# Patient Record
Sex: FEMALE | Race: WHITE | NOT HISPANIC OR LATINO | Employment: UNEMPLOYED | URBAN - METROPOLITAN AREA
[De-identification: names, ages, dates, MRNs, and addresses within clinical notes are randomized per-mention and may not be internally consistent; named-entity substitution may affect disease eponyms.]

---

## 2017-04-14 DIAGNOSIS — G40.219 LOCALIZATION-RELATED EPILEPSY WITH COMPLEX PARTIAL SEIZURES WITH INTRACTABLE EPILEPSY (H): Primary | ICD-10-CM

## 2017-04-14 RX ORDER — CARBAMAZEPINE 300 MG/1
CAPSULE, EXTENDED RELEASE ORAL
Qty: 120 CAPSULE | Refills: 0 | Status: SHIPPED | OUTPATIENT
Start: 2017-04-14 | End: 2017-05-16

## 2017-04-26 ENCOUNTER — OFFICE VISIT (OUTPATIENT)
Dept: NEUROLOGY | Facility: CLINIC | Age: 37
End: 2017-04-26

## 2017-04-26 VITALS
SYSTOLIC BLOOD PRESSURE: 124 MMHG | DIASTOLIC BLOOD PRESSURE: 43 MMHG | BODY MASS INDEX: 20.47 KG/M2 | HEART RATE: 96 BPM | WEIGHT: 123 LBS

## 2017-04-26 DIAGNOSIS — G40.219 PARTIAL EPILEPSY WITH IMPAIRMENT OF CONSCIOUSNESS, INTRACTABLE (H): Primary | ICD-10-CM

## 2017-04-26 LAB
ALBUMIN SERPL-MCNC: 3.8 G/DL (ref 3.4–5)
ALP SERPL-CCNC: 157 U/L (ref 40–150)
ALT SERPL W P-5'-P-CCNC: 25 U/L (ref 0–50)
ANION GAP SERPL CALCULATED.3IONS-SCNC: 11 MMOL/L (ref 3–14)
AST SERPL W P-5'-P-CCNC: 29 U/L (ref 0–45)
BASOPHILS # BLD AUTO: 0.1 10E9/L (ref 0–0.2)
BASOPHILS NFR BLD AUTO: 0.8 %
BILIRUB SERPL-MCNC: 0.3 MG/DL (ref 0.2–1.3)
BUN SERPL-MCNC: 12 MG/DL (ref 7–30)
CALCIUM SERPL-MCNC: 8.6 MG/DL (ref 8.5–10.1)
CHLORIDE SERPL-SCNC: 107 MMOL/L (ref 94–109)
CO2 SERPL-SCNC: 28 MMOL/L (ref 20–32)
CREAT SERPL-MCNC: 0.68 MG/DL (ref 0.52–1.04)
DIFFERENTIAL METHOD BLD: ABNORMAL
EOSINOPHIL # BLD AUTO: 0.1 10E9/L (ref 0–0.7)
EOSINOPHIL NFR BLD AUTO: 1.3 %
ERYTHROCYTE [DISTWIDTH] IN BLOOD BY AUTOMATED COUNT: 19.2 % (ref 10–15)
GFR SERPL CREATININE-BSD FRML MDRD: ABNORMAL ML/MIN/1.7M2
GLUCOSE SERPL-MCNC: 77 MG/DL (ref 70–99)
HCT VFR BLD AUTO: 29.2 % (ref 35–47)
HGB BLD-MCNC: 7.7 G/DL (ref 11.7–15.7)
IMM GRANULOCYTES # BLD: 0 10E9/L (ref 0–0.4)
IMM GRANULOCYTES NFR BLD: 0.3 %
LYMPHOCYTES # BLD AUTO: 1.2 10E9/L (ref 0.8–5.3)
LYMPHOCYTES NFR BLD AUTO: 20.1 %
MCH RBC QN AUTO: 17 PG (ref 26.5–33)
MCHC RBC AUTO-ENTMCNC: 26.4 G/DL (ref 31.5–36.5)
MCV RBC AUTO: 64 FL (ref 78–100)
MONOCYTES # BLD AUTO: 0.5 10E9/L (ref 0–1.3)
MONOCYTES NFR BLD AUTO: 7.5 %
NEUTROPHILS # BLD AUTO: 4.2 10E9/L (ref 1.6–8.3)
NEUTROPHILS NFR BLD AUTO: 70 %
NRBC # BLD AUTO: 0 10*3/UL
NRBC BLD AUTO-RTO: 0 /100
PLATELET # BLD AUTO: 431 10E9/L (ref 150–450)
POTASSIUM SERPL-SCNC: 4.6 MMOL/L (ref 3.4–5.3)
PROT SERPL-MCNC: 8.3 G/DL (ref 6.8–8.8)
RBC # BLD AUTO: 4.54 10E12/L (ref 3.8–5.2)
SODIUM SERPL-SCNC: 145 MMOL/L (ref 133–144)
WBC # BLD AUTO: 6 10E9/L (ref 4–11)

## 2017-04-26 NOTE — MR AVS SNAPSHOT
After Visit Summary   4/26/2017    Vernell Logan    MRN: [id number]           Patient Information     Date Of Birth          1980        Visit Information        Provider Department      4/26/2017 1:30 PM Ashli Wilkinson MD [facility]        Today's Diagnoses     Partial epilepsy with impairment of consciousness, intractable (H)    -  1       Follow-ups after your visit        Your next 10 appointments already scheduled     Oct 27, 2017 11:30 AM CDT   Return Visit with MD [facility] ([facility])    [address], [address]  [facility] [id number] [phone]              Who to contact     Please call your clinic at [phone] to:    Ask questions about your health    Make or cancel appointments    Discuss your medicines    Learn about your test results    Speak to your doctor   If you have compliments or concerns about an experience at your clinic, or if you wish to file a complaint, please contact [facility] Patient Relations at [phone] or email us at [email or web address]         Additional Information About Your Visit        MyChart Information     [employer] gives you secure access to your electronic health record. If you see a primary care provider, you can also send messages to your care team and make appointments. If you have questions, please call your primary care clinic.  If you do not have a primary care provider, please call [phone] and they will assist you.      [employer] is an electronic gateway that provides easy, online access to your medical records. With [employer], you can request a clinic appointment, read your test results, renew a prescription or communicate with your care team.     To access your existing account, please contact your [facility] or call [phone] for assistance.        Care EveryWhere ID     This is your Care EveryWhere ID.  This could be used by other organizations to access your [facility] medical records  [id number]        Your Vitals Were     Pulse Breastfeeding? BMI (Body Mass Index)             96 No 20.47 kg/m2          Blood Pressure from Last 3 Encounters:   04/26/17 124/43   08/17/16 121/60   04/13/16 125/67    Weight from Last 3 Encounters:   04/26/17 123 lb (55.8 kg)   08/17/16 121 lb 3.2 oz (55 kg)   04/13/16 111 lb 9.6 oz (50.6 kg)              We Performed the Following     Carbamazepine and epoxide free and total     CBC with platelets differential     Comprehensive metabolic panel     Lamotrigine level     Levetiracetam level        Primary Care Provider Office Phone # Fax #    Juliana Harris [phone]       [facility] [address] [facility] [address]        Thank you!     Thank you for choosing [facility]  for your care. Our goal is always to provide you with excellent care. Hearing back from our patients is one way we can continue to improve our services. Please take a few minutes to complete the written survey that you may receive in the mail after your visit with us. Thank you!             Your Updated Medication List - Protect others around you: Learn how to safely use, store and throw away your medicines at www.disposemymeds.org.          This list is accurate as of: 4/26/17  2:49 PM.  Always use your most recent med list.                   Brand Name Dispense Instructions for use    ascorbic acid 1000 MG Tabs    vitamin C     1 TABLET DAILY AT DINNER       carBAMazepine 300 MG 12 hr capsule    CARBATROL    120 capsule    TAKE 2 CAPSULES (600 MG) BY MOUTH 2 TIMES DAILY       IBUPROFEN      as needed       lamoTRIgine 100 MG tablet    LaMICtal    210 tablet    Take 2 tablets 8 am and 3 tablet at 1pm and 2 tablet 8 pm       levETIRAcetam 500 MG tablet    KEPPRA    150 tablet    TAKE 2.5 TABLETS BY MOUTH TWICE DAILY       TYLENOL PO      Take by mouth as needed for mild pain  or fever

## 2017-04-26 NOTE — PROGRESS NOTES
"[facility] Progress Note    Patient:  Vernell Logan  :  1980   Age:  36 year old   Today's Office Visit:  2017    Epilepsy Data:  Patient History  Primary Epileptologist/Provider: Ashli Wilkinson M.D.  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 Last 5 minutes.    # of Type 1 Seizure since last visit: 1  Freq. Type 1 / Month: 0.12  Seizure Type 2: Partial seizures with secondary generalization  -  with complex partial seizures evolving to generalized seizures  Description of Sz Type 2: Stares off -> GTC  # of Type 2 Seizure since last visit: 0  Freq. Type 2 / Month: 0      EPILEPSY HISTORY: Onset of seizures was 14 years of age. Based on electrographic data 2012, the patient had frequent nonconvulsive seizures arising from the right and left temporal lobe, bitemporal lobe interictal discharges. Her MRI is normal. Her PET scan was remarkable for decreased metabolic activity in the left temporal lobe. Prior  she rarely had seizure. From 5046-3366 she had several seizure per week. She had antiepileptic drug   INTERVAL HISTORY:  Came alone.  She had one partial seizure on 2017 (difficulty getting words out, feels confused, but, she is aware if someone is talking to her, she has difficulty with comprehension, she states \"it sounds like they are repeating a word over " "and over\"). Prior to this her last seizure was 7/2016. No generalized tonic-clonic convulsion, last generalized tonic-clonic convulsion 2014. She had double vision on lamotrigine 700 mg per day and decrease lamotrigine 200 mg three times a day on her own. After lowering lamotrigine she has less side effects and feels better. She has moved to [place] into a new apartment. Currently, on antiepileptic drug there is no double vision, no mood changes, no nausea, no vomiting, no abdominal pain, no rashes. No recent ER visits and no hospitalizations since last visit.        Prior to Admission medications    Medication Sig Start Date End Date Taking? Authorizing Provider   levETIRAcetam (KEPPRA) 500 MG tablet TAKE TWO AND ONE-HALF TABLETS BY MOUTH TWICE DAILY  Patient taking differently: 500 mg TAKE TWO AND ONE-HALF TABLETS BY MOUTH TWICE DAILY 4/13/16  Yes Ashli Wilkinson MD   lamoTRIgine (LAMICTAL) 100 MG tablet Take 2 tab po at 8 am and 3 tab po at 1 pm and 2 tab po 8 pm  Patient taking differently: 100 mg Take 2 tab po at 8 am and 3 tab po at 1 pm and 2 tab po 8 pm 4/13/16  Yes Ashli Wilkinson MD   carBAMazepine (CARBATROL) 300 MG 12 hr capsule TAKE 2 CAPSULES (600 MG) BY MOUTH 2 TIMES DAILY  Patient taking differently: 300 mg TAKE 2 CAPSULES (600 MG) BY MOUTH 2 TIMES DAILY 4/13/16  Yes Ashli Wilkinson MD   Acetaminophen (TYLENOL PO) Take by mouth as needed for mild pain or fever   Yes Reported, Patient   VITAMIN C 1000 MG OR TABS 1 TABLET DAILY AT DINNER   Yes Reported, Patient   IBUPROFEN as needed   Yes Reported, Patient       MEDICATIONS:   1.  Tegretol- mg AM and 600 mg PM    2.  Lamotrigine 200 mg in the morning, 200 mg 1pm, 200 mg at night.   3.  Keppra 1250 mg twice a day (she is not able to tolerate 1500 mg tablets).     Premenopause Females Questions:     Age menstraution started 9     Are menstrual cycles regular every month? yes    Do seizures increase during menses? No    Are you pregnant? No    Are " you breast feeding? No    Are you planning to get pregnant No    Are you taking any homone medications or contraception. No    Have you had any Ob/GYN surgeries? Yes - .    She had one pregnancy at age 16 and had a . . She had one generalized tonic-clonic convulsion during that pregnancy, she was on monotherapy carbamazepine 400 mg twice a day, baby had cerebral palsy. She passed away at age 17. Vernell was not involved with her care and child was adopted.    She is sexually active and not using contraception     MEDICATION NOTE: She cannot tolerate 1500 mg Keppra at a time as she gets hallucinations and hears voices, so we are not able to optimize Keppra any further. Carbamazepine increased to 900mg /day 2014    REVIEW OF SYSTEMS:  Intermittent dizziness.  diplopia improved.  B/l hand tremor. + Anxiety. Menses are irregular, she may have a menses every 40-50 days. She would like to get pregnant.      SOCIAL HISTORY:  Unfortunately, her  Ricardo passed away 2015. She is not working. Moved to [place]. She is trying to find a job. She smokes 1 pack of cigarettes per day. She is dating and is sexually active and not using contraception, we reviewed the importance of safe sex today.      PHYSICAL EXAMINATION:  /43  Pulse 96  Wt 123 lb (55.8 kg)  Breastfeeding? No  BMI 20.47 kg/m2  She  is alert and oriented to person, place and time.  Face is symmetric.  Extraocular movements intact.  Strength is 5/5.  She does have a tremor in her hands bilaterally.  Gait is stable.       ASSESSMENT:   1.  Localization-related epilepsy, uncontrolled (rare seizures), etiology unclear (MRI is nonlesional).  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  She is currently stable with her antiepileptic drug and had one complex partial seizure 2017  and last generalized tonic-clonic convulsion was . We will continue current antiepileptic drug with no  "changes. She is not able to tolerate higher doses of lamotrigine, levetiracetam, or carbamazepine. Antiepileptic drug that may be considered in future: banzel, onfi, fycompa, felbamate. I would avoid additional Na+ blocking agents.      2.  Women care issues. We have discussed  increased teratogenicity risks on multiple AEDs and on high doses.  She is sexually active. Despite education on contraception she does not want to use them and states \"I will not get pregnant\".       3.  Anxiety and depression.  Stable. She does not want to see a mental health provider at this time. Eating disorder is stable, she is eating three meals per day.      4.  Driving.  The patient does drive. Last seizure was 1/2017     PLAN:   1. Continue carbamazepine, lamotrigine and levetiracetam as above. Script for one year sent.   2. Follow up 6 month  3. Enrolled in UMPgenotyping study      PEMA THORNE MD       I spent 25 minutes with the patient and family. During this time counseling and coordination of care exceeded 50% of the visit time. I addressed all questions and concerns the family raised in regards to the patients care.             "

## 2017-04-26 NOTE — LETTER
2017       RE: Vernell Logan  : 1980   MRN: [id number]      Dear Colleague,    Thank you for referring your patient, Vernell Logan, to the [facility] at [facility]. Please see a copy of my visit note below.    [facility]    Patient:  Vernell Logan  :  1980   Age:  36 year old   Today's Office Visit:  2017    Epilepsy Data:  Patient History  Primary Epileptologist/Provider: Ashli Wilkinson M.D.  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 Last 5 minutes.    # of Type 1 Seizure since last visit: 1  Freq. Type 1 / Month: 0.12  Seizure Type 2: Partial seizures with secondary generalization  -  with complex partial seizures evolving to generalized seizures  Description of Sz Type 2: Stares off -> GTC  # of Type 2 Seizure since last visit: 0  Freq. Type 2 / Month: 0      EPILEPSY HISTORY: Onset of seizures was 14 years of age. Based on electrographic data 2012, the patient had frequent nonconvulsive seizures arising from the right and left temporal lobe, bitemporal lobe interictal discharges. Her MRI is normal. Her PET scan was remarkable for decreased metabolic activity in the left temporal lobe. Prior  she rarely had seizure. From 1661-0022 she had several seizure per week. She had antiepileptic  "drug   INTERVAL HISTORY:  Came alone.  She had one partial seizure on 1/2017 (difficulty getting words out, feels confused, but, she is aware if someone is talking to her, she has difficulty with comprehension, she states \"it sounds like they are repeating a word over and over\"). Prior to this her last seizure was 7/2016. No generalized tonic-clonic convulsion, last generalized tonic-clonic convulsion 2014. She had double vision on lamotrigine 700 mg per day and decrease lamotrigine 200 mg three times a day on her own. After lowering lamotrigine she has less side effects and feels better. She has moved to [place] into a new apartment. Currently, on antiepileptic drug there is no double vision, no mood changes, no nausea, no vomiting, no abdominal pain, no rashes. No recent ER visits and no hospitalizations since last visit.        Prior to Admission medications    Medication Sig Start Date End Date Taking? Authorizing Provider   levETIRAcetam (KEPPRA) 500 MG tablet TAKE TWO AND ONE-HALF TABLETS BY MOUTH TWICE DAILY  Patient taking differently: 500 mg TAKE TWO AND ONE-HALF TABLETS BY MOUTH TWICE DAILY 4/13/16  Yes Ashli Wilkinson MD   lamoTRIgine (LAMICTAL) 100 MG tablet Take 2 tab po at 8 am and 3 tab po at 1 pm and 2 tab po 8 pm  Patient taking differently: 100 mg Take 2 tab po at 8 am and 3 tab po at 1 pm and 2 tab po 8 pm 4/13/16  Yes Ashli Wilkinson MD   carBAMazepine (CARBATROL) 300 MG 12 hr capsule TAKE 2 CAPSULES (600 MG) BY MOUTH 2 TIMES DAILY  Patient taking differently: 300 mg TAKE 2 CAPSULES (600 MG) BY MOUTH 2 TIMES DAILY 4/13/16  Yes Ashli Wilkinson MD   Acetaminophen (TYLENOL PO) Take by mouth as needed for mild pain or fever   Yes Reported, Patient   VITAMIN C 1000 MG OR TABS 1 TABLET DAILY AT DINNER   Yes Reported, Patient   IBUPROFEN as needed   Yes Reported, Patient       MEDICATIONS:   1.  Tegretol- mg AM and 600 mg PM    2.  Lamotrigine 200 mg in the morning, 200 mg 1pm, 200 mg at night. "   3.  Keppra 1250 mg twice a day (she is not able to tolerate 1500 mg tablets).     Premenopause Females Questions:     Age menstraution started 9     Are menstrual cycles regular every month? yes    Do seizures increase during menses? No    Are you pregnant? No    Are you breast feeding? No    Are you planning to get pregnant No    Are you taking any homone medications or contraception. No    Have you had any Ob/GYN surgeries? Yes - .    She had one pregnancy at age 16 and had a . . She had one generalized tonic-clonic convulsion during that pregnancy, she was on monotherapy carbamazepine 400 mg twice a day, baby had cerebral palsy. She passed away at age 17. Vernell was not involved with her care and child was adopted.    She is sexually active and not using contraception     MEDICATION NOTE: She cannot tolerate 1500 mg Keppra at a time as she gets hallucinations and hears voices, so we are not able to optimize Keppra any further. Carbamazepine increased to 900mg /day 2014    REVIEW OF SYSTEMS:  Intermittent dizziness.  diplopia improved.  B/l hand tremor. + Anxiety. Menses are irregular, she may have a menses every 40-50 days. She would like to get pregnant.      SOCIAL HISTORY:  Unfortunately, her  Ricardo passed away 2015. She is not working. Moved to [place]. She is trying to find a job. She smokes 1 pack of cigarettes per day. She is dating and is sexually active and not using contraception, we reviewed the importance of safe sex today.      PHYSICAL EXAMINATION:  /43  Pulse 96  Wt 123 lb (55.8 kg)  Breastfeeding? No  BMI 20.47 kg/m2  She  is alert and oriented to person, place and time.  Face is symmetric.  Extraocular movements intact.  Strength is 5/5.  She does have a tremor in her hands bilaterally.  Gait is stable.       ASSESSMENT:   1.  Localization-related epilepsy, uncontrolled (rare seizures), etiology unclear (MRI is nonlesional).  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  She is currently stable with her antiepileptic drug and had one complex partial seizure 1/2017  and last generalized tonic-clonic convulsion was 2014. We will continue current antiepileptic drug with no changes. She is not able to tolerate higher doses of lamotrigine, levetiracetam, or carbamazepine. Antiepileptic drug that may be considered in future: banzel, onfi, fycompa, felbamate. I would avoid additional Na+ blocking agents.      2.  Women care issues. We have discussed  increased teratogenicity risks on multiple AEDs and on high doses.  She is sexually active. Despite education on contraception she does not want to use them and states \"I will not get pregnant\".       3.  Anxiety and depression.  Stable. She does not want to see a mental health provider at this time. Eating disorder is stable, she is eating three meals per day.      4.  Driving.  The patient does drive. Last seizure was 1/2017     PLAN:   1. Continue carbamazepine, lamotrigine and levetiracetam as above. Script for one year sent.   2. Follow up 6 month  3. Enrolled in UMenotyping study      ASHLI THORNE MD       I spent 25 minutes with the patient and family. During this time counseling and coordination of care exceeded 50% of the visit time. I addressed all questions and concerns the family raised in regards to the patients care.        Again, thank you for allowing me to participate in the care of your patient.      Sincerely,    Ashli Thorne MD      "

## 2017-04-28 LAB
CARBAMAZEPINE EP FREE SERPL-MCNC: 1 UG/ML
CARBAMAZEPINE EP SERPL-MCNC: 2.2 UG/ML
CARBAMAZEPINE FREE SERPL-MCNC: 2 UG/ML
CARBAMAZEPINE SERPL-MCNC: 9.1 UG/ML
LAMOTRIGINE SERPL-MCNC: 5.3 UG/ML
LEVETIRACETAM SERPL-MCNC: 22 UG/ML

## 2017-05-16 DIAGNOSIS — G40.219 LOCALIZATION-RELATED EPILEPSY WITH COMPLEX PARTIAL SEIZURES WITH INTRACTABLE EPILEPSY (H): ICD-10-CM

## 2017-05-16 RX ORDER — CARBAMAZEPINE 300 MG/1
CAPSULE, EXTENDED RELEASE ORAL
Qty: 120 CAPSULE | Refills: 11 | Status: SHIPPED | OUTPATIENT
Start: 2017-05-16 | End: 2017-10-12

## 2017-05-26 ENCOUNTER — TELEPHONE (OUTPATIENT)
Dept: NEUROLOGY | Facility: CLINIC | Age: 37
End: 2017-05-26

## 2017-05-26 NOTE — TELEPHONE ENCOUNTER
Writer received message:  Nurse please call patient her H/H is low and Alk Phos is high. She should see her primary care provider for this. Otherwise her labs look good.    Writer called patient and gave her this information.  She asked about Hematocrit and writer explained that it goes along with Hgb, patient expressed understanding and thanked writer for call.

## 2017-06-24 ENCOUNTER — HEALTH MAINTENANCE LETTER (OUTPATIENT)
Age: 37
End: 2017-06-24

## 2017-07-15 DIAGNOSIS — G40.219 LOCALIZATION-RELATED EPILEPSY WITH COMPLEX PARTIAL SEIZURES WITH INTRACTABLE EPILEPSY (H): ICD-10-CM

## 2017-07-17 RX ORDER — LEVETIRACETAM 500 MG/1
TABLET ORAL
Qty: 150 TABLET | Refills: 3 | Status: SHIPPED | OUTPATIENT
Start: 2017-07-17 | End: 2017-10-09

## 2017-07-17 RX ORDER — LAMOTRIGINE 100 MG/1
TABLET ORAL
Qty: 210 TABLET | Refills: 3 | Status: SHIPPED | OUTPATIENT
Start: 2017-07-17 | End: 2017-10-12

## 2017-10-09 DIAGNOSIS — G40.219 LOCALIZATION-RELATED EPILEPSY WITH COMPLEX PARTIAL SEIZURES WITH INTRACTABLE EPILEPSY (H): ICD-10-CM

## 2017-10-09 RX ORDER — LEVETIRACETAM 500 MG/1
TABLET ORAL
Qty: 150 TABLET | Refills: 0 | Status: SHIPPED | OUTPATIENT
Start: 2017-10-09 | End: 2017-10-12

## 2017-10-12 ENCOUNTER — OFFICE VISIT (OUTPATIENT)
Dept: NEUROLOGY | Facility: CLINIC | Age: 37
End: 2017-10-12

## 2017-10-12 VITALS
SYSTOLIC BLOOD PRESSURE: 127 MMHG | BODY MASS INDEX: 18.29 KG/M2 | WEIGHT: 109.8 LBS | HEART RATE: 96 BPM | DIASTOLIC BLOOD PRESSURE: 62 MMHG | HEIGHT: 65 IN

## 2017-10-12 DIAGNOSIS — G40.219 LOCALIZATION-RELATED EPILEPSY WITH COMPLEX PARTIAL SEIZURES WITH INTRACTABLE EPILEPSY (H): ICD-10-CM

## 2017-10-12 RX ORDER — LAMOTRIGINE 100 MG/1
TABLET ORAL
Qty: 540 TABLET | Refills: 3 | Status: SHIPPED | OUTPATIENT
Start: 2017-10-12 | End: 2017-11-02

## 2017-10-12 RX ORDER — CARBAMAZEPINE 300 MG/1
CAPSULE, EXTENDED RELEASE ORAL
Qty: 360 CAPSULE | Refills: 3 | Status: SHIPPED | OUTPATIENT
Start: 2017-10-12 | End: 2018-02-23

## 2017-10-12 RX ORDER — LEVETIRACETAM 500 MG/1
TABLET ORAL
Qty: 450 TABLET | Refills: 3 | Status: SHIPPED | OUTPATIENT
Start: 2017-10-12 | End: 2017-11-02

## 2017-10-12 NOTE — PATIENT INSTRUCTIONS
Medication Name   Tablet Size        8 AM  (morning)  2pm  8PM (Night)   Notes   Generic  Carbamazepine XR (Carbatrol) 300 mg   2 tablet   (600mg)   2 tablet   (600mg)     Generic  levetiracetam 500 mg   2.5 tablet   (1250 mg)   2.5 tablet   (1250 mg)  not able to tolerate 1500 mg per day   Generic  lamotrigine 100 mg   2 tablet   (200 mg) 2 tablet   (200 mg)  2  tablet   (200 mg)       CONTINUE TAKING YOUR OTHER MEDICATIONS AS PREVIOUSLY DIRECTED.  IF YOU  HAVE ANY SIDE EFFECTS OR CONCERNS ABOUT YOUR ANTIEPILEPTIC DRUG CALL [facility] OFFICE [phone]. PLEASE FOLLOW MEDICATION CHANGES AS ADVISED.     PEMA THORNE MD

## 2017-10-12 NOTE — LETTER
10/12/2017       RE: Vernell Logan  : 1980   MRN: [id number]      Dear Colleague,    Thank you for referring your patient, Vernell Logan, to the [facility] EPILEPSY CARE at [facility]. Please see a copy of my visit note below.    [facility] [place] [facility]    Patient:  Vernell Logan  :  1980   Age:  36 year old   Today's Office Visit:  10/12/2017    Epilepsy Data:  Patient History  Primary Epileptologist/Provider: Ashli Wilkinson M.D.  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 Last 5 minutes.    # of Type 1 Seizure since last visit: 1  Freq. Type 1 / Month: 0.07  Seizure Type 2: Partial seizures with secondary generalization  -  with complex partial seizures evolving to generalized seizures  Description of Sz Type 2: Stares off -> GTC  # of Type 2 Seizure since last visit: 0  Freq. Type 2 / Month: 0       EPILEPSY HISTORY: Onset of seizures was 14 years of age. Based on electrographic data 2012, the patient had frequent nonconvulsive seizures arising from the right and left temporal lobe, bitemporal lobe interictal discharges. Her MRI is normal. Her PET scan was remarkable for decreased metabolic activity in the left temporal lobe. Prior  she rarely had seizure. From 4819-0842 she had several seizure per week. She had  "antiepileptic drug.     INTERVAL HISTORY:  Came alone.  She had a spell in which she was not able to read 10/5/2017, no shaking, no obvious seizure activity. She was under a lot of stress. She had broken up with a boyfriend who was being mean. Patient states antiepileptic drug were not missed, no active infection, no sleep deprivation, no excessive alcohol use or recreational drug use.     Prior to this she had one partial seizure on 1/2017 (difficulty getting words out, feels confused, but, she is aware if someone is talking to her, she has difficulty with comprehension, she states \"it sounds like they are repeating a word over and over\"). Prior to this her last seizure was 7/2016. No generalized tonic-clonic convulsion, last generalized tonic-clonic convulsion 2014.     She had double vision on lamotrigine 700 mg per day and decrease lamotrigine 200 mg three times a day on her own and this decreased her double vision. After lowering lamotrigine she has less side effects and feels better. Currently, on antiepileptic drug there is no double vision, no mood changes, no nausea, no vomiting, no abdominal pain, no rashes. No recent ER visits and no hospitalizations since last visit.        Prior to Admission medications    Medication Sig Start Date End Date Taking? Authorizing Provider   levETIRAcetam (KEPPRA) 500 MG tablet TAKE TWO AND ONE-HALF TABLETS BY MOUTH TWICE DAILY  Patient taking differently: 500 mg TAKE TWO AND ONE-HALF TABLETS BY MOUTH TWICE DAILY 4/13/16  Yes Ashli Wilkinson MD   lamoTRIgine (LAMICTAL) 100 MG tablet Take 2 tab po at 8 am and 3 tab po at 1 pm and 2 tab po 8 pm  Patient taking differently: 100 mg Take 2 tab po at 8 am and 3 tab po at 1 pm and 2 tab po 8 pm 4/13/16  Yes Ashli Wilkinson MD   carBAMazepine (CARBATROL) 300 MG 12 hr capsule TAKE 2 CAPSULES (600 MG) BY MOUTH 2 TIMES DAILY  Patient taking differently: 300 mg TAKE 2 CAPSULES (600 MG) BY MOUTH 2 TIMES DAILY 4/13/16  Yes Ashli Wilkinson, " MD   Acetaminophen (TYLENOL PO) Take by mouth as needed for mild pain or fever   Yes Reported, Patient   VITAMIN C 1000 MG OR TABS 1 TABLET DAILY AT DINNER   Yes Reported, Patient   IBUPROFEN as needed   Yes Reported, Patient       MEDICATIONS:                Medication Name   Tablet Size        8 AM  (morning)  2pm  8PM (Night)   Notes   Generic  Carbamazepine XR (Carbatrol) 300 mg   2 tablet    2 tablet      Generic  levetiracetam 500 mg   2.5 tablet    2.5 tablet      Generic  lamotrigine 100 mg   2 tablet  2 tablet   2  tablet          Premenopause Females Questions:     Age menstraution started 9     Are menstrual cycles regular every month? yes    Do seizures increase during menses? No    Are you pregnant? No    Are you breast feeding? No    Are you planning to get pregnant No    Are you taking any homone medications or contraception. No    Have you had any Ob/GYN surgeries? Yes - .    She had one pregnancy at age 16 and had a . . She had one generalized tonic-clonic convulsion during that pregnancy, she was on monotherapy carbamazepine 400 mg twice a day, baby had cerebral palsy. She passed away at age 17. Vernell was not involved with her care and child was adopted.    She is sexually active and not using contraception     MEDICATION NOTE: She cannot tolerate 1500 mg Keppra at a time as she gets hallucinations and hears voices, so we are not able to optimize Keppra any further. Carbamazepine increased to 900mg /day 2014    REVIEW OF SYSTEMS:  Intermittent dizziness.  diplopia improved.  B/l hand tremor. + Anxiety. Menses are irregular, she may have a menses every 40-50 days. She would like to get pregnant.      SOCIAL HISTORY:  Unfortunately, her  Ricardo passed away 2015. She is not working. Moved to [place]. She is trying to find a job. She smokes 1 pack of cigarettes per day. She is dating and is sexually active and not using contraception, we reviewed the importance  "of safe sex today.      PHYSICAL EXAMINATION:  /62  Pulse 96  Ht 5' 5\" (165.1 cm)  Wt 109 lb 12.8 oz (49.8 kg)  LMP 10/04/2017 (Approximate)  Breastfeeding? No  BMI 18.27 kg/m2  She  is alert and oriented to person, place and time.  Face is symmetric.  Extraocular movements intact.  Strength is 5/5.  She does have a tremor in her hands bilaterally.  Gait is stable.       ASSESSMENT:   1.  Localization-related epilepsy, uncontrolled (rare seizures), etiology unclear (MRI is nonlesional).  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  She is currently stable with her antiepileptic drug and had one complex partial seizure 10/2017  and last generalized tonic-clonic convulsion was 2014. We will continue current antiepileptic drug with no changes. She is not able to tolerate higher doses of lamotrigine, levetiracetam, or carbamazepine. Antiepileptic drug that may be considered in future: banzel, onfi, fycompa, felbamate. I would avoid additional Na+ blocking agents.      2.  Women care issues. We have discussed  increased teratogenicity risks on multiple AEDs and on high doses.  She is sexually active. Despite education on contraception she does not want to use them and states \"I will not get pregnant\".       3.  Anxiety and depression.  Stable. She does not want to see a mental health provider at this time. Eating disorder is stable, she is eating three meals per day.      4.  Driving.  The patient does drive. Last seizure was 10/2017, but, she has maintained awareness. She tries not to drive.      PLAN:   1. Continue carbamazepine, lamotrigine and levetiracetam as above. Script for one year sent.   2. Follow up 6 month  3. Enrolled in UMPgenotyping study      PEMA THORNE MD       I spent 25 minutes with the patient and family. During this time counseling and coordination of care exceeded 50% of the visit time. I addressed all questions and concerns the family raised in " regards to the patients care.          Again, thank you for allowing me to participate in the care of your patient.      Sincerely,    Ashli Wilkinson MD

## 2017-10-12 NOTE — PROGRESS NOTES
[facility] Epilepsy Care Progress Note    Patient:  Vernell Logan  :  1980   Age:  36 year old   Today's Office Visit:  10/12/2017    Epilepsy Data:  Patient History  Primary Epileptologist/Provider: Ashli Wilkinson M.D.  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 Last 5 minutes.    # of Type 1 Seizure since last visit: 1  Freq. Type 1 / Month: 0.07  Seizure Type 2: Partial seizures with secondary generalization  -  with complex partial seizures evolving to generalized seizures  Description of Sz Type 2: Stares off -> GTC  # of Type 2 Seizure since last visit: 0  Freq. Type 2 / Month: 0       EPILEPSY HISTORY: Onset of seizures was 14 years of age. Based on electrographic data 2012, the patient had frequent nonconvulsive seizures arising from the right and left temporal lobe, bitemporal lobe interictal discharges. Her MRI is normal. Her PET scan was remarkable for decreased metabolic activity in the left temporal lobe. Prior  she rarely had seizure. From 5858-6543 she had several seizure per week. She had antiepileptic drug.     INTERVAL HISTORY:  Came alone.  She had a spell in which she was not able to read 10/5/2017, no shaking, no obvious seizure activity. She was under a lot of stress. She had broken up with a boyfriend who was being mean. Patient states antiepileptic drug  "were not missed, no active infection, no sleep deprivation, no excessive alcohol use or recreational drug use.     Prior to this she had one partial seizure on 1/2017 (difficulty getting words out, feels confused, but, she is aware if someone is talking to her, she has difficulty with comprehension, she states \"it sounds like they are repeating a word over and over\"). Prior to this her last seizure was 7/2016. No generalized tonic-clonic convulsion, last generalized tonic-clonic convulsion 2014.     She had double vision on lamotrigine 700 mg per day and decrease lamotrigine 200 mg three times a day on her own and this decreased her double vision. After lowering lamotrigine she has less side effects and feels better. Currently, on antiepileptic drug there is no double vision, no mood changes, no nausea, no vomiting, no abdominal pain, no rashes. No recent ER visits and no hospitalizations since last visit.        Prior to Admission medications    Medication Sig Start Date End Date Taking? Authorizing Provider   levETIRAcetam (KEPPRA) 500 MG tablet TAKE TWO AND ONE-HALF TABLETS BY MOUTH TWICE DAILY  Patient taking differently: 500 mg TAKE TWO AND ONE-HALF TABLETS BY MOUTH TWICE DAILY 4/13/16  Yes Ashli Wilkinson MD   lamoTRIgine (LAMICTAL) 100 MG tablet Take 2 tab po at 8 am and 3 tab po at 1 pm and 2 tab po 8 pm  Patient taking differently: 100 mg Take 2 tab po at 8 am and 3 tab po at 1 pm and 2 tab po 8 pm 4/13/16  Yes Ashli Wilkinson MD   carBAMazepine (CARBATROL) 300 MG 12 hr capsule TAKE 2 CAPSULES (600 MG) BY MOUTH 2 TIMES DAILY  Patient taking differently: 300 mg TAKE 2 CAPSULES (600 MG) BY MOUTH 2 TIMES DAILY 4/13/16  Yes Ashli Wilkinson MD   Acetaminophen (TYLENOL PO) Take by mouth as needed for mild pain or fever   Yes Reported, Patient   VITAMIN C 1000 MG OR TABS 1 TABLET DAILY AT DINNER   Yes Reported, Patient   IBUPROFEN as needed   Yes Reported, Patient       MEDICATIONS:                Medication Name " "  Tablet Size        8 AM  (morning)  2pm  8PM (Night)   Notes   Generic  Carbamazepine XR (Carbatrol) 300 mg   2 tablet    2 tablet      Generic  levetiracetam 500 mg   2.5 tablet    2.5 tablet      Generic  lamotrigine 100 mg   2 tablet  2 tablet   2  tablet          Premenopause Females Questions:     Age menstraution started 9     Are menstrual cycles regular every month? yes    Do seizures increase during menses? No    Are you pregnant? No    Are you breast feeding? No    Are you planning to get pregnant No    Are you taking any homone medications or contraception. No    Have you had any Ob/GYN surgeries? Yes - .    She had one pregnancy at age 16 and had a . . She had one generalized tonic-clonic convulsion during that pregnancy, she was on monotherapy carbamazepine 400 mg twice a day, baby had cerebral palsy. She passed away at age 17. Vernell was not involved with her care and child was adopted.    She is sexually active and not using contraception     MEDICATION NOTE: She cannot tolerate 1500 mg Keppra at a time as she gets hallucinations and hears voices, so we are not able to optimize Keppra any further. Carbamazepine increased to 900mg /day 2014    REVIEW OF SYSTEMS:  Intermittent dizziness.  diplopia improved.  B/l hand tremor. + Anxiety. Menses are irregular, she may have a menses every 40-50 days. She would like to get pregnant.      SOCIAL HISTORY:  Unfortunately, her  Ricardo passed away 2015. She is not working. Moved to [place]. She is trying to find a job. She smokes 1 pack of cigarettes per day. She is dating and is sexually active and not using contraception, we reviewed the importance of safe sex today.      PHYSICAL EXAMINATION:  /62  Pulse 96  Ht 5' 5\" (165.1 cm)  Wt 109 lb 12.8 oz (49.8 kg)  LMP 10/04/2017 (Approximate)  Breastfeeding? No  BMI 18.27 kg/m2  She  is alert and oriented to person, place and time.  Face is symmetric.  Extraocular " "movements intact.  Strength is 5/5.  She does have a tremor in her hands bilaterally.  Gait is stable.       ASSESSMENT:   1.  Localization-related epilepsy, uncontrolled (rare seizures), etiology unclear (MRI is nonlesional).  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  She is currently stable with her antiepileptic drug and had one complex partial seizure 10/2017  and last generalized tonic-clonic convulsion was 2014. We will continue current antiepileptic drug with no changes. She is not able to tolerate higher doses of lamotrigine, levetiracetam, or carbamazepine. Antiepileptic drug that may be considered in future: banzel, onfi, fycompa, felbamate. I would avoid additional Na+ blocking agents.      2.  Women care issues. We have discussed  increased teratogenicity risks on multiple AEDs and on high doses.  She is sexually active. Despite education on contraception she does not want to use them and states \"I will not get pregnant\".       3.  Anxiety and depression.  Stable. She does not want to see a mental health provider at this time. Eating disorder is stable, she is eating three meals per day.      4.  Driving.  The patient does drive. Last seizure was 10/2017, but, she has maintained awareness. She tries not to drive.      PLAN:   1. Continue carbamazepine, lamotrigine and levetiracetam as above. Script for one year sent.   2. Follow up 6 month  3. Enrolled in UMPgenotyping study      PEMA THORNE MD       I spent 25 minutes with the patient and family. During this time counseling and coordination of care exceeded 50% of the visit time. I addressed all questions and concerns the family raised in regards to the patients care.             "
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
## 2017-10-26 NOTE — PATIENT INSTRUCTIONS

## 2017-10-27 ASSESSMENT — ANXIETY QUESTIONNAIRES
6. BECOMING EASILY ANNOYED OR IRRITABLE: NOT AT ALL
1. FEELING NERVOUS, ANXIOUS, OR ON EDGE: MORE THAN HALF THE DAYS
3. WORRYING TOO MUCH ABOUT DIFFERENT THINGS: MORE THAN HALF THE DAYS
4. TROUBLE RELAXING: SEVERAL DAYS
GAD7 TOTAL SCORE: 8
7. FEELING AFRAID AS IF SOMETHING AWFUL MIGHT HAPPEN: SEVERAL DAYS
GAD7 TOTAL SCORE: 8
GAD7 TOTAL SCORE: 8
5. BEING SO RESTLESS THAT IT IS HARD TO SIT STILL: NOT AT ALL
7. FEELING AFRAID AS IF SOMETHING AWFUL MIGHT HAPPEN: SEVERAL DAYS
2. NOT BEING ABLE TO STOP OR CONTROL WORRYING: MORE THAN HALF THE DAYS

## 2017-10-27 ASSESSMENT — PATIENT HEALTH QUESTIONNAIRE - PHQ9
SUM OF ALL RESPONSES TO PHQ QUESTIONS 1-9: 4
SUM OF ALL RESPONSES TO PHQ QUESTIONS 1-9: 4
10. IF YOU CHECKED OFF ANY PROBLEMS, HOW DIFFICULT HAVE THESE PROBLEMS MADE IT FOR YOU TO DO YOUR WORK, TAKE CARE OF THINGS AT HOME, OR GET ALONG WITH OTHER PEOPLE: SOMEWHAT DIFFICULT

## 2017-10-28 ASSESSMENT — PATIENT HEALTH QUESTIONNAIRE - PHQ9: SUM OF ALL RESPONSES TO PHQ QUESTIONS 1-9: 4

## 2017-10-28 ASSESSMENT — ANXIETY QUESTIONNAIRES: GAD7 TOTAL SCORE: 8

## 2017-10-30 ENCOUNTER — OFFICE VISIT (OUTPATIENT)
Dept: FAMILY MEDICINE | Facility: CLINIC | Age: 37
End: 2017-10-30
Payer: MEDICARE

## 2017-10-30 VITALS
BODY MASS INDEX: 18.23 KG/M2 | DIASTOLIC BLOOD PRESSURE: 68 MMHG | TEMPERATURE: 99.7 F | RESPIRATION RATE: 18 BRPM | SYSTOLIC BLOOD PRESSURE: 106 MMHG | HEART RATE: 83 BPM | OXYGEN SATURATION: 100 % | HEIGHT: 64 IN | WEIGHT: 106.8 LBS

## 2017-10-30 DIAGNOSIS — N64.4 BREAST PAIN, LEFT: ICD-10-CM

## 2017-10-30 DIAGNOSIS — F33.0 MAJOR DEPRESSIVE DISORDER, RECURRENT EPISODE, MILD (H): ICD-10-CM

## 2017-10-30 DIAGNOSIS — Z32.01 PREGNANCY TEST POSITIVE: ICD-10-CM

## 2017-10-30 DIAGNOSIS — D64.9 ANEMIA, UNSPECIFIED TYPE: ICD-10-CM

## 2017-10-30 DIAGNOSIS — Z34.00 SUPERVISION OF NORMAL FIRST PREGNANCY, ANTEPARTUM: ICD-10-CM

## 2017-10-30 DIAGNOSIS — R63.4 WEIGHT LOSS: ICD-10-CM

## 2017-10-30 DIAGNOSIS — N63.20 LEFT BREAST LUMP: ICD-10-CM

## 2017-10-30 DIAGNOSIS — Z00.00 ROUTINE GENERAL MEDICAL EXAMINATION AT A HEALTH CARE FACILITY: Primary | ICD-10-CM

## 2017-10-30 DIAGNOSIS — G40.219 PARTIAL EPILEPSY WITH IMPAIRMENT OF CONSCIOUSNESS, INTRACTABLE (H): ICD-10-CM

## 2017-10-30 DIAGNOSIS — Z87.891 PERSONAL HISTORY OF TOBACCO USE, PRESENTING HAZARDS TO HEALTH: ICD-10-CM

## 2017-10-30 LAB
ANION GAP SERPL CALCULATED.3IONS-SCNC: 5 MMOL/L (ref 3–14)
BASOPHILS # BLD AUTO: 0 10E9/L (ref 0–0.2)
BASOPHILS NFR BLD AUTO: 0.1 %
BUN SERPL-MCNC: 8 MG/DL (ref 7–30)
CALCIUM SERPL-MCNC: 8.8 MG/DL (ref 8.5–10.1)
CHLORIDE SERPL-SCNC: 98 MMOL/L (ref 94–109)
CHOLEST SERPL-MCNC: 221 MG/DL
CO2 SERPL-SCNC: 30 MMOL/L (ref 20–32)
CREAT SERPL-MCNC: 0.54 MG/DL (ref 0.52–1.04)
DIFFERENTIAL METHOD BLD: ABNORMAL
EOSINOPHIL # BLD AUTO: 0 10E9/L (ref 0–0.7)
EOSINOPHIL NFR BLD AUTO: 0.3 %
ERYTHROCYTE [DISTWIDTH] IN BLOOD BY AUTOMATED COUNT: 12.7 % (ref 10–15)
FERRITIN SERPL-MCNC: 23 NG/ML (ref 12–150)
GFR SERPL CREATININE-BSD FRML MDRD: >90 ML/MIN/1.7M2
GLUCOSE SERPL-MCNC: 80 MG/DL (ref 70–99)
HCG SERPL QL: POSITIVE
HCT VFR BLD AUTO: 41.5 % (ref 35–47)
HDLC SERPL-MCNC: 97 MG/DL
HGB BLD-MCNC: 14.1 G/DL (ref 11.7–15.7)
IRON SATN MFR SERPL: 41 % (ref 15–46)
IRON SERPL-MCNC: 114 UG/DL (ref 35–180)
LDLC SERPL CALC-MCNC: 105 MG/DL
LYMPHOCYTES # BLD AUTO: 1.3 10E9/L (ref 0.8–5.3)
LYMPHOCYTES NFR BLD AUTO: 10.9 %
MCH RBC QN AUTO: 29.3 PG (ref 26.5–33)
MCHC RBC AUTO-ENTMCNC: 34 G/DL (ref 31.5–36.5)
MCV RBC AUTO: 86 FL (ref 78–100)
MONOCYTES # BLD AUTO: 0.6 10E9/L (ref 0–1.3)
MONOCYTES NFR BLD AUTO: 4.8 %
NEUTROPHILS # BLD AUTO: 10.3 10E9/L (ref 1.6–8.3)
NEUTROPHILS NFR BLD AUTO: 83.9 %
NONHDLC SERPL-MCNC: 124 MG/DL
PLATELET # BLD AUTO: 258 10E9/L (ref 150–450)
POTASSIUM SERPL-SCNC: 3.4 MMOL/L (ref 3.4–5.3)
RBC # BLD AUTO: 4.81 10E12/L (ref 3.8–5.2)
SODIUM SERPL-SCNC: 133 MMOL/L (ref 133–144)
T4 FREE SERPL-MCNC: 1.05 NG/DL (ref 0.76–1.46)
TIBC SERPL-MCNC: 276 UG/DL (ref 240–430)
TRIGL SERPL-MCNC: 93 MG/DL
TSH SERPL DL<=0.005 MIU/L-ACNC: 0.2 MU/L (ref 0.4–4)
VIT B12 SERPL-MCNC: 606 PG/ML (ref 193–986)
WBC # BLD AUTO: 12.2 10E9/L (ref 4–11)

## 2017-10-30 PROCEDURE — 80061 LIPID PANEL: CPT | Performed by: PHYSICIAN ASSISTANT

## 2017-10-30 PROCEDURE — 80048 BASIC METABOLIC PNL TOTAL CA: CPT | Performed by: PHYSICIAN ASSISTANT

## 2017-10-30 PROCEDURE — 84703 CHORIONIC GONADOTROPIN ASSAY: CPT | Performed by: PHYSICIAN ASSISTANT

## 2017-10-30 PROCEDURE — 99385 PREV VISIT NEW AGE 18-39: CPT | Performed by: PHYSICIAN ASSISTANT

## 2017-10-30 PROCEDURE — 36415 COLL VENOUS BLD VENIPUNCTURE: CPT | Performed by: PHYSICIAN ASSISTANT

## 2017-10-30 PROCEDURE — 85025 COMPLETE CBC W/AUTO DIFF WBC: CPT | Performed by: PHYSICIAN ASSISTANT

## 2017-10-30 PROCEDURE — 99214 OFFICE O/P EST MOD 30 MIN: CPT | Mod: 25 | Performed by: PHYSICIAN ASSISTANT

## 2017-10-30 PROCEDURE — 82607 VITAMIN B-12: CPT | Performed by: PHYSICIAN ASSISTANT

## 2017-10-30 PROCEDURE — 83540 ASSAY OF IRON: CPT | Performed by: PHYSICIAN ASSISTANT

## 2017-10-30 PROCEDURE — 84443 ASSAY THYROID STIM HORMONE: CPT | Performed by: PHYSICIAN ASSISTANT

## 2017-10-30 PROCEDURE — 83550 IRON BINDING TEST: CPT | Performed by: PHYSICIAN ASSISTANT

## 2017-10-30 PROCEDURE — 84702 CHORIONIC GONADOTROPIN TEST: CPT | Performed by: PHYSICIAN ASSISTANT

## 2017-10-30 PROCEDURE — 82728 ASSAY OF FERRITIN: CPT | Performed by: PHYSICIAN ASSISTANT

## 2017-10-30 PROCEDURE — 84439 ASSAY OF FREE THYROXINE: CPT | Performed by: PHYSICIAN ASSISTANT

## 2017-10-30 ASSESSMENT — PAIN SCALES - GENERAL: PAINLEVEL: EXTREME PAIN (8)

## 2017-10-30 NOTE — LETTER
My Depression Action Plan  Name: Vernell Logan   Date of Birth 1980  Date: 10/26/2017    My doctor: Juliana Harris   [facility]  [id number] [place] [address], [address]  [place] [id number] [phone]          GREEN    ZONE   Good Control    What it looks like:     Things are going generally well. You have normal up s and down s. You may even feel depressed from time to time, but bad moods usually last less than a day.   What you need to do:  1. Continue to care for yourself (see self care plan)  2. Check your depression survival kit and update it as needed  3. Follow your physician s recommendations including any medication.  4. Do not stop taking medication unless you consult with your physician first.           YELLOW         ZONE Getting Worse    What it looks like:     Depression is starting to interfere with your life.     It may be hard to get out of bed; you may be starting to isolate yourself from others.    Symptoms of depression are starting to last most all day and this has happened for several days.     You may have suicidal thoughts but they are not constant.   What you need to do:     1. Call your care team, your response to treatment will improve if you keep your care team informed of your progress. Yellow periods are signs an adjustment may need to be made.     2. Continue your self-care, even if you have to fake it!    3. Talk to someone in your support network    4. Open up your depression survival kit           RED    ZONE Medical Alert - Get Help    What it looks like:     Depression is seriously interfering with your life.     You may experience these or other symptoms: You can t get out of bed most days, can t work or engage in other necessary activities, you have trouble taking care of basic hygiene, or basic responsibilities, thoughts of suicide or death that will not go away, self-injurious behavior.     What you need to do:  1. Call your care team and  request a same-day appointment. If they are not available (weekends or after hours) call your local crisis line, emergency room or 911.      Electronically signed by: Bianca Elias, October 26, 2017    Depression Self Care Plan / Survival Kit    Self-Care for Depression  Here s the deal. Your body and mind are really not as separate as most people think.  What you do and think affects how you feel and how you feel influences what you do and think. This means if you do things that people who feel good do, it will help you feel better.  Sometimes this is all it takes.  There is also a place for medication and therapy depending on how severe your depression is, so be sure to consult with your medical provider and/ or Behavioral Health Consultant if your symptoms are worsening or not improving.     In order to better manage my stress, I will:    Exercise  Get some form of exercise, every day. This will help reduce pain and release endorphins, the  feel good  chemicals in your brain. This is almost as good as taking antidepressants!  This is not the same as joining a gym and then never going! (they count on that by the way ) It can be as simple as just going for a walk or doing some gardening, anything that will get you moving.      Hygiene   Maintain good hygiene (Get out of bed in the morning, Make your bed, Brush your teeth, Take a shower, and Get dressed like you were going to work, even if you are unemployed).  If your clothes don't fit try to get ones that do.    Diet  I will strive to eat foods that are good for me, drink plenty of water, and avoid excessive sugar, caffeine, alcohol, and other mood-altering substances.  Some foods that are helpful in depression are: complex carbohydrates, B vitamins, flaxseed, fish or fish oil, fresh fruits and vegetables.    Psychotherapy  I agree to participate in Individual Therapy (if recommended).    Medication  If prescribed medications, I agree to take them.  Missing  doses can result in serious side effects.  I understand that drinking alcohol, or other illicit drug use, may cause potential side effects.  I will not stop my medication abruptly without first discussing it with my provider.    Staying Connected With Others  I will stay in touch with my friends, family members, and my primary care provider/team.    Use your imagination  Be creative.  We all have a creative side; it doesn t matter if it s oil painting, sand castles, or mud pies! This will also kick up the endorphins.    Witness Beauty  (AKA stop and smell the roses) Take a look outside, even in mid-winter. Notice colors, textures. Watch the squirrels and birds.     Service to others  Be of service to others.  There is always someone else in need.  By helping others we can  get out of ourselves  and remember the really important things.  This also provides opportunities for practicing all the other parts of the program.    Humor  Laugh and be silly!  Adjust your TV habits for less news and crime-drama and more comedy.    Control your stress  Try breathing deep, massage therapy, biofeedback, and meditation. Find time to relax each day.     My support system    Clinic Contact:  Phone number:    Contact 1:  Phone number:    Contact 2:  Phone number:    [employer]/:  Phone number:    Therapist:  Phone number:    Local crisis center:    Phone number:    Other community support:  Phone number:

## 2017-10-30 NOTE — MR AVS SNAPSHOT
After Visit Summary   10/30/2017    Vernell Logan    MRN: [id number]           Patient Information     Date Of Birth          1980        Visit Information        Provider Department      10/30/2017 11:00 AM Subha Saenz PA-C [facility]        Today's Diagnoses     Routine general medical examination at a health care facility    -  1    Major depressive disorder, recurrent episode, mild (H)        Personal history of tobacco use, presenting hazards to health        Left breast lump        Breast pain, left        Anemia, unspecified type          Care Instructions      Preventive Health Recommendations  Female Ages 26 - 39  Yearly exam:   See your health care provider every year in order to    Review health changes.     Discuss preventive care.      Review your medicines if you your doctor has prescribed any.    Until age 30: Get a Pap test every three years (more often if you have had an abnormal result).    After age 30: Talk to your doctor about whether you should have a Pap test every 3 years or have a Pap test with HPV screening every 5 years.   You do not need a Pap test if your uterus was removed (hysterectomy) and you have not had cancer.  You should be tested each year for STDs (sexually transmitted diseases), if you're at risk.   Talk to your provider about how often to have your cholesterol checked.  If you are at risk for diabetes, you should have a diabetes test (fasting glucose).  Shots: Get a flu shot each year. Get a tetanus shot every 10 years.   Nutrition:     Eat at least 5 servings of fruits and vegetables each day.    Eat whole-grain bread, whole-wheat pasta and brown rice instead of white grains and rice.    Talk to your provider about Calcium and Vitamin D.     Lifestyle    Exercise at least 150 minutes a week (30 minutes a day, 5 days of the week). This will help you control your weight and prevent disease.    Limit alcohol to one drink per day.    No  "smoking.     Wear sunscreen to prevent skin cancer.    See your dentist every six months for an exam and cleaning.    Plan for breast imaging    Hemomglobin was normal at 13.   You can stop the iron  Will recheck 6 months      Other labs pending                    Follow-ups after your visit        Your next 10 appointments already scheduled     Nov 01, 2017  3:50 PM CDT   MA DIAGNOSTIC DIGITAL BILATERAL with MGMA2, MG MA [facility] ([facility])    [id number] [place] [id number] [phone]           Do not use any powder, lotion or deodorant under your arms or on your breast. If you do, we will ask you to remove it before your exam.  Wear comfortable, two-piece clothing.  If you have any allergies, tell your care team.  Bring any previous mammograms from other facilities or have them mailed to the breast center.  Three-dimensional (3D) mammograms are available at [facility] locations in [facility], [facility], [facility], and [place]. [facility] locations include [place] [facility] in [place]. Benefits of 3D mammograms include: - Improved rate of cancer detection - Decreases your chance of having to go back for more tests, which means fewer: - \"False-positive\" results (This means that there is an abnormal area but it isn't cancer.) - Invasive testing procedures, such as a biopsy or surgery - Can provide clearer images of the breast if you have dense breast tissue. 3D mammography is an optional exam that anyone can have with a 2D mammogram. It doesn't replace or take the place of a 2D mammogram. 2D mammograms remain an effective screening test for all women.  Not all insurance companies cover the cost of a 3D mammogram. Check with your insurance.            Nov 01, 2017  4:10 PM CDT   US BREAST LEFT CMPL 4 QUAD with MGMUS1 MG [facility] ([facility]) "    [address] [address] [place] [id number] [phone]           Please bring a list of your medicines (including vitamins, minerals and over-the-counter drugs). Also, tell your doctor about any allergies you may have. Wear comfortable clothes and leave your valuables at home.  You do not need to do anything special to prepare for your exam.  Please call the Imaging Department at your exam site with any questions.              Future tests that were ordered for you today     Open Future Orders        Priority Expected Expires Ordered    US Breast Left Complete 4 Quadrants Routine  10/30/2018 10/30/2017    MA Diagnostic Digital Bilateral Routine  10/30/2018 10/30/2017            Who to contact     If you have questions or need follow up information about today's clinic visit or your schedule please contact [facility] directly at [phone].  Normal or non-critical lab and imaging results will be communicated to you by MyChart, letter or phone within 4 business days after the clinic has received the results. If you do not hear from us within 7 days, please contact the clinic through [employer] or phone. If you have a critical or abnormal lab result, we will notify you by phone as soon as possible.  Submit refill requests through [employer] or call your pharmacy and they will forward the refill request to us. Please allow 3 business days for your refill to be completed.          Additional Information About Your Visit        [employer] Information     [employer] gives you secure access to your electronic health record. If you see a primary care provider, you can also send messages to your care team and make appointments. If you have questions, please call your primary care clinic.  If you do not have a primary care provider, please call [phone] and they will assist you.        Care EveryWhere ID     This is your Care EveryWhere ID. This could be used by other organizations to access your [facility] [place]  "medical records  [id number]        Your Vitals Were     Pulse Temperature Respirations Height Last Period Pulse Oximetry    83 99.7  F (37.6  C) (Temporal) 18 5' 3.78\" (1.62 m) 10/30/2017 (Approximate) 100%    BMI (Body Mass Index)                   18.46 kg/m2            Blood Pressure from Last 3 Encounters:   10/30/17 106/68   10/12/17 127/62   04/26/17 124/43    Weight from Last 3 Encounters:   10/30/17 106 lb 12.8 oz (48.4 kg)   10/12/17 109 lb 12.8 oz (49.8 kg)   04/26/17 123 lb (55.8 kg)              We Performed the Following     Basic metabolic panel  (Ca, Cl, CO2, Creat, Gluc, K, Na, BUN)     CBC with platelets and differential     DEPRESSION ACTION PLAN (DAP)     Ferritin     HCG qualitative Blood     Iron and iron binding capacity     Lipid panel reflex to direct LDL Fasting     TOBACCO CESSATION - FOR HEALTH MAINTENANCE     TSH with free T4 reflex     Vitamin B12        Primary Care Provider Office Phone # Fax #    Juliana Harris [phone]       [address]  [facility] [address]        Equal Access to Services     [facility]: Hadii aad osman hadasho Soyana, waaxda luqadaha, qaybta kaalmada adeegyada, bubba candelario . So [facility] [phone].    ATENCIÓN: Si habla español, tiene a larkin disposición servicios gratuitos de asistencia lingüística. Llame al [phone].    We comply with applicable federal civil rights laws and [place] laws. We do not discriminate on the basis of race, color, national origin, age, disability, sex, sexual orientation, or gender identity.            Thank you!     Thank you for choosing [facility]  for your care. Our goal is always to provide you with excellent care. Hearing back from our patients is one way we can continue to improve our services. Please take a few minutes to complete the written survey that you may receive in the mail after your visit with us. Thank you!             Your Updated Medication List " - Protect others around you: Learn how to safely use, store and throw away your medicines at www.disposemymeds.org.          This list is accurate as of: 10/30/17 12:23 PM.  Always use your most recent med list.                   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)       IBUPROFEN      as needed        IRON (FERROUS SULFATE) PO      Take 1 tablet by mouth every morning        lamoTRIgine 100 MG tablet    LaMICtal    540 tablet    200 mg three times a day    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)       levETIRAcetam 500 MG tablet    KEPPRA    450 tablet    Take two and one half tablets by mouth twice daily    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

## 2017-10-30 NOTE — Clinical Note
Elvin Cedillo, This patient was new to me for a physical today. Incidentally found to be pregnant. Uncertain dates. Obtaining quant hcg and early OB US. I have advised her to be in touch with you about her antiseizure medication regimen. I do not do prenatal care and will refer her on to OB. TITUSI. Thank you, Subha Saenz PA-C

## 2017-10-31 ENCOUNTER — TELEPHONE (OUTPATIENT)
Dept: NEUROLOGY | Facility: CLINIC | Age: 37
End: 2017-10-31

## 2017-10-31 LAB — B-HCG SERPL-ACNC: ABNORMAL IU/L (ref 0–5)

## 2017-10-31 NOTE — TELEPHONE ENCOUNTER
From Hilario Vu:    Patient called in saying she just found out she is pregnant.   She is on Carbatrol 300 mg 12hr   600 / 600   Keprra  1250 / 1250   LAMOTRIGINE 200 / 200 / 200   She has had Vision SE from LTG   She had one child with cerebral palsy   No immediate concerns - just staying healthy and keeping baby safe.   Also sent message to Iraida

## 2017-11-01 ENCOUNTER — TELEPHONE (OUTPATIENT)
Dept: FAMILY MEDICINE | Facility: CLINIC | Age: 37
End: 2017-11-01

## 2017-11-01 ENCOUNTER — RADIANT APPOINTMENT (OUTPATIENT)
Dept: ULTRASOUND IMAGING | Facility: CLINIC | Age: 37
End: 2017-11-01
Attending: PHYSICIAN ASSISTANT
Payer: MEDICARE

## 2017-11-01 ENCOUNTER — RADIANT APPOINTMENT (OUTPATIENT)
Dept: MAMMOGRAPHY | Facility: CLINIC | Age: 37
End: 2017-11-01
Attending: PHYSICIAN ASSISTANT
Payer: MEDICARE

## 2017-11-01 DIAGNOSIS — N63.20 LEFT BREAST LUMP: ICD-10-CM

## 2017-11-01 DIAGNOSIS — Z32.01 PREGNANCY TEST POSITIVE: ICD-10-CM

## 2017-11-01 DIAGNOSIS — N64.4 BREAST PAIN, LEFT: ICD-10-CM

## 2017-11-01 PROCEDURE — 76801 OB US < 14 WKS SINGLE FETUS: CPT

## 2017-11-01 PROCEDURE — G0204 DX MAMMO INCL CAD BI: HCPCS

## 2017-11-01 PROCEDURE — 76802 OB US < 14 WKS ADDL FETUS: CPT

## 2017-11-01 PROCEDURE — 76642 ULTRASOUND BREAST LIMITED: CPT | Mod: LT

## 2017-11-02 ENCOUNTER — TELEPHONE (OUTPATIENT)
Dept: NEUROLOGY | Facility: CLINIC | Age: 37
End: 2017-11-02

## 2017-11-02 DIAGNOSIS — G40.219 LOCALIZATION-RELATED EPILEPSY WITH COMPLEX PARTIAL SEIZURES WITH INTRACTABLE EPILEPSY (H): Primary | ICD-10-CM

## 2017-11-02 DIAGNOSIS — O30.009 TWIN PREGNANCY: ICD-10-CM

## 2017-11-02 RX ORDER — LEVETIRACETAM 500 MG/1
TABLET ORAL
Qty: 135 TABLET | Refills: 11 | Status: SHIPPED | OUTPATIENT
Start: 2017-11-02 | End: 2018-01-17

## 2017-11-02 RX ORDER — LAMOTRIGINE 100 MG/1
TABLET ORAL
Qty: 210 TABLET | Refills: 1 | Status: SHIPPED | OUTPATIENT
Start: 2017-11-02 | End: 2017-11-07

## 2017-11-02 NOTE — TELEPHONE ENCOUNTER
Provider, is there a message/information you want us to pass on to the patient?  Or is this a reminder to yourself to call patient again to discuss results?

## 2017-11-02 NOTE — TELEPHONE ENCOUNTER
PLAN: Discussed with Dr. Wilkinson    1) Increase LTG to 300-200-200 (increased AM dose by 100 mg)  2) In one week, increase LEV by 250 mg. Daily dose will be 4324-1898. Patient reported that when she has higher doses of LEV she heard voices - she will call if concerns.  3) RTC for f/u with Dr. Wilkinson scheduled 11/7/17.  4) Standing lab order entered.    Goal levels:    LTG >8-10  CBZ =>9  LEV =>20

## 2017-11-02 NOTE — TELEPHONE ENCOUNTER
R   Ref. Range 4/26/2017 14:32   Carbamazepine Total Level Unknown 9.1   10, 11 Epoxide Level Unknown 2.2   Free Carbamazepine Level Ug/Ml Unknown 2.0   Free Epoxide Level Unknown 1.0   Lamotrigine Level Unknown 5.3   Levetiracetam Level Unknown 22.0

## 2017-11-02 NOTE — TELEPHONE ENCOUNTER
Attempted call to pt with results of early OB US- 12 weeks with twins. Needs referral to OB or MFM.

## 2017-11-02 NOTE — TELEPHONE ENCOUNTER
Spoke with pt about breast imaging and OB US results. Pt scheduled with  11:15am 11/3/2017 in [place]. Subha Saenz PA-C

## 2017-11-02 NOTE — TELEPHONE ENCOUNTER
Margo Kat RN Harper, Heather, J CARLOS        Phone Number: [phone]                       Previous Messages       ----- Message -----      From: Geraldine Charles MA      Sent: 11/2/2017  10:43 AM        To: Aidee Lee   Caller: Vernell   Relationship to Patient: Self   Call Back Number: [phone]   Reason for Call: Pt found out that she is pregnant with twins, and would like to speak to Iraida or Hilario to let them know and to go over things.   Please advise         SALIMA: May 16, 2017  Vernell is feeling fine. She can start to show so it's evident she is carrying twins.  She had been taking LTG     Current/confirmed anticonvulsant medications:    LEV [phone]) [phone]  CBZ (300 12H) 600-600  LTG (100) 200-200-200    Vernell feels like her AED serum levels are low and would like to increase LTG to 200-300-200.    Standing lab order entered. WIll have labs drawn at [facility].  OB/GYN Dr. Rain: Phone number [phone]

## 2017-11-02 NOTE — TELEPHONE ENCOUNTER
I LM on pt cell. I will attempt to call pt to discuss results. No message for staff to pass on. Subha Saenz PA-C

## 2017-11-03 ENCOUNTER — RESULT FOLLOW UP (OUTPATIENT)
Dept: OBGYN | Facility: CLINIC | Age: 37
End: 2017-11-03

## 2017-11-03 ENCOUNTER — OFFICE VISIT (OUTPATIENT)
Dept: OBGYN | Facility: CLINIC | Age: 37
End: 2017-11-03
Payer: MEDICARE

## 2017-11-03 VITALS
DIASTOLIC BLOOD PRESSURE: 74 MMHG | HEART RATE: 74 BPM | BODY MASS INDEX: 19.1 KG/M2 | RESPIRATION RATE: 18 BRPM | SYSTOLIC BLOOD PRESSURE: 122 MMHG | WEIGHT: 110.5 LBS

## 2017-11-03 DIAGNOSIS — F41.1 GENERALIZED ANXIETY DISORDER: ICD-10-CM

## 2017-11-03 DIAGNOSIS — R87.810 CERVICAL HIGH RISK HPV (HUMAN PAPILLOMAVIRUS) TEST POSITIVE: ICD-10-CM

## 2017-11-03 DIAGNOSIS — G40.219 PARTIAL EPILEPSY WITH IMPAIRMENT OF CONSCIOUSNESS, INTRACTABLE (H): ICD-10-CM

## 2017-11-03 DIAGNOSIS — F32.1 MAJOR DEPRESSIVE DISORDER, SINGLE EPISODE, MODERATE (H): ICD-10-CM

## 2017-11-03 DIAGNOSIS — F17.200 TOBACCO USE DISORDER: ICD-10-CM

## 2017-11-03 DIAGNOSIS — O09.529 SUPERVISION OF HIGH-RISK PREGNANCY OF ELDERLY MULTIGRAVIDA: Primary | ICD-10-CM

## 2017-11-03 PROBLEM — O09.522 ELDERLY MULTIGRAVIDA IN SECOND TRIMESTER: Status: ACTIVE | Noted: 2017-11-03

## 2017-11-03 PROBLEM — Z23 NEED FOR TDAP VACCINATION: Status: ACTIVE | Noted: 2017-11-03

## 2017-11-03 PROBLEM — O09.92 HIGH-RISK PREGNANCY IN SECOND TRIMESTER: Status: ACTIVE | Noted: 2017-11-03

## 2017-11-03 PROBLEM — Z98.891 H/O: C-SECTION: Status: ACTIVE | Noted: 2017-11-03

## 2017-11-03 LAB
ALBUMIN UR-MCNC: NEGATIVE MG/DL
APPEARANCE UR: CLEAR
BACTERIA #/AREA URNS HPF: ABNORMAL /HPF
BASOPHILS # BLD AUTO: 0 10E9/L (ref 0–0.2)
BASOPHILS NFR BLD AUTO: 0.1 %
BILIRUB UR QL STRIP: NEGATIVE
COLOR UR AUTO: YELLOW
DIFFERENTIAL METHOD BLD: NORMAL
EOSINOPHIL # BLD AUTO: 0.1 10E9/L (ref 0–0.7)
EOSINOPHIL NFR BLD AUTO: 0.8 %
ERYTHROCYTE [DISTWIDTH] IN BLOOD BY AUTOMATED COUNT: 13.3 % (ref 10–15)
GLUCOSE UR STRIP-MCNC: NEGATIVE MG/DL
HCT VFR BLD AUTO: 41.2 % (ref 35–47)
HGB BLD-MCNC: 13.7 G/DL (ref 11.7–15.7)
HGB UR QL STRIP: NEGATIVE
KETONES UR STRIP-MCNC: NEGATIVE MG/DL
LEUKOCYTE ESTERASE UR QL STRIP: NEGATIVE
LYMPHOCYTES # BLD AUTO: 1 10E9/L (ref 0.8–5.3)
LYMPHOCYTES NFR BLD AUTO: 11 %
MCH RBC QN AUTO: 29.6 PG (ref 26.5–33)
MCHC RBC AUTO-ENTMCNC: 33.3 G/DL (ref 31.5–36.5)
MCV RBC AUTO: 89 FL (ref 78–100)
MONOCYTES # BLD AUTO: 0.4 10E9/L (ref 0–1.3)
MONOCYTES NFR BLD AUTO: 4.9 %
NEUTROPHILS # BLD AUTO: 7.3 10E9/L (ref 1.6–8.3)
NEUTROPHILS NFR BLD AUTO: 83.2 %
NITRATE UR QL: NEGATIVE
NON-SQ EPI CELLS #/AREA URNS LPF: ABNORMAL /LPF
PH UR STRIP: 7 PH (ref 5–7)
PLATELET # BLD AUTO: 254 10E9/L (ref 150–450)
RBC # BLD AUTO: 4.63 10E12/L (ref 3.8–5.2)
RBC #/AREA URNS AUTO: ABNORMAL /HPF
SOURCE: ABNORMAL
SP GR UR STRIP: 1.02 (ref 1–1.03)
UROBILINOGEN UR STRIP-ACNC: 0.2 EU/DL (ref 0.2–1)
WBC # BLD AUTO: 8.8 10E9/L (ref 4–11)
WBC #/AREA URNS AUTO: ABNORMAL /HPF

## 2017-11-03 PROCEDURE — 86762 RUBELLA ANTIBODY: CPT | Performed by: OBSTETRICS & GYNECOLOGY

## 2017-11-03 PROCEDURE — 36415 COLL VENOUS BLD VENIPUNCTURE: CPT | Performed by: OBSTETRICS & GYNECOLOGY

## 2017-11-03 PROCEDURE — G0499 HEPB SCREEN HIGH RISK INDIV: HCPCS | Performed by: OBSTETRICS & GYNECOLOGY

## 2017-11-03 PROCEDURE — 86901 BLOOD TYPING SEROLOGIC RH(D): CPT | Performed by: OBSTETRICS & GYNECOLOGY

## 2017-11-03 PROCEDURE — 85025 COMPLETE CBC W/AUTO DIFF WBC: CPT | Performed by: OBSTETRICS & GYNECOLOGY

## 2017-11-03 PROCEDURE — 87591 N.GONORRHOEAE DNA AMP PROB: CPT | Performed by: OBSTETRICS & GYNECOLOGY

## 2017-11-03 PROCEDURE — 86900 BLOOD TYPING SEROLOGIC ABO: CPT | Performed by: OBSTETRICS & GYNECOLOGY

## 2017-11-03 PROCEDURE — 99207 ZZC FIRST OB VISIT: CPT | Performed by: OBSTETRICS & GYNECOLOGY

## 2017-11-03 PROCEDURE — G0145 SCR C/V CYTO,THINLAYER,RESCR: HCPCS | Performed by: OBSTETRICS & GYNECOLOGY

## 2017-11-03 PROCEDURE — 86780 TREPONEMA PALLIDUM: CPT | Performed by: OBSTETRICS & GYNECOLOGY

## 2017-11-03 PROCEDURE — 87389 HIV-1 AG W/HIV-1&-2 AB AG IA: CPT | Performed by: OBSTETRICS & GYNECOLOGY

## 2017-11-03 PROCEDURE — 86850 RBC ANTIBODY SCREEN: CPT | Performed by: OBSTETRICS & GYNECOLOGY

## 2017-11-03 PROCEDURE — G0476 HPV COMBO ASSAY CA SCREEN: HCPCS | Performed by: OBSTETRICS & GYNECOLOGY

## 2017-11-03 PROCEDURE — 81001 URINALYSIS AUTO W/SCOPE: CPT | Performed by: OBSTETRICS & GYNECOLOGY

## 2017-11-03 PROCEDURE — 87491 CHLMYD TRACH DNA AMP PROBE: CPT | Performed by: OBSTETRICS & GYNECOLOGY

## 2017-11-03 NOTE — LETTER
May 23, 2019      Vernell Logan  [address] [address]    Dear ,      At [facility], your health and wellness is our primary concern. That is why we are following up on a colposcopy from 6/8/18. Your provider had recommended that you have a Pap smear and HPV test completed by 6/8/19. Our records do not show that this has been scheduled.    It is important to complete the follow up that your provider has suggested for you to ensure that there are no worsening changes which may, over time, develop into cancer.      Please contact our office at  [phone] to schedule an appointment for a Pap smear and HPV test at your earliest convenience. If you have questions or concerns, please call the clinic and we will be happy to assist you.    If you have completed the tests outside of [facility], please have the results forwarded to our office. We will update the chart for your primary Physician to review before your next annual physical.     Thank you for choosing [facility]!    Sincerely,      Your [facility] Care Team/cheli

## 2017-11-03 NOTE — MR AVS SNAPSHOT
After Visit Summary   11/3/2017    Vernell Logan    MRN: [id number]           Patient Information     Date Of Birth          1980        Visit Information        Provider Department      11/3/2017 11:15 AM Alicia Rain DO [facility] (formerly [facility])        Today's Diagnoses     Supervision of high-risk pregnancy of elderly multigravida    -  1    Partial epilepsy with impairment of consciousness, intractable (H)        Generalized anxiety disorder        Major depressive disorder, single episode, moderate (H)        Tobacco use disorder          Care Instructions    Prenatal Care  What is prenatal care?   Prenatal care is the care you receive when you are pregnant. It includes care given by your healthcare provider, support from your family, and an extra focus on giving yourself the care you need during this special time. Prenatal care improves your chances for a healthy pregnancy and healthy baby.   When should I see my healthcare provider?   Good care during pregnancy includes regularly scheduled prenatal exams.   If you are not yet pregnant but planning to get pregnant in the next few months, see your provider. Your provider may do some tests and talk about things you can do to have a healthy pregnancy and healthy baby.   You should schedule your first prenatal visit with your provider as soon as you think or know you are pregnant. Depending on your health and health history, your provider will probably schedule visits at least once a month for the first 6 months. During the 7th and 8th months you will see your provider every 2 weeks. During the last month you will probably see your provider once a week until you deliver your baby. If your pregnancy is high risk, your provider will probably want to see you more often. In some cases your provider may refer you to a medical specialist for more help with special needs, such as diabetes.   At each visit your healthcare provider will check to  make sure that you and the baby are healthy. Regular visits can help you and your provider prevent possible problems. They can also help your provider find and treat any problems early. In addition to meeting your medical needs, your provider advise you about caring for yourself. You will talk about how to have a healthy diet and get plenty of exercise and rest. Your provider can also help you deal with the emotional changes that can happen during pregnancy.   What will happen at the first prenatal visit?   At your first visit, your provider will ask about your personal medical history. He or she will also ask about the baby's father and your family health history. This information can help give your provider an idea of any problems you might have during your pregnancy. You will have a physical exam, including checks of your height, weight, and blood pressure and a pelvic exam. You will have a Pap test, urine tests, blood tests, and cultures of the cervix and vagina to check for infection.   Your provider will calculate your due date and the age of your baby. If your periods were regular before you got pregnant, and you are sure of the day when your last period started, your due date will be estimated to be 40 weeks from that day.   Your provider will talk to you about how to stay healthy during your pregnancy.   What will happen at other prenatal visits?   Your provider will check how you are doing and how the baby is developing. He or she will discuss how you are feeling, ask if you have any problems, and answer your questions.   During each prenatal visit your provider will:   weigh you   take your blood pressure   check your urine for sugar, protein, or bacteria   check your face, hands, ankles, and feet for swelling   listen to the baby's heartbeat   measure the size of the uterus to check the baby's growth.   At different times during the pregnancy, other exams and tests may be done. Some are routine and  others are done only when a problem is suspected or you have a risk factor for a problem. Examples of other tests you might have are:   tests to check for genetic problems and some birth defects, such as:   chorionic villus sampling of cells from the placenta   amniocentesis to test the fluid around the baby   blood tests called triple or quad screens   ultrasound scans to check the baby's growth, development, and health and to look at your uterus, the amniotic sac, and the placenta   blood tests to check for diabetes   electronic monitoring to check the health of the baby.   How can I take care of myself during my pregnancy?   Here are some things you can do to take good care of yourself during your pregnancy and prepare for the birth of your child:   Keep all appointments with your healthcare provider. Use these visits to discuss your pregnancy concerns or problems. Write down questions before each visit so that you will not forget about things you want to talk about.   Eat healthy meals that include whole grains, fresh fruits and vegetables, and calcium-rich foods, such as milk, cheese, and yogurt. Choose foods low in saturated fat. Do not eat uncooked or undercooked meats or fish.   Avoid certain fish with high levels of mercury. These fish include shark, vesta mackerel, swordfish, and tilefish. Do not eat more than 12 ounces of fish per week, or no more than 6 ounces of tuna fish per week. Because albacore tuna fish is also high in mercury, choose light tuna.   Drink plenty of water each day.   Take vitamins, other supplements, and medicines as recommended by your provider.   Unless your healthcare provider tells you not to, try to be physically active for at least 30 minutes a day, most days of the week. If you are pressed for time, you might find it easier to exercise 10 minutes at a time, 3 times a day. Consider taking a prenatal exercise class.   Do not smoke, do not drink alcohol, and do not take illegal  drugs.   Talk to your healthcare provider before you take any medicine, including nonprescription and herbal medicines. Some medicines are not safe during pregnancy.   Avoid hot tubs or saunas.   If you have cats in your home, do not empty the cat litter while you are pregnant. It may contain a parasite that causes an infection called toxoplasmosis, which can cause birth defects. Also, use gloves when you work in garden areas used by cats.   Stay away from toxic chemicals like insecticides, solvents (such as some  or paint thinners), lead, and mercury. Check labels on household products. Most dangerous products have pregnancy warnings on their labels. Ask your healthcare provider about products if you are unsure.   Relax by taking breaks from work or chores.   Help reduce stress by sharing your feelings with others.   Report any violence or other types of abuse in your home.   Learn more about pregnancy, labor, and delivery. Read books, watch videos, go to a childbirth class, and talk with experienced moms.   Plan for the lifestyle changes a new baby will bring. Prepare for possible changes in your budget, work situation, daily schedule, and relationships with family and friends.   Talk to your provider about the pros and cons of breast-feeding.   Before and during your pregnancy, try to do everything you can to keep yourself and your baby healthy during your pregnancy.     Published by [employer].  This content is reviewed periodically and is subject to change as new health information becomes available. The information is intended to inform and educate and is not a replacement for medical evaluation, advice, diagnosis or treatment by a healthcare professional.   Developed by [employer].   ? 2010 [facility] and/or its affiliates. All Rights Reserved.   Copyright   Clinical Reference Systems 2011              Follow-ups after your visit        Follow-up notes from your care team     Return in about 4  weeks (around 12/1/2017).      Your next 10 appointments already scheduled     Nov 07, 2017 11:00 AM CST   Return Visit with Ashli Wilkinson MD   [facility] ([facility])    [address], [address]  [facility] [id number] [phone]            Dec 11, 2017 10:30 AM CST   ESTABLISHED PRENATAL with Alicia Rain DO   [facility]    [address] [facility] [address] [phone]              Future tests that were ordered for you today     Open Standing Orders        Priority Remaining Interval Expires Ordered    Keppra (Levetiracetam) Level Routine 7/7 monthly 5/30/2018 11/2/2017    Lamotrigine Level Routine 7/7 monthly 5/30/2018 11/2/2017    Carbamazepine and epoxide free and total Routine 7/7 monthly 5/30/2018 11/2/2017            Who to contact     If you have questions or need follow up information about today's clinic visit or your schedule please contact [facility] directly at [phone].  Normal or non-critical lab and imaging results will be communicated to you by MyChart, letter or phone within 4 business days after the clinic has received the results. If you do not hear from us within 7 days, please contact the clinic through [employer] or phone. If you have a critical or abnormal lab result, we will notify you by phone as soon as possible.  Submit refill requests through [employer] [facility] or call your pharmacy and they will forward the refill request to us. Please allow 3 business days for your refill to be completed.          Additional Information About Your Visit        MyChart Information     [employer] [facility] gives you secure access to your electronic health record. If you see a primary care provider, you can also send messages to your care team and make appointments. If you have questions, please call your primary care clinic.  If you do not have a primary care provider, please call [phone] and they will assist  you.        Care EveryWhere ID     This is your Care EveryWhere ID. This could be used by other organizations to access your [place] medical records  [id number]        Your Vitals Were     Pulse Respirations Last Period BMI (Body Mass Index)          74 18 (LMP Unknown) 19.1 kg/m2         Blood Pressure from Last 3 Encounters:   11/03/17 122/74   10/30/17 106/68   10/12/17 127/62    Weight from Last 3 Encounters:   11/03/17 110 lb 8 oz (50.1 kg)   10/30/17 106 lb 12.8 oz (48.4 kg)   10/12/17 109 lb 12.8 oz (49.8 kg)              We Performed the Following     ABO/Rh type and screen     Anti Treponema     CBC with platelets differential     Chlamydia trachomatis PCR     Hepatitis B surface antigen     HIV Antigen Antibody Combo     Neisseria gonorrhoeae PCR     Pap imaged thin layer screen with HPV - recommended age 30 - 65 years (select HPV order below)     Rubella Antibody IgG Quantitative     UA with Microscopic        Primary Care Provider Office Phone # Fax #    Juliana Harris [phone] [address] [facility] [address]        Equal Access to Services     [facility] : Hadii aad ku hadashaliyah Soyana, waaxda ludanna, qaybta kaalj carlos cervantes, bubba candelario . So [facility] [phone].    ATENCIÓN: Si habla español, tiene a larkin disposición servicios gratuitos de asistencia lingüística. [facility] [phone].    We comply with applicable federal civil rights laws and [place] laws. We do not discriminate on the basis of race, color, national origin, age, disability, sex, sexual orientation, or gender identity.            Thank you!     Thank you for choosing [facility]  for your care. Our goal is always to provide you with excellent care. Hearing back from our patients is one way we can continue to improve our services. Please take a few minutes to complete the written survey that you may receive in the mail after your visit with us. Thank you!              Your Updated Medication List - Protect others around you: Learn how to safely use, store and throw away your medicines at www.disposemymeds.org.          This list is accurate as of: 11/3/17  1:32 PM.  Always use your most recent med list.                   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)       IBUPROFEN      as needed        IRON (FERROUS SULFATE) PO      Take 1 tablet by mouth every morning        lamoTRIgine 100 MG tablet    LaMICtal    210 tablet    Increase to 300 AM, 200 afternoon and 200 HS    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), Twin pregnancy       levETIRAcetam 500 MG tablet    KEPPRA    135 tablet    Beginning 11/9/17, increase LEV to 1500 mg AM and 1250 HS    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

## 2017-11-03 NOTE — LETTER
February 21, 2018      Vernell Logan  [address] [address]    Dear ,      At [facility], your health and wellness is our primary concern. That is why we are following up on a colposcopy from 12/11/17. Your provider had recommended that you have a repeat Colposcopy completed by 02/05/18. Our records do not show that this has been done.    It is important to complete the follow up that your provider has suggested for you to ensure that there are no worsening changes which may, over time, develop into cancer.      Please discuss the need for a repeat Colposcopy at your next prenatal appointment. If you have questions or concerns, please call the clinic and we will be happy to assist you.    If you have completed the tests outside of [facility], please have the results forwarded to our office. We will update the chart for your primary Physician to review before your next annual physical.     Thank you for choosing [facility]!    Sincerely,      Alicia Rain DO/tonya

## 2017-11-03 NOTE — PATIENT INSTRUCTIONS
Prenatal Care  What is prenatal care?   Prenatal care is the care you receive when you are pregnant. It includes care given by your healthcare provider, support from your family, and an extra focus on giving yourself the care you need during this special time. Prenatal care improves your chances for a healthy pregnancy and healthy baby.   When should I see my healthcare provider?   Good care during pregnancy includes regularly scheduled prenatal exams.   If you are not yet pregnant but planning to get pregnant in the next few months, see your provider. Your provider may do some tests and talk about things you can do to have a healthy pregnancy and healthy baby.   You should schedule your first prenatal visit with your provider as soon as you think or know you are pregnant. Depending on your health and health history, your provider will probably schedule visits at least once a month for the first 6 months. During the 7th and 8th months you will see your provider every 2 weeks. During the last month you will probably see your provider once a week until you deliver your baby. If your pregnancy is high risk, your provider will probably want to see you more often. In some cases your provider may refer you to a medical specialist for more help with special needs, such as diabetes.   At each visit your healthcare provider will check to make sure that you and the baby are healthy. Regular visits can help you and your provider prevent possible problems. They can also help your provider find and treat any problems early. In addition to meeting your medical needs, your provider advise you about caring for yourself. You will talk about how to have a healthy diet and get plenty of exercise and rest. Your provider can also help you deal with the emotional changes that can happen during pregnancy.   What will happen at the first prenatal visit?   At your first visit, your provider will ask about your personal medical history. He  or she will also ask about the baby's father and your family health history. This information can help give your provider an idea of any problems you might have during your pregnancy. You will have a physical exam, including checks of your height, weight, and blood pressure and a pelvic exam. You will have a Pap test, urine tests, blood tests, and cultures of the cervix and vagina to check for infection.   Your provider will calculate your due date and the age of your baby. If your periods were regular before you got pregnant, and you are sure of the day when your last period started, your due date will be estimated to be 40 weeks from that day.   Your provider will talk to you about how to stay healthy during your pregnancy.   What will happen at other prenatal visits?   Your provider will check how you are doing and how the baby is developing. He or she will discuss how you are feeling, ask if you have any problems, and answer your questions.   During each prenatal visit your provider will:   weigh you   take your blood pressure   check your urine for sugar, protein, or bacteria   check your face, hands, ankles, and feet for swelling   listen to the baby's heartbeat   measure the size of the uterus to check the baby's growth.   At different times during the pregnancy, other exams and tests may be done. Some are routine and others are done only when a problem is suspected or you have a risk factor for a problem. Examples of other tests you might have are:   tests to check for genetic problems and some birth defects, such as:   chorionic villus sampling of cells from the placenta   amniocentesis to test the fluid around the baby   blood tests called triple or quad screens   ultrasound scans to check the baby's growth, development, and health and to look at your uterus, the amniotic sac, and the placenta   blood tests to check for diabetes   electronic monitoring to check the health of the baby.   How can I take care  of myself during my pregnancy?   Here are some things you can do to take good care of yourself during your pregnancy and prepare for the birth of your child:   Keep all appointments with your healthcare provider. Use these visits to discuss your pregnancy concerns or problems. Write down questions before each visit so that you will not forget about things you want to talk about.   Eat healthy meals that include whole grains, fresh fruits and vegetables, and calcium-rich foods, such as milk, cheese, and yogurt. Choose foods low in saturated fat. Do not eat uncooked or undercooked meats or fish.   Avoid certain fish with high levels of mercury. These fish include shark, vesta mackerel, swordfish, and tilefish. Do not eat more than 12 ounces of fish per week, or no more than 6 ounces of tuna fish per week. Because albacore tuna fish is also high in mercury, choose light tuna.   Drink plenty of water each day.   Take vitamins, other supplements, and medicines as recommended by your provider.   Unless your healthcare provider tells you not to, try to be physically active for at least 30 minutes a day, most days of the week. If you are pressed for time, you might find it easier to exercise 10 minutes at a time, 3 times a day. Consider taking a prenatal exercise class.   Do not smoke, do not drink alcohol, and do not take illegal drugs.   Talk to your healthcare provider before you take any medicine, including nonprescription and herbal medicines. Some medicines are not safe during pregnancy.   Avoid hot tubs or saunas.   If you have cats in your home, do not empty the cat litter while you are pregnant. It may contain a parasite that causes an infection called toxoplasmosis, which can cause birth defects. Also, use gloves when you work in garden areas used by cats.   Stay away from toxic chemicals like insecticides, solvents (such as some  or paint thinners), lead, and mercury. Check labels on household products.  Most dangerous products have pregnancy warnings on their labels. Ask your healthcare provider about products if you are unsure.   Relax by taking breaks from work or chores.   Help reduce stress by sharing your feelings with others.   Report any violence or other types of abuse in your home.   Learn more about pregnancy, labor, and delivery. Read books, watch videos, go to a childbirth class, and talk with experienced moms.   Plan for the lifestyle changes a new baby will bring. Prepare for possible changes in your budget, work situation, daily schedule, and relationships with family and friends.   Talk to your provider about the pros and cons of breast-feeding.   Before and during your pregnancy, try to do everything you can to keep yourself and your baby healthy during your pregnancy.     Published by [employer].  This content is reviewed periodically and is subject to change as new health information becomes available. The information is intended to inform and educate and is not a replacement for medical evaluation, advice, diagnosis or treatment by a healthcare professional.   Developed by [employer].   ? 2010 [employer] and/or its affiliates. All Rights Reserved.   Copyright   [employer] 2011

## 2017-11-03 NOTE — PROGRESS NOTES
HPI:    Vernell is a 36 year old yo  at 12 2/7 weeks by 12 1/7 week ultrasound with di/di twins who presents today for her initial OB visit.  Patient has a seizure disorder and has been dealing with this since she was 14 years old. She has not had a grand mal seizure since .  She spaces out/not aware of her surroundings during a seizure.  This happens once a month.  We discussed the concern if this were to happen and she falls or hurts herself, etc.  She feels she gets a good warning before the seizure happens.  We discussed the concern with her current medications.  She is seeing her Epileptologist, Dr. Wilkinson in [facility] next Tuesday.  This physician is aware of the pregnancy now.  Unknown last pap smear.     Issues:  None    Past OB Hx: Emergency c section at [facility]     Father of baby: No health issues       Past Medical History:   Diagnosis Date     Anorexia 3/5/2013     Anxiety      Depressive disorder 1999     Heart murmur     told benign     Localization-related epilepsy (H)      Major depression      Migraine      Seizure disorder (H)     [place] [facility] [place]      Status post left breast biopsy,sclerosing adenosis not concordant with imaging,12             Past Surgical History:   Procedure Laterality Date     C/SECTION, LOW TRANSVERSE      , Low Transverse        Family History   Problem Relation Age of Onset     HEART DISEASE Father      62 yo when he had arrhythmia,  of CHF age 65     DIABETES Father      Neurologic Disorder Father      epilepsy     Schizophrenia Father      Anxiety Disorder Father      Hypertension Father      Obesity Father      CANCER Maternal Grandfather      CEREBROVASCULAR DISEASE Maternal Grandfather      CEREBROVASCULAR DISEASE Paternal Grandmother      HEART DISEASE Paternal Grandfather      Psychotic Disorder Daughter      Neurologic Disorder Daughter      CP, she was adopted out in       Schizophrenia Mother      Anxiety Disorder Mother      Depression Mother         Social History     Social History     Marital status:      Spouse name: N/A     Number of children: 0     Years of education: N/A     Occupational History      None      Social History Main Topics     Smoking status: Current Every Day Smoker     Packs/day: 1.00     Years: 10.00     Types: Cigarettes     Start date: 6/1/1993     Smokeless tobacco: Never Used      Comment: 1 pack      Alcohol use Yes      Comment: beer or wine a few times per year     Drug use: No     Sexual activity: Yes     Partners: Male     Birth control/ protection: None     Other Topics Concern     Parent/Sibling W/ Cabg, Mi Or Angioplasty Before 65f 55m? Yes     Social History Narrative         Current Outpatient Prescriptions:      lamoTRIgine (LAMICTAL) 100 MG tablet, Increase to 300 AM, 200 afternoon and 200 HS, Disp: 210 tablet, Rfl: 1     levETIRAcetam (KEPPRA) 500 MG tablet, Beginning 11/9/17, increase LEV to 1500 mg AM and 1250 HS, Disp: 135 tablet, Rfl: 11     carBAMazepine (CARBATROL) 300 MG 12 hr capsule, TAKE 2 CAPSULES (600 MG) BY MOUTH 2 TIMES DAILY, Disp: 360 capsule, Rfl: 3     Acetaminophen (TYLENOL PO), Take by mouth as needed for mild pain or fever, Disp: , Rfl:      VITAMIN C 1000 MG OR TABS, 1 TABLET DAILY AT DINNER, Disp: , Rfl:      vitamin B complex with vitamin C (VITAMIN  B COMPLEX) TABS tablet, Take 1 tablet by mouth daily, Disp: , Rfl:      IRON, FERROUS SULFATE, PO, Take 1 tablet by mouth every morning, Disp: , Rfl:      IBUPROFEN, as needed, Disp: , Rfl:       Objective:  Physical Exam:   Breast:  Benign exam, no masses palpated.  No skin changes, no axillary lymphadenopathy, no nipple discharge.  Axilla feel completely normal, no lymph node enlargement and non-tender.  Heart=RRR, no murmurs  Thyroid=normal, no masses, no TTP  Lungs=Clear to ascultation bilateral, non-labored breathing  Abd=soft, Nontender/nondistended, +bowel  sounds x4  PELVIC:    External genitalia: normal without lesion  Vagina: normal mucosa and rugae, no discharge.  Cervix: normal without lesion, healthy, pap smear, GC and Chlamydia obtained  Uterus: small, mobile, nontender.  Adnexa: non tender, without masses  Rectal: deffered  Ext=no clubbing or cyanosis  WTVp=P=754, B=159    Assessment:   1.  IUP at 12 2/7 weeks here for her initial OB visit    Plan:    1.  PNV  2.  NOB Labs   3.  Discussed routine prenatal care, quad screen, GCT, anatomy scan at ~19 weeks, frequency of visits.  4.  Discussed first trimester screen and she wants this done  5.  Delivery hospital: [facility]  6.  RTC 4 weeks.  7.  History of [facility] at [facility]=will get those records  8.  Seizure disorder on multiple medication including Carbamazepine=MFM referral placed  9.  Di/Di twin=MFM referral  10. Tobacco abuse=1/2 pack per day  11.  Anxiety/depression=stable  12.  History of child born with CP  13.  AMA      Discussed physician coverage, tertiary support, diet, exercise, weight gain, schedule of visits, routine and indicated ultrasounds, and childbirth education.    Options for  testing for chromosomal and birth defects were discussed with the patient. Diagnostic tests include CVS and Amniocentesis. We discussed that these tests are definitive but invasive and do carry a risk of fetal loss.   Screening tests include nuchal translucency/blood marker testing in the first trimester and quad screening in the second trimester. We discussed that these are screening tests and not diagnostic tests and that false positives and negatives are a distinct possibility.     25 minutes was spent face to face with the patient today discussing her history, diagnosis, and follow-up plan as noted above.  Over 50% of the visit was spent in counseling and coordination of care.    Total Visit Time: 30 minutes      Alicia Rain

## 2017-11-03 NOTE — NURSING NOTE
"No chief complaint on file.      Initial /74 (BP Location: Left arm, Patient Position: Chair, Cuff Size: Adult Regular)  Pulse 74  Resp 18  Wt 110 lb 8 oz (50.1 kg)  LMP  (LMP Unknown)  BMI 19.1 kg/m2 Estimated body mass index is 19.1 kg/(m^2) as calculated from the following:    Height as of 10/30/17: 5' 3.78\" (1.62 m).    Weight as of this encounter: 110 lb 8 oz (50.1 kg).  Medication Reconciliation: complete    "

## 2017-11-04 LAB — T PALLIDUM IGG+IGM SER QL: NEGATIVE

## 2017-11-05 LAB
C TRACH DNA SPEC QL NAA+PROBE: NEGATIVE
N GONORRHOEA DNA SPEC QL NAA+PROBE: NEGATIVE
SPECIMEN SOURCE: NORMAL
SPECIMEN SOURCE: NORMAL

## 2017-11-06 LAB
HBV SURFACE AG SERPL QL IA: NONREACTIVE
HIV 1+2 AB+HIV1 P24 AG SERPL QL IA: NONREACTIVE
RUBV IGG SERPL IA-ACNC: 179 IU/ML

## 2017-11-07 ENCOUNTER — OFFICE VISIT (OUTPATIENT)
Dept: NEUROLOGY | Facility: CLINIC | Age: 37
End: 2017-11-07

## 2017-11-07 VITALS
WEIGHT: 111.4 LBS | HEART RATE: 85 BPM | DIASTOLIC BLOOD PRESSURE: 60 MMHG | BODY MASS INDEX: 19.02 KG/M2 | SYSTOLIC BLOOD PRESSURE: 124 MMHG | HEIGHT: 64 IN

## 2017-11-07 DIAGNOSIS — G40.219 LOCALIZATION-RELATED EPILEPSY WITH COMPLEX PARTIAL SEIZURES WITH INTRACTABLE EPILEPSY (H): ICD-10-CM

## 2017-11-07 DIAGNOSIS — O09.92 HIGH-RISK PREGNANCY IN SECOND TRIMESTER: Primary | ICD-10-CM

## 2017-11-07 DIAGNOSIS — O30.001 TWIN GESTATION IN FIRST TRIMESTER, UNSPECIFIED MULTIPLE GESTATION TYPE: ICD-10-CM

## 2017-11-07 DIAGNOSIS — G40.219 PARTIAL EPILEPSY WITH IMPAIRMENT OF CONSCIOUSNESS, INTRACTABLE (H): ICD-10-CM

## 2017-11-07 LAB
ABO + RH BLD: NORMAL
ABO + RH BLD: NORMAL
BLD GP AB SCN SERPL QL: NORMAL
BLOOD BANK CMNT PATIENT-IMP: NORMAL
COPATH REPORT: NORMAL
PAP: NORMAL
SPECIMEN EXP DATE BLD: NORMAL

## 2017-11-07 RX ORDER — PRENATAL VIT/IRON FUM/FOLIC AC 27MG-0.8MG
1 TABLET ORAL DAILY
COMMUNITY
End: 2018-02-23

## 2017-11-07 RX ORDER — LAMOTRIGINE 100 MG/1
TABLET ORAL
Qty: 240 TABLET | Refills: 3 | Status: SHIPPED | OUTPATIENT
Start: 2017-11-07 | End: 2017-12-08

## 2017-11-07 RX ORDER — FOLIC ACID 1 MG/1
2 TABLET ORAL DAILY
Qty: 180 TABLET | Refills: 3 | Status: SHIPPED | OUTPATIENT
Start: 2017-11-07 | End: 2018-02-23

## 2017-11-07 NOTE — MR AVS SNAPSHOT
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)        Twin gestation in first trimester, unspecified multiple gestation type          Care Instructions    Women with Epilepsy and Pregnancy Information:     Target pregnancy levels:   Carbamazepine target level above 9  Lamotrigine target level 8-10  Levetiracetam target level above 20    Check antiepileptic drug levels every month    Please establish care with psychiatrist and psychologist near home               Medication Name   Tablet Size      Week 1  8 AM  (morning)  2pm  8PM (Night)   Notes   Generic  Carbamazepine XR (Carbatrol) 300 mg   2 tablet    2 tablet      Generic  levetiracetam 500 mg   3 tablet    2.5 tablet      Generic  lamotrigine 100 mg   3 tablet  2.5 tablet   2  tablet   increased since to get level near 10                   Medication Name   Tablet Size      Week 2  8 AM  (morning)  2pm  8PM (Night)   Notes   Generic  Carbamazepine XR (Carbatrol) 300 mg   2 tablet    2 tablet      Generic  levetiracetam 500 mg   3 tablet    2.5 tablet      Generic  lamotrigine 100 mg   3 tablet  3 tablet   2  tablet   increased since to get level near 10        A. Pregnancy Planning:   a. More than 90% of babies born to women with epilepsy are normal and the chance of having a healthy pregnancy are higher when the pregnancy is well planned and AEDs are reviewed and optimized before conception.   b. All women with epilepsy are encouraged to take 1 mg folic acid and prenatal vitamins daily prior to conception.   In the general population, brandon-conceptual use of folic acid has been shown to reduce risk of neural tube defects.   c. Pregnant women who are taking antiepileptic drugs (AED) are strongly urged to contact the registry (425-175-6128, or www.aedpregnancyregistry.org ) and provide information that may help define the safety of their future pregnancies, as well as the pregnancies of other women with epilepsy.  B. Delivery:   a. Women with epilepsy may have a natural vaginal delivery, this should be reviewed with your OB/GYN.   b. A rescue plan should be created in the event you have a seizure (convulsive or partial seizures) during labor.   c. Avoid water birth primarily to avoid trauma if a seizure occurs in the tub and difficulty in transitioning patient out of the tub.   d. Women are highly encouraged to deliver in a center with adequate facilities for maternal and  resuscitation and maternal complications.    e. Intravenous access should be secured during labor in case of a seizure.  f. Women should continue to take their usual seizure medications. Inform your OB/GYN if you were unable to take your seizure medications due to nausea or pain.   g. Women may be at an increased risk for seizures due to failure or inability to take antiepileptic medications, sleep deprivation, hyperventilation, stress, physical pain.   h. Spinal anesthesia is safe for women with epilepsy. If general anesthesia is required, it also can be given safely. The anesthesiologist should be informed about the woman s history of epilepsy and the antiepileptic drugs she is taking (as well as about other medical disorders and medications).    i. If patient has to be NPO (nothing by mouth), oral AEDS should be continued, review with your OB/GYN during hospital admission.       C. Postpartum Seizure Precautions:   a. Women with epilepsy should be given advice on minimizing the risk of seizures at home. Emphasize the importance of adequate  post-partum social supports.  b. Encourage patients to take all seizure medications as prescribed.  c. Encourage patients to get adequate sleep. Perhaps partners can feed baby at night/ If patient is breast feeding, she may consider pumping breast milk earlier in the day for night time.   d. To minimize the risk of harm if a seizure occurs, changing or feeding the baby on the floor.  e. Avoid baby slings.   f. Bath baby with sponge bath, instead of tub bath, if alone.   g. To minimize the risk of harm if a seizure occurs, be cautious when carrying baby up the stairs.  h. Please visit Epilepsy Foundation website for more details on epilepsy and pregnancy. http://www.epilepsyfoundation.org     D. Breast Feeding:   a. Breast feeding is recommended for most women with epilepsy, because breast milk provides a variety of benefits to the baby, including protection against infection.    b. The clinical consequence of  ingesting AEDs via breast milk has not been extensively studied.    c. Although anticonvulsants do appear in breast milk, they are found at low levels that are not likely to have a significant effect upon the . If concerns arise about anticonvulsant toxicity in the breast fed ,  drug levels can be measured  d. A breastfeeding mother should never stop her medication abruptly, as this can provoke a seizure and may affect her baby.       Ashli Wilkinson MD             Follow-ups after your visit        Follow-up notes from your care team     Return in about 2 months (around 2018).      Your next 10 appointments already scheduled     Dec 11, 2017 10:30 AM CST   ESTABLISHED PRENATAL with Alicia Rain DO   [facility]    [address] [facility] [address] [phone]              Future tests that were ordered for you today     Open Standing Orders        Priority Remaining Interval Expires Ordered    Carbamazepine and  "epoxide free and total Routine 12/12 7/7/2018 11/7/2017    Lamotrigine Level Routine 12/12 7/7/2018 11/7/2017    Keppra (Levetiracetam) Level Routine 12/12 7/7/2018 11/7/2017            Who to contact     Please call your clinic at [phone] to:    Ask questions about your health    Make or cancel appointments    Discuss your medicines    Learn about your test results    Speak to your doctor   If you have compliments or concerns about an experience at your clinic, or if you wish to file a complaint, please contact [facility] Patient Relations at [phone] or email us at [email or web address]         Additional Information About Your Visit        [employer] Information     [employer] gives you secure access to your electronic health record. If you see a primary care provider, you can also send messages to your care team and make appointments. If you have questions, please call your primary care clinic.  If you do not have a primary care provider, please call [phone] and they will assist you.      [employer] is an electronic gateway that provides easy, online access to your medical records. With [employer], you can request a clinic appointment, read your test results, renew a prescription or communicate with your care team.     To access your existing account, please contact your [facility] or call [phone] for assistance.        Care EveryWhere ID     This is your Care EveryWhere ID. This could be used by other organizations to access your [place] medical records  [id number]        Your Vitals Were     Pulse Height Last Period BMI (Body Mass Index)          85 5' 4\" (162.6 cm) (LMP Unknown) 19.12 kg/m2         Blood Pressure from Last 3 Encounters:   11/07/17 124/60   11/03/17 122/74   10/30/17 106/68    Weight from Last 3 Encounters:   11/07/17 111 lb 6.4 oz (50.5 kg)   11/03/17 110 lb 8 oz (50.1 kg)   10/30/17 106 lb 12.8 oz (48.4 kg)    "              Today's Medication Changes          These changes are accurate as of: 11/7/17 12:02 PM.  If you have any questions, ask your nurse or doctor.               Start taking these medicines.        Dose/Directions    folic acid 1 MG tablet   Commonly known as:  FOLVITE   Used for:  High-risk pregnancy in second trimester, Partial epilepsy with impairment of consciousness, intractable (H)   Started by:  Ashli Wilkinson MD        Dose:  2 mg   Take 2 tablets (2 mg) by mouth daily   Quantity:  180 tablet   Refills:  3         These medicines have changed or have updated prescriptions.        Dose/Directions    lamoTRIgine 100 MG tablet   Commonly known as:  LaMICtal   This may have changed:  additional instructions   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), Twin gestation in first trimester, unspecified multiple gestation type   Changed by:  Ashli Wilkinson MD        Increase to 300 AM, 300 afternoon and 200 HS   Quantity:  240 tablet   Refills:  3            Where to get your medicines      These medications were sent to [facility] [address] [place] [address] [address]   [address] [facility] [address]     Phone:  [phone]     folic acid 1 MG tablet    lamoTRIgine 100 MG tablet                Primary Care Provider Office Phone # Fax #    Juliana Harris [phone] [address]  [facility] [address]        Equal Access to Services     MICHELL MENDOZA AH: Hadii lyric brewer hadasho Soyana, waaxda luqadaha, qaybta kaalmada adeegyada, bubba coates. So [facility] [phone].    ATENCIÓN: Si habla español, tiene a larkin disposición servicios gratuitos de asistencia lingüística. Llame al [phone].    We comply with applicable federal civil rights laws and [place] laws. We do not discriminate on the basis of race, color, national origin, age, disability, sex, sexual orientation, or gender  identity.            Thank you!     Thank you for choosing [facility]  for your care. Our goal is always to provide you with excellent care. Hearing back from our patients is one way we can continue to improve our services. Please take a few minutes to complete the written survey that you may receive in the mail after your visit with us. Thank you!             Your Updated Medication List - Protect others around you: Learn how to safely use, store and throw away your medicines at www.disposemymeds.org.          This list is accurate as of: 11/7/17 12:02 PM.  Always use your most recent med list.                   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)       folic acid 1 MG tablet    FOLVITE    180 tablet    Take 2 tablets (2 mg) by mouth daily    High-risk pregnancy in second trimester, Partial epilepsy with impairment of consciousness, intractable (H)       IBUPROFEN      as needed        IRON (FERROUS SULFATE) PO      Take 1 tablet by mouth every morning        lamoTRIgine 100 MG tablet    LaMICtal    240 tablet    Increase to 300 AM, 300 afternoon and 200 HS    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), Twin gestation in first trimester, unspecified multiple gestation type       levETIRAcetam 500 MG tablet    KEPPRA    135 tablet    Beginning 11/9/17, increase LEV to 1500 mg AM and 1250 HS    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)       prenatal multivitamin plus iron 27-0.8 MG Tabs per tablet      Take 1 tablet by mouth daily    High-risk pregnancy in second trimester, Partial epilepsy with impairment of  consciousness, intractable (H)       TYLENOL PO      Take by mouth as needed for mild pain or fever    Localization-related (focal) (partial) epilepsy and epileptic syndromes with complex partial seizures, with intractable epilepsy       vitamin B complex with vitamin C Tabs tablet      Take 1 tablet by mouth daily

## 2017-11-07 NOTE — PATIENT INSTRUCTIONS
Women with Epilepsy and Pregnancy Information:     Target pregnancy levels:   Carbamazepine target level above 9  Lamotrigine target level 8-10  Levetiracetam target level above 20    Check antiepileptic drug levels every month    Please establish care with psychiatrist and psychologist near home               Medication Name   Tablet Size      Week 1  8 AM  (morning)  2pm  8PM (Night)   Notes   Generic  Carbamazepine XR (Carbatrol) 300 mg   2 tablet    2 tablet      Generic  levetiracetam 500 mg   3 tablet    2.5 tablet      Generic  lamotrigine 100 mg   3 tablet  2.5 tablet   2  tablet   increased since to get level near 10                   Medication Name   Tablet Size      Week 2  8 AM  (morning)  2pm  8PM (Night)   Notes   Generic  Carbamazepine XR (Carbatrol) 300 mg   2 tablet    2 tablet      Generic  levetiracetam 500 mg   3 tablet    2.5 tablet      Generic  lamotrigine 100 mg   3 tablet  3 tablet   2  tablet   increased since to get level near 10        A. Pregnancy Planning:   a. More than 90% of babies born to women with epilepsy are normal and the chance of having a healthy pregnancy are higher when the pregnancy is well planned and AEDs are reviewed and optimized before conception.   b. All women with epilepsy are encouraged to take 1 mg folic acid and prenatal vitamins daily prior to conception.  In the general population, brandon-conceptual use of folic acid has been shown to reduce risk of neural tube defects.   c. Pregnant women who are taking antiepileptic drugs (AED) are strongly urged to contact the registry ([phone], or www.aedpregnancyregistry.org ) and provide information that may help define the safety of their future pregnancies, as well as the pregnancies of other women with epilepsy.  B. Delivery:   a. Women with epilepsy may have a natural vaginal delivery, this should be reviewed with your OB/GYN.   b. A rescue plan should be created in the event you have a seizure (convulsive or  partial seizures) during labor.   c. Avoid water birth primarily to avoid trauma if a seizure occurs in the tub and difficulty in transitioning patient out of the tub.   d. Women are highly encouraged to deliver in a center with adequate facilities for maternal and  resuscitation and maternal complications.    e. Intravenous access should be secured during labor in case of a seizure.  f. Women should continue to take their usual seizure medications. Inform your OB/GYN if you were unable to take your seizure medications due to nausea or pain.   g. Women may be at an increased risk for seizures due to failure or inability to take antiepileptic medications, sleep deprivation, hyperventilation, stress, physical pain.   h. Spinal anesthesia is safe for women with epilepsy. If general anesthesia is required, it also can be given safely. The anesthesiologist should be informed about the woman s history of epilepsy and the antiepileptic drugs she is taking (as well as about other medical disorders and medications).    i. If patient has to be NPO (nothing by mouth), oral AEDS should be continued, review with your OB/GYN during hospital admission.       C. Postpartum Seizure Precautions:   a. Women with epilepsy should be given advice on minimizing the risk of seizures at home. Emphasize the importance of adequate post-partum social supports.  b. Encourage patients to take all seizure medications as prescribed.  c. Encourage patients to get adequate sleep. Perhaps partners can feed baby at night/ If patient is breast feeding, she may consider pumping breast milk earlier in the day for night time.   d. To minimize the risk of harm if a seizure occurs, changing or feeding the baby on the floor.  e. Avoid baby slings.   f. Bath baby with sponge bath, instead of tub bath, if alone.   g. To minimize the risk of harm if a seizure occurs, be cautious when carrying baby up the stairs.  h. Please visit Epilepsy Foundation  website for more details on epilepsy and pregnancy. http://www.epilepsyfoundation.org     D. Breast Feeding:   a. Breast feeding is recommended for most women with epilepsy, because breast milk provides a variety of benefits to the baby, including protection against infection.    b. The clinical consequence of  ingesting AEDs via breast milk has not been extensively studied.    c. Although anticonvulsants do appear in breast milk, they are found at low levels that are not likely to have a significant effect upon the . If concerns arise about anticonvulsant toxicity in the breast fed ,  drug levels can be measured  d. A breastfeeding mother should never stop her medication abruptly, as this can provoke a seizure and may affect her baby.       Ashli Wilkinson MD

## 2017-11-07 NOTE — PROGRESS NOTES
[facility]    Patient:  Vernell Logan  :  1980   Age:  36 year old   Today's Office Visit:  2017    Epilepsy Data:  Patient History  Primary Epileptologist/Provider: Ashli Wilkinson M.D.  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 Last 5 minutes.    # of Type 1 Seizure since last visit: 0  Freq. Type 1 / Month: 0  Seizure Type 2: Partial seizures with secondary generalization  -  with complex partial seizures evolving to generalized seizures  Description of Sz Type 2: Stares off -> GTC  # of Type 2 Seizure since last visit: 0  Freq. Type 2 / Month: 0         EPILEPSY HISTORY: Onset of seizures was 14 years of age. Based on electrographic data 2012, the patient had frequent nonconvulsive seizures arising from the right and left temporal lobe, bitemporal lobe interictal discharges. Her MRI is normal. Her PET scan was remarkable for decreased metabolic activity in the left temporal lobe. Prior  she rarely had seizure. From 4970-6017 she had several seizure per week. She had antiepileptic drug.     INTERVAL HISTORY:  Came alone.  She is 12 weeks (twin pregnancy) pregnant. No seizure since last visit. Prior to this she had one partial seizure on 2017 (difficulty getting words out, feels confused, but, she is aware if someone is talking to her, she has  "difficulty with comprehension, she states \"it sounds like they are repeating a word over and over\"). Prior to this her last seizure was 7/2016. No generalized tonic-clonic convulsion, last generalized tonic-clonic convulsion 2014. We have started to increase antiepileptic drug to maintain levels. We spent whole visit talking about pregnancy and antiepileptic drug. I reviewed risk of teratogenicity associated with antiepileptic drug with patient,  importance of therapeutic drug monitoring during pregnancy, potential harm a generalized tonic-clonic convulsion may have on a fetus (miscarriage, hypoxia, etc). Patient expressed understanding of associated risks.   Currently, on antiepileptic drug there is no double vision, no mood changes, no nausea, no vomiting, no abdominal pain, no rashes. No recent ER visits and no hospitalizations since last visit.      Prior to Admission medications    Medication Sig Start Date End Date Taking? Authorizing Provider   levETIRAcetam (KEPPRA) 500 MG tablet TAKE TWO AND ONE-HALF TABLETS BY MOUTH TWICE DAILY  Patient taking differently: 500 mg TAKE TWO AND ONE-HALF TABLETS BY MOUTH TWICE DAILY 4/13/16  Yes Ashli Wilkinson MD   lamoTRIgine (LAMICTAL) 100 MG tablet Take 2 tab po at 8 am and 3 tab po at 1 pm and 2 tab po 8 pm  Patient taking differently: 100 mg Take 2 tab po at 8 am and 3 tab po at 1 pm and 2 tab po 8 pm 4/13/16  Yes Ashli Wilkinson MD   carBAMazepine (CARBATROL) 300 MG 12 hr capsule TAKE 2 CAPSULES (600 MG) BY MOUTH 2 TIMES DAILY  Patient taking differently: 300 mg TAKE 2 CAPSULES (600 MG) BY MOUTH 2 TIMES DAILY 4/13/16  Yes Ashli Wilkinson MD   Acetaminophen (TYLENOL PO) Take by mouth as needed for mild pain or fever   Yes Reported, Patient   VITAMIN C 1000 MG OR TABS 1 TABLET DAILY AT DINNER   Yes Reported, Patient   IBUPROFEN as needed   Yes Reported, Patient       MEDICATIONS:                Medication Name   Tablet Size        8 AM  (morning)  2pm  8PM (Night)   Notes "   Generic  Carbamazepine XR (Carbatrol) 300 mg   2 tablet    2 tablet      Generic  levetiracetam 500 mg   3 tablet    2.5 tablet      Generic  lamotrigine 100 mg   3 tablet  2 tablet   2  tablet   increased since pregnant     Component      Latest Ref Rng & Units 7/26/2013 11/24/2014 6/26/2015 4/13/2016   Carbamazepine Total Level         8.9 5.9   10, 11 Epoxide Level         3.0 1.6   Free Carbamazepine Level Ug/Ml         1.8 1.1   Free Epoxide Level         1.2 0.6   Levetiracetam Level       39.5 31.1  22.9   Lamotrigine Level         8.7 10.0     Component      Latest Ref Rng & Units 4/26/2017   Carbamazepine Total Level       9.1   10, 11 Epoxide Level       2.2   Free Carbamazepine Level Ug/Ml       2.0   Free Epoxide Level       1.0   Levetiracetam Level       22.0   Lamotrigine Level       5.3     Target pregnancy levels:   Carbamazepine target level above 9  Lamotrigine target level 8-10  Levetiracetam target level above 20    Pregnancy Notes/Plan:      1. Expected date of delivery: 5/16/2017  2. OB/GYN contact information: Dr. Koffi Molnia   3. Medications  a. Target AED level during pregnancy: Carbamazepine target level above 9 Lamotrigine target level 8-10, Levetiracetam target level above 20  b. Use of folic acid daily: yes  c. Use of prenatal vitamins:  yes  d. Postpartum AED reduction plan: to be determined in third trimester  4. EPIC:   a. Epilepsy provider entered standing antiepileptic levels every 2-4 weeks in EPIC: yes   b. Epilepsy provider notify Iradia Mccartney to track patient: yes    5. Seizure rescue plan during pregnancy:   a. Diazepam (Valium): 5 mg intensol for any generalized tonic clonic seizure or for more than 2 complex partial seizures within a 4 hour period, repeat once after 10 minutes.     6. Seizure rescue plan during delivery.   a. Lorazepam (Ativan): 2 mg lorazepam intravenously for any generalized tonic clonic seizure or for more than 2 complex partial seizures  "within a 4 hour period once, and repeat as needed per your OB/GYN assessment.  OR   Diazepam (Valium): 5 mg intravenously for any generalized tonic clonic seizure or for more than 2 complex partial seizures within a 4 hour period once, and repeat as needed per your OB/GYN assessment  b. Midazolam (if no IV access): 5 mg intramuscularly for any generalized tonic clonic seizure or for 2 complex partial seizures within a 4 hour period once, and repeat as needed per your OB/GYN assessment.   c. If patient has seizure due to preeclampsia or eclampsia magnesium sulfate is indicated per OB/GYN assessment.      9. Does patient have untreated mood disorder? Yes, but, depression is stable at this time. Encouraged patient to establish care with mental health care provider.       REVIEW OF SYSTEMS:  Intermittent dizziness.  diplopia improved.  B/l hand tremor. improved Anxiety.     SOCIAL HISTORY:  Unfortunately, her  Ricardo passed away 11/2015. She is not working. Moved to [place]. She is trying to find a job. She smokes 1 pack of cigarettes per day. She is dating and is sexually active and not using contraception, we reviewed the importance of safe sex today. She is living alone. Pregnant.      PHYSICAL EXAMINATION:  /60 (BP Location: Right arm, Patient Position: Chair, Cuff Size: Adult Regular)  Pulse 85  Ht 5' 4\" (162.6 cm)  Wt 111 lb 6.4 oz (50.5 kg)  LMP  (LMP Unknown)  BMI 19.12 kg/m2  She  is alert and oriented to person, place and time.  Face is symmetric.  Extraocular movements intact.  Strength is 5/5.  She does have a tremor in her hands bilaterally.  Gait is stable.       ASSESSMENT:   1.  Localization-related epilepsy, uncontrolled (rare seizures), etiology unclear (MRI is nonlesional).  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  Last complex partial seizure 10/2017  and last generalized tonic-clonic convulsion was 2014.  She is pregnant with twins. We " will have to increase antiepileptic drug to maintain target levels. After pregnancy, if needed, antiepileptic drug that may be considered in future: banzel, onfi, fycompa, felbamate. I would avoid additional Na+ blocking agents.      2.  Women care issues. She is pregnant with twins. No seizure thus far in pregnancy. Note epilepsy pregnancy plan of care above. We have discussed  increased teratogenicity risks on multiple AEDs and on high doses.       3.  Anxiety and depression.  Stable. She does not want to see a mental health provider at this time. Eating disorder is stable, she is eating three meals per day. I did ask her to get established with mental health care provider, she is a higher risk for post partum depression, lacks family support.         PLAN:   1. Increase lamotrigine   2. Target pregnancy levels:   Carbamazepine target level above 9  Lamotrigine target level 8-10  Levetiracetam target level above 20                 Medication Name   Tablet Size      Week 1  8 AM  (morning)  2pm  8PM (Night)   Notes   Generic  Carbamazepine XR (Carbatrol) 300 mg   2 tablet    2 tablet      Generic  levetiracetam 500 mg   3 tablet    2.5 tablet      Generic  lamotrigine 100 mg   3 tablet  2.5 tablet   2  tablet   increased since to get level near 10                   Medication Name   Tablet Size      Week 2  8 AM  (morning)  2pm  8PM (Night)   Notes   Generic  Carbamazepine XR (Carbatrol) 300 mg   2 tablet    2 tablet      Generic  levetiracetam 500 mg   3 tablet    2.5 tablet      Generic  lamotrigine 100 mg   3 tablet  3 tablet   2  tablet   increased since to get level near 10          3[facility]    4. Follow up  2 months      PEMA THORNE MD       I spent 35 minutes with the patient and family. During this time counseling and coordination of care exceeded 50% of the visit time. I addressed all questions and concerns the family raised in regards to the patients care.

## 2017-11-08 LAB
FINAL DIAGNOSIS: ABNORMAL
HPV HR 12 DNA CVX QL NAA+PROBE: POSITIVE
HPV16 DNA SPEC QL NAA+PROBE: NEGATIVE
HPV18 DNA SPEC QL NAA+PROBE: POSITIVE
SPECIMEN DESCRIPTION: ABNORMAL

## 2017-11-09 NOTE — PROGRESS NOTES
"8/9/12 NIL pap  11/3/17 NIL pap, + HR HPV 18 and other (no 16). 13w1d pregnant.  Plan: colp about 20 weeks gestation, around Dec 28th.  11/9/17 Pt. Advised.  12/11/17 [place] without bx, due to pregnancy. \"Mild acetowhite epithelial changes noted from the 10-12:00 position\". Plan: Repeat colpo in 4-8 weeks.  If stable, recommend biopsy post partum  02/21/18 [place] reminder letter sent. ([facility])   02/22/18: Plan [place] at next prenatal visit on 03/02/18 per provider. Msg left to call back. (sk)  3/2/18 [place] without bx during pregnancy. \"acetowhitening noted 10-12:00\" o'clock position. Plan: colp with bx due 6 wk pp, (approx 6/27/18)  4/27/18 Baby delivered  6/8/18 [place] bx and ECC: negative. Plan: cotest in 1 yr.  5/23/19 Cotest reminder letter sent (rl)  6/14/19 Pap Follow up reminder call placed, voicemail left (rlm)  7/17/19 Patient is lost to follow-up. Routed to provider as SWAPNA. (rlm)    "

## 2017-11-10 ENCOUNTER — TRANSFERRED RECORDS (OUTPATIENT)
Dept: HEALTH INFORMATION MANAGEMENT | Facility: CLINIC | Age: 37
End: 2017-11-10

## 2017-11-16 ENCOUNTER — TELEPHONE (OUTPATIENT)
Dept: NEUROLOGY | Facility: CLINIC | Age: 37
End: 2017-11-16

## 2017-11-16 DIAGNOSIS — G40.219 LOCALIZATION-RELATED EPILEPSY WITH COMPLEX PARTIAL SEIZURES WITH INTRACTABLE EPILEPSY (H): ICD-10-CM

## 2017-11-16 RX ORDER — DIAZEPAM ORAL SOLUTION (CONCENTRATE) 5 MG/ML
SOLUTION ORAL
Qty: 30 ML | Refills: 1 | Status: SHIPPED | OUTPATIENT
Start: 2017-11-16 | End: 2019-11-04

## 2017-11-16 NOTE — TELEPHONE ENCOUNTER
----- Message from Rashad Granda LPN sent at 11/16/2017  8:32 AM CST -----  Regarding: FW: Refill      ----- Message -----     From: Ashli Wilkinson MD     Sent: 11/15/2017   4:37 PM       To: Rashad Man LPN  Subject: RE: Refill                                       Kindly, ask nurses to refill. Thank you. Ashli Wilkinson MD   ----- Message -----     From: Rashad Granda LPN     Sent: 11/15/2017  12:56 PM       To: Ashli Wilkinson MD  Subject: FW: Refill                                       Would you be able to put in the initial Diazepam order for this patient? She went to the pharmacy as she thought it had been ordered and it was not there.   Thank you  Rashad Granda LPN    ----- Message -----     From: Rachel Zhang [facility]     Sent: 11/15/2017  12:54 PM       To: Rashad Granda LPN  Subject: RE: Refill                                       Talk to Dr. Wilkinson to put in the first script please, I assume it will be diazepam intensol 5 mg/ml and let me know.  Rachel  ----- Message -----     From: Rashad Granda LPN     Sent: 11/15/2017  11:09 AM       To: Rachel Zhang [facility]  Subject: RE: Refill                                       According to the note, this is for use during pregnancy and delivery. If this is not correct then I will set up a phone call with the provider and patient to discuss as the patient thought this was a new and ongoing medication while pregnant. Thank you    Note:    5. Seizure rescue plan during pregnancy:   a. Diazepam (Valium): 5 mg intensol for any generalized tonic clonic seizure or for more than 2 complex partial seizures within a 4 hour period, repeat once after 10 minutes.      6. Seizure rescue plan during delivery.   a. Lorazepam (Ativan): 2 mg lorazepam intravenously for any generalized tonic clonic seizure or for more than 2 complex partial seizures within a 4 hour period once, and repeat as needed per your OB/GYN assessment.  OR   Diazepam (Valium): 5 mg  intravenously for any generalized tonic clonic seizure or for more than 2 complex partial seizures within a 4 hour period once, and repeat as needed per your OB/GYN assessment  ----- Message -----     From: Rachel Zhang [facility]     Sent: 11/15/2017   9:59 AM       To: Rashad Granda LPN  Subject: RE: Refill                                       This is for the emergency protocol during delivery in the hospital, if she has a seizure ......     Rachel  ----- Message -----     From: Rashad Granda LPN     Sent: 11/15/2017   9:11 AM       To: Me [facility]ince[facility] Refill Pool  Subject: Refill                                           Patient needs a refill on Diazepam, taking Diazepam (Valium): 5 mg intravenously for any generalized tonic clonic seizure or for more than 2 complex partial seizures within a 4 hour period once, and repeat as needed per your OB/GYN assessment. Needs 30 days supply with refills.    Pharmacy:    [facility] [id number] [place] [address]        [facility] date: Was just seen on 11/07. Provider wrote this in note but did not order

## 2017-12-08 ENCOUNTER — TELEPHONE (OUTPATIENT)
Dept: NEUROLOGY | Facility: CLINIC | Age: 37
End: 2017-12-08

## 2017-12-08 DIAGNOSIS — O30.001 TWIN GESTATION IN FIRST TRIMESTER, UNSPECIFIED MULTIPLE GESTATION TYPE: ICD-10-CM

## 2017-12-08 DIAGNOSIS — G40.219 LOCALIZATION-RELATED EPILEPSY WITH COMPLEX PARTIAL SEIZURES WITH INTRACTABLE EPILEPSY (H): ICD-10-CM

## 2017-12-08 RX ORDER — LAMOTRIGINE 100 MG/1
TABLET ORAL
Qty: 270 TABLET | Refills: 1 | Status: SHIPPED | OUTPATIENT
Start: 2017-12-08 | End: 2017-12-29

## 2017-12-08 NOTE — TELEPHONE ENCOUNTER
"  From: Froy Ojeda CMA      Sent: 12/8/2017   9:37 AM        To: Aidee Palm Rn Pool   Subject: nful                                         Caller: vernell   Relationship to Patient: self   Call Back Number: [phone]   Reason for Call: pt would like to increase one of her AED. Please call her back    Partial epilepsy with impairment of consciousness  No illness, denies stress, appetite has been great.  Per her report, she recently had a few minor seizures described as \"I can't speak, have a hard time understanding what people are saying, denies loss of awareness\". Lasts 1-2 minutes\".  Vernell calls to see if she could take an extra Lamotrigine or extra LEV at HS.  Although a GTC did not occur, last night (12/14/17) she felt like she may have a GTC.  Vernell is 17 weeks gravid. SALIMA May 16, 2018.  Additional labs are scheduled to be drawn on Monday, 12/14/17.    Current height: 5'4  Current weight: 111 lbs    Last GTC 2014.    -300-200  LEV [phone]   CBZ (300 mg 12 h) 600-600     Ref. Range 4/26/2017 14:32   Carbamazepine Total Level Unknown 9.1   10, 11 Epoxide Level Unknown 2.2   Free Carbamazepine Level Ug/Ml Unknown 2.0   Free Epoxide Level Unknown 1.0   Lamotrigine Level Unknown 5.3   Levetiracetam Level Unknown 22.0     PLAN: Discussed with Dr. Cornell    1) Increase LTG to 300-300-300, RX sent to pharmacy  2) Continue other medications without change.  3) Will have serum levels drawn 12/11/17  4) Follow up appointment with Dr. Wilkinson on 1/17/17  "

## 2017-12-11 ENCOUNTER — OFFICE VISIT (OUTPATIENT)
Dept: OBGYN | Facility: OTHER | Age: 37
End: 2017-12-11
Payer: MEDICARE

## 2017-12-11 VITALS
DIASTOLIC BLOOD PRESSURE: 70 MMHG | HEART RATE: 98 BPM | BODY MASS INDEX: 21.11 KG/M2 | SYSTOLIC BLOOD PRESSURE: 120 MMHG | WEIGHT: 123 LBS

## 2017-12-11 DIAGNOSIS — O09.92 HIGH-RISK PREGNANCY IN SECOND TRIMESTER: ICD-10-CM

## 2017-12-11 DIAGNOSIS — R87.810 CERVICAL HIGH RISK HPV (HUMAN PAPILLOMAVIRUS) TEST POSITIVE: ICD-10-CM

## 2017-12-11 DIAGNOSIS — O30.042 DICHORIONIC DIAMNIOTIC TWIN PREGNANCY IN SECOND TRIMESTER: Primary | ICD-10-CM

## 2017-12-11 DIAGNOSIS — G40.219 PARTIAL EPILEPSY WITH IMPAIRMENT OF CONSCIOUSNESS, INTRACTABLE (H): ICD-10-CM

## 2017-12-11 DIAGNOSIS — F17.200 TOBACCO USE DISORDER: ICD-10-CM

## 2017-12-11 DIAGNOSIS — O09.522 ELDERLY MULTIGRAVIDA IN SECOND TRIMESTER: ICD-10-CM

## 2017-12-11 DIAGNOSIS — Z98.891 H/O: C-SECTION: ICD-10-CM

## 2017-12-11 PROBLEM — Z30.2 ENCOUNTER FOR STERILIZATION: Status: ACTIVE | Noted: 2017-12-11

## 2017-12-11 PROCEDURE — 57452 EXAM OF CERVIX W/SCOPE: CPT | Performed by: OBSTETRICS & GYNECOLOGY

## 2017-12-11 PROCEDURE — 99207 ZZC PRENATAL VISIT: CPT | Performed by: OBSTETRICS & GYNECOLOGY

## 2017-12-11 ASSESSMENT — PAIN SCALES - GENERAL: PAINLEVEL: MILD PAIN (2)

## 2017-12-11 NOTE — NURSING NOTE
"Chief Complaint   Patient presents with     Prenatal Care     Colpo----consent       Initial /70  Pulse 98  Wt 55.8 kg (123 lb)  LMP  (LMP Unknown)  BMI 21.11 kg/m2 Estimated body mass index is 21.11 kg/(m^2) as calculated from the following:    Height as of 11/7/17: 1.626 m (5' 4\").    Weight as of this encounter: 55.8 kg (123 lb).  Medication Reconciliation: complete     17w5d    "

## 2017-12-11 NOTE — MR AVS SNAPSHOT
After Visit Summary   2017    Vernell Logan    MRN: [id number]           Patient Information     Date Of Birth          1980        Visit Information        Provider Department      2017 10:30 AM Alicia Rain DO; NL PROC ROOM, [facility]        Today's Diagnoses     Dichorionic diamniotic twin pregnancy in second trimester    -  1    High-risk pregnancy in second trimester        H/O:         Elderly multigravida in second trimester        Tobacco use disorder        Partial epilepsy with impairment of consciousness, intractable (H)        Cervical high risk HPV (human papillomavirus) test positive          Care Instructions    What to watch out for are: regular contractions every 5 min, vaginal bleeding, decreased fetal movement, or leakage of fluid.  Please call the office or go to L&D if you develop any of these signs and symptoms.      I will see you for your follow up appointment.  Please feel free to call if you have any questions or concerns.      Thanks,  Alicia Rain DO            Follow-ups after your visit        Follow-up notes from your care team     Return in about 4 weeks (around 2018).      Your next 10 appointments already scheduled     2018 10:00 AM CST   [facility]ABL[facility]ia Rain DO   [facility]    [address]  [address]  [place] [id number] [phone]            2018  2:30 PM CST   Return Visit with Ashli Wilkinson MD   [facility]    [address], [address]  [place] [address] [phone]              Who to contact     If you have questions or need follow up information about today's clinic visit or your schedule please contact [facility] directly at [phone].  Normal or non-critical lab and imaging results will be communicated to you by MyChart, letter or  phone within 4 business days after the clinic has received the results. If you do not hear from us within 7 days, please contact the clinic through "[employer] or phone. If you have a critical or abnormal lab result, we will notify you by phone as soon as possible.  Submit refill requests through "[employer] or call your pharmacy and they will forward the refill request to us. Please allow 3 business days for your refill to be completed.          Additional Information About Your Visit        [employer] Information     "[employer] gives you secure access to your electronic health record. If you see a primary care provider, you can also send messages to your care team and make appointments. If you have questions, please call your primary care clinic.  If you do not have a primary care provider, please call [phone] and they will assist you.        Care EveryWhere ID     This is your Care EveryWhere ID. This could be used by other organizations to access your [place] medical records  [id number]        Your Vitals Were     Pulse Last Period BMI (Body Mass Index)             98 (LMP Unknown) 21.11 kg/m2          Blood Pressure from Last 3 Encounters:   12/11/17 120/70   11/07/17 124/60   11/03/17 122/74    Weight from Last 3 Encounters:   12/11/17 123 lb (55.8 kg)   11/07/17 111 lb 6.4 oz (50.5 kg)   11/03/17 110 lb 8 oz (50.1 kg)              We Performed the Following     COLP CERVIX/UPPER VAGINA W BX CERVIX/ENDOCERV CURETT        Primary Care Provider Office Phone # Fax #    Juliana Harris [phone] [address] [facility] [address]        Equal Access to Services     [facility]: Hadii aad ku hadasho Soyana, waaxda luqadaha, qaybta kaalmada bubba cervantes. So [facility] [phone].    ATENCIÓN: Si habla español, tiene a larkin disposición servicios gratuitos de asistencia lingüística. Llame al [phone].    We comply with applicable federal civil rights laws  and [place] laws. We do not discriminate on the basis of race, color, national origin, age, disability, sex, sexual orientation, or gender identity.            Thank you!     Thank you for choosing [facility]  for your care. Our goal is always to provide you with excellent care. Hearing back from our patients is one way we can continue to improve our services. Please take a few minutes to complete the written survey that you may receive in the mail after your visit with us. Thank you!             Your Updated Medication List - Protect others around you: Learn how to safely use, store and throw away your medicines at www.disposemymeds.org.          This list is accurate as of: 12/11/17 11:18 AM.  Always use your most recent med list.                   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 This is during pregnancy. Bottle needs to last 90 days!    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)       folic acid 1 MG tablet    FOLVITE    180 tablet    Take 2 tablets (2 mg) by mouth daily    High-risk pregnancy in second trimester, Partial epilepsy with impairment of consciousness, intractable (H)       IBUPROFEN      as needed        IRON (FERROUS SULFATE) PO      Take 1 tablet by mouth every morning        lamoTRIgine 100 MG tablet    LaMICtal    270 tablet    Increase to 300 AM, 300  afternoon and 300 HS    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), Twin gestation in first trimester, unspecified multiple gestation type       levETIRAcetam 500 MG tablet    KEPPRA    135 tablet    Beginning 11/9/17, increase LEV to 1500 mg AM and 1250 HS    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       vitamin B complex with vitamin C Tabs tablet      Take 1 tablet by mouth daily

## 2017-12-11 NOTE — PROGRESS NOTES
37 year old  at 17w5d weeks with di/di twins presents to the clinic for a routine prenatal visit with colposcopy.  Feeling well.  No vaginal bleeding, leakage of fluid, or contractions   Seizure disorder=recent change in medication  Fundal height=s=d  AIKj=O=984 and B=147  Anatomy ultrasound scheduled with [facility]  Will need to get c section records from [facility] filled out  Tobacco abuse  RTC 4 weeks.      The patient's pap smear on 11/3/17 showed Normal with HPV 18 and HR HPV.   I attempted to ensure that the patient was educated regarding the nature of her findings and implications to date.  We reviewed the role of HPV, incidence in the population and the natural history of the infection, and its transmission.  We also reviewed ways to minimize her future risk, the effect of HPV on the cervix and treatment options available, should they be indicated.    The pathophysiology of the cervix, including a discussion of the squamous and columnar cells, metaplasia and dysplasia have been reviewed, drawings, sketches and the pamphlets were reviewed with her.      No LMP recorded (lmp unknown). Patient is pregnant.  Current Birth Control Method: pregnancy  History of veneral diseases: : No  History of genital warts:  No  Visible warts now?:  No  Family History of  Cervical, Uterine or Vaginal Cancer?: No    Past Medical History:   Diagnosis Date     Anorexia 3/5/2013     Anxiety      Depressive disorder 1999     Heart murmur     told benign     Localization-related epilepsy (H)      Major depression      Migraine      Seizure disorder (H)     working with [facility] [place], really bad episodes      Status post left breast biopsy,sclerosing adenosis not concordant with imaging,12       Past Surgical History:   Procedure Laterality Date     C/SECTION, LOW TRANSVERSE      , Low Transverse        Outpatient Encounter Prescriptions as of 2017   Medication Sig Dispense Refill      lamoTRIgine (LAMICTAL) 100 MG tablet Increase to 300 AM, 300 afternoon and 300  tablet 1     Prenatal Vit-Fe Fumarate-FA (PRENATAL MULTIVITAMIN PLUS IRON) 27-0.8 MG TABS per tablet Take 1 tablet by mouth daily       folic acid (FOLVITE) 1 MG tablet Take 2 tablets (2 mg) by mouth daily 180 tablet 3     levETIRAcetam (KEPPRA) 500 MG tablet Beginning 11/9/17, increase LEV to 1500 mg AM and 1250  tablet 11     carBAMazepine (CARBATROL) 300 MG 12 hr capsule TAKE 2 CAPSULES (600 MG) BY MOUTH 2 TIMES DAILY 360 capsule 3     Acetaminophen (TYLENOL PO) Take by mouth as needed for mild pain or fever       diazepam (DIAZEPAM INTENSOL) 5 MG/ML (HIGH CONC) solution Diazepam intensol 5mg/ml: Administer between gum and cheek  1 ml(5 mg)  for any generalized tonic clonic seizure or for more than 2 complex partial seizures within a 4 hour period, repeat once after 10 minutes. This is during pregnancy. Bottle needs to last 90 days! 30 mL 1     vitamin B complex with vitamin C (VITAMIN  B COMPLEX) TABS tablet Take 1 tablet by mouth daily       IRON, FERROUS SULFATE, PO Take 1 tablet by mouth every morning       VITAMIN C 1000 MG OR TABS 1 TABLET DAILY AT DINNER       IBUPROFEN as needed       No facility-administered encounter medications on file as of 12/11/2017.         Allergies as of 12/11/2017     (No Known Allergies)       Social History     Social History     Marital status:      Spouse name: N/A     Number of children: 0     Years of education: N/A     Occupational History      None      Social History Main Topics     Smoking status: Current Every Day Smoker     Packs/day: 1.00     Years: 10.00     Types: Cigarettes     Start date: 6/1/1993     Smokeless tobacco: Never Used      Comment: 1 pack      Alcohol use Yes      Comment: beer or wine a few times per year     Drug use: No     Sexual activity: Yes     Partners: Male     Birth control/ protection: None     Other Topics Concern     Parent/Sibling W/  Cabg, Mi Or Angioplasty Before 65f 55m? Yes     Social History Narrative        Family History   Problem Relation Age of Onset     HEART DISEASE Father      62 yo when he had arrhythmia,  of CHF age 65     DIABETES Father      Neurologic Disorder Father      epilepsy     Schizophrenia Father      Anxiety Disorder Father      Hypertension Father      Obesity Father      CANCER Maternal Grandfather      CEREBROVASCULAR DISEASE Maternal Grandfather      CEREBROVASCULAR DISEASE Paternal Grandmother      HEART DISEASE Paternal Grandfather      Psychotic Disorder Daughter      Neurologic Disorder Daughter      CP, she was adopted out in      Schizophrenia Mother      Anxiety Disorder Mother      Depression Mother          Review Of Systems  Skin: negative  Eyes: negative  Ears/Nose/Throat: negative  Respiratory: negative  Cardiovascular: negative  Gastrointestinal: negative  Genitourinary: negative  Musculoskeletal: negative  Neurologic: negative  Psychiatric: negative  Hematologic/Lymphatic/Immunologic: negative  Endocrine: negative     Exam:   /70  Pulse 98  Wt 123 lb (55.8 kg)  LMP  (LMP Unknown)  BMI 21.11 kg/m2  GENERAL:  WNWD female NAD  HEENT: NC/AT, EOMI  SKIN: normal skin turgor  GAIT: Normal  NECK: Symmetrical, no masses noted   VULVA: Normal Genitalia  BUS: Normal  URETHRA:  No hypermobility noted  URETHRAL MEATUS:  No masses noted  VAGINA: Normal mucosa, no discharge  CERVIX: Closed, mobile, no discharge  PERIANAL:  No masses or lesions seen  EXTREMITIES: no clubbing, cyanosis, or edema    Assessment:  NIL with HPV 18 and other HR HPV    Plan:  Recommend to Proceed with Colpo  The details of the colposcopic procedure were reviewed, the risks of missed diagnoses, pain, infection, and bleeding.    TT 20 min, in addition to the time for the procedure  CT greater than 50%, as noted above in the HPI and in the Plan.         Procedure:  Procedure for colposcopy and biopsy has been explained to the  patient and consent obtained.    Before the procedure, it was ensured that the patient was educated regarding the nature of her findings and implications to date.  We reviewed the role of HPV and the natural history of the infection.  We also reviewed ways to minimize her future risk, the effect of HPV on the cervix and treatment options available, should they be indicated.    The pathophysiology of the cervix, including a discussion of the squamous and columnar cells, metaplasia and dysplasia have been reviewed, drawings, sketches and the pamphlets were reviewed with her.  The details of the colposcopic procedure were reviewed, the risks of missed diagnoses, pain, infection, and bleeding.  Questions seemed to be answered before proceeding and the patient then consented to the procedure.     Procedure:    Speculum placed and cervix visualized. Vagina normal with no lesions noted. Acetic acid applied to the cervix. The colposcopy is satisfactory as the entire transformation zone is visualized. Mild acetowhite epithelial changes noted from the 10-12:00 position.  I had Dr. Mccoy look as well.  Lugal's solution applied to patients cervix. Speculum removed    She tolerated the procedure well. There were no apparent complications.      Findings:    No images are attached to the encounter.     Cervix: acetowhitening noted 10-12:00  Vaginal inspection: vaginal colposcopy not performed.  Vulvar colposcopy: vulvar colposcopy not performed. N/A  Procedure Summary: Patient tolerated procedure well.      Assessment:   NIL with HPV 18 and other HR HPV    Plan:  Repeat colpo in 4-8 weeks.  If stable, recommend biopsy post partum    Alicia Rain

## 2017-12-11 NOTE — PATIENT INSTRUCTIONS
What to watch out for are: regular contractions every 5 min, vaginal bleeding, decreased fetal movement, or leakage of fluid.  Please call the office or go to L&D if you develop any of these signs and symptoms.      I will see you for your follow up appointment.  Please feel free to call if you have any questions or concerns.      Thanks,  Alicia Rain, DO

## 2017-12-18 DIAGNOSIS — G40.219 PARTIAL EPILEPSY WITH IMPAIRMENT OF CONSCIOUSNESS, INTRACTABLE (H): ICD-10-CM

## 2017-12-18 DIAGNOSIS — O09.92 HIGH-RISK PREGNANCY IN SECOND TRIMESTER: ICD-10-CM

## 2017-12-18 PROCEDURE — 80175 DRUG SCREEN QUAN LAMOTRIGINE: CPT | Mod: 90 | Performed by: PSYCHIATRY & NEUROLOGY

## 2017-12-18 PROCEDURE — 99000 SPECIMEN HANDLING OFFICE-LAB: CPT | Performed by: PSYCHIATRY & NEUROLOGY

## 2017-12-18 PROCEDURE — 80157 ASSAY CARBAMAZEPINE FREE: CPT | Mod: 90 | Performed by: PSYCHIATRY & NEUROLOGY

## 2017-12-18 PROCEDURE — 80339 ANTIEPILEPTICS NOS 1-3: CPT | Mod: 90 | Performed by: PSYCHIATRY & NEUROLOGY

## 2017-12-18 PROCEDURE — 36415 COLL VENOUS BLD VENIPUNCTURE: CPT | Performed by: PSYCHIATRY & NEUROLOGY

## 2017-12-18 PROCEDURE — 80177 DRUG SCRN QUAN LEVETIRACETAM: CPT | Mod: 90 | Performed by: PSYCHIATRY & NEUROLOGY

## 2017-12-18 PROCEDURE — 80156 ASSAY CARBAMAZEPINE TOTAL: CPT | Mod: 90 | Performed by: PSYCHIATRY & NEUROLOGY

## 2017-12-19 ENCOUNTER — TRANSFERRED RECORDS (OUTPATIENT)
Dept: HEALTH INFORMATION MANAGEMENT | Facility: CLINIC | Age: 37
End: 2017-12-19

## 2017-12-19 LAB
CARBAMAZEPINE EP FREE SERPL-MCNC: 1.1 UG/ML
CARBAMAZEPINE EP SERPL-MCNC: 2.2 UG/ML
CARBAMAZEPINE FREE SERPL-MCNC: 2 UG/ML
CARBAMAZEPINE SERPL-MCNC: 7.9 UG/ML

## 2017-12-20 LAB
LAMOTRIGINE SERPL-MCNC: 5.5 UG/ML (ref 2.5–15)
LEVETIRACETAM SERPL-MCNC: 27 UG/ML (ref 12–46)

## 2017-12-29 ENCOUNTER — TELEPHONE (OUTPATIENT)
Dept: NEUROLOGY | Facility: CLINIC | Age: 37
End: 2017-12-29

## 2017-12-29 DIAGNOSIS — G40.219 LOCALIZATION-RELATED EPILEPSY WITH COMPLEX PARTIAL SEIZURES WITH INTRACTABLE EPILEPSY (H): Primary | ICD-10-CM

## 2017-12-29 DIAGNOSIS — O30.001 TWIN GESTATION IN FIRST TRIMESTER, UNSPECIFIED MULTIPLE GESTATION TYPE: ICD-10-CM

## 2017-12-29 RX ORDER — LAMOTRIGINE 100 MG/1
TABLET ORAL
Qty: 330 TABLET | Refills: 3 | Status: SHIPPED | OUTPATIENT
Start: 2017-12-29 | End: 2018-01-17

## 2017-12-29 NOTE — TELEPHONE ENCOUNTER
Caller: Vernell     Relationship to Patient: Self     Call Back Number: [phone]     Reason for Call: Patient would like to talk about adjusting her medications. Please give her a call. Thanks.     Marguerite Sousa CMA    20 weeks gravid: SALIMA ~ 5/16/18.  Vernell calls to report she felt that she may have a GTC last evening. Per Vernell she became spacy and felt her head pulling to the left. She also experienced tunnel vision. She calls requesting to increase LTG.  Per Dr. Wilkinson, goal LTG level is 10.   When labs were last drawn her LTG level returned at 5.5.  No missed medications, no alcohol, sleeping okay, denies n/v/d.  Follow up appointment with Dr. Wilkinson on 1/17/18.    Currently prescribed:    -300-300  -600  LEV [id number]     Ref. Range 12/18/2017 10:47   Carbamazepine Total Level Latest Ref Range: 4.0 - 12.0 ug/mL 7.9   10, 11 Epoxide Level Latest Ref Range: 0.4 - 4.0 ug/mL 2.2   Free Carbamazepine Level Ug/Ml Latest Ref Range: 0.6 - 4.2 ug/mL 2.0   Free Epoxide Level Latest Ref Range: 0.2 - 2.0 ug/mL 1.1   Keppra (Levetiracetam) Level Latest Ref Range: 12 - 46 ug/mL 27   Lamotrigine Level Latest Ref Range: 2.5 - 15.0 ug/mL 5.5     PLAN:  Discussed with Dr. Cornell    1) Increase LTG to 400-400-300  2) Updated RX sent to pharmacy  3) Follow up with Dr. Wilkinson as scheduled 1/17/18

## 2018-01-09 ENCOUNTER — TRANSFERRED RECORDS (OUTPATIENT)
Dept: HEALTH INFORMATION MANAGEMENT | Facility: CLINIC | Age: 38
End: 2018-01-09

## 2018-01-09 PROBLEM — O30.042 DICHORIONIC DIAMNIOTIC TWIN PREGNANCY IN SECOND TRIMESTER: Status: ACTIVE | Noted: 2017-11-03

## 2018-01-12 ENCOUNTER — PRENATAL OFFICE VISIT (OUTPATIENT)
Dept: OBGYN | Facility: CLINIC | Age: 38
End: 2018-01-12
Payer: MEDICARE

## 2018-01-12 VITALS
HEART RATE: 81 BPM | BODY MASS INDEX: 23.34 KG/M2 | SYSTOLIC BLOOD PRESSURE: 124 MMHG | DIASTOLIC BLOOD PRESSURE: 71 MMHG | WEIGHT: 136 LBS

## 2018-01-12 DIAGNOSIS — F33.0 MAJOR DEPRESSIVE DISORDER, RECURRENT EPISODE, MILD (H): ICD-10-CM

## 2018-01-12 DIAGNOSIS — F41.1 GENERALIZED ANXIETY DISORDER: ICD-10-CM

## 2018-01-12 DIAGNOSIS — O09.522 ELDERLY MULTIGRAVIDA IN SECOND TRIMESTER: ICD-10-CM

## 2018-01-12 DIAGNOSIS — O09.92 HIGH-RISK PREGNANCY IN SECOND TRIMESTER: ICD-10-CM

## 2018-01-12 DIAGNOSIS — Z98.891 H/O: C-SECTION: ICD-10-CM

## 2018-01-12 DIAGNOSIS — F17.200 TOBACCO USE DISORDER: ICD-10-CM

## 2018-01-12 DIAGNOSIS — O30.042 DICHORIONIC DIAMNIOTIC TWIN PREGNANCY IN SECOND TRIMESTER: Primary | ICD-10-CM

## 2018-01-12 PROCEDURE — 99207 ZZC PRENATAL VISIT: CPT | Performed by: OBSTETRICS & GYNECOLOGY

## 2018-01-12 ASSESSMENT — PAIN SCALES - GENERAL: PAINLEVEL: NO PAIN (0)

## 2018-01-12 NOTE — NURSING NOTE
"Chief Complaint   Patient presents with     Prenatal Care       Initial /71  Pulse 81  Wt 61.7 kg (136 lb)  LMP  (LMP Unknown)  BMI 23.34 kg/m2 Estimated body mass index is 23.34 kg/(m^2) as calculated from the following:    Height as of 11/7/17: 1.626 m (5' 4\").    Weight as of this encounter: 61.7 kg (136 lb).  Medication Reconciliation: complete     22w2d    "

## 2018-01-12 NOTE — MR AVS SNAPSHOT
After Visit Summary   2018    Vernell Logan    MRN: [id number]           Patient Information     Date Of Birth          1980        Visit Information        Provider Department      2018 10:00 AM Alicia Rain DO [facility]        Today's Diagnoses     Dichorionic diamniotic twin pregnancy in second trimester    -  1    High-risk pregnancy in second trimester        H/O:         Elderly multigravida in second trimester        Generalized anxiety disorder        Major depressive disorder, recurrent episode, mild (H)        Tobacco use disorder          Care Instructions    What to watch out for are: regular contractions every 5 min, vaginal bleeding, decreased fetal movement, or leakage of fluid.  Please call the office or go to L&D if you develop any of these signs and symptoms.      I will see you for your follow up appointment.  Please feel free to call if you have any questions or concerns.      Thanks,  Alicia Rain, DO            Follow-ups after your visit        Follow-up notes from your care team     Return in about 4 weeks (around 2018).      Your next 10 appointments already scheduled     2018  2:30 PM CST   Return Visit with Ashli Wilkinson MD   [facility] ([facility])    [address], [address]  [facility] [address]   [phone]            2018  9:30 AM CST   ESTABLISHED PRENATAL with Alicia Rain DO   [facility]    [address]  [address]  [facility] [address]   [phone]              Future tests that were ordered for you today     Open Future Orders        Priority Expected Expires Ordered    Glucose tolerance gest screen 1 hour Routine  2018    OB hemoglobin Routine  2018            Who to contact     If you have questions or need follow up information about today's clinic visit or your schedule  please contact [facility] directly at [phone].  Normal or non-critical lab and imaging results will be communicated to you by MyChart, letter or phone within 4 business days after the clinic has received the results. If you do not hear from us within 7 days, please contact the clinic through MyChart or phone. If you have a critical or abnormal lab result, we will notify you by phone as soon as possible.  Submit refill requests through [employer] or call your pharmacy and they will forward the refill request to us. Please allow 3 business days for your refill to be completed.          Additional Information About Your Visit        [employer] Information     [employer] gives you secure access to your electronic health record. If you see a primary care provider, you can also send messages to your care team and make appointments. If you have questions, please call your primary care clinic.  If you do not have a primary care provider, please call [phone] and they will assist you.        Care EveryWhere ID     This is your Care EveryWhere ID. This could be used by other organizations to access your [place] medical records  [id number]        Your Vitals Were     Pulse Last Period BMI (Body Mass Index)             81 (LMP Unknown) 23.34 kg/m2          Blood Pressure from Last 3 Encounters:   01/12/18 124/71   12/11/17 120/70   11/07/17 124/60    Weight from Last 3 Encounters:   01/12/18 61.7 kg (136 lb)   12/11/17 55.8 kg (123 lb)   11/07/17 50.5 kg (111 lb 6.4 oz)               Primary Care Provider Office Phone # Fax #    Juliana Harris [phone] [address]  [facility] [address]        Equal Access to Services     [facility]: Hadii lyric chan Soyana, waaxda luqadaha, qaybta kaalmada billy, bubba candelario . So [facility] [phone].    ATENCIÓN: Si habla español, tiene a larkin disposición servicios gratuitos de asistencia lingüística. Llame al  [phone].    We comply with applicable federal civil rights laws and [place] laws. We do not discriminate on the basis of race, color, national origin, age, disability, sex, sexual orientation, or gender identity.            Thank you!     Thank you for choosing [facility]  for your care. Our goal is always to provide you with excellent care. Hearing back from our patients is one way we can continue to improve our services. Please take a few minutes to complete the written survey that you may receive in the mail after your visit with us. Thank you!             Your Updated Medication List - Protect others around you: Learn how to safely use, store and throw away your medicines at www.disposemymeds.org.          This list is accurate as of: 1/12/18 10:31 AM.  Always use your most recent med list.                   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 This is during pregnancy. Bottle needs to last 90 days!    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), Twin gestation in first trimester, unspecified multiple gestation type       levETIRAcetam 500 MG tablet    KEPPRA    135 tablet    Beginning 11/9/17, increase LEV to 1500 mg AM and 1250 HS    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

## 2018-01-12 NOTE — PROGRESS NOTES
37 year old  at 22w2d weeks with di/di twins presents to the clinic for a routine prenatal visit.  Seizure disorder=stable  No vaginal bleeding, leakage of fluid, or contractions   Good fetal movement.  FHTs= A=148  B=135  Fundal height= 26cm  Normal anatomy ultrasound with MFM.  Repeat already scheduled.  RTC 4 weeks  GCT at that visit  Anxiety/depression=stable  Tobacco abuse=1/2 pack per day.  Patient smells of significant smoke.  We discussed cutting back and quitting.   Blood type:O+    Alicia Rain

## 2018-01-15 ENCOUNTER — TRANSFERRED RECORDS (OUTPATIENT)
Dept: HEALTH INFORMATION MANAGEMENT | Facility: CLINIC | Age: 38
End: 2018-01-15

## 2018-01-17 ENCOUNTER — OFFICE VISIT (OUTPATIENT)
Dept: NEUROLOGY | Facility: CLINIC | Age: 38
End: 2018-01-17
Payer: MEDICARE

## 2018-01-17 VITALS
WEIGHT: 137.6 LBS | SYSTOLIC BLOOD PRESSURE: 130 MMHG | BODY MASS INDEX: 23.49 KG/M2 | HEART RATE: 90 BPM | HEIGHT: 64 IN | RESPIRATION RATE: 24 BRPM | DIASTOLIC BLOOD PRESSURE: 56 MMHG

## 2018-01-17 DIAGNOSIS — F33.0 MAJOR DEPRESSIVE DISORDER, RECURRENT EPISODE, MILD (H): Primary | ICD-10-CM

## 2018-01-17 DIAGNOSIS — G40.219 LOCALIZATION-RELATED EPILEPSY WITH COMPLEX PARTIAL SEIZURES WITH INTRACTABLE EPILEPSY (H): ICD-10-CM

## 2018-01-17 DIAGNOSIS — O30.001 TWIN GESTATION IN FIRST TRIMESTER, UNSPECIFIED MULTIPLE GESTATION TYPE: ICD-10-CM

## 2018-01-17 RX ORDER — LEVETIRACETAM 500 MG/1
TABLET ORAL
Qty: 540 TABLET | Refills: 3 | Status: SHIPPED | OUTPATIENT
Start: 2018-01-17 | End: 2018-02-23

## 2018-01-17 RX ORDER — LAMOTRIGINE 100 MG/1
TABLET ORAL
Qty: 345 TABLET | Refills: 3 | Status: SHIPPED | OUTPATIENT
Start: 2018-01-17 | End: 2018-02-23

## 2018-01-17 ASSESSMENT — PAIN SCALES - GENERAL: PAINLEVEL: NO PAIN (0)

## 2018-01-17 NOTE — LETTER
2018       RE: Vernell Logan  : 1980   MRN: [id number]      Dear Colleague,    Thank you for referring your patient, Vernell Logan, to the [facility] EPILEPSY CARE at [facility]. Please see a copy of my visit note below.    [facility]    Patient:  Vernell Logan  :  1980   Age:  37 year old   Today's Office Visit:  2018    Epilepsy Data:  Patient History  Primary Epileptologist/Provider: Ashli Wilkinson M.D.  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 Last 5 minutes.    # of Type 1 Seizure since last visit: 2  Freq. Type 1 / Month: 0.86  Seizure Type 2: Partial seizures with secondary generalization  -  with complex partial seizures evolving to generalized seizures  Description of Sz Type 2: Stares off -> GTC  # of Type 2 Seizure since last visit: 0  Freq. Type 2 / Month: 0         EPILEPSY HISTORY: Onset of seizures was 14 years of age. Based on electrographic data 2012, the patient had frequent nonconvulsive seizures arising from the right and left temporal lobe, bitemporal lobe interictal discharges. Her MRI is normal. Her PET scan was remarkable for decreased metabolic activity in the left temporal lobe. Prior  she rarely had seizure. From 6056-0046 she had several seizure per week. She had  "antiepileptic drug.     INTERVAL HISTORY:  Came alone.  She is 23 weeks (twin pregnancy) pregnant. She has two complex partial seizure since last visit, no generalized tonic-clonic convulsion. Prior to this she had one partial seizure on 1/2017 (difficulty getting words out, feels confused, but, she is aware if someone is talking to her, she has difficulty with comprehension, she states \"it sounds like they are repeating a word over and over\"). Prior to this her last seizure was 7/2016. No generalized tonic-clonic convulsion, last generalized tonic-clonic convulsion 2014.     We have increase antiepileptic drug to maintain levels during pregnancy. We spent whole visit talking about pregnancy and antiepileptic drug. Currently, on antiepileptic drug there is no double vision, no mood changes, no nausea, no vomiting, no abdominal pain, no rashes. No recent ER visits and no hospitalizations since last visit. We spent much of time talking about antiepileptic drug changes, antiepileptic drug levels, precautions post partum, and breast feeding.       Prior to Admission medications    Medication Sig Start Date End Date Taking? Authorizing Provider   levETIRAcetam (KEPPRA) 500 MG tablet TAKE TWO AND ONE-HALF TABLETS BY MOUTH TWICE DAILY  Patient taking differently: 500 mg TAKE TWO AND ONE-HALF TABLETS BY MOUTH TWICE DAILY 4/13/16  Yes Ashli Wilkinson MD   lamoTRIgine (LAMICTAL) 100 MG tablet Take 2 tab po at 8 am and 3 tab po at 1 pm and 2 tab po 8 pm  Patient taking differently: 100 mg Take 2 tab po at 8 am and 3 tab po at 1 pm and 2 tab po 8 pm 4/13/16  Yes Ashli Wilkinson MD   carBAMazepine (CARBATROL) 300 MG 12 hr capsule TAKE 2 CAPSULES (600 MG) BY MOUTH 2 TIMES DAILY  Patient taking differently: 300 mg TAKE 2 CAPSULES (600 MG) BY MOUTH 2 TIMES DAILY 4/13/16  Yes Ashli Wilkinson MD   Acetaminophen (TYLENOL PO) Take by mouth as needed for mild pain or fever   Yes Reported, Patient   VITAMIN C 1000 MG OR TABS 1 TABLET DAILY " AT DINNER   Yes Reported, Patient   IBUPROFEN as needed   Yes Reported, Patient       MEDICATIONS:                Medication Name   Tablet Size        8 AM  (morning)  2pm  8PM (Night)   Notes   Generic  Carbamazepine XR (Carbatrol) 300 mg   2 tablet    2 tablet      Generic  levetiracetam 500 mg   3 tablet    2.5 tablet      Generic  lamotrigine 100 mg   4 tablet  4 tablet   3  tablet   increased since pregnant     Prepregnancy antiepileptic drug doses:   Carbamazepine 600-600  Levetiracetam 7423-3957  Lamotrigine 200-200-200        Component      Latest Ref Rng & Units 7/26/2013 11/24/2014 1/21/2015 4/22/2015   Carbamazepine Total      4.0 - 12.0 mg/L       Carbamazepine Total Level       5.2  7.0 8.4   10, 11 Epoxide Level       1.4  1.9 2.3   Free Carbamazepine Level Ug/Ml       1.4  1.5 1.4   Free Epoxide Level       0.8  0.9 0.9   Levetiracetam Level       39.5 31.1     Lamotrigine Level      2.5 - 15.0 ug/mL       Keppra (Levetiracetam) Level      12 - 46 ug/mL         Component      Latest Ref Rng & Units 6/26/2015 4/13/2016 4/26/2017 12/18/2017   Carbamazepine Total      4.0 - 12.0 mg/L       Carbamazepine Total Level       8.9      10, 11 Epoxide Level       3.0      Free Carbamazepine Level Ug/Ml       1.8      Free Epoxide Level       1.2      Levetiracetam Level        22.9 22.0    Lamotrigine Level      2.5 - 15.0 ug/mL 8.7 10.0 5.3 5.5   Keppra (Levetiracetam) Level      12 - 46 ug/mL    27       Target pregnancy levels:   Carbamazepine target level above 9  Lamotrigine target level 8-10  Levetiracetam target level above 20    Pregnancy Notes/Plan:      1. Expected date of delivery: 5/16/2017  2. OB/GYN contact information: Dr. Koffi [place]   3. Medications  a. Target AED level during pregnancy: Carbamazepine target level above 9 Lamotrigine target level 8-10, Levetiracetam target level above 20  b. Use of folic acid daily: yes  c. Use of prenatal vitamins:  yes  d. Postpartum AED reduction  "plan: to be determined in third trimester  4. EPIC:   a. Epilepsy provider entered standing antiepileptic levels every 2-4 weeks in EPIC: yes   b. Epilepsy provider notify Iraida Mccartney to track patient: yes    5. Seizure rescue plan during pregnancy:   a. Diazepam (Valium): 5 mg intensol for any generalized tonic clonic seizure or for more than 2 complex partial seizures within a 4 hour period, repeat once after 10 minutes.     6. Seizure rescue plan during delivery.   a. Lorazepam (Ativan): 2 mg lorazepam intravenously for any generalized tonic clonic seizure or for more than 2 complex partial seizures within a 4 hour period once, and repeat as needed per your OB/GYN assessment.  OR Diazepam (Valium): 5 mg intravenously for any generalized tonic clonic seizure or for more than 2 complex partial seizures within a 4 hour period once, and repeat as needed per your OB/GYN assessment  b. Midazolam (if no IV access): 5 mg intramuscularly for any generalized tonic clonic seizure or for 2 complex partial seizures within a 4 hour period once, and repeat as needed per your OB/GYN assessment.   c. If patient has seizure due to preeclampsia or eclampsia magnesium sulfate is indicated per OB/GYN assessment.   7. . Does patient have untreated mood disorder? Yes, but, depression is stable at this time. Encouraged patient to establish care with mental health care provider.       REVIEW OF SYSTEMS:  Intermittent dizziness.  diplopia improved.  B/l hand tremor improved.  Anxiety.     SOCIAL HISTORY: She is not working. She is engaged.  She smokes 1/2 pack of cigarettes per day. She is living alone. Pregnant. Stressors in last five years:  Ricardo passed away 11/2015. Unplanned pregnancy.      PHYSICAL EXAMINATION:  /56 (BP Location: Right arm, Patient Position: Chair, Cuff Size: Adult Regular)  Pulse 90  Resp 24  Ht 5' 4\" (162.6 cm)  Wt 137 lb 9.6 oz (62.4 kg)  LMP  (LMP Unknown)  BMI 23.62 kg/m2  Pregnant. " Alert, orientated, speech is fluent, face is symmetric, extra-ocular movement in tact, no focal deficits noted.Gait is stable.    ASSESSMENT:   1.  Localization-related epilepsy, uncontrolled (rare seizures), etiology unclear (MRI is nonlesional).  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  Last complex partial seizure 11/2017 or 12/2017. Her last generalized tonic-clonic convulsion was 2014.  She is pregnant with twins. We have increased antiepileptic drug to maintain target levels. After pregnancy, if needed, antiepileptic drug that may be considered in future: banzel, onfi, fycompa, felbamate. I would avoid additional Na+ blocking agents.      2.  Women care issues. She is pregnant with twins. Rare complex partial seizure in pregnancy, no generalized tonic-clonic convulsion. Note epilepsy pregnancy plan of care above. We have discussed  increased teratogenicity risks on multiple AEDs and on high doses.       3.  Anxiety and depression.  Stable. She does not want to see a mental health provider at this time. Eating disorder is stable, she is eating three meals per day. I did ask her to get established with mental health care provider, she is a higher risk for post partum depression, lacks family support.         PLAN:   1. Increase antiepileptic drugs.     2. Target pregnancy levels:   Carbamazepine target level above 9  Lamotrigine target level 8-10  Levetiracetam target level above 20               Medication Name   Tablet Size      Week 1   8 AM  (morning)  2pm  8PM (Night)   Notes   Generic  Carbamazepine XR (Carbatrol) 300 mg   2 tablet    2 tablet      Generic  levetiracetam 500 mg   3 tablet    3 tablet   increased since pregnant   Generic  lamotrigine 100 mg   4 tablet  4 tablet   3  tablet   increased since pregnant                Medication Name   Tablet Size      Week 2  8 AM  (morning)  2pm  8PM (Night)   Notes   Generic  Carbamazepine XR (Carbatrol) 300 mg   2  tablet    2 tablet      Generic  levetiracetam 500 mg   3 tablet    3 tablet   increased since pregnant   Generic  lamotrigine 100 mg   4.5 tablet  4 tablet   3  tablet   increased since pregnant         [facility]    4. Follow up  1-2 months    5. Placed orders for levetiracetam, lamotrigine, carbamazepine monthly draw        I spent 35 minutes with the patient and family. During this time counseling and coordination of care exceeded 50% of the visit time. I addressed all questions and concerns the family raised in regards to the patients care.       Again, thank you for allowing me to participate in the care of your patient.      Sincerely,    Ashli Wilkinson MD

## 2018-01-17 NOTE — MR AVS SNAPSHOT
After Visit Summary   1/17/2018    Vernell Logan    MRN: [id number]           Patient Information     Date Of Birth          1980        Visit Information        Provider Department      1/17/2018 2:30 PM Ashli Wilkinson MD [facility]        Today's Diagnoses     Major depressive disorder, recurrent episode, mild (H)    -  1    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)        Twin gestation in first trimester, unspecified multiple gestation type          Care Instructions               Medication Name   Tablet Size      Week 1   8 AM  (morning)  2pm  8PM (Night)   Notes   Generic  Carbamazepine XR (Carbatrol) 300 mg   2 tablet    2 tablet      Generic  levetiracetam 500 mg   3 tablet    3 tablet   increased since pregnant   Generic  lamotrigine 100 mg   4 tablet  4 tablet   3  tablet   increased since pregnant                Medication Name   Tablet Size      Week 2  8 AM  (morning)  2pm  8PM (Night)   Notes   Generic  Carbamazepine XR (Carbatrol) 300 mg   2 tablet    2 tablet      Generic  levetiracetam 500 mg   3 tablet    3 tablet   increased since pregnant   Generic  lamotrigine 100 mg   4.5 tablet  4 tablet   3  tablet   increased since pregnant       After we check your labs, we may need to increase carbamazepine    Ashli PEÑALOZA. MD Minh           Follow-ups after your visit        Your next 10 appointments already scheduled     Feb 16, 2018  9:30 AM CST   ESTABLISHED PRENATAL with Alicia Rain DO   [facility] ([facility]    [address]  [address]  [place] [address]   [phone]            Feb 23, 2018 11:00 AM CST   Return Visit with MD [facility] ([facility])    [address], [address]  [place] [address]   [phone]            Apr 11, 2018  2:00 PM CDT   Return Visit with MD [facility] ([facility]  "Clinics)    [address], [address]  [facility] [id number] [phone]              Future tests that were ordered for you today     Open Standing Orders        Priority Remaining Interval Expires Ordered    Lamotrigine Level Routine 10/10  6/17/2018 1/17/2018    Carbamazepine and epoxide free and total Routine 10/10  6/17/2018 1/17/2018    Keppra (Levetiracetam) Level Routine 10/10  6/17/2018 1/17/2018            Who to contact     Please call your clinic at [phone] to:    Ask questions about your health    Make or cancel appointments    Discuss your medicines    Learn about your test results    Speak to your doctor   If you have compliments or concerns about an experience at your clinic, or if you wish to file a complaint, please contact [facility] Patient Relations at [phone] or email us at [email or web address]         Additional Information About Your Visit        IPextremeharTelesocial Information     AllClear ID gives you secure access to your electronic health record. If you see a primary care provider, you can also send messages to your care team and make appointments. If you have questions, please call your primary care clinic.  If you do not have a primary care provider, please call [phone] and they will assist you.      AllClear ID is an electronic gateway that provides easy, online access to your medical records. With AllClear ID, you can request a clinic appointment, read your test results, renew a prescription or communicate with your care team.     To access your existing account, please contact your [facility] or call [phone] for assistance.        Care EveryWhere ID     This is your Care EveryWhere ID. This could be used by other organizations to access your [place] medical records  [id number]        Your Vitals Were     Pulse Respirations Height Last Period BMI (Body Mass Index)       90 24 5' 4\" (162.6 cm) (LMP " Unknown) 23.62 kg/m2        Blood Pressure from Last 3 Encounters:   01/17/18 130/56   01/12/18 124/71   12/11/17 120/70    Weight from Last 3 Encounters:   01/17/18 137 lb 9.6 oz (62.4 kg)   01/12/18 136 lb (61.7 kg)   12/11/17 123 lb (55.8 kg)              We Performed the Following     Carbamazepine and epoxide free and total     Keppra (Levetiracetam) Level     Lamotrigine Level          Today's Medication Changes          These changes are accurate as of: 1/17/18  3:13 PM.  If you have any questions, ask your nurse or doctor.               These medicines have changed or have updated prescriptions.        Dose/Directions    lamoTRIgine 100 MG tablet   Commonly known as:  LaMICtal   This may have changed:  additional instructions   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), Twin gestation in first trimester, unspecified multiple gestation type, Major depressive disorder, recurrent episode, mild (H)   Changed by:  Ashli Wilkinson MD        Increase to 450 mg AM, 400 mg afternoon and 300 HS   Quantity:  345 tablet   Refills:  3       levETIRAcetam 500 MG tablet   Commonly known as:  KEPPRA   This may have changed:  additional instructions   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), Major depressive disorder, recurrent episode, mild (H), Twin gestation in first trimester, unspecified multiple gestation type   Changed by:  Ashli Wilkinson MD        Beginning 1/18/2018, increase LEV to 1500 mg AM and 1500 HS   Quantity:  540 tablet   Refills:  3            Where to get your medicines      These medications were sent to [place] [address] [place] [address]   [address] [facility] [address]     Phone:  [phone]     lamoTRIgine 100 MG tablet    levETIRAcetam 500 MG tablet                Primary Care Provider Office Phone # Fax #    Juliana Harris [phone]        [address]  [facility] [place] [facility] [address]        Equal Access to Services     RASHMILÓPEZ KELLY : Hadii lyric brewer deisyaliyah Ottonielali, susanfab diamondelsaha, kaylaadina mujicaharleyfab espinozafaustinofab, bubba salomon marthacher trinhjunocorona coates. So [facility] [phone].    ATENCIÓN: Si habla español, tiene a larkin disposición servicios gratuitos de asistencia lingüística. Llame al [phone].    We comply with applicable federal civil rights laws and [place] laws. We do not discriminate on the basis of race, color, national origin, age, disability, sex, sexual orientation, or gender identity.            Thank you!     Thank you for choosing [facility] E[facility]  for your care. Our goal is always to provide you with excellent care. Hearing back from our patients is one way we can continue to improve our services. Please take a few minutes to complete the written survey that you may receive in the mail after your visit with us. Thank you!             Your Updated Medication List - Protect others around you: Learn how to safely use, store and throw away your medicines at www.disposemymeds.org.          This list is accurate as of: 1/17/18  3:13 PM.  Always use your most recent med list.                   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 This is during pregnancy. Bottle needs to last 90 days!    Localization-related 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  epilepsy (H)       folic acid 1 MG tablet    FOLVITE    180 tablet    Take 2 tablets (2 mg) by mouth daily    High-risk pregnancy in second trimester, Partial epilepsy with impairment of consciousness, intractable (H)       lamoTRIgine 100 MG tablet    LaMICtal    345 tablet    Increase to 450 mg AM, 400 mg afternoon and 300 HS    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), Twin gestation in first trimester, unspecified multiple gestation type, Major depressive disorder, recurrent episode, mild (H)       levETIRAcetam 500 MG tablet    KEPPRA    540 tablet    Beginning 1/18/2018, increase LEV to 1500 mg AM and 1500 HS    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

## 2018-01-17 NOTE — NURSING NOTE
Lamotrigine taken at 2:00 pm all other AEDs taken at 8:00 am  Blood drawn at 3:10 pm  Marguerite Tijerianrecht CITLALLI

## 2018-01-17 NOTE — Clinical Note
Hi. I saw Vernell and she had two complex partial seizure. I have increase antiepileptic drug, but, I suspect she will need more.   Please keep a look out for her labs.   Thanks. Ashli Wilkinson MD

## 2018-01-17 NOTE — PATIENT INSTRUCTIONS
Medication Name   Tablet Size      Week 1   8 AM  (morning)  2pm  8PM (Night)   Notes   Generic  Carbamazepine XR (Carbatrol) 300 mg   2 tablet    2 tablet      Generic  levetiracetam 500 mg   3 tablet    3 tablet   increased since pregnant   Generic  lamotrigine 100 mg   4 tablet  4 tablet   3  tablet   increased since pregnant                Medication Name   Tablet Size      Week 2  8 AM  (morning)  2pm  8PM (Night)   Notes   Generic  Carbamazepine XR (Carbatrol) 300 mg   2 tablet    2 tablet      Generic  levetiracetam 500 mg   3 tablet    3 tablet   increased since pregnant   Generic  lamotrigine 100 mg   4.5 tablet  4 tablet   3  tablet   increased since pregnant       After we check your labs, we may need to increase carbamazepine    Ashli Wilkinson MD

## 2018-01-18 LAB
CARBAMAZEPINE EP FREE SERPL-MCNC: 1.2 UG/ML
CARBAMAZEPINE EP SERPL-MCNC: 2 UG/ML
CARBAMAZEPINE FREE SERPL-MCNC: 1.8 UG/ML
CARBAMAZEPINE SERPL-MCNC: 6.6 UG/ML

## 2018-01-19 LAB
LAMOTRIGINE SERPL-MCNC: 7.8 UG/ML (ref 2.5–15)
LEVETIRACETAM SERPL-MCNC: 16 UG/ML (ref 12–46)

## 2018-02-06 ENCOUNTER — TRANSFERRED RECORDS (OUTPATIENT)
Dept: HEALTH INFORMATION MANAGEMENT | Facility: CLINIC | Age: 38
End: 2018-02-06

## 2018-02-16 ENCOUNTER — PRENATAL OFFICE VISIT (OUTPATIENT)
Dept: OBGYN | Facility: CLINIC | Age: 38
End: 2018-02-16
Payer: MEDICARE

## 2018-02-16 ENCOUNTER — TELEPHONE (OUTPATIENT)
Dept: OBGYN | Facility: OTHER | Age: 38
End: 2018-02-16

## 2018-02-16 VITALS
BODY MASS INDEX: 23.86 KG/M2 | WEIGHT: 139 LBS | SYSTOLIC BLOOD PRESSURE: 118 MMHG | DIASTOLIC BLOOD PRESSURE: 62 MMHG | HEART RATE: 88 BPM

## 2018-02-16 DIAGNOSIS — F41.1 GENERALIZED ANXIETY DISORDER: ICD-10-CM

## 2018-02-16 DIAGNOSIS — F17.200 TOBACCO USE DISORDER: ICD-10-CM

## 2018-02-16 DIAGNOSIS — Z13.1 ENCOUNTER FOR SCREENING FOR DIABETES MELLITUS: ICD-10-CM

## 2018-02-16 DIAGNOSIS — O30.042 DICHORIONIC DIAMNIOTIC TWIN PREGNANCY IN SECOND TRIMESTER: Primary | ICD-10-CM

## 2018-02-16 DIAGNOSIS — Z13.1 SCREENING FOR DIABETES MELLITUS: ICD-10-CM

## 2018-02-16 DIAGNOSIS — Z33.1 PREGNANCY, INCIDENTAL: ICD-10-CM

## 2018-02-16 DIAGNOSIS — Z30.2 ENCOUNTER FOR STERILIZATION: ICD-10-CM

## 2018-02-16 DIAGNOSIS — Z98.891 H/O: C-SECTION: ICD-10-CM

## 2018-02-16 LAB
GLUCOSE 1H P 50 G GLC PO SERPL-MCNC: 149 MG/DL (ref 60–129)
HGB BLD-MCNC: 12.4 G/DL (ref 11.7–15.7)

## 2018-02-16 PROCEDURE — 80157 ASSAY CARBAMAZEPINE FREE: CPT | Mod: 90 | Performed by: OBSTETRICS & GYNECOLOGY

## 2018-02-16 PROCEDURE — 00000218 ZZHCL STATISTIC OBHBG - HEMOGLOBIN: Performed by: OBSTETRICS & GYNECOLOGY

## 2018-02-16 PROCEDURE — 80156 ASSAY CARBAMAZEPINE TOTAL: CPT | Mod: 90 | Performed by: OBSTETRICS & GYNECOLOGY

## 2018-02-16 PROCEDURE — 99207 ZZC PRENATAL VISIT: CPT | Performed by: OBSTETRICS & GYNECOLOGY

## 2018-02-16 PROCEDURE — 36415 COLL VENOUS BLD VENIPUNCTURE: CPT | Performed by: OBSTETRICS & GYNECOLOGY

## 2018-02-16 PROCEDURE — 80339 ANTIEPILEPTICS NOS 1-3: CPT | Mod: 90 | Performed by: OBSTETRICS & GYNECOLOGY

## 2018-02-16 PROCEDURE — 80177 DRUG SCRN QUAN LEVETIRACETAM: CPT | Mod: 90 | Performed by: OBSTETRICS & GYNECOLOGY

## 2018-02-16 PROCEDURE — 86780 TREPONEMA PALLIDUM: CPT | Performed by: OBSTETRICS & GYNECOLOGY

## 2018-02-16 PROCEDURE — 99000 SPECIMEN HANDLING OFFICE-LAB: CPT | Performed by: OBSTETRICS & GYNECOLOGY

## 2018-02-16 PROCEDURE — 82950 GLUCOSE TEST: CPT | Performed by: OBSTETRICS & GYNECOLOGY

## 2018-02-16 PROCEDURE — 80175 DRUG SCREEN QUAN LAMOTRIGINE: CPT | Mod: 90 | Performed by: OBSTETRICS & GYNECOLOGY

## 2018-02-16 ASSESSMENT — PAIN SCALES - GENERAL: PAINLEVEL: MILD PAIN (2)

## 2018-02-16 NOTE — NURSING NOTE
"Chief Complaint   Patient presents with     Prenatal Care     PHQ9/HOOD       Initial /62  Pulse 88  Wt 139 lb (63 kg)  LMP  (LMP Unknown)  BMI 23.86 kg/m2 Estimated body mass index is 23.86 kg/(m^2) as calculated from the following:    Height as of 1/17/18: 5' 4\" (1.626 m).    Weight as of this encounter: 139 lb (63 kg).  Medication Reconciliation: complete     27w2d    Twins  "

## 2018-02-16 NOTE — TELEPHONE ENCOUNTER
Surgery Scheduled    Date of Surgery 18 Time of Surgery 7:30am  Procedure: Repeat  Section & Tubal Ligation  Hospital/Surgical Facility: [facility]  Surgeon: Dr Rain  Type of Anesthesia Anticipated: Spinal  Pre-Op: 18 with Dr Rain   Post-Op: 06/15/18 with Dr Rain  Pre-Certification -to be completed  Consent Signed -to be completed  Hospital Stay - yes inpatient procedure    Surgery Packet (and/or) Colonscopy Prep (was given/or mailed) to patient. Patient was also instructed to arrive 1 1/2 hour(s) prior to surgery.  Patient understood and agrees to the plan.      Marcelina Sal  Specialty    ___________________________________  Surgery Pre-Certification    Medical Record Number: [id number]  Vernell Logan  YOB: 1980   Phone: [phone] (home)   Primary Provider: Juliana Harris    Reason for Admit:  OB Procedure    Surgeon: Dr Rain  Surgical Procedure: Repeat  Section & Tubal Ligation  ICD-9 Coded: O30.042 & Z30.2  Date of Surgery: 18  Consent signed? Yes    Date signed: 18  Hospital: [facility]  Inpatient- Length of stay:  3 days.    Requestor:  Janeth Sal     Location:  [facility]

## 2018-02-16 NOTE — MR AVS SNAPSHOT
After Visit Summary   2018    Vernell Logan    MRN: [id number]           Patient Information     Date Of Birth          1980        Visit Information        Provider Department      2018 9:30 AM Alicia Rain DO [facility]        Today's Diagnoses     Dichorionic diamniotic twin pregnancy in second trimester    -  1    Encounter for screening for diabetes mellitus         H/O:         Tobacco use disorder        Generalized anxiety disorder        Encounter for sterilization          Care Instructions    What to watch out for are: regular contractions every 5 min, vaginal bleeding, decreased fetal movement, or leakage of fluid.  Please call the office or go to L&D if you develop any of these signs and symptoms.      I will see you for your follow up appointment.  Please feel free to call if you have any questions or concerns.      Thanks,  Alicia Rain, DO            Follow-ups after your visit        Follow-up notes from your care team     Return in about 2 weeks (around 3/2/2018).      Your next 10 appointments already scheduled     2018 11:00 AM CST   Return Visit with Ashli Wilkinson MD   [facility])    [address]  [facility] [id number] [phone]            Mar 02, 2018  8:45 AM CST   ESTABLISHED PRENATAL with Alicia Rain DO   [facility] ([facility])    [address]  [address]  [facility] [id number] [phone]            2018  2:00 PM CDT   Return Visit with Ashli Wilkinson MD   [facility])    [address]  [facility] [facility] [address] [phone]              Who to contact     If you have questions or need follow up information about today's clinic visit or your schedule please contact [facility] directly at [phone].  Normal or non-critical lab and imaging  results will be communicated to you by MyChart, letter or phone within 4 business days after the clinic has received the results. If you do not hear from us within 7 days, please contact the clinic through [facility] or phone. If you have a critical or abnormal lab result, we will notify you by phone as soon as possible.  Submit refill requests through [facility] or call your pharmacy and they will forward the refill request to us. Please allow 3 business days for your refill to be completed.          Additional Information About Your Visit        [facility] Information     [facility] gives you secure access to your electronic health record. If you see a primary care provider, you can also send messages to your care team and make appointments. If you have questions, please call your primary care clinic.  If you do not have a primary care provider, please call [phone] and they will assist you.        Care EveryWhere ID     This is your Care EveryWhere ID. This could be used by other organizations to access your [facility] medical records  [id number]        Your Vitals Were     Pulse Last Period BMI (Body Mass Index)             88 (LMP Unknown) 23.86 kg/m2          Blood Pressure from Last 3 Encounters:   02/16/18 118/62   01/17/18 130/56   01/12/18 124/71    Weight from Last 3 Encounters:   02/16/18 139 lb (63 kg)   01/17/18 137 lb 9.6 oz (62.4 kg)   01/12/18 136 lb (61.7 kg)              We Performed the Following     Anti Treponema     Carbamazepine and epoxide free and total     Glucose tolerance gest screen 1 hour     Keppra (Levetiracetam) Level     Lamotrigine Level     OB hemoglobin        Primary Care Provider Office Phone # Fax #    Juliana Harris [phone] [address]  [facility] [place] [address]        Equal Access to Services     MICHELL MENDOZA : miriam Bahena, bubba anguiano. So [facility]  [phone].    ATENCIÓN: Si angelito luther, tiene a larkin disposición servicios gratuitos de asistencia lingüística. Kai finch [phone].    We comply with applicable federal civil rights laws and [place] laws. We do not discriminate on the basis of race, color, national origin, age, disability, sex, sexual orientation, or gender identity.            Thank you!     Thank you for choosing [facility]  for your care. Our goal is always to provide you with excellent care. Hearing back from our patients is one way we can continue to improve our services. Please take a few minutes to complete the written survey that you may receive in the mail after your visit with us. Thank you!             Your Updated Medication List - Protect others around you: Learn how to safely use, store and throw away your medicines at www.disposemymeds.org.          This list is accurate as of 2/16/18 10:04 AM.  Always use your most recent med list.                   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 This is during pregnancy. Bottle needs to last 90 days!    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), Twin gestation in first trimester, unspecified multiple gestation type, Major depressive disorder, recurrent episode, mild (H)       levETIRAcetam 500 MG tablet    KEPPRA    540 tablet    Beginning 1/18/2018, increase LEV to 1500 mg AM and 1500 HS    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

## 2018-02-16 NOTE — PROGRESS NOTES
37 year old  with Di/Di twins at 27w2d weeks presents to the clinic for a routine prenatal visit.  Di/Di twins=MFM ultrasound in 1 week  Seizure disorder=stable. Blood work done today.  No vaginal bleeding, leakage of fluid, or contractions   Good fetal movement.  Fundal height=30cm  KVNb=A=262    B=135  GCT and hgb today.  Rh O+  RTC 2 weeks.  History of c section=desires RCS.  Will schedule at 38 weeks-patient desires 5/4  Patient desires tubal ligation.  We discussed 3-1000 failure rate.  Increased risk of ectopic pregnancy if patient were to get pregnant again.  Risk of damage to nearby organs, bleeding, infection, and anesthesia risks. TDAP next visit  Tobacco abuse=1/2 ppd      Alicia Rain

## 2018-02-17 LAB
CARBAMAZEPINE EP FREE SERPL-MCNC: 1 UG/ML
CARBAMAZEPINE EP SERPL-MCNC: 1.9 UG/ML
CARBAMAZEPINE FREE SERPL-MCNC: 1.5 UG/ML
CARBAMAZEPINE SERPL-MCNC: 6.3 UG/ML
LAMOTRIGINE SERPL-MCNC: 7.2 UG/ML (ref 2.5–15)
LEVETIRACETAM SERPL-MCNC: 28 UG/ML (ref 12–46)
T PALLIDUM IGG+IGM SER QL: NEGATIVE

## 2018-02-19 ENCOUNTER — TELEPHONE (OUTPATIENT)
Dept: OBGYN | Facility: OTHER | Age: 38
End: 2018-02-19

## 2018-02-19 NOTE — TELEPHONE ENCOUNTER
Patient returned call and received message below and I went through th 3 hour process with her and she understands. She will do the lab before seeing Dr. Barker for her OB appt on march 2nd     Paulette Vasquez  Reception/ Scheduling

## 2018-02-19 NOTE — TELEPHONE ENCOUNTER
Please call patient with her abnormal glucose results.  Let her know she will need to do the three hour testing and give her the instructions.  Orders placed.  Thanks!      When Barbara calls back - please give her the above message,  And or,  Have her discuss with OB ( Tata ) if she has questions.

## 2018-02-22 ENCOUNTER — TELEPHONE (OUTPATIENT)
Dept: NEUROLOGY | Facility: CLINIC | Age: 38
End: 2018-02-22

## 2018-02-22 NOTE — TELEPHONE ENCOUNTER
"Vernell reports she is doing \"pretty well\".  She has had a few episodes of involving difficulty communicating with her boyfriend who has moved in with her. Vernell reports she occasionally gets confused with what her boyfriend is saying. Vernell reports that she has had episodes of difficult speech periodically over the years.    Current/confirmed anticonvulsant medications:    CBZ (300) 600-600  LTG (100) 450-400-300  LEV [phone]) [phone]      Results for VERNELL WU as of 2/22/2018 10:23   Ref. Range 2/16/2018 09:40   Carbamazepine Total Level Latest Ref Range: 4.0 - 12.0 ug/mL 6.3   10, 11 Epoxide Level Latest Ref Range: 0.4 - 4.0 ug/mL 1.9   Free Carbamazepine Level Ug/Ml Latest Ref Range: 0.6 - 4.2 ug/mL 1.5   Free Epoxide Level Latest Ref Range: 0.2 - 2.0 ug/mL 1.0   Keppra (Levetiracetam) Level Latest Ref Range: 12 - 46 ug/mL 28   Lamotrigine Level Latest Ref Range: 2.5 - 15.0 ug/mL 7.2     PLAN:  Follow up appointment scheduled with Dr. Wilkinson on 2/23/18.      "

## 2018-02-23 ENCOUNTER — OFFICE VISIT (OUTPATIENT)
Dept: NEUROLOGY | Facility: CLINIC | Age: 38
End: 2018-02-23
Payer: MEDICARE

## 2018-02-23 VITALS
BODY MASS INDEX: 24.19 KG/M2 | RESPIRATION RATE: 20 BRPM | DIASTOLIC BLOOD PRESSURE: 52 MMHG | WEIGHT: 145.2 LBS | TEMPERATURE: 97.6 F | HEIGHT: 65 IN | SYSTOLIC BLOOD PRESSURE: 122 MMHG | HEART RATE: 87 BPM

## 2018-02-23 DIAGNOSIS — G40.219 LOCALIZATION-RELATED EPILEPSY WITH COMPLEX PARTIAL SEIZURES WITH INTRACTABLE EPILEPSY (H): ICD-10-CM

## 2018-02-23 DIAGNOSIS — F33.0 MAJOR DEPRESSIVE DISORDER, RECURRENT EPISODE, MILD (H): ICD-10-CM

## 2018-02-23 DIAGNOSIS — G40.219 PARTIAL EPILEPSY WITH IMPAIRMENT OF CONSCIOUSNESS, INTRACTABLE (H): ICD-10-CM

## 2018-02-23 DIAGNOSIS — O30.001 TWIN GESTATION IN FIRST TRIMESTER, UNSPECIFIED MULTIPLE GESTATION TYPE: ICD-10-CM

## 2018-02-23 DIAGNOSIS — O09.92 HIGH-RISK PREGNANCY IN SECOND TRIMESTER: ICD-10-CM

## 2018-02-23 RX ORDER — CARBAMAZEPINE 300 MG/1
CAPSULE, EXTENDED RELEASE ORAL
Qty: 360 CAPSULE | Refills: 3 | Status: SHIPPED | OUTPATIENT
Start: 2018-02-23 | End: 2018-09-25

## 2018-02-23 RX ORDER — FOLIC ACID 1 MG/1
2 TABLET ORAL DAILY
Qty: 180 TABLET | Refills: 3 | Status: SHIPPED | OUTPATIENT
Start: 2018-02-23 | End: 2019-01-31 | Stop reason: DRUGHIGH

## 2018-02-23 RX ORDER — CARBAMAZEPINE 200 MG/1
CAPSULE, EXTENDED RELEASE ORAL
Qty: 90 CAPSULE | Refills: 3 | Status: SHIPPED | OUTPATIENT
Start: 2018-02-23 | End: 2018-04-05

## 2018-02-23 RX ORDER — LAMOTRIGINE 100 MG/1
TABLET ORAL
Qty: 345 TABLET | Refills: 3 | Status: SHIPPED | OUTPATIENT
Start: 2018-02-23 | End: 2018-04-16

## 2018-02-23 RX ORDER — PRENATAL VIT/IRON FUM/FOLIC AC 27MG-0.8MG
1 TABLET ORAL DAILY
Qty: 100 TABLET | Refills: 3 | Status: ON HOLD | OUTPATIENT
Start: 2018-02-23 | End: 2022-04-28

## 2018-02-23 RX ORDER — LEVETIRACETAM 500 MG/1
TABLET ORAL
Qty: 540 TABLET | Refills: 3 | Status: SHIPPED | OUTPATIENT
Start: 2018-02-23 | End: 2018-09-14

## 2018-02-23 ASSESSMENT — PAIN SCALES - GENERAL: PAINLEVEL: NO PAIN (0)

## 2018-02-23 NOTE — PATIENT INSTRUCTIONS
Medication Name   Tablet Size      Week 1  8 AM  (morning)  2pm  8PM (Night)   Notes   Generic  Carbamazepine XR (Carbatrol) 300 mg   2 tablet    2 tablet      Generic  levetiracetam 500 mg   3 tablet    3 tablet   increased since pregnant   Generic  lamotrigine 100 mg   4.5 tablet  4.5 tablet   3  tablet   increased since pregnant                Medication Name   Tablet Size      Week 2  8 AM  (morning)  2pm  8PM (Night)   Notes   Generic  Carbamazepine XR (Carbatrol) 300 mg   2 tablet    2 tablet      Generic  Carbamazepine XR (Carbatrol) 200 mg    1 tablet     Generic  levetiracetam 500 mg   3 tablet    3 tablet   increased since pregnant   Generic  lamotrigine 100 mg   4.5 tablet  4.5 tablet   3  tablet   increased since pregnant       Women with Epilepsy and Pregnancy Information:     A. Pregnancy Planning:   a. More than 90% of babies born to women with epilepsy are normal and the chance of having a healthy pregnancy are higher when the pregnancy is well planned and AEDs are reviewed and optimized before conception.   b. All women with epilepsy are encouraged to take 1 mg folic acid and prenatal vitamins daily prior to conception.  In the general population, brandon-conceptual use of folic acid has been shown to reduce risk of neural tube defects.   c. Pregnant women who are taking antiepileptic drugs (AED) are strongly urged to contact the registry (339-311-2286, or www.aedpregnancyregistry.org ) and provide information that may help define the safety of their future pregnancies, as well as the pregnancies of other women with epilepsy.  B. Delivery:   a. A rescue plan should be created in the event you have a seizure (convulsive or partial seizures) during .    b. Women should continue to take their usual seizure medications. Inform your OB/GYN if you were unable to take your seizure medications due to nausea or pain.   c. Women may be at an increased risk for seizures due to failure or  inability to take antiepileptic medications, sleep deprivation, hyperventilation, stress, physical pain.   d. Spinal anesthesia is safe for women with epilepsy. If general anesthesia is required, it also can be given safely. The anesthesiologist should be informed about the woman s history of epilepsy and the antiepileptic drugs she is taking (as well as about other medical disorders and medications).    e. If patient has to be NPO (nothing by mouth), oral AEDS should be continued, review with your OB/GYN during hospital admission.       C. Postpartum Seizure Precautions:   a. Women with epilepsy should be given advice on minimizing the risk of seizures at home. Emphasize the importance of adequate post-partum social supports.  b. Encourage patients to take all seizure medications as prescribed. Ask Stiven to remind you to take antiepileptic drug.   c. Encourage patients to get adequate sleep. Perhaps partners can feed baby at night/ If patient is breast feeding, she may consider pumping breast milk earlier in the day for night time. Ask Stiven to feed at night.   d. To minimize the risk of harm if a seizure occurs, changing or feeding the baby on the floor.  e. Avoid baby slings.   f. Bath baby with sponge bath, instead of tub bath, if alone.   g. To minimize the risk of harm if a seizure occurs, be cautious when carrying baby up the stairs.  h. Please visit Epilepsy Foundation website for more details on epilepsy and pregnancy. http://www.epilepsyfoundation.org     D. Breast Feeding:   a. Breast feeding is recommended for most women with epilepsy, because breast milk provides a variety of benefits to the baby, including protection against infection.    b. The clinical consequence of  ingesting AEDs via breast milk has not been extensively studied.    c. Although anticonvulsants do appear in breast milk, they are found at low levels that are not likely to have a significant effect upon the . If concerns  arise about anticonvulsant toxicity in the breast fed ,  drug levels can be measured  d. A breastfeeding mother should never stop her medication abruptly, as this can provoke a seizure and may affect her baby.     PREGNANCY & POSTPARTUM SUPPORT [place] [phone]    Find A local talk therapist as support group about providers in your area.     Ashli Wilkinson MD

## 2018-02-23 NOTE — PROGRESS NOTES
[facility] Progress Note    Patient:  Vernell Logan  :  1980   Age:  37 year old   Today's Office Visit:  2018    Epilepsy Data:  Patient History  Primary Epileptologist/Provider: Ashli Wilkinson M.D.  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 Last 5 minutes.    # of Type 1 Seizure since last visit: 4  Freq. Type 1 / Month: 3.28  Seizure Type 2: Partial seizures with secondary generalization  -  with complex partial seizures evolving to generalized seizures  Description of Sz Type 2: Stares off -> GTC  # of Type 2 Seizure since last visit: 0  Freq. Type 2 / Month: 0       EPILEPSY HISTORY: Onset of seizures was 14 years of age. Based on electrographic data 2012, the patient had frequent nonconvulsive seizures arising from the right and left temporal lobe, bitemporal lobe interictal discharges. Her MRI is normal. Her PET scan was remarkable for decreased metabolic activity in the left temporal lobe. Prior  she rarely had seizure. From 1160-5231 she had several seizure per week. She had antiepileptic drug.     INTERVAL HISTORY:  Came alone.  She is 28 weeks (twin pregnancy) pregnant. She has four complex partial seizure since last visit, no generalized tonic-clonic convulsion. Complex partial seizure are described as difficulty getting words out, feels confused,  "but, she is aware if someone is talking to her, she has difficulty with comprehension, she states \"it sounds like they are repeating a word over and over.  No generalized tonic-clonic convulsion, last generalized tonic-clonic convulsion 2014. She is very fatigued, stressed about money, denied disability, She is living with Stiven. Currently, on antiepileptic drug there is fatigue, no double vision, no mood changes, no nausea, no vomiting, no abdominal pain, no rashes. No recent ER visits and no hospitalizations since last visit.  She was feeling overwhelmed, crying, Currently, patient  feeling depressed, denies feeling anhedonia, denies, suicidal  thoughts, and denies having feelings of excessive guilt/worthlessness.  She misses her parents and wish they were here.       Prior to Admission medications    Medication Sig Start Date End Date Taking? Authorizing Provider   levETIRAcetam (KEPPRA) 500 MG tablet TAKE TWO AND ONE-HALF TABLETS BY MOUTH TWICE DAILY  Patient taking differently: 500 mg TAKE TWO AND ONE-HALF TABLETS BY MOUTH TWICE DAILY 4/13/16  Yes Ashli Wilkinson MD   lamoTRIgine (LAMICTAL) 100 MG tablet Take 2 tab po at 8 am and 3 tab po at 1 pm and 2 tab po 8 pm  Patient taking differently: 100 mg Take 2 tab po at 8 am and 3 tab po at 1 pm and 2 tab po 8 pm 4/13/16  Yes Ashli Wilkinson MD   carBAMazepine (CARBATROL) 300 MG 12 hr capsule TAKE 2 CAPSULES (600 MG) BY MOUTH 2 TIMES DAILY  Patient taking differently: 300 mg TAKE 2 CAPSULES (600 MG) BY MOUTH 2 TIMES DAILY 4/13/16  Yes Ashli Wilkinson MD   Acetaminophen (TYLENOL PO) Take by mouth as needed for mild pain or fever   Yes Reported, Patient   VITAMIN C 1000 MG OR TABS 1 TABLET DAILY AT DINNER   Yes Reported, Patient   IBUPROFEN as needed   Yes Reported, Patient       MEDICATIONS:                Medication Name   Tablet Size        8 AM  (morning)  2pm  8PM (Night)   Notes   Generic  Carbamazepine XR (Carbatrol) 300 mg   2 tablet    2 tablet      Generic  " levetiracetam 500 mg   3 tablet    2.5 tablet      Generic  lamotrigine 100 mg   4 tablet  4 tablet   3  tablet   increased since pregnant     Prepregnancy antiepileptic drug doses:   Carbamazepine 600-600  Levetiracetam 6339-4747  Lamotrigine 450-400-300      Component      Latest Ref Rng & Units 12/18/2017 1/17/2018 2/16/2018   Carbamazepine Total Level      4.0 - 12.0 ug/mL 7.9 6.6 6.3   10, 11 Epoxide Level      0.4 - 4.0 ug/mL 2.2 2.0 1.9   Free Carbamazepine Level Ug/Ml      0.6 - 4.2 ug/mL 2.0 1.8 1.5   Free Epoxide Level      0.2 - 2.0 ug/mL 1.1 1.2 1.0   Lamotrigine Level      2.5 - 15.0 ug/mL 5.5 7.8 7.2   Keppra (Levetiracetam) Level      12 - 46 ug/mL 27 16 28       Target pregnancy levels:   Carbamazepine target level above 9  Lamotrigine target level 8-10  Levetiracetam target level above 20    Pregnancy Notes/Plan:      1. Expected date of delivery: 5/16/2017. 5/4/2018 C section  2. OB/GYN contact information: Dr. Koffi Molina   3. Medications  a. Target AED level during pregnancy: Carbamazepine target level above 9 Lamotrigine target level 8-10, Levetiracetam target level above 20  b. Use of folic acid daily: yes  c. Use of prenatal vitamins:  yes  d. Postpartum AED reduction plan: to be determined in third trimester  4. EPIC:   a. Epilepsy provider entered standing antiepileptic levels every 2-4 weeks in EPIC: yes   b. Epilepsy provider notify Iraida Mccartney to track patient: yes    5. Seizure rescue plan during pregnancy:   a. Diazepam (Valium): 5 mg intensol for any generalized tonic clonic seizure or for more than 2 complex partial seizures within a 4 hour period, repeat once after 10 minutes.     7. . Does patient have untreated mood disorder? Yes. Encouraged patient to establish care with mental health care provider.       REVIEW OF SYSTEMS:  Intermittent dizziness.  diplopia improved.  B/l hand tremor improved.  Anxiety. Hip pain, fatigue. Depressed. Stressed. Unstable gait, sleeping  "more, nausea, no headaches, no joint pain, no suicidal ideations.      SOCIAL HISTORY: She is not working. She is engaged.  She smokes 1/2 pack of cigarettes per day. She is living alone. Pregnant. Stressors in last five years:  Ricardo passed away 2015. Unplanned pregnancy. She lost her disability benefits and she is very worried about this.      PHYSICAL EXAMINATION:  /52 (BP Location: Right arm, Patient Position: Chair, Cuff Size: Adult Regular)  Pulse 87  Temp 97.6  F (36.4  C)  Resp 20  Ht 5' 4.57\" (164 cm)  Wt 145 lb 3.2 oz (65.9 kg)  LMP  (LMP Unknown)  BMI 24.49 kg/m2  Pregnant. Alert, orientated, speech is fluent, face is symmetric, extra-ocular movement in tact, no focal deficits noted.Gait is stable.    ASSESSMENT:   1.  Localization-related epilepsy, uncontrolled (rare seizures), etiology unclear (MRI is nonlesional).  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  Continues to have rare complex partial seizure during pregnancy, no generalized tonic-clonic convulsion. Her last generalized tonic-clonic convulsion was .  She is pregnant with twins. We have increased antiepileptic drug to maintain target levels. After pregnancy we will reduce her antiepileptic drug otherwise she will have side effects. If needed, antiepileptic drug that may be considered in future: banzel, onfi, fycompa, felbamate. I would avoid additional Na+ blocking agents.      2.  Women care issues. She is pregnant with twins. Rare complex partial seizure in pregnancy, no generalized tonic-clonic convulsion. Note epilepsy pregnancy plan of care above. We have discussed  increased teratogenicity risks on multiple AEDs and on high doses.    date is 2018.     3.  Anxiety and depression.  Stable. She does not want to see a mental health provider at this time. Eating disorder is stable, she is eating three meals per day. I did ask her to get established with mental " health care provider, she is a higher risk for post partum depression, lacks family support (Isadora Saleem, mother in law, Milagros)         PLAN:   1. Increase lamotrigine and carbamazepine.     2. Target pregnancy levels:   Carbamazepine target level above 9  Lamotrigine target level 8-10  Levetiracetam target level above 20                 Medication Name   Tablet Size      Week 1  8 AM  (morning)  2pm  8PM (Night)   Notes   Generic  Carbamazepine XR (Carbatrol) 300 mg   2 tablet    2 tablet      Generic  levetiracetam 500 mg   3 tablet    3 tablet   increased since pregnant   Generic  lamotrigine 100 mg   4.5 tablet  4.5 tablet   3  tablet   increased since pregnant                Medication Name   Tablet Size      Week 2  8 AM  (morning)  2pm  8PM (Night)   Notes   Generic  Carbamazepine XR (Carbatrol) 300 mg   2 tablet    2 tablet      Generic  Carbamazepine XR (Carbatrol) 200 mg    1 tablet     Generic  levetiracetam 500 mg   3 tablet    3 tablet   increased since pregnant   Generic  lamotrigine 100 mg   4.5 tablet  4.5 tablet   3  tablet   increased since pregnant         [facility]    4. Follow up  On the phone in 4/11/2018 with Minh. Nurse call on 5/7/2018.   5. Placed orders for levetiracetam, lamotrigine, carbamazepine scripts      PEMA THORNE MD       I spent 35 minutes with the patient and family. During this time counseling and coordination of care exceeded 50% of the visit time. I addressed all questions and concerns the family raised in regards to the patients care.

## 2018-02-23 NOTE — MR AVS SNAPSHOT
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)        Major depressive disorder, recurrent episode, mild (H)        Twin gestation in first trimester, unspecified multiple gestation type        High-risk pregnancy in second trimester        Partial epilepsy with impairment of consciousness, intractable (H)          Care Instructions               Medication Name   Tablet Size      Week 1  8 AM  (morning)  2pm  8PM (Night)   Notes   Generic  Carbamazepine XR (Carbatrol) 300 mg   2 tablet    2 tablet      Generic  levetiracetam 500 mg   3 tablet    3 tablet   increased since pregnant   Generic  lamotrigine 100 mg   4.5 tablet  4.5 tablet   3  tablet   increased since pregnant                Medication Name   Tablet Size      Week 2  8 AM  (morning)  2pm  8PM (Night)   Notes   Generic  Carbamazepine XR (Carbatrol) 300 mg   2 tablet    2 tablet      Generic  Carbamazepine XR (Carbatrol) 200 mg    1 tablet     Generic  levetiracetam 500 mg   3 tablet    3 tablet   increased since pregnant   Generic  lamotrigine 100 mg   4.5 tablet  4.5 tablet   3  tablet   increased since pregnant       Women with Epilepsy and Pregnancy Information:     A. Pregnancy Planning:   a. More than 90% of babies born to women with epilepsy are normal and the chance of having a healthy pregnancy are higher when the pregnancy is well planned and AEDs are reviewed and optimized before conception.   b. All women with epilepsy are encouraged to take 1 mg folic acid and prenatal vitamins daily prior to conception.  In the general population, brandon-conceptual use of folic acid has been shown to reduce risk of neural tube  defects.   c. Pregnant women who are taking antiepileptic drugs (AED) are strongly urged to contact the registry (679-217-0790, or www.aedpregnancyregistry.org ) and provide information that may help define the safety of their future pregnancies, as well as the pregnancies of other women with epilepsy.  B. Delivery:   a. A rescue plan should be created in the event you have a seizure (convulsive or partial seizures) during .    b. Women should continue to take their usual seizure medications. Inform your OB/GYN if you were unable to take your seizure medications due to nausea or pain.   c. Women may be at an increased risk for seizures due to failure or inability to take antiepileptic medications, sleep deprivation, hyperventilation, stress, physical pain.   d. Spinal anesthesia is safe for women with epilepsy. If general anesthesia is required, it also can be given safely. The anesthesiologist should be informed about the woman s history of epilepsy and the antiepileptic drugs she is taking (as well as about other medical disorders and medications).    e. If patient has to be NPO (nothing by mouth), oral AEDS should be continued, review with your OB/GYN during hospital admission.       C. Postpartum Seizure Precautions:   a. Women with epilepsy should be given advice on minimizing the risk of seizures at home. Emphasize the importance of adequate post-partum social supports.  b. Encourage patients to take all seizure medications as prescribed. Ask Stiven to remind you to take antiepileptic drug.   c. Encourage patients to get adequate sleep. Perhaps partners can feed baby at night/ If patient is breast feeding, she may consider pumping breast milk earlier in the day for night time. Ask Stiven to feed at night.   d. To minimize the risk of harm if a seizure occurs, changing or feeding the baby on the floor.  e. Avoid baby slings.   f. Bath baby with sponge bath, instead of tub bath, if alone.   g. To minimize the  risk of harm if a seizure occurs, be cautious when carrying baby up the stairs.  h. Please visit Epilepsy Foundation website for more details on epilepsy and pregnancy. http://www.epilepsyfoundation.org     D. Breast Feeding:   a. Breast feeding is recommended for most women with epilepsy, because breast milk provides a variety of benefits to the baby, including protection against infection.    b. The clinical consequence of  ingesting AEDs via breast milk has not been extensively studied.    c. Although anticonvulsants do appear in breast milk, they are found at low levels that are not likely to have a significant effect upon the . If concerns arise about anticonvulsant toxicity in the breast fed ,  drug levels can be measured  d. A breastfeeding mother should never stop her medication abruptly, as this can provoke a seizure and may affect her baby.     PREGNANCY & POSTPARTUM SUPPORT [place] [phone]    Find A local talk therapist as support group about providers in your area.     Ashli Wilkinson MD             Follow-ups after your visit        Your next 10 appointments already scheduled     Mar 02, 2018  8:30 AM CST   [facility]    [address], [address]  [place] [address]   [phone]           Please do not eat 10-12 hours before your appointment if you are coming in fasting for labs on lipids, cholesterol, or glucose (sugar). This does not apply to pregnant women. Water, hot tea and black coffee (with nothing added) are okay. Do not drink other fluids, diet soda or chew gum.            Mar 02, 2018  8:45 AM CST   ESTABLISHED PRENATAL with Alicia [facility]Rin,    [facility]    [address]  [address]  [place] [address]   [phone]            2018  3:00 PM CDT   Telephone Call with Ashli Wilkinson MD   [facility]    [address], [address]  [place] [id number] [phone]           Note: This is not an onsite visit; there is no need to come to the facility.            Apr 27, 2018  9:00 AM CDT   ESTABLISHED PRENATAL with Alicia Rain DO   [facility] [place] ([facility])    [address]  [address]  [place] [address] [phone]            May 07, 2018 10:30 AM CDT   Telephone Call with [facility] Nurse 2   [facility] ([facility])    [address], [address]  [place] [facility] [address] [phone]           Note: this is not an onsite visit; there is no need to come to the facility.            Sadi 15, 2018  9:00 AM CDT   Office Visit with Alicia Rain DO   [facility] ([facility])    [address]  [address]  [place] [address] [phone]           Bring a current list of meds and any records pertaining to this visit. For Physicals, please bring immunization records and any forms needing to be filled out. Please arrive 10 minutes early to complete paperwork.              Who to contact     Please call your clinic at [phone] to:    Ask questions about your health    Make or cancel appointments    Discuss your medicines    Learn about your test results    Speak to your doctor            Additional Information About Your Visit        [employer] Information     [employer] gives you secure access to your electronic health record. If you see a primary care provider, you can also send messages to your care team and make appointments. If you have questions, please call your primary care clinic.  If you do not have a primary care provider, please call [phone] and they will assist you.      [employer] is an electronic gateway that provides easy, online access to your medical records. With [employer], you can request a clinic appointment, read your test results, renew a prescription or communicate  "with your care team.     To access your existing account, please contact your [facility] or call [phone] for assistance.        Care EveryWhere ID     This is your Care EveryWhere ID. This could be used by other organizations to access your [place] medical records  [id number]        Your Vitals Were     Pulse Temperature Respirations Height Last Period BMI (Body Mass Index)    87 97.6  F (36.4  C) 20 5' 4.57\" (164 cm) (LMP Unknown) 24.49 kg/m2       Blood Pressure from Last 3 Encounters:   02/23/18 122/52   02/16/18 118/62   01/17/18 130/56    Weight from Last 3 Encounters:   02/23/18 145 lb 3.2 oz (65.9 kg)   02/16/18 139 lb (63 kg)   01/17/18 137 lb 9.6 oz (62.4 kg)              Today, you had the following     No orders found for display         Today's Medication Changes          These changes are accurate as of 2/23/18 11:44 AM.  If you have any questions, ask your nurse or doctor.               These medicines have changed or have updated prescriptions.        Dose/Directions    * carBAMazepine 300 MG 12 hr capsule   Commonly known as:  CARBATROL   This may have changed:  Another medication with the same name was added. Make sure you understand how and when to take each.   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 Make sure you understand how and when to take each. 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 Read the directions carefully, and ask your doctor or other care provider to review them with you.         Where to get your medicines      These medications were sent to [facility] [address] [place] [address] [address]     Phone:  [phone]     carBAMazepine 200 MG 12 hr capsule    carBAMazepine 300 MG 12 hr capsule    folic acid 1 MG tablet    lamoTRIgine 100 MG tablet    levETIRAcetam 500 MG tablet    prenatal multivitamin plus iron 27-0.8 MG Tabs per tablet                Primary Care Provider Office Phone # Fax #    Subha Saenz PA-C [phone] [address] [address]        Equal Access to Services     MICHELL MENDOZA AH: Hadii lyric brewer hadasho Soke[place]onali, waaxda luqadaha, qaybta kaalmada adeegyada, bubba coates. So [facility] [phone].    ATENCIÓN: Si habla español, tiene a larkin disposición servicios gratuitos de asistencia lingüística. Llame al [phone].    We comply with applicable federal civil rights laws and [place] laws. We do not discriminate on the basis of race, color, national origin, age, disability, sex, sexual orientation, or gender identity.            Thank you!     Thank you for choosing [facility]  for your care. Our goal is always to provide you with excellent care. Hearing back from  our patients is one way we can continue to improve our services. Please take a few minutes to complete the written survey that you may receive in the mail after your visit with us. Thank you!             Your Updated Medication List - Protect others around you: Learn how to safely use, store and throw away your medicines at www.disposemymeds.org.          This list is accurate as of 2/23/18 11:44 AM.  Always use your most recent med list.                   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), Major depressive disorder, recurrent episode, mild (H), Twin gestation in first trimester, unspecified multiple gestation type, High-risk pregnancy in second trimester, Partial epilepsy with impairment of consciousness, intractable (H)       * carBAMazepine 200 MG 12 hr capsule    CARBATROL    90 capsule    1 tablet at night (along with 600 mg at night) for total of 800 mg at night    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 This is during pregnancy. Bottle needs to last 90 days!    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), Major depressive disorder, recurrent episode, mild (H), Twin gestation in first trimester, unspecified multiple gestation type       lamoTRIgine 100 MG tablet    LaMICtal    345 tablet    Increase to 450 mg AM, 400 mg afternoon and 300 HS    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), Twin gestation in first trimester, unspecified multiple gestation type, Major depressive disorder, recurrent episode, mild (H), High-risk pregnancy in second trimester, Partial epilepsy with impairment of consciousness, intractable (H)       levETIRAcetam 500 MG tablet    KEPPRA    540 tablet    Beginning 1/18/2018, increase LEV to 1500 mg AM and 1500 HS    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), Major depressive disorder, recurrent episode, mild (H), Twin gestation in first trimester, unspecified multiple gestation type, High-risk pregnancy in second trimester, Partial epilepsy with impairment of consciousness, intractable (H)       prenatal multivitamin plus iron 27-0.8 MG Tabs per tablet     100 tablet    Take 1 tablet by mouth daily    High-risk pregnancy in second trimester, Partial epilepsy with impairment of consciousness, intractable (H), 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 Read the directions carefully, and ask your doctor or other care provider to review them with you.

## 2018-02-23 NOTE — LETTER
2018       RE: Vernell Logan  : 1980   MRN: [id number]      Dear Colleague,    Thank you for referring your patient, Vernell Logan, to the [facility] EPILEPSY CARE at [facility]. Please see a copy of my visit note below.    [facility] Note    Patient:  Vernell Logan  :  1980   Age:  37 year old   Today's Office Visit:  2018    Epilepsy Data:  Patient History  Primary Epileptologist/Provider: Ashli Wilkinson M.D.  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 Last 5 minutes.    # of Type 1 Seizure since last visit: 4  Freq. Type 1 / Month: 3.28  Seizure Type 2: Partial seizures with secondary generalization  -  with complex partial seizures evolving to generalized seizures  Description of Sz Type 2: Stares off -> GTC  # of Type 2 Seizure since last visit: 0  Freq. Type 2 / Month: 0       EPILEPSY HISTORY: Onset of seizures was 14 years of age. Based on electrographic data 2012, the patient had frequent nonconvulsive seizures arising from the right and left temporal lobe, bitemporal lobe interictal discharges. Her MRI is normal. Her PET scan was remarkable for decreased metabolic activity in the left temporal lobe. Prior  she rarely had seizure. From 2319-7542 she had several seizure per week. She had antiepileptic  "drug.     INTERVAL HISTORY:  Came alone.  She is 28 weeks (twin pregnancy) pregnant. She has four complex partial seizure since last visit, no generalized tonic-clonic convulsion. Complex partial seizure are described as difficulty getting words out, feels confused, but, she is aware if someone is talking to her, she has difficulty with comprehension, she states \"it sounds like they are repeating a word over and over.  No generalized tonic-clonic convulsion, last generalized tonic-clonic convulsion 2014. She is very fatigued, stressed about money, denied disability, She is living with [place]. Currently, on antiepileptic drug there is fatigue, no double vision, no mood changes, no nausea, no vomiting, no abdominal pain, no rashes. No recent ER visits and no hospitalizations since last visit.  She was feeling overwhelmed, crying, Currently, patient  feeling depressed, denies feeling anhedonia, denies, suicidal  thoughts, and denies having feelings of excessive guilt/worthlessness.  She misses her parents and wish they were here.       Prior to Admission medications    Medication Sig Start Date End Date Taking? Authorizing Provider   levETIRAcetam (KEPPRA) 500 MG tablet TAKE TWO AND ONE-HALF TABLETS BY MOUTH TWICE DAILY  Patient taking differently: 500 mg TAKE TWO AND ONE-HALF TABLETS BY MOUTH TWICE DAILY 4/13/16  Yes Ashli Wilkinson MD   lamoTRIgine (LAMICTAL) 100 MG tablet Take 2 tab po at 8 am and 3 tab po at 1 pm and 2 tab po 8 pm  Patient taking differently: 100 mg Take 2 tab po at 8 am and 3 tab po at 1 pm and 2 tab po 8 pm 4/13/16  Yes Ashli Wilkinson MD   carBAMazepine (CARBATROL) 300 MG 12 hr capsule TAKE 2 CAPSULES (600 MG) BY MOUTH 2 TIMES DAILY  Patient taking differently: 300 mg TAKE 2 CAPSULES (600 MG) BY MOUTH 2 TIMES DAILY 4/13/16  Yes Ashli Wilkinson MD   Acetaminophen (TYLENOL PO) Take by mouth as needed for mild pain or fever   Yes Reported, Patient   VITAMIN C 1000 MG OR TABS 1 TABLET DAILY AT DINNER   " Yes Reported, Patient   IBUPROFEN as needed   Yes Reported, Patient       MEDICATIONS:                Medication Name   Tablet Size        8 AM  (morning)  2pm  8PM (Night)   Notes   Generic  Carbamazepine XR (Carbatrol) 300 mg   2 tablet    2 tablet      Generic  levetiracetam 500 mg   3 tablet    2.5 tablet      Generic  lamotrigine 100 mg   4 tablet  4 tablet   3  tablet   increased since pregnant     Prepregnancy antiepileptic drug doses:   Carbamazepine 600-600  Levetiracetam 5403-7805  Lamotrigine 450-400-300      Component      Latest Ref Rng & Units 12/18/2017 1/17/2018 2/16/2018   Carbamazepine Total Level      4.0 - 12.0 ug/mL 7.9 6.6 6.3   10, 11 Epoxide Level      0.4 - 4.0 ug/mL 2.2 2.0 1.9   Free Carbamazepine Level Ug/Ml      0.6 - 4.2 ug/mL 2.0 1.8 1.5   Free Epoxide Level      0.2 - 2.0 ug/mL 1.1 1.2 1.0   Lamotrigine Level      2.5 - 15.0 ug/mL 5.5 7.8 7.2   Keppra (Levetiracetam) Level      12 - 46 ug/mL 27 16 28       Target pregnancy levels:   Carbamazepine target level above 9  Lamotrigine target level 8-10  Levetiracetam target level above 20    Pregnancy Notes/Plan:      1. Expected date of delivery: 5/16/2017. 5/4/2018 C section  2. OB/GYN contact information: Dr. Koffi Molina   3. Medications  a. Target AED level during pregnancy: Carbamazepine target level above 9 Lamotrigine target level 8-10, Levetiracetam target level above 20  b. Use of folic acid daily: yes  c. Use of prenatal vitamins:  yes  d. Postpartum AED reduction plan: to be determined in third trimester  4. EPIC:   a. Epilepsy provider entered standing antiepileptic levels every 2-4 weeks in EPIC: yes   b. Epilepsy provider notify Iraida Mccartney to track patient: yes    5. Seizure rescue plan during pregnancy:   a. Diazepam (Valium): 5 mg intensol for any generalized tonic clonic seizure or for more than 2 complex partial seizures within a 4 hour period, repeat once after 10 minutes.     7. . Does patient have  "untreated mood disorder? Yes. Encouraged patient to establish care with mental health care provider.       REVIEW OF SYSTEMS:  Intermittent dizziness.  diplopia improved.  B/l hand tremor improved.  Anxiety. Hip pain, fatigue. Depressed. Stressed. Unstable gait, sleeping more, nausea, no headaches, no joint pain, no suicidal ideations.      SOCIAL HISTORY: She is not working. She is engaged.  She smokes 1/2 pack of cigarettes per day. She is living alone. Pregnant. Stressors in last five years:  Ricardo passed away 11/2015. Unplanned pregnancy. She lost her disability benefits and she is very worried about this.      PHYSICAL EXAMINATION:  /52 (BP Location: Right arm, Patient Position: Chair, Cuff Size: Adult Regular)  Pulse 87  Temp 97.6  F (36.4  C)  Resp 20  Ht 5' 4.57\" (164 cm)  Wt 145 lb 3.2 oz (65.9 kg)  LMP  (LMP Unknown)  BMI 24.49 kg/m2  Pregnant. Alert, orientated, speech is fluent, face is symmetric, extra-ocular movement in tact, no focal deficits noted.Gait is stable.    ASSESSMENT:   1.  Localization-related epilepsy, uncontrolled (rare seizures), etiology unclear (MRI is nonlesional).  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  Continues to have rare complex partial seizure during pregnancy, no generalized tonic-clonic convulsion. Her last generalized tonic-clonic convulsion was 2014.  She is pregnant with twins. We have increased antiepileptic drug to maintain target levels. After pregnancy we will reduce her antiepileptic drug otherwise she will have side effects. If needed, antiepileptic drug that may be considered in future: banzel, onfi, fycompa, felbamate. I would avoid additional Na+ blocking agents.      2.  Women care issues. She is pregnant with twins. Rare complex partial seizure in pregnancy, no generalized tonic-clonic convulsion. Note epilepsy pregnancy plan of care above. We have discussed  increased teratogenicity risks on " multiple AEDs and on high doses.    date is 2018.     3.  Anxiety and depression.  Stable. She does not want to see a mental health provider at this time. Eating disorder is stable, she is eating three meals per day. I did ask her to get established with mental health care provider, she is a higher risk for post partum depression, lacks family support (Isadora Saleem, mother in law, Milagros)         PLAN:   1. Increase lamotrigine and carbamazepine.     2. Target pregnancy levels:   Carbamazepine target level above 9  Lamotrigine target level 8-10  Levetiracetam target level above 20                 Medication Name   Tablet Size      Week 1  8 AM  (morning)  2pm  8PM (Night)   Notes   Generic  Carbamazepine XR (Carbatrol) 300 mg   2 tablet    2 tablet      Generic  levetiracetam 500 mg   3 tablet    3 tablet   increased since pregnant   Generic  lamotrigine 100 mg   4.5 tablet  4.5 tablet   3  tablet   increased since pregnant                Medication Name   Tablet Size      Week 2  8 AM  (morning)  2pm  8PM (Night)   Notes   Generic  Carbamazepine XR (Carbatrol) 300 mg   2 tablet    2 tablet      Generic  Carbamazepine XR (Carbatrol) 200 mg    1 tablet     Generic  levetiracetam 500 mg   3 tablet    3 tablet   increased since pregnant   Generic  lamotrigine 100 mg   4.5 tablet  4.5 tablet   3  tablet   increased since pregnant         3. [facility]     4. Follow up  On the phone in 2018 with Minh. Nurse call on 2018.   5. Placed orders for levetiracetam, lamotrigine, carbamazepine scripts      PEMA THORNE MD       I spent 35 minutes with the patient and family. During this time counseling and coordination of care exceeded 50% of the visit time. I addressed all questions and concerns the family raised in regards to the patients care.

## 2018-02-28 ENCOUNTER — TRANSFERRED RECORDS (OUTPATIENT)
Dept: HEALTH INFORMATION MANAGEMENT | Facility: CLINIC | Age: 38
End: 2018-02-28

## 2018-03-02 ENCOUNTER — PRENATAL OFFICE VISIT (OUTPATIENT)
Dept: OBGYN | Facility: CLINIC | Age: 38
End: 2018-03-02
Payer: MEDICARE

## 2018-03-02 VITALS
WEIGHT: 145 LBS | SYSTOLIC BLOOD PRESSURE: 115 MMHG | DIASTOLIC BLOOD PRESSURE: 70 MMHG | HEART RATE: 85 BPM | BODY MASS INDEX: 24.45 KG/M2

## 2018-03-02 DIAGNOSIS — G40.219 PARTIAL EPILEPSY WITH IMPAIRMENT OF CONSCIOUSNESS, INTRACTABLE (H): ICD-10-CM

## 2018-03-02 DIAGNOSIS — Z13.1 SCREENING FOR DIABETES MELLITUS: ICD-10-CM

## 2018-03-02 DIAGNOSIS — R87.810 CERVICAL HIGH RISK HPV (HUMAN PAPILLOMAVIRUS) TEST POSITIVE: ICD-10-CM

## 2018-03-02 DIAGNOSIS — F41.1 GENERALIZED ANXIETY DISORDER: ICD-10-CM

## 2018-03-02 DIAGNOSIS — F17.200 TOBACCO USE DISORDER: ICD-10-CM

## 2018-03-02 DIAGNOSIS — O30.043 DICHORIONIC DIAMNIOTIC TWIN PREGNANCY IN THIRD TRIMESTER: Primary | ICD-10-CM

## 2018-03-02 DIAGNOSIS — O30.009 TWIN PREGNANCY: ICD-10-CM

## 2018-03-02 DIAGNOSIS — O30.042 DICHORIONIC DIAMNIOTIC TWIN PREGNANCY IN SECOND TRIMESTER: ICD-10-CM

## 2018-03-02 DIAGNOSIS — Z98.891 H/O: C-SECTION: ICD-10-CM

## 2018-03-02 LAB
GLUCOSE 1H P 100 G GLC PO SERPL-MCNC: 174 MG/DL (ref 60–179)
GLUCOSE 2H P 100 G GLC PO SERPL-MCNC: 146 MG/DL (ref 60–154)
GLUCOSE 3H P 100 G GLC PO SERPL-MCNC: 94 MG/DL (ref 60–139)
GLUCOSE P FAST SERPL-MCNC: 96 MG/DL (ref 60–94)

## 2018-03-02 PROCEDURE — 36415 COLL VENOUS BLD VENIPUNCTURE: CPT | Performed by: OBSTETRICS & GYNECOLOGY

## 2018-03-02 PROCEDURE — 82951 GLUCOSE TOLERANCE TEST (GTT): CPT | Performed by: OBSTETRICS & GYNECOLOGY

## 2018-03-02 PROCEDURE — 57452 EXAM OF CERVIX W/SCOPE: CPT | Performed by: OBSTETRICS & GYNECOLOGY

## 2018-03-02 PROCEDURE — 80175 DRUG SCREEN QUAN LAMOTRIGINE: CPT | Mod: 90 | Performed by: PSYCHIATRY & NEUROLOGY

## 2018-03-02 PROCEDURE — 99207 ZZC PRENATAL VISIT: CPT | Mod: 25 | Performed by: OBSTETRICS & GYNECOLOGY

## 2018-03-02 PROCEDURE — 99000 SPECIMEN HANDLING OFFICE-LAB: CPT | Performed by: PSYCHIATRY & NEUROLOGY

## 2018-03-02 PROCEDURE — 80156 ASSAY CARBAMAZEPINE TOTAL: CPT | Mod: 90 | Performed by: PSYCHIATRY & NEUROLOGY

## 2018-03-02 PROCEDURE — 80177 DRUG SCRN QUAN LEVETIRACETAM: CPT | Mod: 90 | Performed by: PSYCHIATRY & NEUROLOGY

## 2018-03-02 PROCEDURE — 82952 GTT-ADDED SAMPLES: CPT | Performed by: OBSTETRICS & GYNECOLOGY

## 2018-03-02 PROCEDURE — 80157 ASSAY CARBAMAZEPINE FREE: CPT | Mod: 90 | Performed by: PSYCHIATRY & NEUROLOGY

## 2018-03-02 PROCEDURE — 80339 ANTIEPILEPTICS NOS 1-3: CPT | Mod: 90 | Performed by: PSYCHIATRY & NEUROLOGY

## 2018-03-02 ASSESSMENT — PAIN SCALES - GENERAL: PAINLEVEL: NO PAIN (0)

## 2018-03-02 NOTE — MR AVS SNAPSHOT
After Visit Summary   3/2/2018    Vernell Logan    MRN: [id number]           Patient Information     Date Of Birth          1980        Visit Information        Provider Department      3/2/2018 8:45 AM Alicia Rain DO [facility]        Today's Diagnoses     Dichorionic diamniotic twin pregnancy in third trimester    -  1    H/O:         Dichorionic diamniotic twin pregnancy in second trimester        Tobacco use disorder        Generalized anxiety disorder        Cervical high risk HPV (human papillomavirus) test positive        Partial epilepsy with impairment of consciousness, intractable (H)          Care Instructions    Colposcopy  Colposcopy is a procedure that gives your healthcare provider a magnified view of the cervix. It is done using a lighted microscope called a colposcope. In most cases, a sample of cervical cells is taken during a biopsy. The sample can then be studied in a lab. If any problems are found, you and your healthcare provider will discuss treatment options. It usually takes less than 30 minutes, and you can often go back to your normal routine right away.   Reasons for the Procedure  Colposcopy is usually done as a follow-up exam to help find the cause of an abnormal Pap test. Abnormal Pap tests are often due to an HPV (human papilloma virus) infection. HPV is a large family of viruses. HPV can cause genital warts. It can also cause changes in cervical cells. Colposcopy is also used to assess other problems. These include pain or bleeding during sexual intercourse, or a lesion on the vulva or vagina.   What Are the Risks?  Problems after colposcopy are very rare, but can include:    Bleeding (if a biopsy is done)    Infection  Getting Ready for the Procedure  Colposcopy is normally done in your healthcare provider s office. It will be scheduled for a time when you re not having your menstrual period. You may be asked to sign a form giving  your consent to have the procedure. A day or two before the procedure, your healthcare provider may also ask you to:    Avoid sexual intercourse.    Stop using tampons.    Avoid using creams or other vaginal medications.    Avoid douching.    Take over-the-counter pain medications an hour or two before the procedure.  During Colposcopy    You will be asked to lie on an exam table with your knees bent, just as you do for a Pap test.    An instrument called a speculum is inserted into the vagina to hold it open.    A vinegar solution is applied to the cervix to make the cells easier to see. You may feel pressure or a slight burning for a few moments. In some cases, the cervix may be numbed first with an anesthetic.    The cervix is viewed through the colposcope, which is placed outside the vagina.    If your healthcare provider sees abnormal areas on the cervix, a biopsy will be done. The tissue sample is sent to a lab for study.    You may feel slight pinching or cramping during the biopsy. Medication may be applied to the biopsy site to stop bleeding.  After the Procedure    If you feel lightheaded or dizzy, you can rest on the table until you re ready to get dressed.    If a biopsy was done, you may have mild cramping or light bleeding for a few days. You may also have discharge from the medication used to stop bleeding at the biopsy site.    Use pads, not tampons, for at least the first 24 hours.    If you have any discomfort, over-the-counter pain medication can provide relief.    Ask your healthcare provider when you can resume sexual intercourse.  Follow-Up  If a biopsy was done, your healthcare provider will get the lab report in a week or two. You and your healthcare provider can then discuss the results. In some cases, you may be scheduled for further tests or treatment. Be sure to keep follow-up appointments with your healthcare provider.  Call your healthcare provider if you have:    Heavy vaginal bleeding  (more than a pad an hour for 2 hours).    Severe or increasing pelvic pain.    A fever over 101 F.    Foul-smelling or unusual vaginal discharge.               Follow-ups after your visit        Follow-up notes from your care team     Return in about 2 weeks (around 3/16/2018).      Your next 10 appointments already scheduled     Mar 16, 2018 10:15 AM CDT   ESTABLISHED PRENATAL with Alicia Rain DO   [facility])    [id number] [place] [id number]   [phone]            Apr 11, 2018  3:00 PM CDT   Telephone Call with Ashli Wilkinson MD   [facility] ([facility])    [address] [place] [address], [facility] [address]  [facility] [id number] [phone]           Note: This is not an onsite visit; there is no need to come to the facility.            Apr 27, 2018  9:00 AM CDT   ESTABLISHED PRENATAL with Alicia Rain DO   [facility] ([facility])    [address] [place] [id number]   [phone]            May 07, 2018 10:30 AM CDT   Telephone Call with [facility] 2   [facility])    [address], [address]  [facility] [id number] [phone]           Note: this is not an onsite visit; there is no need to come to the facility.            Sadi 15, 2018  9:00 AM CDT   Office Visit with Alicia Rain DO   [facility] ([facility])    [id number] [place] [phone]   [phone]           Bring a current list of meds and any records pertaining to this visit. For Physicals, please bring immunization records and any forms needing to be filled out. Please arrive 10 minutes early to complete paperwork.              Who to contact     If you have questions or need follow up information about today's clinic visit or your schedule please contact [facility] directly at  [phone].  Normal or non-critical lab and imaging results will be communicated to you by MyChart, letter or phone within 4 business days after the clinic has received the results. If you do not hear from us within 7 days, please contact the clinic through [facility] or phone. If you have a critical or abnormal lab result, we will notify you by phone as soon as possible.  Submit refill requests through peerTransfer or call your pharmacy and they will forward the refill request to us. Please allow 3 business days for your refill to be completed.          Additional Information About Your Visit        [facility] Information     peerTransfer gives you secure access to your electronic health record. If you see a primary care provider, you can also send messages to your care team and make appointments. If you have questions, please call your primary care clinic.  If you do not have a primary care provider, please call [phone] and they will assist you.        Care EveryWhere ID     This is your Care EveryWhere ID. This could be used by other organizations to access your [facility] medical records  [id number]        Your Vitals Were     Pulse Last Period BMI (Body Mass Index)             85 (LMP Unknown) 24.45 kg/m2          Blood Pressure from Last 3 Encounters:   03/02/18 115/70   02/23/18 122/52   02/16/18 118/62    Weight from Last 3 Encounters:   03/02/18 145 lb (65.8 kg)   02/23/18 145 lb 3.2 oz (65.9 kg)   02/16/18 139 lb (63 kg)              We Performed the Following     COLP CERVIX/UPPER VAGINA W BX CERVIX/ENDOCERV CURETT        Primary Care Provider Office Phone # Fax #    Subha Saenz PA-C [phone] [address] [facility] [address]        Equal Access to Services     [facility]: Hadii lyric chan Soyana, waaxda luqadaha, qaybta kaalmada billy, bubba coates. So [facility] [phone].    ATENCIÓN: Si habla español, tiene a larkin disposición servicios  mushtaq de asistencia lingüística. Kai finch [phone].    We comply with applicable federal civil rights laws and [place] laws. We do not discriminate on the basis of race, color, national origin, age, disability, sex, sexual orientation, or gender identity.            Thank you!     Thank you for choosing [facility]  for your care. Our goal is always to provide you with excellent care. Hearing back from our patients is one way we can continue to improve our services. Please take a few minutes to complete the written survey that you may receive in the mail after your visit with us. Thank you!             Your Updated Medication List - Protect others around you: Learn how to safely use, store and throw away your medicines at www.disposemymeds.org.          This list is accurate as of 3/2/18  9:20 AM.  Always use your most recent med list.                   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), Major depressive disorder, recurrent episode, mild (H), Twin gestation in first trimester, unspecified multiple gestation type, High-risk pregnancy in second trimester, Partial epilepsy with impairment of consciousness, intractable (H)       * carBAMazepine 200 MG 12 hr capsule    CARBATROL    90 capsule    1 tablet at night (along with 600 mg at night) for total of 800 mg at night    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), Major depressive disorder, recurrent episode, mild (H), Twin gestation in first trimester, unspecified multiple gestation type, High-risk pregnancy in second trimester, Partial epilepsy with impairment of consciousness, intractable (H)        diazepam 5 MG/ML (HIGH CONC) solution    DIAZEPAM INTENSOL    30 mL    Diazepam intensol 5mg/ml: Administer between gum and cheek  1 ml(5 mg)  for any generalized tonic clonic seizure or for more than 2 complex partial seizures within a 4 hour period, repeat once after 10 minutes. This is during pregnancy. Bottle needs to last 90 days!    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), Major depressive disorder, recurrent episode, mild (H), Twin gestation in first trimester, unspecified multiple gestation type       lamoTRIgine 100 MG tablet    LaMICtal    345 tablet    Increase to 450 mg AM, 400 mg afternoon and 300 HS    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), Twin gestation in first trimester, unspecified multiple gestation type, Major depressive disorder, recurrent episode, mild (H), High-risk pregnancy in second trimester, Partial epilepsy with impairment of consciousness, intractable (H)       levETIRAcetam 500 MG tablet    KEPPRA    540 tablet    Beginning 1/18/2018, increase LEV to 1500 mg AM and 1500 HS    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), Major depressive disorder, recurrent episode, mild (H), Twin gestation in first trimester, unspecified multiple gestation type, High-risk pregnancy in second trimester, Partial epilepsy with impairment of consciousness, intractable (H)       prenatal multivitamin plus iron 27-0.8 MG Tabs per tablet     100 tablet    Take 1 tablet by mouth daily    High-risk pregnancy in second trimester, Partial epilepsy with impairment of  consciousness, intractable (H), 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 Read the directions carefully, and ask your doctor or other care provider to review them with you.

## 2018-03-02 NOTE — PROGRESS NOTES
37 year old  at 29w2d weeks with di/di twins presents to the clinic for a routine prenatal visit.  Di/Di twins=mild poly for Baby A.  Weekly BPPs  Seizure disorder=increase in medications this week  No vaginal bleeding, leakage of fluid, or contractions   Good fetal movement.  Fundal height=34cm  FHTs=Baby A=148 and Baby B=154  HPV on pap smear, abnormal colpo.  Repeat today  GCT=failed one hour and doing three hour today  Hgb=12.4  Rh O+  RTC 2 weeks.  TDAP=pt declines  C section with TL=18  Anxiety/depresssion=stable.  We discussed following up with psych and patient states she hasn't made an appointment yet.  We discussed the importance of this.  Patient expresses concerns for post partum depression.  We discussed how important it is to establish care prior to delivery.  Tobacco abuse    The patient's pap smear on 11/3/17 showed Normal with HR HPV and HPV 18.   I attempted to ensure that the patient was educated regarding the nature of her findings and implications to date.  We reviewed the role of HPV, incidence in the population and the natural history of the infection, and its transmission.  We also reviewed ways to minimize her future risk, the effect of HPV on the cervix and treatment options available, should they be indicated.    The pathophysiology of the cervix, including a discussion of the squamous and columnar cells, metaplasia and dysplasia have been reviewed, drawings, sketches and the pamphlets were reviewed with her.      No LMP recorded (lmp unknown). Patient is pregnant.  Current Birth Control Method: Pregnancy  History of veneral diseases: : No  History of genital warts:  No  Visible warts now?:  No  Family History of  Cervical, Uterine or Vaginal Cancer?: No    Past Medical History:   Diagnosis Date     Anorexia 3/5/2013     Anxiety      Depressive disorder 1999     Heart murmur     told benign     Localization-related epilepsy (H)      Major depression      Migraine       Seizure disorder (H)     working with [facility] [employer] [facility] [place], really bad episodes      Seizures (H)      Status post left breast biopsy,sclerosing adenosis not concordant with imaging,12       Past Surgical History:   Procedure Laterality Date     C/SECTION, LOW TRANSVERSE      , Low Transverse        Outpatient Encounter Prescriptions as of 3/2/2018   Medication Sig Dispense Refill     levETIRAcetam (KEPPRA) 500 MG tablet Beginning 2018, increase LEV to 1500 mg AM and 1500  tablet 3     lamoTRIgine (LAMICTAL) 100 MG tablet Increase to 450 mg AM, 400 mg afternoon and 300  tablet 3     Prenatal Vit-Fe Fumarate-FA (PRENATAL MULTIVITAMIN PLUS IRON) 27-0.8 MG TABS per tablet Take 1 tablet by mouth daily 100 tablet 3     carBAMazepine (CARBATROL) 300 MG 12 hr capsule TAKE 2 CAPSULES (600 MG) BY MOUTH 2 TIMES DAILY 360 capsule 3     folic acid (FOLVITE) 1 MG tablet Take 2 tablets (2 mg) by mouth daily 180 tablet 3     carBAMazepine (CARBATROL) 200 MG 12 hr capsule 1 tablet at night (along with 600 mg at night) for total of 800 mg at night 90 capsule 3     Acetaminophen (TYLENOL PO) Take by mouth as needed for mild pain or fever       VITAMIN C 1000 MG OR TABS 1 TABLET DAILY AT DINNER       diazepam (DIAZEPAM INTENSOL) 5 MG/ML (HIGH CONC) solution Diazepam intensol 5mg/ml: Administer between gum and cheek  1 ml(5 mg)  for any generalized tonic clonic seizure or for more than 2 complex partial seizures within a 4 hour period, repeat once after 10 minutes. This is during pregnancy. Bottle needs to last 90 days! 30 mL 1     No facility-administered encounter medications on file as of 3/2/2018.         Allergies as of 2018     (No Known Allergies)       Social History     Social History     Marital status:      Spouse name: N/A     Number of children: 0     Years of education: N/A     Occupational History      None      Social History Main Topics     Smoking status: Current Every  Day Smoker     Packs/day: 1.00     Years: 10.00     Types: Cigarettes     Start date: 1993     Smokeless tobacco: Never Used      Comment: 1 pack      Alcohol use Yes      Comment: beer or wine a few times per year     Drug use: No     Sexual activity: Yes     Partners: Male     Birth control/ protection: None     Other Topics Concern     Parent/Sibling W/ Cabg, Mi Or Angioplasty Before 65f 55m? Yes     Social History Narrative        Family History   Problem Relation Age of Onset     HEART DISEASE Father      62 yo when he had arrhythmia,  of CHF age 65     DIABETES Father      Neurologic Disorder Father      epilepsy     Schizophrenia Father      Anxiety Disorder Father      Hypertension Father      Obesity Father      CANCER Maternal Grandfather      CEREBROVASCULAR DISEASE Maternal Grandfather      CEREBROVASCULAR DISEASE Paternal Grandmother      HEART DISEASE Paternal Grandfather      Psychotic Disorder Daughter      Neurologic Disorder Daughter      CP, she was adopted out in      Schizophrenia Mother      Anxiety Disorder Mother      Depression Mother          Review Of Systems  Skin: negative  Eyes: negative  Ears/Nose/Throat: negative  Respiratory: negative  Cardiovascular: negative  Gastrointestinal: negative  Genitourinary: negative  Musculoskeletal: negative  Neurologic: negative  Psychiatric: negative  Hematologic/Lymphatic/Immunologic: negative  Endocrine: negative     Exam:   /70  Pulse 85  Wt 145 lb (65.8 kg)  LMP  (LMP Unknown)  BMI 24.45 kg/m2  GENERAL:  WNWD female NAD  HEENT: NC/AT, EOMI  SKIN: normal skin turgor  GAIT: Normal  NECK: Symmetrical, no masses noted   VULVA: Normal Genitalia  BUS: Normal  URETHRA:  No hypermobility noted  URETHRAL MEATUS:  No masses noted  VAGINA: Normal mucosa, no discharge  CERVIX: Closed, mobile, no discharge  PERIANAL:  No masses or lesions seen  EXTREMITIES: no clubbing, cyanosis, or edema    Assessment:  NIL with HPV 18 and HR  HPV  Acetowhite changes seen previously    Plan:  Recommend to Proceed with Colpo  The details of the colposcopic procedure were reviewed, the risks of missed diagnoses, pain, infection, and bleeding.    TT 20 min, in addition to the time for the procedure  CT greater than 50%, as noted above in the HPI and in the Plan.         Procedure:  Procedure for colposcopy and biopsy has been explained to the patient and consent obtained.    Before the procedure, it was ensured that the patient was educated regarding the nature of her findings and implications to date.  We reviewed the role of HPV and the natural history of the infection.  We also reviewed ways to minimize her future risk, the effect of HPV on the cervix and treatment options available, should they be indicated.    The pathophysiology of the cervix, including a discussion of the squamous and columnar cells, metaplasia and dysplasia have been reviewed, drawings, sketches and the pamphlets were reviewed with her.  The details of the colposcopic procedure were reviewed, the risks of missed diagnoses, pain, infection, and bleeding.  Questions seemed to be answered before proceeding and the patient then consented to the procedure.     Procedure:    Speculum placed and cervix visualized. Vagina normal with no lesions noted. Acetic acid applied to the cervix. The colposcopy is satisfactory as the entire transformation zone is visualized. Mild acetowhite epithelial changes noted again at the 10-12:00 position.  Unchanged and stable. Lugal's solution applied to patients cervix. Speculum removed    She tolerated the procedure well. There were no apparent complications.    She is instructed not to use tampons or have intercourse for 5 days.  Instructed to call if she has persistent bleeding, foul vaginal discharge or any other concerns.    Findings:    No images are attached to the encounter.     Cervix: acetowhitening noted 10-12:00  Vaginal inspection: vaginal  colposcopy not performed.  Vulvar colposcopy: vulvar colposcopy not performed. N/A  Procedure Summary: Patient tolerated procedure well.      Assessment:   NIL with HPV 18 and other HR HPV    Plan:  Biopsy post partum    Aliica Rain

## 2018-03-02 NOTE — NURSING NOTE
"Chief Complaint   Patient presents with     Prenatal Care     offer T-dap  --  consent for Colposcopy       Initial /70  Pulse 85  Wt 145 lb (65.8 kg)  LMP  (LMP Unknown)  BMI 24.45 kg/m2 Estimated body mass index is 24.45 kg/(m^2) as calculated from the following:    Height as of 2/23/18: 5' 4.57\" (1.64 m).    Weight as of this encounter: 145 lb (65.8 kg).  Medication Reconciliation: complete     29w2d  Twins  "

## 2018-03-03 LAB
CARBAMAZEPINE EP FREE SERPL-MCNC: 1.2 UG/ML
CARBAMAZEPINE EP SERPL-MCNC: 2.3 UG/ML
CARBAMAZEPINE FREE SERPL-MCNC: 1 UG/ML
CARBAMAZEPINE SERPL-MCNC: 4.1 UG/ML
LAMOTRIGINE SERPL-MCNC: 7.8 UG/ML (ref 2.5–15)
LEVETIRACETAM SERPL-MCNC: 32 UG/ML (ref 12–46)

## 2018-03-14 ENCOUNTER — TRANSFERRED RECORDS (OUTPATIENT)
Dept: HEALTH INFORMATION MANAGEMENT | Facility: CLINIC | Age: 38
End: 2018-03-14

## 2018-03-16 ENCOUNTER — PRENATAL OFFICE VISIT (OUTPATIENT)
Dept: OBGYN | Facility: CLINIC | Age: 38
End: 2018-03-16
Payer: MEDICARE

## 2018-03-16 VITALS
SYSTOLIC BLOOD PRESSURE: 120 MMHG | BODY MASS INDEX: 25.3 KG/M2 | DIASTOLIC BLOOD PRESSURE: 75 MMHG | HEART RATE: 73 BPM | WEIGHT: 150 LBS

## 2018-03-16 DIAGNOSIS — Z30.2 ENCOUNTER FOR STERILIZATION: ICD-10-CM

## 2018-03-16 DIAGNOSIS — O30.043 DICHORIONIC DIAMNIOTIC TWIN PREGNANCY IN THIRD TRIMESTER: ICD-10-CM

## 2018-03-16 DIAGNOSIS — O09.93 HIGH-RISK PREGNANCY IN THIRD TRIMESTER: Primary | ICD-10-CM

## 2018-03-16 DIAGNOSIS — Z98.891 H/O: C-SECTION: ICD-10-CM

## 2018-03-16 PROCEDURE — 99207 ZZC PRENATAL VISIT: CPT | Performed by: OBSTETRICS & GYNECOLOGY

## 2018-03-16 ASSESSMENT — PAIN SCALES - GENERAL: PAINLEVEL: EXTREME PAIN (8)

## 2018-03-16 NOTE — MR AVS SNAPSHOT
After Visit Summary   3/16/2018    Vernell Logan    MRN: [id number]           Patient Information     Date Of Birth          1980        Visit Information        Provider Department      3/16/2018 10:15 AM Alicia Rain DO [facility]        Today's Diagnoses     High-risk pregnancy in third trimester    -  1    H/O:         Dichorionic diamniotic twin pregnancy in third trimester        Encounter for sterilization          Care Instructions    SIGNS OF  LABOR    Labor is  if it happens more than three weeks before your due date.    It can be hard to know if you are in labor, since the symptoms can be like the normal feelings of pregnancy.  Often, the only difference is the symptoms increase or they don't go away.     Signs of  labor can include:    Change in your vaginal discharge:  You will have more vaginal discharge when you are pregnant and it should be creamy white.  Call the clinic right away if your discharge has a foul odor, pink or bloody,  or if it becomes watery or is more than is normal for you during your pregnancy.    More than 5-6 contractions or tightenings per hour.  Contractions can feel like period cramps or bowel (gas or diarrhea) pain.  You will feel it in the lower part of your abdomen, in your back or as a pressure feeling in your bottom.  It is often regular, coming for 30 seconds or a minute and then going away, only to come back 5 or 10 minutes later. Some contractions are normal during pregnancy, but if you are feeling more than 5-6 in one hour, empty your bladder, then drink 16-24 ounces of water, eat a snack and lay down on your left side. Put your hand on your abdomen to count the contractions.  If after one hour of resting you have still had 5-6 contractions call your clinic.          Follow-ups after your visit        Follow-up notes from your care team     Return in about 2 weeks (around 3/30/2018).      Your next  10 appointments already scheduled     Mar 26, 2018  9:00 AM CDT   [facility]    [address] [facility] [address]   [phone]            Apr 11, 2018  3:00 PM CDT   Telephone Call with Ashli Wilkinson MD   [facility] ([facility])    [address], [address]  [place] [address] [phone]           Note: This is not an onsite visit; there is no need to come to the facility.            Apr 27, 2018  9:00 AM CDT   ESTABLISHED PRENATAL with Alicia Rain DO   [facility] ([facility])    [address]  [address]  [place] [id number] [phone]            May 07, 2018 10:30 AM CDT   Telephone Call with [facility] Nurse 2   [facility]    [address], [address]  [place] [address] [phone]           Note: this is not an onsite visit; there is no need to come to the facility.            Sadi 15, 2018  9:00 AM CDT   Office Visit with Alicia Rain DO   [facility] ([facility])    [address]  [place] [id number] [phone]           Bring a current list of meds and any records pertaining to this visit. For Physicals, please bring immunization records and any forms needing to be filled out. Please arrive 10 minutes early to complete paperwork.              Who to contact     If you have questions or need follow up information about today's clinic visit or your schedule please contact [facility] directly at [phone].  Normal or non-critical lab and imaging results will be communicated to you by MyChart, letter or phone within 4 business days after the clinic has received the results. If you do not hear from us within 7 days, please contact the clinic through MyChart or phone. If you have a critical or abnormal lab  result, we will notify you by phone as soon as possible.  Submit refill requests through [facility] or call your pharmacy and they will forward the refill request to us. Please allow 3 business days for your refill to be completed.          Additional Information About Your Visit        [facility] Information     [facility] gives you secure access to your electronic health record. If you see a primary care provider, you can also send messages to your care team and make appointments. If you have questions, please call your primary care clinic.  If you do not have a primary care provider, please call [phone] and they will assist you.        Care EveryWhere ID     This is your Care EveryWhere ID. This could be used by other organizations to access your [place] medical records  [id number]        Your Vitals Were     Pulse Last Period BMI (Body Mass Index)             73 (LMP Unknown) 25.3 kg/m2          Blood Pressure from Last 3 Encounters:   03/16/18 120/75   03/02/18 115/70   02/23/18 122/52    Weight from Last 3 Encounters:   03/16/18 150 lb (68 kg)   03/02/18 145 lb (65.8 kg)   02/23/18 145 lb 3.2 oz (65.9 kg)              Today, you had the following     No orders found for display       Primary Care Provider Office Phone # Fax #    Subha Saenz PA-C [phone] [address] [facility] [address]        Equal Access to Services     [facility] : Hadii aad ku hadasho Soomaali, waaxda luqadaha, qaybta kaalmada adeegyada, bubba salomon hayindra candelario . So [facility] [phone].    ATENCIÓN: Si habla español, tiene a larkin disposición servicios gratuitos de asistencia lingüística. Kai al [phone].    We comply with applicable federal civil rights laws and [place] laws. We do not discriminate on the basis of race, color, national origin, age, disability, sex, sexual orientation, or gender identity.            Thank you!     Thank you for choosing [facility]  for  your care. Our goal is always to provide you with excellent care. Hearing back from our patients is one way we can continue to improve our services. Please take a few minutes to complete the written survey that you may receive in the mail after your visit with us. Thank you!             Your Updated Medication List - Protect others around you: Learn how to safely use, store and throw away your medicines at www.disposemymeds.org.          This list is accurate as of 3/16/18 11:28 AM.  Always use your most recent med list.                   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), Major depressive disorder, recurrent episode, mild (H), Twin gestation in first trimester, unspecified multiple gestation type, High-risk pregnancy in second trimester, Partial epilepsy with impairment of consciousness, intractable (H)       * carBAMazepine 200 MG 12 hr capsule    CARBATROL    90 capsule    1 tablet at night (along with 600 mg at night) for total of 800 mg at night    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 This is during pregnancy. Bottle needs to last 90 days!     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), Major depressive disorder, recurrent episode, mild (H), Twin gestation in first trimester, unspecified multiple gestation type       lamoTRIgine 100 MG tablet    LaMICtal    345 tablet    Increase to 450 mg AM, 400 mg afternoon and 300 HS    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), Twin gestation in first trimester, unspecified multiple gestation type, Major depressive disorder, recurrent episode, mild (H), High-risk pregnancy in second trimester, Partial epilepsy with impairment of consciousness, intractable (H)       levETIRAcetam 500 MG tablet    KEPPRA    540 tablet    Beginning 1/18/2018, increase LEV to 1500 mg AM and 1500 HS    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), Major depressive disorder, recurrent episode, mild (H), Twin gestation in first trimester, unspecified multiple gestation type, High-risk pregnancy in second trimester, Partial epilepsy with impairment of consciousness, intractable (H)       prenatal multivitamin plus iron 27-0.8 MG Tabs per tablet     100 tablet    Take 1 tablet by mouth daily    High-risk pregnancy in second trimester, Partial epilepsy with impairment of consciousness, intractable (H), 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 Read the directions carefully, and ask your doctor or other care provider to review them with you.

## 2018-03-16 NOTE — NURSING NOTE
"Chief Complaint   Patient presents with     Prenatal Care     T-dap ??       Initial /75  Pulse 73  Wt 150 lb (68 kg)  LMP  (LMP Unknown)  BMI 25.3 kg/m2 Estimated body mass index is 25.3 kg/(m^2) as calculated from the following:    Height as of 2/23/18: 5' 4.57\" (1.64 m).    Weight as of this encounter: 150 lb (68 kg).  Medication Reconciliation: complete     31w2d    "

## 2018-03-16 NOTE — PROGRESS NOTES
37 year old  at 31w2d weeks with di/di twins presents to the clinic for a routine prenatal visit.  Di/Di twins=repeat c section on .  Weekly ultrasound with MFM on   Seizure disorder=pt recently had an increase in her meds  No concerns. Patient denies any vaginal bleeding, leakage of fluid, or contractions   Good fetal movement.    Fundal height=36cm  WKOc=K=331 B=129  GDS=failed one hour but passed three hour  Hgb=12.4  Tobacco abuse=1/2 pack per day  RTC 2 weeks    Alicia Rain    Carbatrol  300 twice a day  200 qhs      Lamictal  450 am  450 afternoon  300qhs

## 2018-03-21 ENCOUNTER — TRANSFERRED RECORDS (OUTPATIENT)
Dept: HEALTH INFORMATION MANAGEMENT | Facility: CLINIC | Age: 38
End: 2018-03-21

## 2018-03-26 ENCOUNTER — PRENATAL OFFICE VISIT (OUTPATIENT)
Dept: OBGYN | Facility: OTHER | Age: 38
End: 2018-03-26
Payer: MEDICARE

## 2018-03-26 ENCOUNTER — TELEPHONE (OUTPATIENT)
Dept: NEUROLOGY | Facility: CLINIC | Age: 38
End: 2018-03-26

## 2018-03-26 VITALS
BODY MASS INDEX: 25.97 KG/M2 | DIASTOLIC BLOOD PRESSURE: 72 MMHG | SYSTOLIC BLOOD PRESSURE: 120 MMHG | HEART RATE: 82 BPM | WEIGHT: 154 LBS

## 2018-03-26 DIAGNOSIS — G40.219 LOCALIZATION-RELATED EPILEPSY WITH COMPLEX PARTIAL SEIZURES WITH INTRACTABLE EPILEPSY (H): ICD-10-CM

## 2018-03-26 DIAGNOSIS — F17.200 TOBACCO USE DISORDER: ICD-10-CM

## 2018-03-26 DIAGNOSIS — F33.0 MAJOR DEPRESSIVE DISORDER, RECURRENT EPISODE, MILD (H): ICD-10-CM

## 2018-03-26 DIAGNOSIS — F41.1 GENERALIZED ANXIETY DISORDER: ICD-10-CM

## 2018-03-26 DIAGNOSIS — Z30.2 ENCOUNTER FOR STERILIZATION: ICD-10-CM

## 2018-03-26 DIAGNOSIS — O30.001 TWIN GESTATION IN FIRST TRIMESTER, UNSPECIFIED MULTIPLE GESTATION TYPE: ICD-10-CM

## 2018-03-26 DIAGNOSIS — G40.219 PARTIAL EPILEPSY WITH IMPAIRMENT OF CONSCIOUSNESS, INTRACTABLE (H): ICD-10-CM

## 2018-03-26 DIAGNOSIS — O09.529 ELDERLY MULTIGRAVIDA WITH ANTEPARTUM CONDITION OR COMPLICATION: ICD-10-CM

## 2018-03-26 DIAGNOSIS — F32.1 MAJOR DEPRESSIVE DISORDER, SINGLE EPISODE, MODERATE (H): ICD-10-CM

## 2018-03-26 DIAGNOSIS — O30.043 DICHORIONIC DIAMNIOTIC TWIN PREGNANCY IN THIRD TRIMESTER: Primary | ICD-10-CM

## 2018-03-26 DIAGNOSIS — O09.92 HIGH-RISK PREGNANCY IN SECOND TRIMESTER: ICD-10-CM

## 2018-03-26 PROCEDURE — 99207 ZZC PRENATAL VISIT: CPT | Performed by: OBSTETRICS & GYNECOLOGY

## 2018-03-26 ASSESSMENT — PAIN SCALES - GENERAL: PAINLEVEL: MILD PAIN (2)

## 2018-03-26 NOTE — MR AVS SNAPSHOT
After Visit Summary   3/26/2018    Vernell Logan    MRN: [id number]           Patient Information     Date Of Birth          1980        Visit Information        Provider Department      3/26/2018 9:00 AM Alicia Rain DO [facility]        Today's Diagnoses     Dichorionic diamniotic twin pregnancy in third trimester    -  1    Elderly multigravida with antepartum condition or complication        Encounter for sterilization        Partial epilepsy with impairment of consciousness, intractable (H)        Major depressive disorder, single episode, moderate (H)        Generalized anxiety disorder        Tobacco use disorder        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)        Twin gestation in first trimester, unspecified multiple gestation type        Major depressive disorder, recurrent episode, mild (H)        High-risk pregnancy in second trimester          Care Instructions    SIGNS OF  LABOR    Labor is  if it happens more than three weeks before your due date.    It can be hard to know if you are in labor, since the symptoms can be like the normal feelings of pregnancy.  Often, the only difference is the symptoms increase or they don't go away.     Signs of  labor can include:    Change in your vaginal discharge:  You will have more vaginal discharge when you are pregnant and it should be creamy white.  Call the clinic right away if your discharge has a foul odor, pink or bloody,  or if it becomes watery or is more than is normal for you during your pregnancy.    More than 5-6 contractions or tightenings per hour.  Contractions can feel like period cramps or bowel (gas or diarrhea) pain.  You will feel it in the lower part of your abdomen, in your back or as a pressure feeling in your bottom.  It is often regular, coming for 30 seconds or a minute and then going away, only to come back 5 or 10  minutes later. Some contractions are normal during pregnancy, but if you are feeling more than 5-6 in one hour, empty your bladder, then drink 16-24 ounces of water, eat a snack and lay down on your left side. Put your hand on your abdomen to count the contractions.  If after one hour of resting you have still had 5-6 contractions call your clinic.          Follow-ups after your visit        Follow-up notes from your care team     Return in about 2 weeks (around 4/9/2018).      Your next 10 appointments already scheduled     Apr 11, 2018  3:00 PM CDT   Telephone Call with Ashli Wilkinson MD   [facility]    [address], [address]  [place] [id number] [phone]           Note: This is not an onsite visit; there is no need to come to the facility.            Apr 12, 2018  2:15 PM CDT   ESTABLISHED PRENATAL with Padmini Mccoy MD   [facility]    [address] [facility] [address]   [phone]            Apr 27, 2018  9:00 AM CDT   ESTABLISHED PRENATAL with Alicia Rain DO   [facility]    [address]  [place] [facility] [id number] [phone]            May 07, 2018 10:30 AM CDT   Telephone Call with Me [facility] Nurse 2   [facility]    [address], [address]  [place] [id number] [phone]           Note: this is not an onsite visit; there is no need to come to the facility.            Sadi 15, 2018  9:00 AM CDT   Office Visit with Alicia Rain DO   [facility] ([facility])    [address]  [place] [place] [facility] [id number] [phone]           Bring a current list of meds and any records pertaining to this visit. For Physicals, please bring immunization records and any forms needing to be filled out. Please arrive 10 minutes early to complete paperwork.               Who to contact     If you have questions or need follow up information about today's clinic visit or your schedule please contact [facility] directly at [phone].  Normal or non-critical lab and imaging results will be communicated to you by MyChart, letter or phone within 4 business days after the clinic has received the results. If you do not hear from us within 7 days, please contact the clinic through MyChart or phone. If you have a critical or abnormal lab result, we will notify you by phone as soon as possible.  Submit refill requests through earthmine or call your pharmacy and they will forward the refill request to us. Please allow 3 business days for your refill to be completed.          Additional Information About Your Visit        earthmine Information     earthmine gives you secure access to your electronic health record. If you see a primary care provider, you can also send messages to your care team and make appointments. If you have questions, please call your primary care clinic.  If you do not have a primary care provider, please call [phone] and they will assist you.        Care EveryWhere ID     This is your Care EveryWhere ID. This could be used by other organizations to access your [facility] medical records  [id number]        Your Vitals Were     Pulse Last Period BMI (Body Mass Index)             82 (LMP Unknown) 25.97 kg/m2          Blood Pressure from Last 3 Encounters:   03/26/18 120/72   03/16/18 120/75   03/02/18 115/70    Weight from Last 3 Encounters:   03/26/18 154 lb (69.9 kg)   03/16/18 150 lb (68 kg)   03/02/18 145 lb (65.8 kg)              Today, you had the following     No orders found for display       Primary Care Provider Office Phone # Fax #    Subha Saenz PA-C [phone] [address] [place] [address]        Equal Access to Services     MICHELL MENDOZA : Katelynn Wagner, miriam rudolph, fely moses  bubba cervantesjunocorona jacobsen'aan ah. So [facility] [phone].    ATENCIÓN: Si nataliela ashkan, tiene a larkin disposición servicios gratuitos de asistencia lingüística. Kai al [phone].    We comply with applicable federal civil rights laws and [place] laws. We do not discriminate on the basis of race, color, national origin, age, disability, sex, sexual orientation, or gender identity.            Thank you!     Thank you for choosing [facility]  for your care. Our goal is always to provide you with excellent care. Hearing back from our patients is one way we can continue to improve our services. Please take a few minutes to complete the written survey that you may receive in the mail after your visit with us. Thank you!             Your Updated Medication List - Protect others around you: Learn how to safely use, store and throw away your medicines at www.disposemymeds.org.          This list is accurate as of 3/26/18  9:47 AM.  Always use your most recent med list.                   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), Major depressive disorder, recurrent episode, mild (H), Twin gestation in first trimester, unspecified multiple gestation type, High-risk pregnancy in second trimester, Partial epilepsy with impairment of consciousness, intractable (H)       * carBAMazepine 200 MG 12 hr capsule    CARBATROL    90 capsule    1 tablet at night (along with 600 mg at night) for total of 800 mg at night    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), Major depressive disorder, recurrent episode, mild (H), Twin gestation in first trimester,  unspecified multiple gestation type, High-risk pregnancy in second trimester, Partial epilepsy with impairment of consciousness, intractable (H)       diazepam 5 MG/ML (HIGH CONC) solution    DIAZEPAM INTENSOL    30 mL    Diazepam intensol 5mg/ml: Administer between gum and cheek  1 ml(5 mg)  for any generalized tonic clonic seizure or for more than 2 complex partial seizures within a 4 hour period, repeat once after 10 minutes. This is during pregnancy. Bottle needs to last 90 days!    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), Major depressive disorder, recurrent episode, mild (H), Twin gestation in first trimester, unspecified multiple gestation type       lamoTRIgine 100 MG tablet    LaMICtal    345 tablet    Increase to 450 mg AM, 400 mg afternoon and 300 HS    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), Twin gestation in first trimester, unspecified multiple gestation type, Major depressive disorder, recurrent episode, mild (H), High-risk pregnancy in second trimester, Partial epilepsy with impairment of consciousness, intractable (H)       levETIRAcetam 500 MG tablet    KEPPRA    540 tablet    Beginning 1/18/2018, increase LEV to 1500 mg AM and 1500 HS    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), Major depressive disorder, recurrent episode, mild (H), Twin gestation in first trimester, unspecified multiple gestation type, High-risk pregnancy in second trimester, Partial epilepsy with impairment of consciousness, intractable (H)       prenatal multivitamin plus iron 27-0.8 MG  Tabs per tablet     100 tablet    Take 1 tablet by mouth daily    High-risk pregnancy in second trimester, Partial epilepsy with impairment of consciousness, intractable (H), 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 Read the directions carefully, and ask your doctor or other care provider to review them with you.

## 2018-03-26 NOTE — TELEPHONE ENCOUNTER
No seizures.   Side effects: Had double vision temporarily after increasing LTG. Side effects lasted a few days then resolved.   scheduled for May 4, 2018.  Reports most recent labs were drawn one week after appointment with MD on 18.    Goal levels:    LTG >8-10  CBZ =>9  LEV =>20     Ref. Range 2018 14:47 2018 09:40 3/2/2018 09:04   Carbamazepine Total Level Latest Ref Range: 4.0 - 12.0 ug/mL 6.6 6.3 4.1   10, 11 Epoxide Level Latest Ref Range: 0.4 - 4.0 ug/mL 2.0 1.9 2.3   Free Carbamazepine Level Ug/Ml Latest Ref Range: 0.6 - 4.2 ug/mL 1.8 1.5 1.0   Free Epoxide Level Latest Ref Range: 0.2 - 2.0 ug/mL 1.2 1.0 1.2   Keppra (Levetiracetam) Level Latest Ref Range: 12 - 46 ug/mL 16 28 32   Lamotrigine Level Latest Ref Range: 2.5 - 15.0 ug/mL 7.8 7.2 7.8     Current/confirmed anticonvulsant medications drawn 18:    CBZ 4.1 / 2.3  (Daily dose 600-800)  LTG 7.8   (Daily dose 450-450-300)  LEV 32 (1500- 1500)    Vernell confirms she is currently taking the above doses of anticonvulsant medications.  Will continue to monitor.

## 2018-03-26 NOTE — NURSING NOTE
"Chief Complaint   Patient presents with     Prenatal Care       Initial /72  Pulse 82  Wt 154 lb (69.9 kg)  LMP  (LMP Unknown)  BMI 25.97 kg/m2 Estimated body mass index is 25.97 kg/(m^2) as calculated from the following:    Height as of 2/23/18: 5' 4.57\" (1.64 m).    Weight as of this encounter: 154 lb (69.9 kg).  Medication Reconciliation: complete       32w5d    "

## 2018-03-26 NOTE — PROGRESS NOTES
37 year old  at 32w5d weeks with di/di twins presents to the clinic for a routine prenatal visit.    MFM ultrasounds on   Patient denies any vaginal bleeding, leakage of fluid, or contractions   Seizure disorder=stable  Tobacco abuse=1/2 pack per day  Anxiety/depression=stable    Good fetal movement  Fundal height=39cm  FHTs=Baby A=130's and Baby B=120's  Discussed labor precautions.  ERCS with TL=5/4  RTC 2 weeks    Alicia Rain         Spoke w/patient, informed he is due for flu vaccine. States he will call and schedule after he has his procedure.

## 2018-03-28 ENCOUNTER — TRANSFERRED RECORDS (OUTPATIENT)
Dept: HEALTH INFORMATION MANAGEMENT | Facility: CLINIC | Age: 38
End: 2018-03-28

## 2018-03-30 ENCOUNTER — TELEPHONE (OUTPATIENT)
Dept: FAMILY MEDICINE | Facility: CLINIC | Age: 38
End: 2018-03-30

## 2018-03-30 DIAGNOSIS — R30.0 DYSURIA: Primary | ICD-10-CM

## 2018-03-30 RX ORDER — CEPHALEXIN 500 MG/1
500 CAPSULE ORAL 2 TIMES DAILY
Qty: 14 CAPSULE | Refills: 0 | Status: SHIPPED | OUTPATIENT
Start: 2018-03-30 | End: 2018-04-06

## 2018-03-30 NOTE — TELEPHONE ENCOUNTER
Routing to  to review and advise. Having abdominal burning sensation since yesterday. Pain level 7-8/10. Able to find relief sitting with feet elevated. Able to feel fetal movement in both babies. Denies accompanying symptoms. Please review and advise if you would recommend being seen in clinic today or being seen in L&D for triage in person.    Vernell Logan is a 37 year old female who calls with abdominal burning.    NURSING ASSESSMENT:  Description:  Burning across the stomach, pain started yesterday afternoon. Lower stomach pain, radiates up and around the stomach. Tried laying on her left side and drinking water, but doesn't feel like she is having relief. Did take Tylenol last night. Able to find relief with sitting and elevating feet. Able to feel fetal movement of both fetuses. Denies low back pain, dysuria, pelvic pressures, BM changes, vaginal bleeding, vaginal discharge, leakage.   Onset/duration:  yesterday  Precip. factors:  Pregnant with twins  Associated symptoms:  Stomach discomfort - burning sensation   Improves/worsens symptoms:  Worsening   Pain scale (0-10)   7-8/10  LMP/preg/breast feeding:  No LMP recorded (lmp unknown). Patient is pregnant.  GA: 33w2d  SALIMA: Estimated Date of Delivery: May 16, 2018  Last exam/Treatment:  03/26/2018  Allergies: No Known Allergies    NURSING PLAN: Routed to provider Yes    RECOMMENDED DISPOSITION:  TBD by provider   Will comply with recommendation: Yes  If further questions/concerns or if symptoms do not improve, worsen or new symptoms develop, call your PCP or [facility] as soon as possible.    NOTES:  Disposition was determined by the first positive assessment question, therefore all previous assessment questions were negative    Guideline used:  Telephone Triage for Obstetrics and Gynecology, Jackie Miranda and Teresa Mullins  3rd Trimester Abdominal Pain  Nursing Judgment  Routing to     Dora Galvan, RN, BSN

## 2018-03-30 NOTE — TELEPHONE ENCOUNTER
Reason for call:  Patient reporting a symptom    Symptom or request: burning across stomach area    Duration (how long have symptoms been present): since yesterday afternoon    Have you been treated for this before? No    Additional comments: pt is pregnant and feels like shes having contractions , every time she moves around she gets a burning feeling across her stomach area. Pt wondering if she should go into the hospital or not     Phone Number patient can be reached at:  Home number on file [phone] (home)    Best Time:  ANY    Can we leave a detailed message on this number:  YES    Call taken on 3/30/2018 at 8:02 AM by Ruma Fuentes

## 2018-04-05 ENCOUNTER — TELEPHONE (OUTPATIENT)
Dept: NEUROLOGY | Facility: CLINIC | Age: 38
End: 2018-04-05

## 2018-04-05 DIAGNOSIS — G40.219 LOCALIZATION-RELATED EPILEPSY WITH COMPLEX PARTIAL SEIZURES WITH INTRACTABLE EPILEPSY (H): ICD-10-CM

## 2018-04-05 DIAGNOSIS — G40.219 PARTIAL EPILEPSY WITH IMPAIRMENT OF CONSCIOUSNESS, INTRACTABLE (H): ICD-10-CM

## 2018-04-05 DIAGNOSIS — O09.92 HIGH-RISK PREGNANCY IN SECOND TRIMESTER: ICD-10-CM

## 2018-04-05 DIAGNOSIS — F33.0 MAJOR DEPRESSIVE DISORDER, RECURRENT EPISODE, MILD (H): ICD-10-CM

## 2018-04-05 DIAGNOSIS — O30.001 TWIN GESTATION IN FIRST TRIMESTER, UNSPECIFIED MULTIPLE GESTATION TYPE: ICD-10-CM

## 2018-04-05 RX ORDER — CARBAMAZEPINE 200 MG/1
CAPSULE, EXTENDED RELEASE ORAL
Qty: 62 CAPSULE | Refills: 3 | Status: SHIPPED | OUTPATIENT
Start: 2018-04-05 | End: 2018-04-25

## 2018-04-05 NOTE — TELEPHONE ENCOUNTER
Vernell continues to be seizure free.   Side effects: Had double vision temporarily after increasing LTG. Side effects lasted a few days then resolved.   scheduled for May 4, 2018.     Goal levels:     LTG >8-10  CBZ =>9  LEV =>20       Ref. Range 2018 14:47 2018 09:40 3/2/2018 09:04   Carbamazepine Total Level Latest Ref Range: 4.0 - 12.0 ug/mL 6.6 6.3 4.1   10, 11 Epoxide Level Latest Ref Range: 0.4 - 4.0 ug/mL 2.0 1.9 2.3   Free Carbamazepine Level Ug/Ml Latest Ref Range: 0.6 - 4.2 ug/mL 1.8 1.5 1.0   Free Epoxide Level Latest Ref Range: 0.2 - 2.0 ug/mL 1.2 1.0 1.2   Keppra (Levetiracetam) Level Latest Ref Range: 12 - 46 ug/mL 16 28 32   Lamotrigine Level Latest Ref Range: 2.5 - 15.0 ug/mL 7.8 7.2 7.8      CBZ 4.1 / 2.3  (Daily dose 600-800)  LTG 7.8   (Daily dose 450-450-300)  LEV 32 (1500- 1500    PLAN: Discussed with Dr Barcenas    1) Increase CBZ to 800-800. Updated RX sent to pharmacy.  2) Continue other medications as prescribed.  3) Will have serum levels drawn in 2 weeks.   4) Will continue to monitor.

## 2018-04-06 ENCOUNTER — PRENATAL OFFICE VISIT (OUTPATIENT)
Dept: OBGYN | Facility: OTHER | Age: 38
End: 2018-04-06
Payer: MEDICARE

## 2018-04-06 VITALS
DIASTOLIC BLOOD PRESSURE: 70 MMHG | HEART RATE: 92 BPM | BODY MASS INDEX: 26.98 KG/M2 | SYSTOLIC BLOOD PRESSURE: 122 MMHG | WEIGHT: 160 LBS

## 2018-04-06 DIAGNOSIS — F32.1 MAJOR DEPRESSIVE DISORDER, SINGLE EPISODE, MODERATE (H): ICD-10-CM

## 2018-04-06 DIAGNOSIS — O09.529 ELDERLY MULTIGRAVIDA WITH ANTEPARTUM CONDITION OR COMPLICATION: ICD-10-CM

## 2018-04-06 DIAGNOSIS — Z98.891 H/O: C-SECTION: ICD-10-CM

## 2018-04-06 DIAGNOSIS — O30.043 DICHORIONIC DIAMNIOTIC TWIN PREGNANCY IN THIRD TRIMESTER: Primary | ICD-10-CM

## 2018-04-06 DIAGNOSIS — R80.9 PROTEINURIA, UNSPECIFIED TYPE: ICD-10-CM

## 2018-04-06 DIAGNOSIS — G40.219 PARTIAL EPILEPSY WITH IMPAIRMENT OF CONSCIOUSNESS, INTRACTABLE (H): ICD-10-CM

## 2018-04-06 DIAGNOSIS — F41.1 GENERALIZED ANXIETY DISORDER: ICD-10-CM

## 2018-04-06 DIAGNOSIS — F17.200 TOBACCO USE DISORDER: ICD-10-CM

## 2018-04-06 LAB
CREAT UR-MCNC: 186 MG/DL
PROT UR-MCNC: 1.56 G/L
PROT/CREAT 24H UR: 0.84 G/G CR (ref 0–0.2)

## 2018-04-06 PROCEDURE — 99207 ZZC PRENATAL VISIT: CPT | Performed by: OBSTETRICS & GYNECOLOGY

## 2018-04-06 PROCEDURE — 84156 ASSAY OF PROTEIN URINE: CPT | Performed by: OBSTETRICS & GYNECOLOGY

## 2018-04-06 ASSESSMENT — PAIN SCALES - GENERAL: PAINLEVEL: NO PAIN (0)

## 2018-04-06 NOTE — PROGRESS NOTES
37 year old  at 34w2d weeks with Di/Di presents to the clinic for a routine prenatal visit.    Di/Di Twins=MFM ultrasound on Wednesday  Patient denies any vaginal bleeding, leakage of fluid, or contractions     Patient was seen at L&D 2 weeks ago for cramping.  UTI was questionable for UTI so antibiotics given.  Elevated protein then.  Will check a urine Pr/Cr today  Good fetal movement  Fundal height=42cm  FHTs=Baby A=135 Baby B=142  RCS with TL=5/4  Anxiety/depression=stable  Seizure disorder=stable  Tobacco abuse=1/2 pack/day  Discussed labor precautions.  RETURN TO CLINIC 1 week    Alicia Rain

## 2018-04-06 NOTE — NURSING NOTE
"Chief Complaint   Patient presents with     Prenatal Care       Initial /70  Pulse 92  Wt 160 lb (72.6 kg)  LMP  (LMP Unknown)  BMI 26.98 kg/m2 Estimated body mass index is 26.98 kg/(m^2) as calculated from the following:    Height as of 2/23/18: 5' 4.57\" (1.64 m).    Weight as of this encounter: 160 lb (72.6 kg).  Medication Reconciliation: complete       34w2d  Twins   "

## 2018-04-06 NOTE — MR AVS SNAPSHOT
After Visit Summary   2018    Vernell Logan    MRN: [id number]           Patient Information     Date Of Birth          1980        Visit Information        Provider Department      2018 2:45 PM Alicia Rain DO [facility]        Today's Diagnoses     Dichorionic diamniotic twin pregnancy in third trimester    -  1    Elderly multigravida with antepartum condition or complication        Proteinuria, unspecified type        H/O:         Partial epilepsy with impairment of consciousness, intractable (H)        Major depressive disorder, single episode, moderate (H)        Generalized anxiety disorder        Tobacco use disorder          Care Instructions    What to watch out for are: regular contractions every 5 min, vaginal bleeding, decreased fetal movement, or leakage of fluid.  Please call the office or go to L&D if you develop any of these signs and symptoms.      I will see you for your follow up appointment.  Please feel free to call if you have any questions or concerns.      Thanks,  Alicia Rain, DO            Follow-ups after your visit        Follow-up notes from your care team     Return in about 1 week (around 2018).      Your next 10 appointments already scheduled     2018  3:00 PM CDT   Telephone Call with Ashli Wilkinson MD   [facility] ([facility])    [address], [address]  [facility] [address] [phone]           Note: This is not an onsite visit; there is no need to come to the facility.            2018  2:15 PM CDT   ESTABLISHED PRENATAL with Padmini Mccoy MD   [facility] ([facility])    [address] [facility] [id number] [phone]            2018  9:00 AM CDT   ESTABLISHED PRENATAL with Alicia Rain DO   [facility]    [address]  [address]  [facility] [address]    [phone]            May 07, 2018 10:30 AM CDT   Telephone Call with [facility] Nurse 2   [facility] ([facility])    [address], [address]  [facility] [address] [phone]           Note: this is not an onsite visit; there is no need to come to the facility.            Sadi 15, 2018  9:00 AM CDT   Office Visit with Alicia Rain,    [facility]    [address]  [address]  [facility] [address] [phone]           Bring a current list of meds and any records pertaining to this visit. For Physicals, please bring immunization records and any forms needing to be filled out. Please arrive 10 minutes early to complete paperwork.              Who to contact     If you have questions or need follow up information about today's clinic visit or your schedule please contact [facility] [place] directly at [phone].  Normal or non-critical lab and imaging results will be communicated to you by Lukkinhart, letter or phone within 4 business days after the clinic has received the results. If you do not hear from us within 7 days, please contact the clinic through 250okt or phone. If you have a critical or abnormal lab result, we will notify you by phone as soon as possible.  Submit refill requests through [employer] or call your pharmacy and they will forward the refill request to us. Please allow 3 business days for your refill to be completed.          Additional Information About Your Visit        [employer] Information     [employer] gives you secure access to your electronic health record. If you see a primary care provider, you can also send messages to your care team and make appointments. If you have questions, please call your primary care clinic.  If you do not have a primary care provider, please call [phone] and they will assist you.        Care EveryWhere ID     This is your Care EveryWhere ID. This could be used  by other organizations to access your [place] medical records  [id number]        Your Vitals Were     Pulse Last Period BMI (Body Mass Index)             92 (LMP Unknown) 26.98 kg/m2          Blood Pressure from Last 3 Encounters:   04/06/18 122/70   03/26/18 120/72   03/16/18 120/75    Weight from Last 3 Encounters:   04/06/18 160 lb (72.6 kg)   03/26/18 154 lb (69.9 kg)   03/16/18 150 lb (68 kg)              We Performed the Following     Creatinine urine calculation only     Protein  random urine with Creat Ratio        Primary Care Provider Office Phone # Fax #    Subha Sarahy Saenz PA-C [phone] [address] [facility] [address]        Equal Access to Services     MICHELL MENDOZA : Hadii lyric olivaso Soyana, waaxda luqadaha, qaybta kaalmada adeegyada, bubba candelario . So [facility] [phone].    ATENCIÓN: Si habla español, tiene a larkin disposición servicios gratuitos de asistencia lingüística. [facility] [phone].    We comply with applicable federal civil rights laws and [place] laws. We do not discriminate on the basis of race, color, national origin, age, disability, sex, sexual orientation, or gender identity.            Thank you!     Thank you for choosing [facility]  for your care. Our goal is always to provide you with excellent care. Hearing back from our patients is one way we can continue to improve our services. Please take a few minutes to complete the written survey that you may receive in the mail after your visit with us. Thank you!             Your Updated Medication List - Protect others around you: Learn how to safely use, store and throw away your medicines at www.disposemymeds.org.          This list is accurate as of 4/6/18  4:03 PM.  Always use your most recent med list.                   Brand Name Dispense Instructions for use Diagnosis    ascorbic acid 1000 MG Tabs    vitamin C     1 TABLET DAILY AT DINNER         * carBAMazepine 300 MG 12 hr capsule    CARBATROL    360 capsule    TAKE 2 CAPSULES (600 MG) BY MOUTH 2 TIMES DAILY    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), Major depressive disorder, recurrent episode, mild (H), Twin gestation in first trimester, unspecified multiple gestation type, High-risk pregnancy in second trimester, Partial epilepsy with impairment of consciousness, intractable (H)       * carBAMazepine 200 MG 12 hr capsule    CARBATROL    62 capsule    1 tablet each morning and evening (along with 600 mg at night) for total of 800 mg Am and PM    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), Major depressive disorder, recurrent episode, mild (H), Twin gestation in first trimester, unspecified multiple gestation type, High-risk pregnancy in second trimester, Partial epilepsy with impairment of consciousness, intractable (H)       cephALEXin 500 MG capsule    KEFLEX    14 capsule    Take 1 capsule (500 mg) by mouth 2 times daily for 7 days    Dysuria       diazepam 5 MG/ML (HIGH CONC) solution    DIAZEPAM INTENSOL    30 mL    Diazepam intensol 5mg/ml: Administer between gum and cheek  1 ml(5 mg)  for any generalized tonic clonic seizure or for more than 2 complex partial seizures within a 4 hour period, repeat once after 10 minutes. This is during pregnancy. Bottle needs to last 90 days!    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), Major depressive disorder, recurrent episode, mild (H), Twin gestation in first trimester, unspecified multiple gestation type       lamoTRIgine  100 MG tablet    LaMICtal    345 tablet    Increase to 450 mg AM, 400 mg afternoon and 300 HS    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), Twin gestation in first trimester, unspecified multiple gestation type, Major depressive disorder, recurrent episode, mild (H), High-risk pregnancy in second trimester, Partial epilepsy with impairment of consciousness, intractable (H)       levETIRAcetam 500 MG tablet    KEPPRA    540 tablet    Beginning 1/18/2018, increase LEV to 1500 mg AM and 1500 HS    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), Major depressive disorder, recurrent episode, mild (H), Twin gestation in first trimester, unspecified multiple gestation type, High-risk pregnancy in second trimester, Partial epilepsy with impairment of consciousness, intractable (H)       prenatal multivitamin plus iron 27-0.8 MG Tabs per tablet     100 tablet    Take 1 tablet by mouth daily    High-risk pregnancy in second trimester, Partial epilepsy with impairment of consciousness, intractable (H), 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 Read the directions carefully, and ask your doctor or other care provider to review them with you.

## 2018-04-11 ENCOUNTER — TRANSFERRED RECORDS (OUTPATIENT)
Dept: HEALTH INFORMATION MANAGEMENT | Facility: CLINIC | Age: 38
End: 2018-04-11

## 2018-04-12 ENCOUNTER — PRENATAL OFFICE VISIT (OUTPATIENT)
Dept: OBGYN | Facility: OTHER | Age: 38
End: 2018-04-12
Payer: MEDICARE

## 2018-04-12 VITALS
BODY MASS INDEX: 27.95 KG/M2 | HEART RATE: 78 BPM | WEIGHT: 165.75 LBS | SYSTOLIC BLOOD PRESSURE: 120 MMHG | DIASTOLIC BLOOD PRESSURE: 64 MMHG

## 2018-04-12 DIAGNOSIS — Z98.891 H/O: C-SECTION: ICD-10-CM

## 2018-04-12 DIAGNOSIS — F17.200 TOBACCO USE DISORDER: ICD-10-CM

## 2018-04-12 DIAGNOSIS — Z86.69 HISTORY OF SEIZURE DISORDER: ICD-10-CM

## 2018-04-12 DIAGNOSIS — O40.1XX1 POLYHYDRAMNIOS IN FIRST TRIMESTER, FETUS 1: ICD-10-CM

## 2018-04-12 DIAGNOSIS — O30.042 DICHORIONIC DIAMNIOTIC TWIN PREGNANCY IN SECOND TRIMESTER: Primary | ICD-10-CM

## 2018-04-12 PROCEDURE — 99207 ZZC COMPLICATED OB VISIT: CPT | Performed by: OBSTETRICS & GYNECOLOGY

## 2018-04-12 PROCEDURE — 87186 SC STD MICRODIL/AGAR DIL: CPT | Performed by: OBSTETRICS & GYNECOLOGY

## 2018-04-12 PROCEDURE — 87653 STREP B DNA AMP PROBE: CPT | Performed by: OBSTETRICS & GYNECOLOGY

## 2018-04-12 NOTE — PROGRESS NOTES
Presents for routine  appointment.     No complaints.    No LOF/VB/Ctxs.    ROS:   and GI  negative.     Please see Prenatal Vitals and Notes Flowsheet for objective data.    A/P:  37 year old  at 35w1d       ICD-10-CM    1. Dichorionic diamniotic twin pregnancy in third trimester O30.042 Strep, Group B by PCR   2. Polyhydramnios in first trimester, fetus 1 O40.1XX1    3. H/O:  Z98.891    4. Tobacco use disorder F17.200    5. History of seizure disorder Z86.69        Group B Strep collected today  RCS with TL=5/4  Anxiety/depression=stable  Seizure disorder=stable  Tobacco abuse=recommend cessation  Discussed MFM US and polyhydramnios baby A. She will discuss rescheduling the CS when she sees Dr. Rain next week.   Follow up in 1 week.      Padmini Mccoy MD

## 2018-04-12 NOTE — NURSING NOTE
"Chief Complaint   Patient presents with     Prenatal Care       Initial /64 (BP Location: Left arm, Patient Position: Chair, Cuff Size: Adult Regular)  Pulse 78  Wt 165 lb 12 oz (75.2 kg)  LMP  (LMP Unknown)  BMI 27.95 kg/m2 Estimated body mass index is 27.95 kg/(m^2) as calculated from the following:    Height as of 2/23/18: 5' 4.57\" (1.64 m).    Weight as of this encounter: 165 lb 12 oz (75.2 kg).  Medication Reconciliation: complete     Li Madrigal, [facility]  April 12, 2018      "

## 2018-04-12 NOTE — MR AVS SNAPSHOT
After Visit Summary   2018    Vernell Logan    MRN: [id number]           Patient Information     Date Of Birth          1980        Visit Information        Provider Department      2018 2:15 PM Padmini Mccoy MD [facility]        Today's Diagnoses     Dichorionic diamniotic twin pregnancy in third trimester    -  1    Polyhydramnios in first trimester, fetus 1        H/O:         Tobacco use disorder        History of seizure disorder           Follow-ups after your visit        Follow-up notes from your care team     Return in about 1 week (around 2018) for OB Visit.      Your next 10 appointments already scheduled     2018  8:30 AM CDT   ESTABLISHED PRENATAL with Alicia Rain DO   [facility] ([facility])    [address] [place] [address]   [phone]            2018  9:00 AM CDT   ESTABLISHED PRENATAL with Alicia Rain DO   [facility]    [address]  [address]  [facility] [address]   [phone]            May 07, 2018 10:30 AM CDT   Telephone Call with [facility] Nurse 2   [facility] ([facility])    [address], [address]  [facility] [address] [phone]           Note: this is not an onsite visit; there is no need to come to the facility.            Sadi 15, 2018  9:00 AM CDT   Office Visit with Alicia Rain DO   [facility] ([facility])    [address] [place] [address]  [address]  [facility] [address]   [phone]           Bring a current list of meds and any records pertaining to this visit. For Physicals, please bring immunization records and any forms needing to be filled out. Please arrive 10 minutes early to complete paperwork.              Who to contact     If you have questions or need follow up information about today's clinic visit or your  schedule please contact [facility] [place] directly at [phone].  Normal or non-critical lab and imaging results will be communicated to you by MyChart, letter or phone within 4 business days after the clinic has received the results. If you do not hear from us within 7 days, please contact the clinic through MyChart or phone. If you have a critical or abnormal lab result, we will notify you by phone as soon as possible.  Submit refill requests through [employer] or call your pharmacy and they will forward the refill request to us. Please allow 3 business days for your refill to be completed.          Additional Information About Your Visit        [email or web address] Information     [employer] gives you secure access to your electronic health record. If you see a primary care provider, you can also send messages to your care team and make appointments. If you have questions, please call your primary care clinic.  If you do not have a primary care provider, please call [phone] and they will assist you.        Care EveryWhere ID     This is your Care EveryWhere ID. This could be used by other organizations to access your [place] medical records  [id number]        Your Vitals Were     Pulse Last Period BMI (Body Mass Index)             78 (LMP Unknown) 27.95 kg/m2          Blood Pressure from Last 3 Encounters:   04/12/18 120/64   04/06/18 122/70   03/26/18 120/72    Weight from Last 3 Encounters:   04/12/18 165 lb 12 oz (75.2 kg)   04/06/18 160 lb (72.6 kg)   03/26/18 154 lb (69.9 kg)              We Performed the Following     Strep, Group B by PCR        Primary Care Provider Office Phone # Fax #    Subha Saenz PA-C [phone] [address] [facility] [address]        Equal Access to Services     MICHELL MENDOZA : Katelynn Wagner, waluisa rudolph, qaybta karoberto cervantes, bubba coates. So [facility] [phone].    ATENCIÓN: Jabier luther,  tiene a larkin disposición servicios gratuitos de asistencia lingüística. Kai finch [phone].    We comply with applicable federal civil rights laws and [place] laws. We do not discriminate on the basis of race, color, national origin, age, disability, sex, sexual orientation, or gender identity.            Thank you!     Thank you for choosing [facility]  for your care. Our goal is always to provide you with excellent care. Hearing back from our patients is one way we can continue to improve our services. Please take a few minutes to complete the written survey that you may receive in the mail after your visit with us. Thank you!             Your Updated Medication List - Protect others around you: Learn how to safely use, store and throw away your medicines at www.disposemymeds.org.          This list is accurate as of 4/12/18 11:59 PM.  Always use your most recent med list.                   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), Major depressive disorder, recurrent episode, mild (H), Twin gestation in first trimester, unspecified multiple gestation type, High-risk pregnancy in second trimester, Partial epilepsy with impairment of consciousness, intractable (H)       * carBAMazepine 200 MG 12 hr capsule    CARBATROL    62 capsule    1 tablet each morning and evening (along with 600 mg at night) for total of 800 mg Am and PM    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), Major depressive disorder, recurrent episode, mild (H), Twin gestation in first trimester, unspecified multiple gestation type, High-risk pregnancy in second trimester, Partial epilepsy  with impairment of consciousness, intractable (H)       diazepam 5 MG/ML (HIGH CONC) solution    DIAZEPAM INTENSOL    30 mL    Diazepam intensol 5mg/ml: Administer between gum and cheek  1 ml(5 mg)  for any generalized tonic clonic seizure or for more than 2 complex partial seizures within a 4 hour period, repeat once after 10 minutes. This is during pregnancy. Bottle needs to last 90 days!    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), Major depressive disorder, recurrent episode, mild (H), Twin gestation in first trimester, unspecified multiple gestation type       lamoTRIgine 100 MG tablet    LaMICtal    345 tablet    Increase to 450 mg AM, 400 mg afternoon and 300 HS    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), Twin gestation in first trimester, unspecified multiple gestation type, Major depressive disorder, recurrent episode, mild (H), High-risk pregnancy in second trimester, Partial epilepsy with impairment of consciousness, intractable (H)       levETIRAcetam 500 MG tablet    KEPPRA    540 tablet    Beginning 1/18/2018, increase LEV to 1500 mg AM and 1500 HS    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), Major depressive disorder, recurrent episode, mild (H), Twin gestation in first trimester, unspecified multiple gestation type, High-risk pregnancy in second trimester, Partial epilepsy with impairment of consciousness, intractable (H)       prenatal multivitamin plus iron 27-0.8 MG Tabs per tablet     100 tablet    Take 1 tablet by mouth daily    High-risk pregnancy in second  trimester, Partial epilepsy with impairment of consciousness, intractable (H), 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 Read the directions carefully, and ask your doctor or other care provider to review them with you.

## 2018-04-13 LAB
GP B STREP DNA SPEC QL NAA+PROBE: POSITIVE
SPECIMEN SOURCE: ABNORMAL

## 2018-04-16 ENCOUNTER — TELEPHONE (OUTPATIENT)
Dept: NEUROLOGY | Facility: CLINIC | Age: 38
End: 2018-04-16

## 2018-04-16 DIAGNOSIS — O30.001 TWIN GESTATION IN FIRST TRIMESTER, UNSPECIFIED MULTIPLE GESTATION TYPE: ICD-10-CM

## 2018-04-16 DIAGNOSIS — O09.92 HIGH-RISK PREGNANCY IN SECOND TRIMESTER: ICD-10-CM

## 2018-04-16 DIAGNOSIS — G40.219 PARTIAL EPILEPSY WITH IMPAIRMENT OF CONSCIOUSNESS, INTRACTABLE (H): ICD-10-CM

## 2018-04-16 DIAGNOSIS — G40.219 LOCALIZATION-RELATED EPILEPSY WITH COMPLEX PARTIAL SEIZURES WITH INTRACTABLE EPILEPSY (H): Primary | ICD-10-CM

## 2018-04-16 DIAGNOSIS — F33.0 MAJOR DEPRESSIVE DISORDER, RECURRENT EPISODE, MILD (H): ICD-10-CM

## 2018-04-16 LAB
BACTERIA SPEC CULT: ABNORMAL
SPECIMEN SOURCE: ABNORMAL

## 2018-04-16 RX ORDER — LAMOTRIGINE 100 MG/1
TABLET ORAL
Qty: 363 TABLET | Refills: 1 | Status: SHIPPED | OUTPATIENT
Start: 2018-04-16 | End: 2018-06-13

## 2018-04-16 NOTE — TELEPHONE ENCOUNTER
Surgery was changed to 04/27/18 with Dr Robbins due to recommendation from [facility] to deliver a week earlier.  Hospital, patient and Dr's schedules all update to reflect.

## 2018-04-16 NOTE — TELEPHONE ENCOUNTER
Phone call to Vernell regarding lab levels below.  Labs were last drawn 3/2/18. Vernell will have labs re-drawn tomorrow (4/17/18). Vernell reports that she may delivery earlier than expected ~ 4/27/18.    Goal levels:     LTG >8-10  CBZ =>9  LEV =>20     Ref. Range 2/16/2018 09:40 3/2/2018 09:04   Carbamazepine Total Level Latest Ref Range: 4.0 - 12.0 ug/mL 6.3 4.1   10, 11 Epoxide Level Latest Ref Range: 0.4 - 4.0 ug/mL 1.9 2.3   Free Carbamazepine Level Ug/Ml Latest Ref Range: 0.6 - 4.2 ug/mL 1.5 1.0   Free Epoxide Level Latest Ref Range: 0.2 - 2.0 ug/mL 1.0 1.2   Keppra (Levetiracetam) Level Latest Ref Range: 12 - 46 ug/mL 28 32   Lamotrigine Level Latest Ref Range: 2.5 - 15.0 ug/mL 7.2 7.8     Vernell is in need of a refill for Lamotrgine 450-450-300.  RX sent to pharmacy.  Will follow up with Vernell when results are available.

## 2018-04-17 ENCOUNTER — PRENATAL OFFICE VISIT (OUTPATIENT)
Dept: OBGYN | Facility: CLINIC | Age: 38
End: 2018-04-17
Payer: MEDICARE

## 2018-04-17 VITALS
SYSTOLIC BLOOD PRESSURE: 112 MMHG | HEART RATE: 71 BPM | BODY MASS INDEX: 28.5 KG/M2 | WEIGHT: 169 LBS | DIASTOLIC BLOOD PRESSURE: 70 MMHG

## 2018-04-17 DIAGNOSIS — O30.043 DICHORIONIC DIAMNIOTIC TWIN PREGNANCY IN THIRD TRIMESTER: Primary | ICD-10-CM

## 2018-04-17 DIAGNOSIS — F41.1 GENERALIZED ANXIETY DISORDER: ICD-10-CM

## 2018-04-17 DIAGNOSIS — F33.0 MAJOR DEPRESSIVE DISORDER, RECURRENT EPISODE, MILD (H): ICD-10-CM

## 2018-04-17 DIAGNOSIS — O99.820 GBS (GROUP B STREPTOCOCCUS CARRIER), +RV CULTURE, CURRENTLY PREGNANT: ICD-10-CM

## 2018-04-17 DIAGNOSIS — O30.009 TWIN PREGNANCY: ICD-10-CM

## 2018-04-17 DIAGNOSIS — Z30.2 ENCOUNTER FOR STERILIZATION: ICD-10-CM

## 2018-04-17 DIAGNOSIS — Z98.891 H/O: C-SECTION: ICD-10-CM

## 2018-04-17 DIAGNOSIS — F17.200 TOBACCO USE DISORDER: ICD-10-CM

## 2018-04-17 DIAGNOSIS — O09.523 ELDERLY MULTIGRAVIDA IN THIRD TRIMESTER: ICD-10-CM

## 2018-04-17 PROCEDURE — 80339 ANTIEPILEPTICS NOS 1-3: CPT | Mod: 90 | Performed by: PSYCHIATRY & NEUROLOGY

## 2018-04-17 PROCEDURE — 36415 COLL VENOUS BLD VENIPUNCTURE: CPT | Performed by: PSYCHIATRY & NEUROLOGY

## 2018-04-17 PROCEDURE — 99207 ZZC PRENATAL VISIT: CPT | Performed by: OBSTETRICS & GYNECOLOGY

## 2018-04-17 PROCEDURE — 99000 SPECIMEN HANDLING OFFICE-LAB: CPT | Performed by: PSYCHIATRY & NEUROLOGY

## 2018-04-17 PROCEDURE — 80156 ASSAY CARBAMAZEPINE TOTAL: CPT | Mod: 90 | Performed by: PSYCHIATRY & NEUROLOGY

## 2018-04-17 PROCEDURE — 80175 DRUG SCREEN QUAN LAMOTRIGINE: CPT | Mod: 90 | Performed by: PSYCHIATRY & NEUROLOGY

## 2018-04-17 PROCEDURE — 80157 ASSAY CARBAMAZEPINE FREE: CPT | Mod: 90 | Performed by: PSYCHIATRY & NEUROLOGY

## 2018-04-17 PROCEDURE — 80177 DRUG SCRN QUAN LEVETIRACETAM: CPT | Mod: 90 | Performed by: PSYCHIATRY & NEUROLOGY

## 2018-04-17 ASSESSMENT — PAIN SCALES - GENERAL: PAINLEVEL: MILD PAIN (2)

## 2018-04-17 NOTE — NURSING NOTE
"Chief Complaint   Patient presents with     Prenatal Care     Had GBS       Initial /70  Pulse 71  Wt 169 lb (76.7 kg)  LMP  (LMP Unknown)  BMI 28.5 kg/m2 Estimated body mass index is 28.5 kg/(m^2) as calculated from the following:    Height as of 2/23/18: 5' 4.57\" (1.64 m).    Weight as of this encounter: 169 lb (76.7 kg).  Medication Reconciliation: complete     35w6d  Twin gestation  "

## 2018-04-17 NOTE — PROGRESS NOTES
37 year old  at 35w6d weeks with di/di twins presents to the clinic for a routine prenatal visit.  MFM ultrasound/BPP tomorrow   Poly Baby A so delivery was recommended now for 37 weeks.  Scheduled for RCS with TL on  with Dr. Robbins  No vaginal bleeding, leakage of fluid, or contractions  Pr/Cr=0.84  Patient denies any headache, vision changes, N/V, no epigastric or RUQ pain  Fundal height=46cm  FHTs=Baby A=130's and Baby B=140's  CX=deferred  GBS=positive  Anxiety/depression=stable  Tobacco abuse=1/2 pack/day  Seizure disorder=stable  Labor precautions discussed  RTC 1 week    Alicia Rain

## 2018-04-17 NOTE — MR AVS SNAPSHOT
After Visit Summary   2018    Vernell Logan    MRN: [id number]           Patient Information     Date Of Birth          1980        Visit Information        Provider Department      2018 8:30 AM Alicia Rain DO [facility]        Today's Diagnoses     Dichorionic diamniotic twin pregnancy in third trimester    -  1    Elderly multigravida in third trimester        GBS (group B Streptococcus carrier), +RV culture, currently pregnant        H/O:         Encounter for sterilization        Generalized anxiety disorder        Major depressive disorder, recurrent episode, mild (H)        Tobacco use disorder          Care Instructions    What to watch out for are: regular contractions every 5 min, vaginal bleeding, decreased fetal movement, or leakage of fluid.  Please call the office or go to L&D if you develop any of these signs and symptoms.      I will see you for your follow up appointment.  Please feel free to call if you have any questions or concerns.      Thanks,  Alicia Rain, DO            Follow-ups after your visit        Follow-up notes from your care team     Return in about 1 week (around 2018).      Your next 10 appointments already scheduled     2018  1:45 PM CDT   ESTABLISHED PRENATAL with Alicia Rain DO   [facility]    [address] [address] [place] [id number] [phone]            May 07, 2018 10:30 AM CDT   Telephone Call with [facility] Nurse 2   [facility] ([facility])    [address], [address]  [facility] [address] [phone]           Note: this is not an onsite visit; there is no need to come to the facility.            2018  9:15 AM CDT   Office Visit with Alicia Rain DO   [facility]    [address]  [address]  [facility] [id number] [phone]            Bring a current list of meds and any records pertaining to this visit. For Physicals, please bring immunization records and any forms needing to be filled out. Please arrive 10 minutes early to complete paperwork.              Who to contact     If you have questions or need follow up information about today's clinic visit or your schedule please contact [facility] directly at [phone].  Normal or non-critical lab and imaging results will be communicated to you by [employer], letter or phone within 4 business days after the clinic has received the results. If you do not hear from us within 7 days, please contact the clinic through [employer] or phone. If you have a critical or abnormal lab result, we will notify you by phone as soon as possible.  Submit refill requests through [employer] or call your pharmacy and they will forward the refill request to us. Please allow 3 business days for your refill to be completed.          Additional Information About Your Visit        [employer] Information     [employer] gives you secure access to your electronic health record. If you see a primary care provider, you can also send messages to your care team and make appointments. If you have questions, please call your primary care clinic.  If you do not have a primary care provider, please call [phone] and they will assist you.        Care EveryWhere ID     This is your Care EveryWhere ID. This could be used by other organizations to access your [place] medical records  [id number]        Your Vitals Were     Pulse Last Period BMI (Body Mass Index)             71 (LMP Unknown) 28.5 kg/m2          Blood Pressure from Last 3 Encounters:   04/17/18 112/70   04/12/18 120/64   04/06/18 122/70    Weight from Last 3 Encounters:   04/17/18 169 lb (76.7 kg)   04/12/18 165 lb 12 oz (75.2 kg)   04/06/18 160 lb (72.6 kg)              Today, you had the following     No orders found for display       Primary Care  Provider Office Phone # Fax #    Subha Saenz PA-C [phone] [address] [address]        Equal Access to Services     MICHELL MENDOZA : Hadii lyric ku deisyo Sokeatonali, waaxda luqadaha, qaybta kaalmada adezakda, bubba thorne laShannonindra coates. So [facility] [phone].    ATENCIÓN: Si habla español, tiene a larkin disposición servicios gratuitos de asistencia lingüística. Llame al [phone].    We comply with applicable federal civil rights laws and [place] laws. We do not discriminate on the basis of race, color, national origin, age, disability, sex, sexual orientation, or gender identity.            Thank you!     Thank you for choosing [facility]  for your care. Our goal is always to provide you with excellent care. Hearing back from our patients is one way we can continue to improve our services. Please take a few minutes to complete the written survey that you may receive in the mail after your visit with us. Thank you!             Your Updated Medication List - Protect others around you: Learn how to safely use, store and throw away your medicines at www.disposemymeds.org.          This list is accurate as of 4/17/18 10:24 AM.  Always use your most recent med list.                   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), Major depressive disorder, recurrent episode, mild (H), Twin gestation in first trimester, unspecified multiple gestation type, High-risk pregnancy in second trimester, Partial epilepsy with impairment of consciousness, intractable (H)       * carBAMazepine 200 MG 12 hr capsule    CARBATROL    62 capsule    1 tablet each morning  and evening (along with 600 mg at night) for total of 800 mg Am and PM    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 This is during pregnancy. Bottle needs to last 90 days!    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), Major depressive disorder, recurrent episode, mild (H), Twin gestation in first trimester, unspecified multiple gestation type       lamoTRIgine 100 MG tablet    LaMICtal    363 tablet    Increase to 450 mg AM, 450 mg afternoon and 300 HS    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), Twin gestation in first trimester, unspecified multiple gestation type, Major depressive disorder, recurrent episode, mild (H), High-risk pregnancy in second trimester, Partial epilepsy with impairment of consciousness, intractable (H)       levETIRAcetam 500 MG tablet    KEPPRA    540 tablet    Beginning 1/18/2018, increase LEV to 1500 mg AM and 1500 HS    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), Major depressive disorder,  recurrent episode, mild (H), Twin gestation in first trimester, unspecified multiple gestation type, High-risk pregnancy in second trimester, Partial epilepsy with impairment of consciousness, intractable (H)       prenatal multivitamin plus iron 27-0.8 MG Tabs per tablet     100 tablet    Take 1 tablet by mouth daily    High-risk pregnancy in second trimester, Partial epilepsy with impairment of consciousness, intractable (H), 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 Read the directions carefully, and ask your doctor or other care provider to review them with you.

## 2018-04-18 ENCOUNTER — TRANSFERRED RECORDS (OUTPATIENT)
Dept: HEALTH INFORMATION MANAGEMENT | Facility: CLINIC | Age: 38
End: 2018-04-18

## 2018-04-18 LAB
CARBAMAZEPINE EP FREE SERPL-MCNC: 0.9 UG/ML
CARBAMAZEPINE EP SERPL-MCNC: 1.6 UG/ML
CARBAMAZEPINE FREE SERPL-MCNC: 1.1 UG/ML
CARBAMAZEPINE SERPL-MCNC: 3.7 UG/ML
LAMOTRIGINE SERPL-MCNC: 7.9 UG/ML (ref 2.5–15)
LEVETIRACETAM SERPL-MCNC: 40 UG/ML (ref 12–46)

## 2018-04-23 ENCOUNTER — TELEPHONE (OUTPATIENT)
Dept: NEUROLOGY | Facility: CLINIC | Age: 38
End: 2018-04-23

## 2018-04-23 DIAGNOSIS — G40.209 LOCALIZATION-RELATED FOCAL EPILEPSY WITH COMPLEX PARTIAL SEIZURES (H): Primary | ICD-10-CM

## 2018-04-23 RX ORDER — CARBAMAZEPINE 100 MG/1
100 CAPSULE, EXTENDED RELEASE ORAL 2 TIMES DAILY
Qty: 92 CAPSULE | Refills: 1 | Status: SHIPPED | OUTPATIENT
Start: 2018-04-23 | End: 2018-04-25 | Stop reason: DRUGHIGH

## 2018-04-23 NOTE — TELEPHONE ENCOUNTER
Goal levels:    LTG >8-10  CBZ =>9  LEV =>20     Ref. Range 4/17/2018 08:54   Carbamazepine Total Level Latest Ref Range: 4.0 - 12.0 ug/mL 3.7 (L)   10, 11 Epoxide Level Latest Ref Range: 0.4 - 4.0 ug/mL 1.6   Free Carbamazepine Level Ug/Ml Latest Ref Range: 0.6 - 4.2 ug/mL 1.1   Free Epoxide Level Latest Ref Range: 0.2 - 2.0 ug/mL 0.9   Keppra (Levetiracetam) Level Latest Ref Range: 12 - 46 ug/mL 40   Lamotrigine Level Latest Ref Range: 2.5 - 15.0 ug/mL 7.9     Current/confirmed anticonvulsant medication dosages:    LTG (100) 450-450-300  CBZ (200 12 h/300 12 h) 800-300-800  LEV [phone]    PLAN: Discussed with Dr. Padilla    1) Increase Carbamazepine to 800-400-800.  2) RX for Carbamazepine 100 mg 12h tab sent to pharmacy  3) Check AED serum levels on Wednesday, 4/23/18.    Vernell verbalized agreement to POC

## 2018-04-24 ENCOUNTER — PRENATAL OFFICE VISIT (OUTPATIENT)
Dept: OBGYN | Facility: CLINIC | Age: 38
End: 2018-04-24
Payer: MEDICARE

## 2018-04-24 DIAGNOSIS — O99.820 GBS (GROUP B STREPTOCOCCUS CARRIER), +RV CULTURE, CURRENTLY PREGNANT: ICD-10-CM

## 2018-04-24 DIAGNOSIS — Z98.891 H/O: C-SECTION: ICD-10-CM

## 2018-04-24 DIAGNOSIS — G40.219 PARTIAL EPILEPSY WITH IMPAIRMENT OF CONSCIOUSNESS, INTRACTABLE (H): ICD-10-CM

## 2018-04-24 DIAGNOSIS — F41.1 GENERALIZED ANXIETY DISORDER: ICD-10-CM

## 2018-04-24 DIAGNOSIS — O30.043 DICHORIONIC DIAMNIOTIC TWIN PREGNANCY IN THIRD TRIMESTER: Primary | ICD-10-CM

## 2018-04-24 DIAGNOSIS — F17.200 TOBACCO USE DISORDER: ICD-10-CM

## 2018-04-24 DIAGNOSIS — F32.1 MAJOR DEPRESSIVE DISORDER, SINGLE EPISODE, MODERATE (H): ICD-10-CM

## 2018-04-24 DIAGNOSIS — Z30.2 ENCOUNTER FOR STERILIZATION: ICD-10-CM

## 2018-04-24 DIAGNOSIS — O09.529 HIGH-RISK PREGNANCY, ELDERLY MULTIGRAVIDA, UNSPECIFIED TRIMESTER: ICD-10-CM

## 2018-04-24 PROCEDURE — 99207 ZZC PRENATAL VISIT: CPT | Performed by: OBSTETRICS & GYNECOLOGY

## 2018-04-24 ASSESSMENT — PAIN SCALES - GENERAL: PAINLEVEL: MILD PAIN (2)

## 2018-04-24 NOTE — PROGRESS NOTES
37 year old  at 36w6d weeks with di/di twins presents to the clinic for a routine prenatal visit.  MFM ultrasound/BPP tomorrow   Poly Baby A so delivery was recommended at 37 weeks. Scheduled for RCS with TL on  with Dr. Robbins-Friday works better for patient even though Wed was recommended  No vaginal bleeding, leakage of fluid, or contractions  Patient denies any headache, vision changes, N/V, no epigastric or RUQ pain  Fundal height=46cm  FHTs=Baby A=132 and Baby B=125  CX=deferred  GBS=positive  Anxiety/depression=stable  Tobacco abuse=1/2 pack/day  Seizure disorder=medicine increase yesterday  Labor precautions discussed     Alicia Rain        Pre-op  Date of Surgery:   Type of Anticipated Surgery: RCS with TL  Surgeon: Dr. Robbins  Type of Anesthesia Anticipated: Spinal                                SUBJECTIVE:  Vernell Logan is an 37 year old female here for Preoperative clearance for surgery from operating surgeon Dr. Robbins.    HPI: 38 y/o  at 36w6d with di/di twins will have a repeat c section on .     Any Aspirin within 10 days? Yes  Transfusion reactions: No prior transfusions  Bleeding tendencies:No bleeding problems noted    Patient Active Problem List   Diagnosis     Tobacco use disorder     CARDIOVASCULAR SCREENING; LDL GOAL LESS THAN 160     Status post left breast biopsy,sclerosing adenosis not concordant with imaging,12     Major depressive disorder, recurrent episode, mild (H)     Generalized anxiety disorder     Health Care Home     Anorexia     Major depressive disorder, single episode, moderate (H)     Partial epilepsy with impairment of consciousness, intractable (H)     Vitamin D deficiency     GBS (group B Streptococcus carrier), +RV culture, currently pregnant     Need for Tdap vaccination     H/O:      Elderly multigravida in second trimester     Dichorionic diamniotic twin pregnancy in second trimester     Cervical high risk HPV (human  papillomavirus) test positive     Encounter for sterilization     Past Medical History:   Diagnosis Date     Anorexia 3/5/2013     Anxiety      Depressive disorder 1999     Heart murmur     told benign     Localization-related epilepsy (H)      Major depression      Migraine      Seizure disorder (H)     [place] [facility] [place] episodes      Seizures (H)      Status post left breast biopsy,sclerosing adenosis not concordant with imaging,12      Current Outpatient Prescriptions   Medication     Acetaminophen (TYLENOL PO)     carBAMazepine (CARBATROL) 200 MG 12 hr capsule     carBAMazepine (CARBATROL) 300 MG 12 hr capsule     folic acid (FOLVITE) 1 MG tablet     lamoTRIgine (LAMICTAL) 100 MG tablet     levETIRAcetam (KEPPRA) 500 MG tablet     Prenatal Vit-Fe Fumarate-FA (PRENATAL MULTIVITAMIN PLUS IRON) 27-0.8 MG TABS per tablet     carBAMazepine (CARBATROL) 100 MG 12 hr capsule     diazepam (DIAZEPAM INTENSOL) 5 MG/ML (HIGH CONC) solution     VITAMIN C 1000 MG OR TABS     No current facility-administered medications for this visit.      No Known Allergies  Past Surgical History:   Procedure Laterality Date     C/SECTION, LOW TRANSVERSE      , Low Transverse     Family History   Problem Relation Age of Onset     HEART DISEASE Father      64 yo when he had arrhythmia,  of CHF age 65     DIABETES Father      Neurologic Disorder Father      epilepsy     Schizophrenia Father      Anxiety Disorder Father      Hypertension Father      Obesity Father      CANCER Maternal Grandfather      CEREBROVASCULAR DISEASE Maternal Grandfather      CEREBROVASCULAR DISEASE Paternal Grandmother      HEART DISEASE Paternal Grandfather      Psychotic Disorder Daughter      Neurologic Disorder Daughter      CP, she was adopted out in      Schizophrenia Mother      Anxiety Disorder Mother      Depression Mother      No family history of malignant hyperthermia.       REVIEW OF  SYSTEMS:  General: negative  Skin: negative  Eyes: negative  Ears/Nose/Throat: negative  Respiratory: No shortness of breath, dyspnea on exertion, cough, or hemoptysis  Cardiovascular: negative  Gastrointestinal: negative  Genitourinary: negative  Musculoskeletal: negative  Neurologic: negative  Psychiatric: negative  Hematologic/Lymphatic/Immunologic: negative  Endocrine: negative       PHYSICAL EXAM:  General Appearance: healthy, alert and no distress  Head: Normocephalic. No masses, lesions, tenderness or abnormalities  Eyes: normal  Ears: negative  Nose: Nares normal  Mouth: Oropharynx normal  Heart:regular rate and rhythm and no murmurs, clicks, or gallops  Lungs: negative, Percussion normal. Good diaphragmatic excursion. Lungs clear  Abdomen: Abdomen soft, non-tender. BS normal. No masses, organomegaly, gravid  Genitals: Deferred  Extremities: Extremities normal. No deformities, edema, or skin discoloration.  Musculoskeletal: Spine ROM normal. Muscular strength intact.  Skin: Skin color, texture, turgor normal. No rashes or lesions.  Neurological: Gait normal. Reflexes normal and symmetric. Sensation grossly WNL.    Diabetic Instructions Given for Pre-Op Insulin: NOT APPLICABLE      ASSESSMENT:  Preoperative clearance for surgery.       PLAN:  Cleared for surgery: yes          MD Signature: _______________________________________________Alicia Rain

## 2018-04-24 NOTE — MR AVS SNAPSHOT
After Visit Summary   2018    Vernell Logan    MRN: [id number]           Patient Information     Date Of Birth          1980        Visit Information        Provider Department      2018 1:45 PM Alicia Rain DO [facility]        Today's Diagnoses     Dichorionic diamniotic twin pregnancy in third trimester    -  1    High-risk pregnancy, elderly multigravida, unspecified trimester        GBS (group B Streptococcus carrier), +RV culture, currently pregnant        H/O:         Encounter for sterilization        Major depressive disorder, single episode, moderate (H)        Partial epilepsy with impairment of consciousness, intractable (H)        Generalized anxiety disorder        Tobacco use disorder          Care Instructions    What to watch out for are: regular contractions every 5 min, vaginal bleeding, decreased fetal movement, or leakage of fluid.  Please call the office or go to L&D if you develop any of these signs and symptoms.      I will see you for your follow up appointment.  Please feel free to call if you have any questions or concerns.      Thanks,  Alicia Rain DO            Follow-ups after your visit        Follow-up notes from your care team     Return in about 2 days (around 2018).      Your next 10 appointments already scheduled     May 07, 2018 10:30 AM CDT   Telephone Call with [facility] Nurse 2   [facility] ([facility])    [address], [address]  [facility] [address] [phone]           Note: this is not an onsite visit; there is no need to come to the facility.            2018  9:15 AM CDT   Office Visit with Alicia Rain DO   [facility] ([facility])    [address]  [address]  [facility] [id number] [phone]           Bring a current list of meds and any records pertaining to this visit. For Physicals, please bring  immunization records and any forms needing to be filled out. Please arrive 10 minutes early to complete paperwork.              Future tests that were ordered for you today     Open Future Orders        Priority Expected Expires Ordered    Carbamazepine and 10 11 epoxide Routine 4/25/2018 4/30/2018 4/23/2018    Lamotrigine Level Routine 4/25/2018 4/30/2018 4/23/2018    Keppra (Levetiracetam) Level Routine 4/25/2018 4/30/2018 4/23/2018            Who to contact     If you have questions or need follow up information about today's clinic visit or your schedule please contact [facility] directly at [phone].  Normal or non-critical lab and imaging results will be communicated to you by MyChart, letter or phone within 4 business days after the clinic has received the results. If you do not hear from us within 7 days, please contact the clinic through RideAparthart or phone. If you have a critical or abnormal lab result, we will notify you by phone as soon as possible.  Submit refill requests through Evri or call your pharmacy and they will forward the refill request to us. Please allow 3 business days for your refill to be completed.          Additional Information About Your Visit        RideAparthart Information     Evri gives you secure access to your electronic health record. If you see a primary care provider, you can also send messages to your care team and make appointments. If you have questions, please call your primary care clinic.  If you do not have a primary care provider, please call [phone] and they will assist you.        Care EveryWhere ID     This is your Care EveryWhere ID. This could be used by other organizations to access your [place] medical records  [id number]        Your Vitals Were     Pulse Temperature Last Period BMI (Body Mass Index)          83 98  F (36.7  C) (LMP Unknown) 29.18 kg/m2         Blood Pressure from Last 3 Encounters:   04/24/18 113/69   04/17/18 112/70    04/12/18 120/64    Weight from Last 3 Encounters:   04/24/18 173 lb (78.5 kg)   04/17/18 169 lb (76.7 kg)   04/12/18 165 lb 12 oz (75.2 kg)              Today, you had the following     No orders found for display       Primary Care Provider Office Phone # Fax #    Subha Saenz PA-C [phone] [address] [facility] [address]        Equal Access to Services     LÓPEZ MENDOZA : Hadii aad ku hadasho Soomaali, waaxda luqadaha, qaybta kaalmada adeegyada, waxay idiin hayaan adeeg mati candelario . So [facility] [place] [phone].    ATENCIÓN: Si habla español, tiene a larkin disposición servicios gratuitos de asistencia lingüística. LlGalion [facility] [phone].    We comply with applicable federal civil rights laws and [place] laws. We do not discriminate on the basis of race, color, national origin, age, disability, sex, sexual orientation, or gender identity.            Thank you!     Thank you for choosing [facility]  for your care. Our goal is always to provide you with excellent care. Hearing back from our patients is one way we can continue to improve our services. Please take a few minutes to complete the written survey that you may receive in the mail after your visit with us. Thank you!             Your Updated Medication List - Protect others around you: Learn how to safely use, store and throw away your medicines at www.disposemymeds.org.          This list is accurate as of 4/24/18  3:18 PM.  Always use your most recent med list.                   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), Major depressive disorder, recurrent episode, mild (H), Twin gestation in first trimester,  unspecified multiple gestation type, High-risk pregnancy in second trimester, Partial epilepsy with impairment of consciousness, intractable (H)       * carBAMazepine 200 MG 12 hr capsule    CARBATROL    62 capsule    1 tablet each morning and evening (along with 600 mg at night) for total of 800 mg Am and PM    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), Major depressive disorder, recurrent episode, mild (H), Twin gestation in first trimester, unspecified multiple gestation type, High-risk pregnancy in second trimester, Partial epilepsy with impairment of consciousness, intractable (H)       * carBAMazepine 100 MG 12 hr capsule    CARBATROL    92 capsule    Take 1 capsule (100 mg) by mouth 2 times daily Take 1 capsule (100 mg) at noon along with one 300 mg capsule    Localization-related focal epilepsy with complex partial seizures (H)       diazepam 5 MG/ML (HIGH CONC) solution    DIAZEPAM INTENSOL    30 mL    Diazepam intensol 5mg/ml: Administer between gum and cheek  1 ml(5 mg)  for any generalized tonic clonic seizure or for more than 2 complex partial seizures within a 4 hour period, repeat once after 10 minutes. This is during pregnancy. Bottle needs to last 90 days!    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), Major depressive disorder, recurrent episode, mild (H), Twin gestation in first trimester, unspecified multiple gestation type       lamoTRIgine 100 MG tablet    LaMICtal    363 tablet    Increase to 450 mg AM, 450 mg afternoon and 300 HS    Localization-related 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  epilepsy (H), Twin gestation in first trimester, unspecified multiple gestation type, Major depressive disorder, recurrent episode, mild (H), High-risk pregnancy in second trimester, Partial epilepsy with impairment of consciousness, intractable (H)       levETIRAcetam 500 MG tablet    KEPPRA    540 tablet    Beginning 1/18/2018, increase LEV to 1500 mg AM and 1500 HS    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), Major depressive disorder, recurrent episode, mild (H), Twin gestation in first trimester, unspecified multiple gestation type, High-risk pregnancy in second trimester, Partial epilepsy with impairment of consciousness, intractable (H)       prenatal multivitamin plus iron 27-0.8 MG Tabs per tablet     100 tablet    Take 1 tablet by mouth daily    High-risk pregnancy in second trimester, Partial epilepsy with impairment of consciousness, intractable (H), 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 Read the directions carefully, and ask your doctor or other care provider to review them with you.

## 2018-04-24 NOTE — NURSING NOTE
"Chief Complaint   Patient presents with     Prenatal Care       Initial /69  Pulse 83  Temp 98  F (36.7  C)  Wt 173 lb (78.5 kg)  LMP  (LMP Unknown)  BMI 29.18 kg/m2 Estimated body mass index is 29.18 kg/(m^2) as calculated from the following:    Height as of 2/23/18: 5' 4.57\" (1.64 m).    Weight as of this encounter: 173 lb (78.5 kg).  Medication Reconciliation: complete  "

## 2018-04-25 ENCOUNTER — TRANSFERRED RECORDS (OUTPATIENT)
Dept: HEALTH INFORMATION MANAGEMENT | Facility: CLINIC | Age: 38
End: 2018-04-25

## 2018-04-25 DIAGNOSIS — O30.001 TWIN GESTATION IN FIRST TRIMESTER, UNSPECIFIED MULTIPLE GESTATION TYPE: ICD-10-CM

## 2018-04-25 DIAGNOSIS — G40.209 LOCALIZATION-RELATED FOCAL EPILEPSY WITH COMPLEX PARTIAL SEIZURES (H): ICD-10-CM

## 2018-04-25 DIAGNOSIS — G40.219 LOCALIZATION-RELATED EPILEPSY WITH COMPLEX PARTIAL SEIZURES WITH INTRACTABLE EPILEPSY (H): Primary | ICD-10-CM

## 2018-04-25 DIAGNOSIS — O09.92 HIGH-RISK PREGNANCY IN SECOND TRIMESTER: ICD-10-CM

## 2018-04-25 DIAGNOSIS — G40.219 PARTIAL EPILEPSY WITH IMPAIRMENT OF CONSCIOUSNESS, INTRACTABLE (H): ICD-10-CM

## 2018-04-25 DIAGNOSIS — F33.0 MAJOR DEPRESSIVE DISORDER, RECURRENT EPISODE, MILD (H): ICD-10-CM

## 2018-04-25 PROCEDURE — 80339 ANTIEPILEPTICS NOS 1-3: CPT | Mod: 90 | Performed by: PSYCHIATRY & NEUROLOGY

## 2018-04-25 PROCEDURE — 80156 ASSAY CARBAMAZEPINE TOTAL: CPT | Mod: 90 | Performed by: PSYCHIATRY & NEUROLOGY

## 2018-04-25 PROCEDURE — 80175 DRUG SCREEN QUAN LAMOTRIGINE: CPT | Mod: 90 | Performed by: PSYCHIATRY & NEUROLOGY

## 2018-04-25 PROCEDURE — 80177 DRUG SCRN QUAN LEVETIRACETAM: CPT | Mod: 90 | Performed by: PSYCHIATRY & NEUROLOGY

## 2018-04-25 PROCEDURE — 36415 COLL VENOUS BLD VENIPUNCTURE: CPT | Performed by: PSYCHIATRY & NEUROLOGY

## 2018-04-25 PROCEDURE — 99000 SPECIMEN HANDLING OFFICE-LAB: CPT | Performed by: PSYCHIATRY & NEUROLOGY

## 2018-04-25 RX ORDER — CARBAMAZEPINE 200 MG/1
CAPSULE, EXTENDED RELEASE ORAL
Qty: 120 CAPSULE | Refills: 3 | Status: SHIPPED | OUTPATIENT
Start: 2018-04-25 | End: 2018-09-25

## 2018-04-26 LAB
LAMOTRIGINE SERPL-MCNC: 7.4 UG/ML (ref 2.5–15)
LEVETIRACETAM SERPL-MCNC: 33 UG/ML (ref 12–46)

## 2018-04-27 VITALS
SYSTOLIC BLOOD PRESSURE: 113 MMHG | WEIGHT: 173 LBS | HEART RATE: 83 BPM | DIASTOLIC BLOOD PRESSURE: 69 MMHG | TEMPERATURE: 98 F | BODY MASS INDEX: 29.18 KG/M2

## 2018-04-27 LAB
CARBAMAZEPINE EP SERPL-MCNC: 3.2 UG/ML
CARBAMAZEPINE SERPL-MCNC: 6.7 UG/ML

## 2018-05-01 ENCOUNTER — TELEPHONE (OUTPATIENT)
Dept: NEUROLOGY | Facility: CLINIC | Age: 38
End: 2018-05-01

## 2018-05-01 NOTE — TELEPHONE ENCOUNTER
Follow up call with Vernell. She returned home yesterday (4/29/18). Babies are doing well.  Endorses some sleep deprivation. She reports mild dizziness likely r/t lack of sleep. No other concerns. Advised to call if additional questions/concerns.

## 2018-05-11 ENCOUNTER — TELEPHONE (OUTPATIENT)
Dept: OBGYN | Facility: OTHER | Age: 38
End: 2018-05-11

## 2018-05-11 ENCOUNTER — OFFICE VISIT (OUTPATIENT)
Dept: OBGYN | Facility: OTHER | Age: 38
End: 2018-05-11
Payer: MEDICARE

## 2018-05-11 VITALS
SYSTOLIC BLOOD PRESSURE: 122 MMHG | BODY MASS INDEX: 22.6 KG/M2 | WEIGHT: 134 LBS | DIASTOLIC BLOOD PRESSURE: 80 MMHG | HEART RATE: 48 BPM

## 2018-05-11 DIAGNOSIS — L08.9 LOCAL INFECTION OF WOUND: Primary | ICD-10-CM

## 2018-05-11 DIAGNOSIS — G40.219 LOCALIZATION-RELATED EPILEPSY WITH COMPLEX PARTIAL SEIZURES WITH INTRACTABLE EPILEPSY (H): ICD-10-CM

## 2018-05-11 DIAGNOSIS — Z98.891 H/O: C-SECTION: ICD-10-CM

## 2018-05-11 DIAGNOSIS — T14.8XXA LOCAL INFECTION OF WOUND: Primary | ICD-10-CM

## 2018-05-11 PROCEDURE — 99024 POSTOP FOLLOW-UP VISIT: CPT | Performed by: OBSTETRICS & GYNECOLOGY

## 2018-05-11 RX ORDER — CEPHALEXIN 500 MG/1
500 CAPSULE ORAL 3 TIMES DAILY
Qty: 20 CAPSULE | Refills: 0 | Status: SHIPPED | OUTPATIENT
Start: 2018-05-11 | End: 2019-05-03

## 2018-05-11 RX ORDER — OXYCODONE HYDROCHLORIDE 5 MG/1
5-10 TABLET ORAL
COMMUNITY
Start: 2018-04-30 | End: 2019-05-03

## 2018-05-11 RX ORDER — DIAZEPAM INTENSOL 5 MG/ML
SOLUTION, CONCENTRATE ORAL
Qty: 30 ML | Refills: 1 | OUTPATIENT
Start: 2018-05-11

## 2018-05-11 ASSESSMENT — PAIN SCALES - GENERAL: PAINLEVEL: MODERATE PAIN (4)

## 2018-05-11 NOTE — MR AVS SNAPSHOT
After Visit Summary   2018    Vernell Logan    MRN: [id number]           Patient Information     Date Of Birth          1980        Visit Information        Provider Department      2018 4:30 PM Alicia Rain DO [facility]        Today's Diagnoses     Local infection of wound    -  1    H/O:           Care Instructions    Please call if you any questions.    [address]   [address]  [phone]        Alicia Rain            Follow-ups after your visit        Follow-up notes from your care team     Return in about 2 days (around 2018).      Your next 10 appointments already scheduled     May 14, 2018  9:45 AM CDT   SHORT with Alicia Rain DO   [facility] ([facility])    [address] [place] [address]   [phone]            2018  9:15 AM CDT   Office Visit with Alicia Rain DO   [facility]    [address]  [address]  [place] [id number] [phone]           Bring a current list of meds and any records pertaining to this visit. For Physicals, please bring immunization records and any forms needing to be filled out. Please arrive 10 minutes early to complete paperwork.              Who to contact     If you have questions or need follow up information about today's clinic visit or your schedule please contact [facility] directly at [phone].  Normal or non-critical lab and imaging results will be communicated to you by MyChart, letter or phone within 4 business days after the clinic has received the results. If you do not hear from us within 7 days, please contact the clinic through MyChart or phone. If you have a critical or abnormal lab result, we will notify you by phone as soon as possible.  Submit refill requests through [email or web address] or call your pharmacy and they will  forward the refill request to us. Please allow 3 business days for your refill to be completed.          Additional Information About Your Visit        Verdeecohart Information     Mapp gives you secure access to your electronic health record. If you see a primary care provider, you can also send messages to your care team and make appointments. If you have questions, please call your primary care clinic.  If you do not have a primary care provider, please call [phone] and they will assist you.        Care EveryWhere ID     This is your Care EveryWhere ID. This could be used by other organizations to access your [place] medical records  [id number]        Your Vitals Were     Pulse Last Period BMI (Body Mass Index)             48 (LMP Unknown) 22.6 kg/m2          Blood Pressure from Last 3 Encounters:   05/11/18 122/80   04/24/18 113/69   04/17/18 112/70    Weight from Last 3 Encounters:   05/11/18 134 lb (60.8 kg)   04/24/18 173 lb (78.5 kg)   04/17/18 169 lb (76.7 kg)              Today, you had the following     No orders found for display         Today's Medication Changes          These changes are accurate as of 5/11/18  5:41 PM.  If you have any questions, ask your nurse or doctor.               Start taking these medicines.        Dose/Directions    cephALEXin 500 MG capsule   Commonly known as:  KEFLEX   Used for:  Local infection of wound   Started by:  Alicia Rain DO        Dose:  500 mg   Take 1 capsule (500 mg) by mouth 3 times daily   Quantity:  20 capsule   Refills:  0            Where to get your medicines      These medications were sent to [facility] [address] [place] [address] [place] [address]     Phone:  [phone]     cephALEXin 500 MG capsule               Information about OPIOIDS     PRESCRIPTION OPIOIDS: WHAT YOU NEED TO KNOW   You have a prescription for an opioid (narcotic) pain medicine. Opioids can cause addiction. If you have a  history of chemical dependency of any type, you are at a higher risk of becoming addicted to opioids. Only take this medicine after all other options have been tried. Take it for as short a time and as few doses as possible.     Do not:    Drive. If you drive while taking these medicines, you could be arrested for driving under the influence (DUI).    Operate heavy machinery    Do any other dangerous activities while taking these medicines.     Drink any alcohol while taking these medicines.      Take with any other medicines that contain acetaminophen. Read all labels carefully. Look for the word  acetaminophen  or  Tylenol.  Ask your pharmacist if you have questions or are unsure.    Store your pills in a secure place, locked if possible. We will not replace any lost or stolen medicine. If you don t finish your medicine, please throw away (dispose) as directed by your pharmacist. The [place] [employer] has more information about safe disposal: https://www.pca.[facility].us/living-green/managing-unwanted-medications    All opioids tend to cause constipation. Drink plenty of water and eat foods that have a lot of fiber, such as fruits, vegetables, prune juice, apple juice and high-fiber cereal. Take a laxative (Miralax, milk of magnesia, Colace, Senna) if you don t move your bowels at least every other day.          Primary Care Provider Office Phone # Fax #    Subha Saenz PA-C [phone] [address] [facility] [address]        Equal Access to Services     MICHELL MENDOZA : Hadii aad ku hadasho Sokeatonali, waaxda luqadaha, qaybta kaalmada adeegyada, bubba candelario . So [facility] [phone].    ATENCIÓN: Si habla español, tiene a larkin disposición servicios gratuitos de asistencia lingüística. Llame al [phone].    We comply with applicable federal civil rights laws and [place] laws. We do not discriminate on the basis of race, color, national  origin, age, disability, sex, sexual orientation, or gender identity.            Thank you!     Thank you for choosing [facility]  for your care. Our goal is always to provide you with excellent care. Hearing back from our patients is one way we can continue to improve our services. Please take a few minutes to complete the written survey that you may receive in the mail after your visit with us. Thank you!             Your Updated Medication List - Protect others around you: Learn how to safely use, store and throw away your medicines at www.disposemymeds.org.          This list is accurate as of 5/11/18  5:41 PM.  Always use your most recent med list.                   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), Major depressive disorder, recurrent episode, mild (H), Twin gestation in first trimester, unspecified multiple gestation type, High-risk pregnancy in second trimester, Partial epilepsy with impairment of consciousness, intractable (H)       * carBAMazepine 200 MG 12 hr capsule    CARBATROL    120 capsule    Take 200 mg AM (along with two 300 mg tabs), take two tabs (400 mg) at noon, and 200 mg PM (along with two 300 mg tab) (daily dose: 800-400-800)    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), Major depressive disorder, recurrent episode, mild (H), Twin gestation in first trimester, unspecified multiple gestation type, High-risk pregnancy in second trimester, Partial epilepsy with impairment of consciousness, intractable (H)       cephALEXin 500 MG capsule    KEFLEX    20 capsule    Take 1 capsule (500 mg) by mouth 3 times daily    Local infection of  wound       diazepam 5 MG/ML (HIGH CONC) solution    DIAZEPAM INTENSOL    30 mL    Diazepam intensol 5mg/ml: Administer between gum and cheek  1 ml(5 mg)  for any generalized tonic clonic seizure or for more than 2 complex partial seizures within a 4 hour period, repeat once after 10 minutes. This is during pregnancy. Bottle needs to last 90 days!    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), Major depressive disorder, recurrent episode, mild (H), Twin gestation in first trimester, unspecified multiple gestation type       lamoTRIgine 100 MG tablet    LaMICtal    363 tablet    Increase to 450 mg AM, 450 mg afternoon and 300 HS    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), Twin gestation in first trimester, unspecified multiple gestation type, Major depressive disorder, recurrent episode, mild (H), High-risk pregnancy in second trimester, Partial epilepsy with impairment of consciousness, intractable (H)       levETIRAcetam 500 MG tablet    KEPPRA    540 tablet    Beginning 1/18/2018, increase LEV to 1500 mg AM and 1500 HS    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), Major depressive disorder, recurrent episode, mild (H), Twin gestation in first trimester, unspecified multiple gestation type, High-risk pregnancy in second trimester, Partial epilepsy with impairment of consciousness, intractable (H)       oxyCODONE IR 5 MG tablet    ROXICODONE     Take 5-10 mg by mouth        prenatal multivitamin plus iron 27-0.8 MG Tabs per tablet     100 tablet    Take 1 tablet by mouth daily     High-risk pregnancy in second trimester, Partial epilepsy with impairment of consciousness, intractable (H), 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 Read the directions carefully, and ask your doctor or other care provider to review them with you.

## 2018-05-11 NOTE — TELEPHONE ENCOUNTER
Vernell Logan is a 37 year old female who calls with  concerns.    NURSING ASSESSMENT:  Description:  Had  on 2018, having leaking from incision.  Used a pad to cover it.  Light pink color. Not filling pad, but noticed that it will tug every time she goes to the bathoom  No fever. Doesn't look red, but hard to see. No odor      Allergies: No Known Allergies      NURSING PLAN: Routed to provider Yes would you work her in today or just keep it covered and monitor? Please advise on plan    RECOMMENDED DISPOSITION: TBD  Will comply with recommendation: Yes  If further questions/concerns or if symptoms do not improve, worsen or new symptoms develop, call your PCP or [facility] as soon as possible.      Guideline used:  Telephone Triage for Obstetrics and Gynecology, Jackie Miranda and Teresa Caballero, RN, BSN

## 2018-05-11 NOTE — PROGRESS NOTES
Subjective  37 year old non-pregnant female presents today for a postpartum visit.  Patient had a RCS with TL on 18.  Patient has noticed drainage from incision all day today.  Odor and greenish in color.  No active bleeding.  More pain today around incision then before.  No fever or chills.  No back pain.  Some vaginal bleeding.  No problems urinating.  Normal bowel movements.  Patient is bottle feeding and pumping.       ROS: 10 point ROS neg other than the symptoms noted above in the HPI.  Past Medical History:   Diagnosis Date     Anorexia 3/5/2013     Anxiety      Depressive disorder 1999     Heart murmur     told benign     Localization-related epilepsy (H)      Major depression      Migraine      Seizure disorder (H)     working with [employer], really bad episodes      Seizures (H)      Status post left breast biopsy,sclerosing adenosis not concordant with imaging,12     Past Surgical History:   Procedure Laterality Date     C/SECTION, LOW TRANSVERSE  , 2018    x 2      SECTION, TUBAL LIGATION, COMBINED       Family History   Problem Relation Age of Onset     HEART DISEASE Father      64 yo when he had arrhythmia,  of CHF age 65     DIABETES Father      Neurologic Disorder Father      epilepsy     Schizophrenia Father      Anxiety Disorder Father      Hypertension Father      Obesity Father      CANCER Maternal Grandfather      CEREBROVASCULAR DISEASE Maternal Grandfather      CEREBROVASCULAR DISEASE Paternal Grandmother      HEART DISEASE Paternal Grandfather      Psychotic Disorder Daughter      Neurologic Disorder Daughter      CP, she was adopted out in      Schizophrenia Mother      Anxiety Disorder Mother      Depression Mother      Social History   Substance Use Topics     Smoking status: Current Every Day Smoker     Packs/day: 1.00     Years: 10.00     Types: Cigarettes     Start date: 1993     Smokeless tobacco: Never Used      Comment: 1 pack       Alcohol use Yes      Comment: beer or wine a few times per year         Objective  Vitals: /80  Pulse (!) 48  Wt 134 lb (60.8 kg)  LMP  (LMP Unknown)  BMI 22.6 kg/m2  BMI= Body mass index is 22.6 kg/(m^2).         Abd=soft, slightly tender to palpation around incision, incision=some warmth around it, minimal erythema, some drainage noted-clear in color, odor      Assessment  1.)  Wound infection  2.)  S/p RCS with TL on 4/27/18       Plan  1.)  Keflex ordered  2.)  Follow up on Monday      Alicia Rain

## 2018-05-14 ENCOUNTER — OFFICE VISIT (OUTPATIENT)
Dept: OBGYN | Facility: OTHER | Age: 38
End: 2018-05-14
Payer: MEDICARE

## 2018-05-14 VITALS
HEART RATE: 78 BPM | WEIGHT: 130 LBS | BODY MASS INDEX: 21.92 KG/M2 | SYSTOLIC BLOOD PRESSURE: 122 MMHG | DIASTOLIC BLOOD PRESSURE: 66 MMHG

## 2018-05-14 DIAGNOSIS — L08.9 WOUND INFECTION: Primary | ICD-10-CM

## 2018-05-14 DIAGNOSIS — T14.8XXA WOUND INFECTION: Primary | ICD-10-CM

## 2018-05-14 DIAGNOSIS — Z98.891 H/O: C-SECTION: ICD-10-CM

## 2018-05-14 PROCEDURE — 99024 POSTOP FOLLOW-UP VISIT: CPT | Performed by: OBSTETRICS & GYNECOLOGY

## 2018-05-14 ASSESSMENT — PAIN SCALES - GENERAL: PAINLEVEL: MODERATE PAIN (4)

## 2018-05-14 NOTE — MR AVS SNAPSHOT
After Visit Summary   2018    Vernell Logan    MRN: [id number]           Patient Information     Date Of Birth          1980        Visit Information        Provider Department      2018 9:45 AM Alicia Rain DO [facility]        Today's Diagnoses     Wound infection    -  1    H/O:           Care Instructions              If you have any questions regarding your visit, Please contact your care team.    Women s Health CLINIC HOURS TELEPHONE NUMBER   Alicia Rain M.D.    Tata Mead -         Monday-[facility]  7:00 am - 5 pm - [facility]  8:00am- 5 pm  Wednesday- Off  Thursday- - [place], and [facility] 8:00-11:30 AM  [place] 1:00-5:00 PM [facility]  [address] [place] [address]  [phone] ask for [facility]    Imaging [phone]    [facility]  [address]  [place] [address]  [phone]  Imaging [phone]    [facility]  [address] [place], [place] [address] [phone]  Imaging Scheduling - [phone]     Urgent Care locations:    [facility] Saturday and    9 am - 5 pm    Monday-Friday   12 pm - 8 pm  Saturday and    9 am - 5 pm   [phone]       If you need a medication refill, please contact your pharmacy. Please allow 3 business days for your refill to be completed.  As always, Thank you for trusting us with your healthcare needs!                Follow-ups after your visit        Follow-up notes from your care team     Return in about 4 weeks (around 2018).      Your next 10 appointments already scheduled     2018  9:15 AM CDT   [facility]    [address]  [address]  [facility] [id number] [phone]            Bring a current list of meds and any records pertaining to this visit. For Physicals, please bring immunization records and any forms needing to be filled out. Please arrive 10 minutes early to complete paperwork.              Who to contact     If you have questions or need follow up information about today's clinic visit or your schedule please contact [facility] directly at [phone].  Normal or non-critical lab and imaging results will be communicated to you by MyChart, letter or phone within 4 business days after the clinic has received the results. If you do not hear from us within 7 days, please contact the clinic through [employer] or phone. If you have a critical or abnormal lab result, we will notify you by phone as soon as possible.  Submit refill requests through [employer] or call your pharmacy and they will forward the refill request to us. Please allow 3 business days for your refill to be completed.          Additional Information About Your Visit        [employer] Information     [employer] gives you secure access to your electronic health record. If you see a primary care provider, you can also send messages to your care team and make appointments. If you have questions, please call your primary care clinic.  If you do not have a primary care provider, please call [phone] and they will assist you.        Care EveryWhere ID     This is your Care EveryWhere ID. This could be used by other organizations to access your [facility] medical records  [id number]        Your Vitals Were     Pulse Last Period BMI (Body Mass Index)             78 (LMP Unknown) 21.92 kg/m2          Blood Pressure from Last 3 Encounters:   05/14/18 122/66   05/11/18 122/80   04/24/18 113/69    Weight from Last 3 Encounters:   05/14/18 130 lb (59 kg)   05/11/18 134 lb (60.8 kg)   04/24/18 173 lb (78.5 kg)              Today, you had the following     No orders found for display       Primary Care Provider Office Phone  # Fax #    Subha Saenz PA-C [phone] [address] [address]        Equal Access to Services     MICHELL MENDOZA : Katelynn lyric brewer deisyo Sokeatonali, waaxda luqadaha, qaybta kaalmada teresada, bubba montoya aliyahrose thorne kodak coates. So [facility] [phone].    ATENCIÓN: Si habla español, tiene a larkin disposición servicios gratuitos de asistencia lingüística. Llame al [phone].    We comply with applicable federal civil rights laws and [place] laws. We do not discriminate on the basis of race, color, national origin, age, disability, sex, sexual orientation, or gender identity.            Thank you!     Thank you for choosing [facility]  for your care. Our goal is always to provide you with excellent care. Hearing back from our patients is one way we can continue to improve our services. Please take a few minutes to complete the written survey that you may receive in the mail after your visit with us. Thank you!             Your Updated Medication List - Protect others around you: Learn how to safely use, store and throw away your medicines at www.disposemymeds.org.          This list is accurate as of 5/14/18 10:00 AM.  Always use your most recent med list.                   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), Major depressive disorder, recurrent episode, mild (H), Twin gestation in first trimester, unspecified multiple gestation type, High-risk pregnancy in second trimester, Partial epilepsy with impairment of consciousness, intractable (H)       * carBAMazepine 200 MG 12 hr capsule    CARBATROL    120 capsule    Take 200 mg AM (along with two 300 mg tabs),  take two tabs (400 mg) at noon, and 200 mg PM (along with two 300 mg tab) (daily dose: 800-400-800)    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), Major depressive disorder, recurrent episode, mild (H), Twin gestation in first trimester, unspecified multiple gestation type, High-risk pregnancy in second trimester, Partial epilepsy with impairment of consciousness, intractable (H)       cephALEXin 500 MG capsule    KEFLEX    20 capsule    Take 1 capsule (500 mg) by mouth 3 times daily    Local infection of wound       diazepam 5 MG/ML (HIGH CONC) solution    DIAZEPAM INTENSOL    30 mL    Diazepam intensol 5mg/ml: Administer between gum and cheek  1 ml(5 mg)  for any generalized tonic clonic seizure or for more than 2 complex partial seizures within a 4 hour period, repeat once after 10 minutes. This is during pregnancy. Bottle needs to last 90 days!    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), Major depressive disorder, recurrent episode, mild (H), Twin gestation in first trimester, unspecified multiple gestation type       lamoTRIgine 100 MG tablet    LaMICtal    363 tablet    Increase to 450 mg AM, 450 mg afternoon and 300 HS    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), Twin gestation in first trimester, unspecified multiple gestation type, Major depressive disorder, recurrent episode, mild (H), High-risk pregnancy in second trimester, Partial epilepsy with impairment of consciousness, intractable (H)       levETIRAcetam 500 MG tablet    KEPPRA    540 tablet    Beginning 1/18/2018,  increase LEV to 1500 mg AM and 1500 HS    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), Major depressive disorder, recurrent episode, mild (H), Twin gestation in first trimester, unspecified multiple gestation type, High-risk pregnancy in second trimester, Partial epilepsy with impairment of consciousness, intractable (H)       oxyCODONE IR 5 MG tablet    ROXICODONE     Take 5-10 mg by mouth        prenatal multivitamin plus iron 27-0.8 MG Tabs per tablet     100 tablet    Take 1 tablet by mouth daily    High-risk pregnancy in second trimester, Partial epilepsy with impairment of consciousness, intractable (H), 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 Read the directions carefully, and ask your doctor or other care provider to review them with you.

## 2018-05-14 NOTE — PROGRESS NOTES
Subjective  37 year old non-pregnant female presents today for a follow up visit for her wound infection. Patient had a RCS with TL on 18.  Pt feels her symptoms are better.  She has not noticed anymore odor.  The pain around the incision is minimal now.  The drainage is minimal as well and now more blood tinged then any other color.  No fever or chills.  No back pain.  Some vaginal bleeding.  No problems urinating.  Normal bowel movements.  Patient is bottle feeding and pumping.         ROS: 10 point ROS neg other than the symptoms noted above in the HPI.  Past Medical History:   Diagnosis Date     Anorexia 3/5/2013     Anxiety      Depressive disorder 1999     Heart murmur     told benign     Localization-related epilepsy (H)      Major depression      Migraine      Seizure disorder (H)     working with [employer], really bad episodes      Seizures (H)      Status post left breast biopsy,sclerosing adenosis not concordant with imaging,12     Past Surgical History:   Procedure Laterality Date     C/SECTION, LOW TRANSVERSE  , 2018    x 2      SECTION, TUBAL LIGATION, COMBINED       Family History   Problem Relation Age of Onset     HEART DISEASE Father      62 yo when he had arrhythmia,  of CHF age 65     DIABETES Father      Neurologic Disorder Father      epilepsy     Schizophrenia Father      Anxiety Disorder Father      Hypertension Father      Obesity Father      CANCER Maternal Grandfather      CEREBROVASCULAR DISEASE Maternal Grandfather      CEREBROVASCULAR DISEASE Paternal Grandmother      HEART DISEASE Paternal Grandfather      Psychotic Disorder Daughter      Neurologic Disorder Daughter      CP, she was adopted out in      Schizophrenia Mother      Anxiety Disorder Mother      Depression Mother      Social History   Substance Use Topics     Smoking status: Current Every Day Smoker     Packs/day: 1.00     Years: 10.00     Types: Cigarettes     Start  date: 6/1/1993     Smokeless tobacco: Never Used      Comment: 1 pack      Alcohol use Yes      Comment: beer or wine a few times per year         Objective  Vitals: /66  Pulse 78  Wt 130 lb (59 kg)  LMP  (LMP Unknown)  BMI 21.92 kg/m2  BMI= Body mass index is 21.92 kg/(m^2).         Abd=soft, slightly tender to palpation around incision but patient feels it is better, incision=no warmth around it, minimal erythema, some drainage noted-clear/blood tinged in color, no odor        Assessment  1.)  Wound infection  2.)  S/p RCS with TL on 4/27/18         Plan  1.)  Continue Keflex  2.)  Follow up for 6 week postpartum visit      Alicia Rain

## 2018-05-14 NOTE — PATIENT INSTRUCTIONS
If you have any questions regarding your visit, Please contact your care team.    Women s Health CLINIC HOURS TELEPHONE NUMBER   Alicia Rain M.D.    Tata Mead -         Monday-[facility]  7:00 am - 5 pm Monday's Tuesday- [facility]  8:00am- 5 pm  Wednesday- Off  Thursday- Offf Friday-Am [place], and [facility]  [place] 8:00-11:30 AM  [place] 1:00-5:00 PM [facility]  [address] [place] [address]  [phone] ask for [facility]    Imaging [phone]    [facility]  [address]  [place], [place] [address] [phone]  Imaging [phone]    [facility]  [address] [place], [place] [id number] [phone]  Imaging Scheduling - [phone]     Urgent Care locations:    [facility] Saturday and Sunday   9 am - 5 pm    Monday-Friday   12 pm - 8 pm  Saturday and Sunday   9 am - 5 pm   [phone]       If you need a medication refill, please contact your pharmacy. Please allow 3 business days for your refill to be completed.  As always, Thank you for trusting us with your healthcare needs!

## 2018-06-08 ENCOUNTER — TELEPHONE (OUTPATIENT)
Dept: NEUROLOGY | Facility: CLINIC | Age: 38
End: 2018-06-08

## 2018-06-08 ENCOUNTER — OFFICE VISIT (OUTPATIENT)
Dept: OBGYN | Facility: CLINIC | Age: 38
End: 2018-06-08
Payer: MEDICARE

## 2018-06-08 VITALS
BODY MASS INDEX: 21.08 KG/M2 | HEART RATE: 72 BPM | OXYGEN SATURATION: 98 % | SYSTOLIC BLOOD PRESSURE: 133 MMHG | DIASTOLIC BLOOD PRESSURE: 88 MMHG | HEIGHT: 65 IN | WEIGHT: 126.5 LBS

## 2018-06-08 DIAGNOSIS — F33.0 MAJOR DEPRESSIVE DISORDER, RECURRENT EPISODE, MILD (H): ICD-10-CM

## 2018-06-08 DIAGNOSIS — B97.7 HIGH RISK HPV INFECTION: ICD-10-CM

## 2018-06-08 DIAGNOSIS — G40.219 LOCALIZATION-RELATED EPILEPSY WITH COMPLEX PARTIAL SEIZURES WITH INTRACTABLE EPILEPSY (H): ICD-10-CM

## 2018-06-08 PROBLEM — Z30.2 ENCOUNTER FOR STERILIZATION: Status: RESOLVED | Noted: 2017-12-11 | Resolved: 2018-06-08

## 2018-06-08 PROBLEM — O09.522 ELDERLY MULTIGRAVIDA IN SECOND TRIMESTER: Status: RESOLVED | Noted: 2017-11-03 | Resolved: 2018-06-08

## 2018-06-08 PROBLEM — O99.820 GBS (GROUP B STREPTOCOCCUS CARRIER), +RV CULTURE, CURRENTLY PREGNANT: Status: RESOLVED | Noted: 2017-11-03 | Resolved: 2018-06-08

## 2018-06-08 PROBLEM — O30.042 DICHORIONIC DIAMNIOTIC TWIN PREGNANCY IN SECOND TRIMESTER: Status: RESOLVED | Noted: 2017-11-03 | Resolved: 2018-06-08

## 2018-06-08 PROBLEM — Z23 NEED FOR TDAP VACCINATION: Status: RESOLVED | Noted: 2017-11-03 | Resolved: 2018-06-08

## 2018-06-08 LAB — BETA HCG QUAL IFA URINE: NEGATIVE

## 2018-06-08 PROCEDURE — 80157 ASSAY CARBAMAZEPINE FREE: CPT | Mod: 90 | Performed by: OBSTETRICS & GYNECOLOGY

## 2018-06-08 PROCEDURE — 57454 BX/CURETT OF CERVIX W/SCOPE: CPT | Performed by: OBSTETRICS & GYNECOLOGY

## 2018-06-08 PROCEDURE — 80177 DRUG SCRN QUAN LEVETIRACETAM: CPT | Mod: 90 | Performed by: OBSTETRICS & GYNECOLOGY

## 2018-06-08 PROCEDURE — 80175 DRUG SCREEN QUAN LAMOTRIGINE: CPT | Mod: 90 | Performed by: OBSTETRICS & GYNECOLOGY

## 2018-06-08 PROCEDURE — 84703 CHORIONIC GONADOTROPIN ASSAY: CPT | Performed by: OBSTETRICS & GYNECOLOGY

## 2018-06-08 PROCEDURE — 36415 COLL VENOUS BLD VENIPUNCTURE: CPT | Performed by: OBSTETRICS & GYNECOLOGY

## 2018-06-08 PROCEDURE — 80339 ANTIEPILEPTICS NOS 1-3: CPT | Mod: 90 | Performed by: OBSTETRICS & GYNECOLOGY

## 2018-06-08 PROCEDURE — 80156 ASSAY CARBAMAZEPINE TOTAL: CPT | Mod: 90 | Performed by: OBSTETRICS & GYNECOLOGY

## 2018-06-08 PROCEDURE — 88305 TISSUE EXAM BY PATHOLOGIST: CPT | Mod: 26 | Performed by: OBSTETRICS & GYNECOLOGY

## 2018-06-08 PROCEDURE — 99213 OFFICE O/P EST LOW 20 MIN: CPT | Mod: 24 | Performed by: OBSTETRICS & GYNECOLOGY

## 2018-06-08 PROCEDURE — 99207 ZZC POST PARTUM EXAM: CPT | Performed by: OBSTETRICS & GYNECOLOGY

## 2018-06-08 PROCEDURE — 99000 SPECIMEN HANDLING OFFICE-LAB: CPT | Performed by: OBSTETRICS & GYNECOLOGY

## 2018-06-08 PROCEDURE — 88305 TISSUE EXAM BY PATHOLOGIST: CPT | Performed by: OBSTETRICS & GYNECOLOGY

## 2018-06-08 RX ORDER — OMEGA-3 FATTY ACIDS/FISH OIL 300-1000MG
200 CAPSULE ORAL EVERY 6 HOURS PRN
Refills: 0 | Status: ON HOLD | COMMUNITY
Start: 2018-04-30 | End: 2024-04-24

## 2018-06-08 ASSESSMENT — PAIN SCALES - GENERAL: PAINLEVEL: MILD PAIN (2)

## 2018-06-08 NOTE — PROGRESS NOTES
Subjective  37 year old non-pregnant female presents today for a postpartum visit.  Patient had a RCS with TL on 18.  No pain.  No vaginal bleeding.  No problems urinating.  Normal bowel movements.  Patient is bottle feeding.  Patient does complains of increased anxiety/depression.  She has a history of of anxiety/depression.  She feels she needs to start something.  We discussed Zoloft and patient is in agreement with this.  Patient scored a 14 on the PHQ-9.  No thoughts of suicide or infanticide.  Patient is due for a colpo today.       ROS: 10 point ROS neg other than the symptoms noted above in the HPI.  Past Medical History:   Diagnosis Date     Anorexia 3/5/2013     Anxiety      Depressive disorder 1999     Heart murmur     told benign     Localization-related epilepsy (H)      Major depression      Migraine      Seizure disorder (H)     working with [employer], really bad episodes      Seizures (H)      Status post left breast biopsy,sclerosing adenosis not concordant with imaging,12     Past Surgical History:   Procedure Laterality Date     C/SECTION, LOW TRANSVERSE  , 2018    x 2      SECTION, TUBAL LIGATION, COMBINED       Family History   Problem Relation Age of Onset     HEART DISEASE Father      64 yo when he had arrhythmia,  of CHF age 65     DIABETES Father      Neurologic Disorder Father      epilepsy     Schizophrenia Father      Anxiety Disorder Father      Hypertension Father      Obesity Father      CANCER Maternal Grandfather      CEREBROVASCULAR DISEASE Maternal Grandfather      CEREBROVASCULAR DISEASE Paternal Grandmother      HEART DISEASE Paternal Grandfather      Psychotic Disorder Daughter      Neurologic Disorder Daughter      CP, she was adopted out in      Schizophrenia Mother      Anxiety Disorder Mother      Depression Mother      Social History   Substance Use Topics     Smoking status: Current Every Day Smoker     Packs/day: 1.00  "    Years: 10.00     Types: Cigarettes     Start date: 6/1/1993     Smokeless tobacco: Never Used      Comment: 1 pack      Alcohol use No      Comment: beer or wine a few times per year         Objective  Vitals: /88 (BP Location: Left arm, Patient Position: Sitting, Cuff Size: Adult Regular)  Pulse 72  Ht 5' 4.54\" (1.639 m)  Wt 126 lb 8 oz (57.4 kg)  LMP  (LMP Unknown)  SpO2 98%  BMI 21.35 kg/m2  BMI= Body mass index is 21.35 kg/(m^2).         Abd=soft, Nontender/nondistended, incision=healed well          Assessment  1.)  Post partum visit  2.)  S/p RCS with TL on 4/27/18  3.)  Due for colpo  4.)  Post partum depression  5.)  Seizure disorder       Plan  1.)  Colpo today  2.)  Zoloft ordered  3.)  Follow up in 2 weeks      Alicia Rain"

## 2018-06-08 NOTE — MR AVS SNAPSHOT
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 It is done using a lighted microscope called a colposcope. In most cases, a sample of cervical cells is taken during a biopsy. The sample can then be studied in a lab. If any problems are found, you and your healthcare provider will discuss treatment options. It usually takes less than 30 minutes, and you can often go back to your normal routine right away.   Reasons for the Procedure  Colposcopy is usually done as a follow-up exam to help find the cause of an abnormal Pap test. Abnormal Pap tests are often due to an HPV (human papilloma virus) infection. HPV is a large family of viruses. HPV can cause genital warts. It can also cause changes in cervical cells. Colposcopy is also used to assess other problems. These include pain or bleeding during sexual intercourse, or a lesion on the vulva or vagina.   What Are the Risks?  Problems after colposcopy are very rare, but can include:    Bleeding (if a biopsy is done)    Infection  Getting Ready for the Procedure  Colposcopy is normally done in your healthcare provider s office. It will be scheduled for a time when you re not having your menstrual period. You may be asked to sign a form giving your consent to have the procedure. A day or two before the procedure, your healthcare provider may  also ask you to:    Avoid sexual intercourse.    Stop using tampons.    Avoid using creams or other vaginal medications.    Avoid douching.    Take over-the-counter pain medications an hour or two before the procedure.  During Colposcopy    You will be asked to lie on an exam table with your knees bent, just as you do for a Pap test.    An instrument called a speculum is inserted into the vagina to hold it open.    A vinegar solution is applied to the cervix to make the cells easier to see. You may feel pressure or a slight burning for a few moments. In some cases, the cervix may be numbed first with an anesthetic.    The cervix is viewed through the colposcope, which is placed outside the vagina.    If your healthcare provider sees abnormal areas on the cervix, a biopsy will be done. The tissue sample is sent to a lab for study.    You may feel slight pinching or cramping during the biopsy. Medication may be applied to the biopsy site to stop bleeding.  After the Procedure    If you feel lightheaded or dizzy, you can rest on the table until you re ready to get dressed.    If a biopsy was done, you may have mild cramping or light bleeding for a few days. You may also have discharge from the medication used to stop bleeding at the biopsy site.    Use pads, not tampons, for at least the first 24 hours.    If you have any discomfort, over-the-counter pain medication can provide relief.    Ask your healthcare provider when you can resume sexual intercourse.  Follow-Up  If a biopsy was done, your healthcare provider will get the lab report in a week or two. You and your healthcare provider can then discuss the results. In some cases, you may be scheduled for further tests or treatment. Be sure to keep follow-up appointments with your healthcare provider.  Call your healthcare provider if you have:    Heavy vaginal bleeding (more than a pad an hour for 2 hours).    Severe or increasing pelvic pain.    A fever over  "101 F.    Foul-smelling or unusual vaginal discharge.               Follow-ups after your visit        Follow-up notes from your care team     Return in about 2 weeks (around 6/22/2018).      Who to contact     If you have questions or need follow up information about today's clinic visit or your schedule please contact [facility] directly at [phone].  Normal or non-critical lab and imaging results will be communicated to you by MyChart, letter or phone within 4 business days after the clinic has received the results. If you do not hear from us within 7 days, please contact the clinic through [employer] or phone. If you have a critical or abnormal lab result, we will notify you by phone as soon as possible.  Submit refill requests through [employer] or call your pharmacy and they will forward the refill request to us. Please allow 3 business days for your refill to be completed.          Additional Information About Your Visit        [employer] Information     [employer] gives you secure access to your electronic health record. If you see a primary care provider, you can also send messages to your care team and make appointments. If you have questions, please call your primary care clinic.  If you do not have a primary care provider, please call [phone] and they will assist you.        Care EveryWhere ID     This is your Care EveryWhere ID. This could be used by other organizations to access your [facility] medical records  [id number]        Your Vitals Were     Pulse Height Last Period Pulse Oximetry BMI (Body Mass Index)       72 5' 4.54\" (1.639 m) (LMP Unknown) 98% 21.35 kg/m2        Blood Pressure from Last 3 Encounters:   06/08/18 133/88   05/14/18 122/66   05/11/18 122/80    Weight from Last 3 Encounters:   06/08/18 126 lb 8 oz (57.4 kg)   05/14/18 130 lb (59 kg)   05/11/18 134 lb (60.8 kg)              We Performed the Following     Beta HCG qual IFA urine     Carbamazepine and epoxide free and " total     COLP CERVIX/UPPER VAGINA W BX CERVIX/ENDOCERV CURETT     Keppra (Levetiracetam) Level     Lamotrigine Level     Surgical pathology exam          Today's Medication Changes          These changes are accurate as of 6/8/18  9:55 AM.  If you have any questions, ask your nurse or doctor.               Start taking these medicines.        Dose/Directions    sertraline 50 MG tablet   Commonly known as:  ZOLOFT   Used for:  Post partum depression   Started by:  Alicia Rain, DO        Take 1/2 tablet (25 mg) for 1-2 weeks, then increase to 1 tablet orally daily   Quantity:  30 tablet   Refills:  1            Where to get your medicines      These medications were sent to [place] [id number] [place] [facility] [address]   [address] [facility] [address]     Phone:  [phone]     sertraline 50 MG tablet                Primary Care Provider Office Phone # Fax #    Subha Saenz PA-C [phone] [address] [facility] [address]        Equal Access to Services     : Hadii aad ku hadasho Soomaali, waaxda luqadaha, qaybta kaalmada adeegyada, waxay umm candelario . So [facility] [phone].    ATENCIÓN: Si habla español, tiene a larkin disposición servicios gratuitos de asistencia lingüística. Llame al [phone].    We comply with applicable federal civil rights laws and [place] laws. We do not discriminate on the basis of race, color, national origin, age, disability, sex, sexual orientation, or gender identity.            Thank you!     Thank you for choosing [facility]  for your care. Our goal is always to provide you with excellent care. Hearing back from our patients is one way we can continue to improve our services. Please take a few minutes to complete the written survey that you may receive in the mail after your visit with us. Thank you!             Your Updated Medication List - Protect others around you: Learn how  to safely use, store and throw away your medicines at www.disposemymeds.org.          This list is accurate as of 6/8/18  9:55 AM.  Always use your most recent med list.                   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), Major depressive disorder, recurrent episode, mild (H), Twin gestation in first trimester, unspecified multiple gestation type, High-risk pregnancy in second trimester, Partial epilepsy with impairment of consciousness, intractable (H)       * carBAMazepine 200 MG 12 hr capsule    CARBATROL    120 capsule    Take 200 mg AM (along with two 300 mg tabs), take two tabs (400 mg) at noon, and 200 mg PM (along with two 300 mg tab) (daily dose: 800-400-800)    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), Major depressive disorder, recurrent episode, mild (H), Twin gestation in first trimester, unspecified multiple gestation type, High-risk pregnancy in second trimester, Partial epilepsy with impairment of consciousness, intractable (H)       cephALEXin 500 MG capsule    KEFLEX    20 capsule    Take 1 capsule (500 mg) by mouth 3 times daily    Local infection of wound       diazepam 5 MG/ML (HIGH CONC) solution    DIAZEPAM INTENSOL    30 mL    Diazepam intensol 5mg/ml: Administer between gum and cheek  1 ml(5 mg)  for any generalized tonic clonic seizure or for more than 2 complex partial seizures within a 4 hour period, repeat once after 10 minutes. This is during pregnancy. Bottle needs to last 90 days!    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), Major depressive disorder, recurrent episode, mild (H), Twin gestation in first trimester, unspecified multiple gestation type       ibuprofen 600 MG tablet    ADVIL/MOTRIN     TAKE 1 TABLET BY MOUTH EVERY 6 HOURS FOR 9 DOSES        lamoTRIgine 100 MG tablet    LaMICtal    363 tablet    Increase to 450 mg AM, 450 mg afternoon and 300 HS    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), Twin gestation in first trimester, unspecified multiple gestation type, Major depressive disorder, recurrent episode, mild (H), High-risk pregnancy in second trimester, Partial epilepsy with impairment of consciousness, intractable (H)       levETIRAcetam 500 MG tablet    KEPPRA    540 tablet    Beginning 1/18/2018, increase LEV to 1500 mg AM and 1500 HS    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), Major depressive disorder, recurrent episode, mild (H), Twin gestation in first trimester, unspecified multiple gestation type, High-risk pregnancy in second trimester, Partial epilepsy with impairment of consciousness, intractable (H)       oxyCODONE IR 5 MG tablet    ROXICODONE     Take 5-10 mg by mouth        prenatal multivitamin plus iron 27-0.8 MG Tabs per tablet     100 tablet    Take 1 tablet by mouth daily    High-risk pregnancy in second trimester, Partial epilepsy with impairment of consciousness, intractable (H), 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 Read the directions carefully, and ask your doctor or other care provider to review them with you.

## 2018-06-08 NOTE — PROGRESS NOTES
The patient's pap smear on 11/3/17 showed Normal with HPV 18 and other HR HPV.   I attempted to ensure that the patient was educated regarding the nature of her findings and implications to date.  We reviewed the role of HPV, incidence in the population and the natural history of the infection, and its transmission.  We also reviewed ways to minimize her future risk, the effect of HPV on the cervix and treatment options available, should they be indicated.    The pathophysiology of the cervix, including a discussion of the squamous and columnar cells, metaplasia and dysplasia have been reviewed, drawings, sketches and the pamphlets were reviewed with her.      No LMP recorded (lmp unknown).  Current Birth Control Method: tubal ligation  History of veneral diseases: : No  History of genital warts:  No  Visible warts now?:  No  Family History of  Cervical, Uterine or Vaginal Cancer?: No    Past Medical History:   Diagnosis Date     Anorexia 3/5/2013     Anxiety      Depressive disorder 1999     Heart murmur     told benign     Localization-related epilepsy (H)      Major depression      Migraine      Seizure disorder (H)     working with [employer] [facility] [employer], really bad episodes      Seizures (H)      Status post left breast biopsy,sclerosing adenosis not concordant with imaging,12       Past Surgical History:   Procedure Laterality Date     C/SECTION, LOW TRANSVERSE  , 2018    x 2      SECTION, TUBAL LIGATION, COMBINED          Outpatient Encounter Prescriptions as of 2018   Medication Sig Dispense Refill     carBAMazepine (CARBATROL) 200 MG 12 hr capsule Take 200 mg AM (along with two 300 mg tabs), take two tabs (400 mg) at noon, and 200 mg PM (along with two 300 mg tab) (daily dose: 800-400-800) 120 capsule 3     carBAMazepine (CARBATROL) 300 MG 12 hr capsule TAKE 2 CAPSULES (600 MG) BY MOUTH 2 TIMES DAILY 360 capsule 3     folic acid (FOLVITE) 1 MG tablet Take 2 tablets (2 mg) by  mouth daily 180 tablet 3     lamoTRIgine (LAMICTAL) 100 MG tablet Increase to 450 mg AM, 450 mg afternoon and 300  tablet 1     levETIRAcetam (KEPPRA) 500 MG tablet Beginning 1/18/2018, increase LEV to 1500 mg AM and 1500  tablet 3     Prenatal Vit-Fe Fumarate-FA (PRENATAL MULTIVITAMIN PLUS IRON) 27-0.8 MG TABS per tablet Take 1 tablet by mouth daily 100 tablet 3     sertraline (ZOLOFT) 50 MG tablet Take 1/2 tablet (25 mg) for 1-2 weeks, then increase to 1 tablet orally daily 30 tablet 1     Acetaminophen (TYLENOL PO) Take by mouth as needed for mild pain or fever       cephALEXin (KEFLEX) 500 MG capsule Take 1 capsule (500 mg) by mouth 3 times daily (Patient not taking: Reported on 6/8/2018) 20 capsule 0     diazepam (DIAZEPAM INTENSOL) 5 MG/ML (HIGH CONC) solution Diazepam intensol 5mg/ml: Administer between gum and cheek  1 ml(5 mg)  for any generalized tonic clonic seizure or for more than 2 complex partial seizures within a 4 hour period, repeat once after 10 minutes. This is during pregnancy. Bottle needs to last 90 days! (Patient not taking: Reported on 5/14/2018) 30 mL 1     ibuprofen (ADVIL/MOTRIN) 600 MG tablet TAKE 1 TABLET BY MOUTH EVERY 6 HOURS FOR 9 DOSES  0     oxyCODONE IR (ROXICODONE) 5 MG tablet Take 5-10 mg by mouth       VITAMIN C 1000 MG OR TABS 1 TABLET DAILY AT DINNER       No facility-administered encounter medications on file as of 6/8/2018.         Allergies as of 06/08/2018     (No Known Allergies)       Social History     Social History     Marital status:      Spouse name: N/A     Number of children: 2     Years of education: N/A     Occupational History      None      Social History Main Topics     Smoking status: Current Every Day Smoker     Packs/day: 1.00     Years: 10.00     Types: Cigarettes     Start date: 6/1/1993     Smokeless tobacco: Never Used      Comment: 1 pack      Alcohol use No      Comment: beer or wine a few times per year     Drug use: No      "Sexual activity: Yes     Partners: Male     Birth control/ protection: None     Other Topics Concern     Parent/Sibling W/ Cabg, Mi Or Angioplasty Before 65f 55m? Yes     Social History Narrative        Family History   Problem Relation Age of Onset     HEART DISEASE Father      62 yo when he had arrhythmia,  of CHF age 65     DIABETES Father      Neurologic Disorder Father      epilepsy     Schizophrenia Father      Anxiety Disorder Father      Hypertension Father      Obesity Father      CANCER Maternal Grandfather      CEREBROVASCULAR DISEASE Maternal Grandfather      CEREBROVASCULAR DISEASE Paternal Grandmother      HEART DISEASE Paternal Grandfather      Psychotic Disorder Daughter      Neurologic Disorder Daughter      CP, she was adopted out in      Schizophrenia Mother      Anxiety Disorder Mother      Depression Mother          Review Of Systems  Skin: negative  Eyes: negative  Ears/Nose/Throat: negative  Respiratory: negative  Cardiovascular: negative  Gastrointestinal: negative  Genitourinary: negative  Musculoskeletal: negative  Neurologic: negative  Psychiatric: negative  Hematologic/Lymphatic/Immunologic: negative  Endocrine: negative     Exam:   /88 (BP Location: Left arm, Patient Position: Sitting, Cuff Size: Adult Regular)  Pulse 72  Ht 5' 4.54\" (1.639 m)  Wt 126 lb 8 oz (57.4 kg)  LMP  (LMP Unknown)  SpO2 98%  BMI 21.35 kg/m2  GENERAL:  WNWD female NAD  HEENT: NC/AT, EOMI  SKIN: normal skin turgor  GAIT: Normal  NECK: Symmetrical, no masses noted   VULVA: Normal Genitalia  BUS: Normal  URETHRA:  No hypermobility noted  URETHRAL MEATUS:  No masses noted  VAGINA: Normal mucosa, no discharge  CERVIX: Closed, mobile, no discharge  PERIANAL:  No masses or lesions seen  EXTREMITIES: no clubbing, cyanosis, or edema    Assessment:  NIL with HR HPV and HPV 18    Plan:  Recommend to Proceed with Colpo  The details of the colposcopic procedure were reviewed, the risks of missed diagnoses, " pain, infection, and bleeding.    TT 20 min, in addition to the time for the procedure  CT greater than 50%, as noted above in the HPI and in the Plan.         Procedure:  Procedure for colposcopy and biopsy has been explained to the patient and consent obtained.    Before the procedure, it was ensured that the patient was educated regarding the nature of her findings and implications to date.  We reviewed the role of HPV and the natural history of the infection.  We also reviewed ways to minimize her future risk, the effect of HPV on the cervix and treatment options available, should they be indicated.    The pathophysiology of the cervix, including a discussion of the squamous and columnar cells, metaplasia and dysplasia have been reviewed, drawings, sketches and the pamphlets were reviewed with her.  The details of the colposcopic procedure were reviewed, the risks of missed diagnoses, pain, infection, and bleeding.  Questions seemed to be answered before proceeding and the patient then consented to the procedure.     Procedure:    Speculum placed and cervix visualized. Vagina normal with no lesions noted. Acetic acid applied to the cervix. The colposcopy is satisfactory as the entire transformation zone is visualized. Mild acetowhite epithelial changes noted at the 9:00 position.  Lugal's solution applied to patients cervix.  ECC obtained.  Biopsy is completed on the cervix at the 9 o'clock position. Specimens placed aside to be sent to pathology. Hemostasis obtained with Monsel's solution and Silver Nitrate. Speculum removed    She tolerated the procedure well. There were no apparent complications.    She is instructed not to use tampons or have intercourse for 5 days.  Instructed to call if she has persistent bleeding, foul vaginal discharge or any other concerns.    Findings:    No images are attached to the encounter.     Cervix: acetowhitening noted 0900  Vaginal inspection: vaginal colposcopy not  performed.  Vulvar colposcopy: vulvar colposcopy not performed. N/A  Procedure Summary: Patient tolerated procedure well.      Assessment:   NIL with HR HPV and HPV 18    Plan:  Specimens labelled and sent to pathology.  Will base further treatment on pathology findings.  Post biopsy instructions given to patient and call to discuss Pathology results.    Alicia Rain

## 2018-06-08 NOTE — LETTER
Patient:  Vernell Logan  :   1980  MRN:     [id number]        Ms.Deann AGUS Logan  [address]  [place] [address]        2018    Dear ,    We are writing to inform you of your test results.    Your test results fall within the expected range(s) or remain unchanged from previous results. Your levetiracetam is high, we may need to lower it if you have side effects. Please call [facility] or come see us in clinic.  Please continue with current treatment plan.    Resulted Orders   Lamotrigine Level   Result Value Ref Range    Lamotrigine Level 8.7 2.5 - 15.0 ug/mL      Comment:      (Note)  INTERPRETIVE INFORMATION:  Lamotrigine  Therapeutic Range:  2.5-15.0 ug/mL             Toxic:  Not well established  Pharmacokinetics varies widely, particularly with   co-medications and/or compromised renal function.  Adverse   effects may include dizziness, somnolence, nausea and   vomiting.  Performed by [facility],  [address] [place] [id number] [phone]  [email or web address], Gil Irene MD, Lab. Director     Carbamazepine and epoxide free and total   Result Value Ref Range    Carbamazepine Total Level 5.2 4.0 - 12.0 ug/mL    10, 11 Epoxide Level 1.6 0.4 - 4.0 ug/mL      Comment:      (Note)  This test was developed and its performance characteristics  determined by [facility].  It has not been cleared or approved  by the Food and Drug Administration.  Analysis performed by [facility], [address] [place] [address]      Free Carbamazepine Level Ug/Ml 1.1 0.6 - 4.2 ug/mL    Free Epoxide Level 0.8 0.2 - 2.0 ug/mL      Comment:      (Note)  This test was developed and its performance characteristics  determined by [facility].  It has not been cleared or approved  by the Food and Drug Administration.  Analysis performed by [facility], [address] [place] [address]     Keppra (Levetiracetam) Level   Result Value Ref Range    Keppra (Levetiracetam) Level 48 (H) 12 - 46 ug/mL      Comment:       (Note)  INTERPRETIVE INFORMATION: Keppra (Levetiracetam)  Therapeutic Range:  12-46 ug/mL             Toxic:  Not well Established  Pharmacokinetics of levetiracetam are affected by renal   function. Adverse effects may include somnolence, weakness,   headache and vomiting.  Performed by [facility],  [address] [place] [address] [phone]  [email or web address], Gil Irene MD, Lab. Director         Ashli Wilkinson MD               [phone]  1980

## 2018-06-08 NOTE — TELEPHONE ENCOUNTER
Rashad Granda LPN  P Me [facility] [place]                   Caller: Vernell   Relationship to Patient: self   Call Back Number: [phone]   Reason for Call: Would like to know if it is OK to take Zoloft with other current medications      Vernell has recently been prescribed Zoloft. She would like to know if this is safe to take with her ASD's.     Current/confirmed ASD's    CBZ: [phone]/[phone]  LTG: [phone]  LEV: [phone]    Advised Vernell that that it is okay to take Zoloft - Carbamazepine will reduce Zoloft.  Encouraged to call back if additional questions/concerns.

## 2018-06-09 LAB
CARBAMAZEPINE EP FREE SERPL-MCNC: 0.8 UG/ML
CARBAMAZEPINE EP SERPL-MCNC: 1.6 UG/ML
CARBAMAZEPINE FREE SERPL-MCNC: 1.1 UG/ML
CARBAMAZEPINE SERPL-MCNC: 5.2 UG/ML
LAMOTRIGINE SERPL-MCNC: 8.7 UG/ML (ref 2.5–15)
LEVETIRACETAM SERPL-MCNC: 48 UG/ML (ref 12–46)

## 2018-06-09 ASSESSMENT — PATIENT HEALTH QUESTIONNAIRE - PHQ9: SUM OF ALL RESPONSES TO PHQ QUESTIONS 1-9: 14

## 2018-06-12 LAB — COPATH REPORT: NORMAL

## 2018-06-13 DIAGNOSIS — G40.219 LOCALIZATION-RELATED EPILEPSY WITH COMPLEX PARTIAL SEIZURES WITH INTRACTABLE EPILEPSY (H): ICD-10-CM

## 2018-06-13 DIAGNOSIS — O09.92 HIGH-RISK PREGNANCY IN SECOND TRIMESTER: ICD-10-CM

## 2018-06-13 DIAGNOSIS — G40.219 PARTIAL EPILEPSY WITH IMPAIRMENT OF CONSCIOUSNESS, INTRACTABLE (H): ICD-10-CM

## 2018-06-13 DIAGNOSIS — F33.0 MAJOR DEPRESSIVE DISORDER, RECURRENT EPISODE, MILD (H): ICD-10-CM

## 2018-06-13 DIAGNOSIS — O30.001 TWIN GESTATION IN FIRST TRIMESTER, UNSPECIFIED MULTIPLE GESTATION TYPE: ICD-10-CM

## 2018-06-13 RX ORDER — LAMOTRIGINE 100 MG/1
TABLET ORAL
Qty: 363 TABLET | Refills: 5 | Status: SHIPPED | OUTPATIENT
Start: 2018-06-13 | End: 2018-09-25

## 2018-08-16 NOTE — TELEPHONE ENCOUNTER
Zoloft  Routing refill request to provider for review/approval because:  Patient needs to be seen because:  Overdue for mood follow up    Subha Judd RN, BSN

## 2018-08-17 NOTE — TELEPHONE ENCOUNTER
I ordered the prescription for patient.  I would like her to follow up in the next few months.    Alicia Rain

## 2018-09-12 ENCOUNTER — TELEPHONE (OUTPATIENT)
Dept: NEUROLOGY | Facility: CLINIC | Age: 38
End: 2018-09-12

## 2018-09-12 NOTE — TELEPHONE ENCOUNTER
Prior Authorization Retail Medication Request    Medication/Dose: levETIRAcetam (KEPPRA) 500 MG tablet  ICD code (if different than what is on RX):    Previously Tried and Failed:    Rationale:      Insurance Name:    Insurance ID:        Pharmacy Information (if different than what is on RX)  Name:    Phone:

## 2018-09-13 ENCOUNTER — PATIENT OUTREACH (OUTPATIENT)
Dept: NEUROLOGY | Facility: CLINIC | Age: 38
End: 2018-09-13

## 2018-09-13 ENCOUNTER — TELEPHONE (OUTPATIENT)
Dept: NEUROLOGY | Facility: CLINIC | Age: 38
End: 2018-09-13

## 2018-09-13 NOTE — PROGRESS NOTES
Social Work Intervention  [facility]    Data/Intervention:    Patient Name:  Vernell HOWELL/Age:  1980 (37 year old)    Visit Type: telephone  Referral Source: Hilario Vu RN   Reason for Referral:  [place]    Collaborated With:    -Vernell  -Hilario Vu RN    Patient Concerns/Issues:   Pt reports that SSDI was denied some time ago and she was also then denied Medicare. She isn't able to refill [place] meds because [place]. She has contacted [facility], [facility] and an Ombudsman at [facility] who is trying to help her get this resolved. She anticipates they will be able to resolve it. She has meds thru tomorrow morning and then will be out.    Intervention/Education/Resources Provided:  Discussed that the State still has Medicare listed as an insurance for her on their website and until that is removed, her meds will be denied by [place].   She expects a call back from the Ombudsman about how this will be resolved. They are also calling her pharmacy in [place].     Assessment/Plan:  We made an agreement that she would call me if by tomorrow morning this issue is not resolved.     Provided patient/family with contact information and availability.    ZEENAT Benton, [facility]    [facility]  [phone]

## 2018-09-13 NOTE — TELEPHONE ENCOUNTER
Central Prior Authorization Team   Phone: [phone]      PA Initiation    Medication: levETIRAcetam (KEPPRA) 500 MG tablet  Insurance Company: Blue Plus [facility] - Phone [phone] Fax [phone]  Pharmacy Filling the Rx: [id number]  Filling Pharmacy Phone: [phone]  Filling Pharmacy Fax:   Start Date: 9/13/2018

## 2018-09-13 NOTE — TELEPHONE ENCOUNTER
Submitted P/A request through patient's prepaid health plan Blue Plus. Received a fax notification that we should be billing to Medicare Part D.  I called pharmacy ([facility] [phone]) to get the insurance info. They were also processing through [employer]. Pharm tech said to disregard this request and if they need anything more, they will call us back.

## 2018-09-13 NOTE — TELEPHONE ENCOUNTER
Nurse received In-Basket message as follows:    RE: levETIRAcetam (KEPPRA) 500 MG tablet     SIG: Beginning 1/18/2018, increase LEV to 1500 mg AM and 1500 HS     # of days supply: 90     Pharmacy: [facility] [address] [place] [address] [address]       RTC date: Last visit 6/8/18     Request from: Patient calling       RE: lamoTRIgine (LAMICTAL) 100 MG tablet     SIG: TAKE 4.5 TABLETS BY MOUTH IN THE AM AND AFTERNOON, AND 3 TABLETS BY MOUTH AT NIGHTIME     # of days supply: 90       RE: carBAMazepine (CARBATROL) 200 MG 12 hr capsule     SIG:Take 200 mg AM (along with two 300 mg tabs), take two tabs (400 mg) at noon, and 200 mg PM (along with two 300 mg tab) (daily dose: 800-400-800)   # of days supply: 90     Caller: Vernell     Relationship to Patient: pt     Call Back Number: [phone]     Reason for Call: Wants to talk with nurse about meds.     Nurse returned call to patient who indicates she lost Medicare part D coverage, was unable to cover her meds, received a one day emergency supply, and needs help getting meds, also noted she has twin babies at home.  Nurse indicated to patient that he would speak to the  her to see if she can help.  Nurse spoke to MATTHEW Goldberg who will call patient and speak to her about her Meds.

## 2018-09-14 ENCOUNTER — TELEPHONE (OUTPATIENT)
Dept: NEUROLOGY | Facility: CLINIC | Age: 38
End: 2018-09-14

## 2018-09-14 DIAGNOSIS — G40.219 LOCALIZATION-RELATED EPILEPSY WITH COMPLEX PARTIAL SEIZURES WITH INTRACTABLE EPILEPSY (H): ICD-10-CM

## 2018-09-14 DIAGNOSIS — F33.0 MAJOR DEPRESSIVE DISORDER, RECURRENT EPISODE, MILD (H): ICD-10-CM

## 2018-09-14 DIAGNOSIS — O30.001 TWIN GESTATION IN FIRST TRIMESTER, UNSPECIFIED MULTIPLE GESTATION TYPE: ICD-10-CM

## 2018-09-14 DIAGNOSIS — G40.219 PARTIAL EPILEPSY WITH IMPAIRMENT OF CONSCIOUSNESS, INTRACTABLE (H): ICD-10-CM

## 2018-09-14 DIAGNOSIS — O09.92 HIGH-RISK PREGNANCY IN SECOND TRIMESTER: ICD-10-CM

## 2018-09-14 RX ORDER — LEVETIRACETAM 500 MG/1
TABLET ORAL
Qty: 540 TABLET | Refills: 1 | Status: SHIPPED | OUTPATIENT
Start: 2018-09-14 | End: 2018-09-25

## 2018-09-14 NOTE — TELEPHONE ENCOUNTER
----- Message from Pk Coy MA sent at 9/13/2018  8:57 AM CDT -----  Regarding: nful  RE: levETIRAcetam (KEPPRA) 500 MG tablet    SIG: Beginning 1/18/2018, increase LEV to 1500 mg AM and 1500 HS    # of days supply: 90    Pharmacy: [facility] [address] [place] [address] [address]      RTC date: Last visit 6/8/18    Request from: Patient calling      RE: lamoTRIgine (LAMICTAL) 100 MG tablet    SIG: TAKE 4.5 TABLETS BY MOUTH IN THE AM AND AFTERNOON, AND 3 TABLETS BY MOUTH AT NIGHTIME    # of days supply: 90      RE: carBAMazepine (CARBATROL) 200 MG 12 hr capsule    SIG:Take 200 mg AM (along with two 300 mg tabs), take two tabs (400 mg) at noon, and 200 mg PM (along with two 300 mg tab) (daily dose: 800-400-800)  # of days supply: 90    Caller: Vernell    Relationship to Patient: pt    Call Back Number: [phone]    Reason for Call: Wants to talk with nurse about meds.

## 2018-09-17 ENCOUNTER — TELEPHONE (OUTPATIENT)
Dept: NEUROLOGY | Facility: CLINIC | Age: 38
End: 2018-09-17

## 2018-09-17 NOTE — TELEPHONE ENCOUNTER
Call placed to patient, she reports she no longer needs to speak with a nurse, but would like to arrange a phone appointment with .  Attempted to transfer call to scheduling, however patient ended call before connecting.    Will request  call patient back to arrange a visit.

## 2018-09-17 NOTE — TELEPHONE ENCOUNTER
----- Message from Rachel Zhang [facility] sent at 9/14/2018  4:34 PM CDT -----  Regarding: FW: nful      ----- Message -----     From: Pk Coy MA     Sent: 9/13/2018   8:57 AM       To: Me Stevensoncep Refill Pool, Me Mincep Rn Pool  Subject: nful                                             RE: levETIRAcetam (KEPPRA) 500 MG tablet    SIG: Beginning 1/18/2018, increase LEV to 1500 mg AM and 1500 HS    # of days supply: 90    Pharmacy: [facility] [address] [place] [address] [address]      RTC date: Last visit 6/8/18    Request from: Patient calling      RE: lamoTRIgine (LAMICTAL) 100 MG tablet    SIG: TAKE 4.5 TABLETS BY MOUTH IN THE AM AND AFTERNOON, AND 3 TABLETS BY MOUTH AT NIGHTIME    # of days supply: 90      RE: carBAMazepine (CARBATROL) 200 MG 12 hr capsule    SIG:Take 200 mg AM (along with two 300 mg tabs), take two tabs (400 mg) at noon, and 200 mg PM (along with two 300 mg tab) (daily dose: 800-400-800)  # of days supply: 90    Caller: Vernell    Relationship to Patient: pt    Call Back Number: [phone]    Reason for Call: Wants to talk with nurse about meds.

## 2018-09-18 ENCOUNTER — TELEPHONE (OUTPATIENT)
Dept: NEUROLOGY | Facility: CLINIC | Age: 38
End: 2018-09-18

## 2018-09-18 NOTE — TELEPHONE ENCOUNTER
Prior Authorization Retail Medication Request    Medication/Dose: carBAMazepine (CARBATROL) 200 MG 12 hr capsule  ICD code (if different than what is on RX):    Previously Tried and Failed:    Rationale:      Insurance Name:    Insurance ID:        Pharmacy Information (if different than what is on RX)  Name:    Phone:

## 2018-09-18 NOTE — TELEPHONE ENCOUNTER
Prior Authorization Retail Medication Request    Medication/Dose: lamoTRIgine (LAMICTAL) 100 MG tablet  ICD code (if different than what is on RX):    Previously Tried and Failed:    Rationale:      Insurance Name:    Insurance ID:        Pharmacy Information (if different than what is on RX)  Name:    Phone:

## 2018-09-19 NOTE — TELEPHONE ENCOUNTER
Prior Authorization Not Needed per Insurance    Medication: carBAMazepine (CARBATROL) 200 MG 12 hr capsule-PA Not Needed  Insurance Company: [employer]IE [employer] - Phone [phone] Fax [phone]  Expected CoPay:      Pharmacy Filling the Rx: [id number] [place], [place] - [facility]  Pharmacy Notified: Yes-Pharmacy was able to process script.  Patient Notified: No

## 2018-09-19 NOTE — TELEPHONE ENCOUNTER
Prior Authorization Not Needed per Insurance    Medication: lamoTRIgine (LAMICTAL) 100 MG tablet-PA not Needed  Insurance Company: [employer]ATHALIE BLUE [employer]US - Phone [phone] Fax [phone]  Expected CoPay:      Pharm[id number] [place] [id number]  Pharmacy Notified: Yes-Pharmacy notified  Patient Notified: No

## 2018-09-19 NOTE — TELEPHONE ENCOUNTER
Central Prior Authorization Team   Phone: [phone]      PA Initiation    Medication: lamoTRIgine (LAMICTAL) 100 MG tablet-PA Intiaited  Insurance Company: [employer] - Phone [phone] Fax [phone]  Pharm[facility]illing the Rx[id number] [place] [facility] [place], [place] - [address]  Filling Pharmacy Phone: [phone]  Filling Pharmacy Fax: [phone]  Start Date: 9/19/2018

## 2018-09-19 NOTE — TELEPHONE ENCOUNTER
Central Prior Authorization Team   Phone: [phone]      PA Initiation    Medication: carBAMazepine (CARBATROL) 200 MG 12 hr capsule-PA Initiated  Insurance Company: [employer] - Phone [phone] Fax [phone]  Ph[id number] [place] [facility] [place], [place] - [address]  Filling Pharmacy Phone: [phone]  Filling Pharmacy Fax: [phone]  Start Date: 9/19/2018

## 2018-09-25 ENCOUNTER — VIRTUAL VISIT (OUTPATIENT)
Dept: NEUROLOGY | Facility: CLINIC | Age: 38
End: 2018-09-25
Payer: COMMERCIAL

## 2018-09-25 DIAGNOSIS — G40.219 LOCALIZATION-RELATED EPILEPSY WITH COMPLEX PARTIAL SEIZURES WITH INTRACTABLE EPILEPSY (H): ICD-10-CM

## 2018-09-25 DIAGNOSIS — O09.92 HIGH-RISK PREGNANCY IN SECOND TRIMESTER: ICD-10-CM

## 2018-09-25 DIAGNOSIS — G40.219 PARTIAL EPILEPSY WITH IMPAIRMENT OF CONSCIOUSNESS, INTRACTABLE (H): ICD-10-CM

## 2018-09-25 DIAGNOSIS — F33.0 MAJOR DEPRESSIVE DISORDER, RECURRENT EPISODE, MILD (H): ICD-10-CM

## 2018-09-25 DIAGNOSIS — O30.001 TWIN GESTATION IN FIRST TRIMESTER, UNSPECIFIED MULTIPLE GESTATION TYPE: ICD-10-CM

## 2018-09-25 RX ORDER — LEVETIRACETAM 500 MG/1
TABLET ORAL
Qty: 540 TABLET | Refills: 3 | Status: SHIPPED | OUTPATIENT
Start: 2018-09-25 | End: 2019-01-31

## 2018-09-25 RX ORDER — CARBAMAZEPINE 200 MG/1
CAPSULE, EXTENDED RELEASE ORAL
Qty: 90 CAPSULE | Refills: 3 | Status: SHIPPED | OUTPATIENT
Start: 2018-09-25 | End: 2019-03-07

## 2018-09-25 RX ORDER — CARBAMAZEPINE 300 MG/1
CAPSULE, EXTENDED RELEASE ORAL
Qty: 180 CAPSULE | Refills: 3 | Status: SHIPPED | OUTPATIENT
Start: 2018-09-25 | End: 2019-01-31

## 2018-09-25 RX ORDER — LAMOTRIGINE 100 MG/1
TABLET ORAL
Qty: 900 TABLET | Refills: 3 | Status: SHIPPED | OUTPATIENT
Start: 2018-09-25 | End: 2019-01-24

## 2018-09-25 NOTE — PROGRESS NOTES
Telephone call     4/27/2018 delivered her twins. She has had three seizures (parital seizure), post partum period. She has not had a seizure since June 2018. No dizziness, no double vision. No falls. No generalized tonic-clonic convulsion.     Current antiepileptic drug   Carbamazepine: 300-300-200  Lamotrigine 400-300-300  Levetiracetam 1773-6511    Prepregnancy dose in 2017  Carbamazepine 600-600  Levetiracetam 3537-8084  Lamotrigine 200-200-200      Plan:     Continue antiepileptic drug, sent in one year script  Carbamazepine: 300-300-200  Lamotrigine 400-300-300  Levetiracetam 0848-6863    If she has seizure we can increase carbamazepine 300-500-200      I spent 10 minutes with the patient on the phone obtaining a medical history, determined medical diagnosis and treatment plan, then patient was counseled on this treatment plan and diagnosis. I answered all her questions and concerns, and arranged follow up care during this time.        Ashli Wilkinson MD

## 2018-09-25 NOTE — MR AVS SNAPSHOT
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)        Major depressive disorder, recurrent episode, mild (H)        Twin gestation in first trimester, unspecified multiple gestation type        High-risk pregnancy in second trimester        Partial epilepsy with impairment of consciousness, intractable (H)           Follow-ups after your visit        Who to contact     Please call your clinic at [phone] to:    Ask questions about your health    Make or cancel appointments    Discuss your medicines    Learn about your test results    Speak to your doctor            Additional Information About Your Visit        MyChart Information     [employer] gives you secure access to your electronic health record. If you see a primary care provider, you can also send messages to your care team and make appointments. If you have questions, please call your primary care clinic.  If you do not have a primary care provider, please call [phone] and they will assist you.      [employer] is an electronic gateway that provides easy, online access to your medical records. With [employer], you can request a clinic appointment, read your test results, renew a prescription or communicate with your care team.     To access your existing account, please contact your [facility] or call [phone] for assistance.        Care EveryWhere ID     This is your Care EveryWhere ID. This could be used by other organizations to access your [facility] medical records  [id number]         Blood Pressure from Last 3 Encounters:   06/08/18 133/88   05/14/18 122/66   05/11/18 122/80    Weight  from Last 3 Encounters:   06/08/18 57.4 kg (126 lb 8 oz)   05/14/18 59 kg (130 lb)   05/11/18 60.8 kg (134 lb)              Today, you had the following     No orders found for display         Today's Medication Changes          These changes are accurate as of 9/25/18 12:34 PM.  If you have any questions, ask your nurse or doctor.               These medicines have changed or have updated prescriptions.        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), Major depressive disorder, recurrent episode, mild (H), Twin gestation in first trimester, unspecified multiple gestation type, High-risk pregnancy in second trimester, Partial epilepsy with impairment of consciousness, intractable (H)        300 mg am and 300 mg noon   Quantity:  180 capsule   Refills:  3       * carBAMazepine 200 MG 12 hr capsule   Commonly known as:  CARBATROL   This may have changed:  additional instructions   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 Read the directions carefully, and ask your doctor or other care provider to review them with you.         Where to get your medicines      These medications were sent to [facility] [address] [place] [address] [address]  [address] [facility] [address]     Phone:  [phone]     carBAMazepine 200 MG 12 hr capsule    carBAMazepine 300 MG 12 hr capsule    lamoTRIgine 100 MG tablet    levETIRAcetam 500 MG tablet                Primary Care Provider Office Phone # Fax #    Subha Saenz PA-C [phone] [address] [facility] [address]        Equal Access to Services     [facility] KELLY : Hadii lyric ku hadasho Soomaali, waaxda luqadaha, qaybta kaalmada adeegyada, bubba coates. So [facility] [phone].    ATENCIÓN: Si habla español, tiene a larkin disposición servicios gratuitos de asistencia lingüística. Kai al [phone].    We comply with applicable federal civil rights laws and [place] laws. We do not discriminate on the basis of race, color, national origin, age, disability, sex, sexual orientation, or gender identity.            Thank you!     Thank you for choosing [facility]  for your care. Our goal is always to provide you with excellent care. Hearing back from our patients is one way we can continue to improve our services. Please take a few minutes to complete the written survey that you may receive in the mail after your visit with us. Thank you!             Your Updated Medication List - Protect others around you: Learn how to safely use, store and throw away your medicines at www.disposemymeds.org.          This list is accurate as of 9/25/18 12:34 PM.  Always use your most recent med list.                   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), Major depressive disorder, recurrent episode, mild (H), Twin gestation in first trimester, unspecified multiple gestation type, High-risk pregnancy in second trimester, Partial epilepsy with impairment of consciousness, intractable (H)       * carBAMazepine 200 MG 12 hr capsule    CARBATROL    90 capsule    Take 200 mg at night    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), Major depressive disorder, recurrent episode, mild (H), Twin gestation in first trimester, unspecified multiple gestation type, High-risk pregnancy in second trimester, Partial epilepsy with impairment of consciousness, intractable (H)       cephALEXin 500 MG capsule    KEFLEX    20 capsule    Take 1 capsule (500 mg) by mouth 3 times daily    Local infection of wound       diazepam 5 MG/ML (HIGH CONC) solution    DIAZEPAM INTENSOL    30 mL    Diazepam intensol 5mg/ml: Administer between gum and cheek  1 ml(5 mg)  for any generalized tonic clonic seizure or for more than 2 complex partial seizures within a 4 hour period, repeat once after 10  minutes. This is during pregnancy. Bottle needs to last 90 days!    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), Major depressive disorder, recurrent episode, mild (H), Twin gestation in first trimester, unspecified multiple gestation type       ibuprofen 600 MG tablet    ADVIL/MOTRIN     TAKE 1 TABLET BY MOUTH EVERY 6 HOURS FOR 9 DOSES        lamoTRIgine 100 MG tablet    LaMICtal    900 tablet    400 mg am, 300 mg noon, 300 mg pm    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), Twin gestation in first trimester, unspecified multiple gestation type, Major depressive disorder, recurrent episode, mild (H), High-risk pregnancy in second trimester, Partial epilepsy with impairment of consciousness, intractable (H)       levETIRAcetam 500 MG tablet    KEPPRA    540 tablet    Levetiracetam 1500 mg AM and 1500 HS    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), Major depressive disorder, recurrent episode, mild (H), Twin gestation in first trimester, unspecified multiple gestation type, High-risk pregnancy in second trimester, Partial epilepsy with impairment of consciousness, intractable (H)       oxyCODONE IR 5 MG tablet    ROXICODONE     Take 5-10 mg by mouth        prenatal multivitamin plus iron 27-0.8 MG Tabs per tablet     100 tablet    Take 1 tablet by mouth daily    High-risk pregnancy in second trimester, Partial epilepsy with impairment of consciousness, intractable (H), 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), Major depressive disorder,  recurrent episode, mild (H), Twin gestation in first trimester, unspecified multiple gestation type       * sertraline 50 MG tablet    ZOLOFT    30 tablet    Take 1/2 tablet (25 mg) for 1-2 weeks, then increase to 1 tablet orally daily    Post partum depression       * sertraline 50 MG tablet    ZOLOFT    90 tablet    Take 1 tablet (50 mg) by mouth daily    Post partum depression       TYLENOL PO      Take by mouth as needed for mild pain or fever    Localization-related (focal) (partial) epilepsy and epileptic syndromes with complex partial seizures, with intractable epilepsy       * Notice:  This list has 4 medication(s) that are the same as other medications prescribed for you. Read the directions carefully, and ask your doctor or other care provider to review them with you.

## 2018-11-05 ENCOUNTER — TELEPHONE (OUTPATIENT)
Dept: NEUROLOGY | Facility: CLINIC | Age: 38
End: 2018-11-05

## 2018-11-05 NOTE — TELEPHONE ENCOUNTER
----- Message from Ashli Wilkinson MD sent at 11/3/2018  5:34 PM CDT -----  Regarding: RE: kiran  Yes, that should be fine.   ----- Message -----     From: Hilario Vu, RN     Sent: 11/2/2018   2:02 PM       To: Ashli Wilkinson MD  Subject: FW: kiran                                         Question about Eye movement desensitization and reprocessing    ----- Message -----     From: Froy Ojeda CMA     Sent: 11/1/2018   3:03 PM       To: Aidee Palm Rn Pool  Subject: nful                                             Caller: Isadora    Relationship to Patient: [facility], her psychologist.    Call Back Number: [phone]    Reason for Call: Would like to do EMDR on pt. Uses bilateral stimulation, would like to run this by SIP first if its safe. Also sending verbal OVIDIO as we speak.

## 2018-11-05 NOTE — TELEPHONE ENCOUNTER
Nurse received below message and reply, call Isadora      ( patient psychologist ) back; left voice message indicating that Dr. Wilkinson did not have any issue with this.

## 2018-11-13 NOTE — TELEPHONE ENCOUNTER
"Requested Prescriptions   Pending Prescriptions Disp Refills     sertraline (ZOLOFT) 50 MG tablet 90 tablet 0     Sig: Take 1 tablet (50 mg) by mouth daily    SSRIs Protocol Failed    11/12/2018 12:07 PM       Failed - PHQ-9 score less than 5 in past 6 months    Please review last PHQ-9 score.          Passed - Patient is age 18 or older       Passed - No active pregnancy on record       Passed - No positive pregnancy test in last 12 months       Passed - Recent (6 mo) or future (30 days) visit within the authorizing provider's specialty    Patient had office visit in the last 6 months or has a visit in the next 30 days with authorizing provider or within the authorizing provider's specialty.  See \"Patient Info\" tab in inbasket, or \"Choose Columns\" in Meds & Orders section of the refill encounter.            Routing refill request to provider for review/approval because:  Last PHQ-9 on 6/8/18 was >5 (14).  Needs to be seen.  Per 6/8/18 OV plan, pt was to f/u in 2 weeks from that appt.    Chela Ga RN          "

## 2019-01-10 DIAGNOSIS — G40.219 PARTIAL EPILEPSY WITH IMPAIRMENT OF CONSCIOUSNESS, INTRACTABLE (H): ICD-10-CM

## 2019-01-10 DIAGNOSIS — F33.0 MAJOR DEPRESSIVE DISORDER, RECURRENT EPISODE, MILD (H): ICD-10-CM

## 2019-01-10 DIAGNOSIS — O09.92 HIGH-RISK PREGNANCY IN SECOND TRIMESTER: ICD-10-CM

## 2019-01-10 DIAGNOSIS — O30.001 TWIN GESTATION IN FIRST TRIMESTER, UNSPECIFIED MULTIPLE GESTATION TYPE: ICD-10-CM

## 2019-01-10 DIAGNOSIS — G40.219 LOCALIZATION-RELATED EPILEPSY WITH COMPLEX PARTIAL SEIZURES WITH INTRACTABLE EPILEPSY (H): ICD-10-CM

## 2019-01-24 RX ORDER — LAMOTRIGINE 100 MG/1
TABLET ORAL
Qty: 900 TABLET | Refills: 3 | Status: SHIPPED | OUTPATIENT
Start: 2019-01-24 | End: 2019-05-03

## 2019-01-30 DIAGNOSIS — O30.001 TWIN GESTATION IN FIRST TRIMESTER, UNSPECIFIED MULTIPLE GESTATION TYPE: ICD-10-CM

## 2019-01-30 DIAGNOSIS — F33.0 MAJOR DEPRESSIVE DISORDER, RECURRENT EPISODE, MILD (H): ICD-10-CM

## 2019-01-30 DIAGNOSIS — G40.219 LOCALIZATION-RELATED EPILEPSY WITH COMPLEX PARTIAL SEIZURES WITH INTRACTABLE EPILEPSY (H): Primary | ICD-10-CM

## 2019-01-30 DIAGNOSIS — O09.92 HIGH-RISK PREGNANCY IN SECOND TRIMESTER: ICD-10-CM

## 2019-01-30 DIAGNOSIS — G40.219 PARTIAL EPILEPSY WITH IMPAIRMENT OF CONSCIOUSNESS, INTRACTABLE (H): ICD-10-CM

## 2019-01-31 DIAGNOSIS — F33.0 MAJOR DEPRESSIVE DISORDER, RECURRENT EPISODE, MILD (H): ICD-10-CM

## 2019-01-31 DIAGNOSIS — O30.001 TWIN GESTATION IN FIRST TRIMESTER, UNSPECIFIED MULTIPLE GESTATION TYPE: ICD-10-CM

## 2019-01-31 DIAGNOSIS — G40.219 LOCALIZATION-RELATED EPILEPSY WITH COMPLEX PARTIAL SEIZURES WITH INTRACTABLE EPILEPSY (H): Primary | ICD-10-CM

## 2019-01-31 DIAGNOSIS — G40.219 PARTIAL EPILEPSY WITH IMPAIRMENT OF CONSCIOUSNESS, INTRACTABLE (H): ICD-10-CM

## 2019-01-31 DIAGNOSIS — O09.92 HIGH-RISK PREGNANCY IN SECOND TRIMESTER: ICD-10-CM

## 2019-01-31 RX ORDER — CARBAMAZEPINE 300 MG/1
CAPSULE, EXTENDED RELEASE ORAL
Qty: 180 CAPSULE | Refills: 3 | Status: SHIPPED | OUTPATIENT
Start: 2019-01-31 | End: 2019-03-07

## 2019-01-31 RX ORDER — LEVETIRACETAM 500 MG/1
TABLET ORAL
Qty: 540 TABLET | Refills: 3 | Status: SHIPPED | OUTPATIENT
Start: 2019-01-31 | End: 2019-03-28 | Stop reason: ALTCHOICE

## 2019-02-01 RX ORDER — LEVETIRACETAM 500 MG/1
TABLET ORAL
Qty: 540 TABLET | Refills: 1 | Status: CANCELLED | OUTPATIENT
Start: 2019-02-01

## 2019-02-04 RX ORDER — LEVETIRACETAM 500 MG/1
TABLET ORAL
Qty: 540 TABLET | Refills: 3 | Status: SHIPPED | OUTPATIENT
Start: 2019-02-04 | End: 2019-05-03

## 2019-02-12 ENCOUNTER — TELEPHONE (OUTPATIENT)
Dept: NEUROLOGY | Facility: CLINIC | Age: 39
End: 2019-02-12

## 2019-02-12 NOTE — TELEPHONE ENCOUNTER
Writer returned call to Vernell who requested to increase Oxcarbazepine from 300-300-200 to 600-600-200.    Vernell's last office visit was scheduled 2/23/2018.    PLAN:   As discused with Vernell, advised to return to clinic for follow up appointment with MD.    Vernell stated she would call back to schedule an appointment.

## 2019-03-06 DIAGNOSIS — G40.219 PARTIAL EPILEPSY WITH IMPAIRMENT OF CONSCIOUSNESS, INTRACTABLE (H): ICD-10-CM

## 2019-03-06 DIAGNOSIS — F33.0 MAJOR DEPRESSIVE DISORDER, RECURRENT EPISODE, MILD (H): ICD-10-CM

## 2019-03-06 DIAGNOSIS — O30.001 TWIN GESTATION IN FIRST TRIMESTER, UNSPECIFIED MULTIPLE GESTATION TYPE: ICD-10-CM

## 2019-03-06 DIAGNOSIS — G40.219 LOCALIZATION-RELATED EPILEPSY WITH COMPLEX PARTIAL SEIZURES WITH INTRACTABLE EPILEPSY (H): ICD-10-CM

## 2019-03-06 DIAGNOSIS — O09.92 HIGH-RISK PREGNANCY IN SECOND TRIMESTER: ICD-10-CM

## 2019-03-07 RX ORDER — CARBAMAZEPINE 200 MG/1
CAPSULE, EXTENDED RELEASE ORAL
Qty: 90 CAPSULE | Refills: 3 | Status: SHIPPED | OUTPATIENT
Start: 2019-03-07 | End: 2019-05-03

## 2019-03-07 RX ORDER — CARBAMAZEPINE 300 MG/1
CAPSULE, EXTENDED RELEASE ORAL
Qty: 180 CAPSULE | Refills: 3 | Status: SHIPPED | OUTPATIENT
Start: 2019-03-07 | End: 2019-03-18

## 2019-03-18 ENCOUNTER — TELEPHONE (OUTPATIENT)
Dept: PSYCHIATRY | Facility: CLINIC | Age: 39
End: 2019-03-18

## 2019-03-18 DIAGNOSIS — G40.219 PARTIAL EPILEPSY WITH IMPAIRMENT OF CONSCIOUSNESS, INTRACTABLE (H): ICD-10-CM

## 2019-03-18 DIAGNOSIS — F33.0 MAJOR DEPRESSIVE DISORDER, RECURRENT EPISODE, MILD (H): ICD-10-CM

## 2019-03-18 DIAGNOSIS — O09.92 HIGH-RISK PREGNANCY IN SECOND TRIMESTER: ICD-10-CM

## 2019-03-18 DIAGNOSIS — O30.001 TWIN GESTATION IN FIRST TRIMESTER, UNSPECIFIED MULTIPLE GESTATION TYPE: ICD-10-CM

## 2019-03-18 DIAGNOSIS — G40.219 LOCALIZATION-RELATED EPILEPSY WITH COMPLEX PARTIAL SEIZURES WITH INTRACTABLE EPILEPSY (H): ICD-10-CM

## 2019-03-18 RX ORDER — CARBAMAZEPINE 300 MG/1
CAPSULE, EXTENDED RELEASE ORAL
Qty: 150 CAPSULE | Refills: 3 | Status: SHIPPED | OUTPATIENT
Start: 2019-03-18 | End: 2019-05-03

## 2019-03-18 NOTE — TELEPHONE ENCOUNTER
Mandy Laws Heather, RN             Caller: Vernell     Relationship to Patient: pt     Call Back Number: [phone]     Reason for Call: Pt is requesting a call back from you to discuss her carBAMazepine (CARBATROL) 300 MG 12 hr capsule medication     Vernell calls to report she needs a refill of Carbamazepine.  Currently taking -600-200.  Refill sent per patient request.  Vernell does not currently need refills on  mg caps.

## 2019-03-21 ENCOUNTER — TRANSFERRED RECORDS (OUTPATIENT)
Dept: HEALTH INFORMATION MANAGEMENT | Facility: CLINIC | Age: 39
End: 2019-03-21

## 2019-03-29 ENCOUNTER — TELEPHONE (OUTPATIENT)
Dept: NEUROLOGY | Facility: CLINIC | Age: 39
End: 2019-03-29

## 2019-03-29 NOTE — TELEPHONE ENCOUNTER
Vernell calls to discuss [facility] forms.  Vernell reports that she would like these forms scanned into patient chart.  The forms do not require a signature, they are informational only.

## 2019-05-03 ENCOUNTER — OFFICE VISIT (OUTPATIENT)
Dept: NEUROLOGY | Facility: CLINIC | Age: 39
End: 2019-05-03
Payer: COMMERCIAL

## 2019-05-03 VITALS — DIASTOLIC BLOOD PRESSURE: 75 MMHG | HEART RATE: 71 BPM | SYSTOLIC BLOOD PRESSURE: 115 MMHG

## 2019-05-03 DIAGNOSIS — F33.0 MAJOR DEPRESSIVE DISORDER, RECURRENT EPISODE, MILD (H): ICD-10-CM

## 2019-05-03 DIAGNOSIS — O30.001 TWIN GESTATION IN FIRST TRIMESTER, UNSPECIFIED MULTIPLE GESTATION TYPE: ICD-10-CM

## 2019-05-03 DIAGNOSIS — G40.219 LOCALIZATION-RELATED EPILEPSY WITH COMPLEX PARTIAL SEIZURES WITH INTRACTABLE EPILEPSY (H): Primary | ICD-10-CM

## 2019-05-03 DIAGNOSIS — O09.92 HIGH-RISK PREGNANCY IN SECOND TRIMESTER: ICD-10-CM

## 2019-05-03 DIAGNOSIS — G40.219 PARTIAL EPILEPSY WITH IMPAIRMENT OF CONSCIOUSNESS, INTRACTABLE (H): ICD-10-CM

## 2019-05-03 LAB
ALBUMIN SERPL-MCNC: 3.8 G/DL (ref 3.4–5)
ALP SERPL-CCNC: 115 U/L (ref 40–150)
ALT SERPL W P-5'-P-CCNC: 23 U/L (ref 0–50)
ANION GAP SERPL CALCULATED.3IONS-SCNC: 11 MMOL/L (ref 3–14)
AST SERPL W P-5'-P-CCNC: 17 U/L (ref 0–45)
BASOPHILS # BLD AUTO: 0 10E9/L (ref 0–0.2)
BASOPHILS NFR BLD AUTO: 0.4 %
BILIRUB SERPL-MCNC: 0.2 MG/DL (ref 0.2–1.3)
BUN SERPL-MCNC: 9 MG/DL (ref 7–30)
CALCIUM SERPL-MCNC: 9 MG/DL (ref 8.5–10.1)
CHLORIDE SERPL-SCNC: 106 MMOL/L (ref 94–109)
CO2 SERPL-SCNC: 28 MMOL/L (ref 20–32)
CREAT SERPL-MCNC: 0.78 MG/DL (ref 0.52–1.04)
DIFFERENTIAL METHOD BLD: ABNORMAL
EOSINOPHIL # BLD AUTO: 0.1 10E9/L (ref 0–0.7)
EOSINOPHIL NFR BLD AUTO: 1.2 %
ERYTHROCYTE [DISTWIDTH] IN BLOOD BY AUTOMATED COUNT: 13.3 % (ref 10–15)
GFR SERPL CREATININE-BSD FRML MDRD: >90 ML/MIN/{1.73_M2}
GLUCOSE SERPL-MCNC: 84 MG/DL (ref 70–99)
HCT VFR BLD AUTO: 42.6 % (ref 35–47)
HGB BLD-MCNC: 12.6 G/DL (ref 11.7–15.7)
IMM GRANULOCYTES # BLD: 0 10E9/L (ref 0–0.4)
IMM GRANULOCYTES NFR BLD: 0.2 %
LYMPHOCYTES # BLD AUTO: 1.2 10E9/L (ref 0.8–5.3)
LYMPHOCYTES NFR BLD AUTO: 24.1 %
MCH RBC QN AUTO: 28.8 PG (ref 26.5–33)
MCHC RBC AUTO-ENTMCNC: 29.6 G/DL (ref 31.5–36.5)
MCV RBC AUTO: 97 FL (ref 78–100)
MONOCYTES # BLD AUTO: 0.4 10E9/L (ref 0–1.3)
MONOCYTES NFR BLD AUTO: 7.3 %
NEUTROPHILS # BLD AUTO: 3.2 10E9/L (ref 1.6–8.3)
NEUTROPHILS NFR BLD AUTO: 66.8 %
NRBC # BLD AUTO: 0 10*3/UL
NRBC BLD AUTO-RTO: 0 /100
PLATELET # BLD AUTO: 321 10E9/L (ref 150–450)
POTASSIUM SERPL-SCNC: 4.5 MMOL/L (ref 3.4–5.3)
PROT SERPL-MCNC: 7.4 G/DL (ref 6.8–8.8)
RBC # BLD AUTO: 4.38 10E12/L (ref 3.8–5.2)
SODIUM SERPL-SCNC: 145 MMOL/L (ref 133–144)
WBC # BLD AUTO: 4.8 10E9/L (ref 4–11)

## 2019-05-03 RX ORDER — CARBAMAZEPINE 200 MG/1
CAPSULE, EXTENDED RELEASE ORAL
Qty: 180 CAPSULE | Refills: 3 | Status: SHIPPED | OUTPATIENT
Start: 2019-05-03 | End: 2020-03-24

## 2019-05-03 RX ORDER — CARBAMAZEPINE 300 MG/1
CAPSULE, EXTENDED RELEASE ORAL
Qty: 360 CAPSULE | Refills: 3 | Status: SHIPPED | OUTPATIENT
Start: 2019-05-03 | End: 2020-04-08

## 2019-05-03 RX ORDER — VENLAFAXINE HYDROCHLORIDE 37.5 MG/1
37.5 CAPSULE, EXTENDED RELEASE ORAL
Refills: 0 | COMMUNITY
Start: 2019-04-16 | End: 2019-09-05

## 2019-05-03 RX ORDER — LAMOTRIGINE 100 MG/1
TABLET ORAL
Qty: 720 TABLET | Refills: 3 | Status: SHIPPED | OUTPATIENT
Start: 2019-05-03 | End: 2020-04-08

## 2019-05-03 RX ORDER — LEVETIRACETAM 500 MG/1
TABLET ORAL
Qty: 540 TABLET | Refills: 3 | Status: SHIPPED | OUTPATIENT
Start: 2019-05-03 | End: 2020-04-08

## 2019-05-03 NOTE — PATIENT INSTRUCTIONS
Increase carbamazepine dose, if you have side effects reduce back down to prior dose.     Carbamazepine: 600 mg am, 200 mg noon, and 800 mg pm   Lamotrigine 400 mg am and 200 mg noon and 200 mg night   Levetiracetam 0937-3497        Continue therapy    Find activities in your community for kids and you, downtime       Ashli Wilkinson MD

## 2019-05-03 NOTE — LETTER
5/3/2019     RE: Vernell Logan  : 1980   MRN: [id number]      Dear Colleague,    Thank you for referring your patient, Vernell Logan, to the [facility] EPILEPSY CARE at [facility]. Please see a copy of my visit note below.    [facility] [place] [facility] Progress Note    Patient:  Vernell Logan  :  1980   Age:  38 year old   Today's Office Visit:  5/3/2019    Epilepsy Data:  Patient History  Primary Epileptologist/Provider: Ashli Wilkinson M.D.  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 Last 5 minutes.    # of Type 1 Seizure since last visit: 14  Freq. Type 1 / Month: 0.98  Seizure Type 2: Partial seizures with secondary generalization  -  with complex partial seizures evolving to generalized seizures  Description of Sz Type 2: Stares off -> GTC  # of Type 2 Seizure since last visit: 0  Freq. Type 2 / Month: 0          EPILEPSY HISTORY: Onset of seizures was 14 years of age. Based on electrographic data 2012, the patient had frequent nonconvulsive seizures arising from the right and left temporal lobe, bitemporal lobe interictal discharges. Her MRI is normal. Her PET scan was remarkable for decreased metabolic activity in the left temporal lobe. Prior  she rarely had seizure. From 5363-4809 she had several seizure per week. She had antiepileptic  "drug.     INTERVAL HISTORY:  Came alone.  Twins delivered 4/2018.  She has once complex partial seizure per month.  Complex partial seizure are described as difficulty getting words out, feels confused, but, she is aware if someone is talking to her, she has difficulty with comprehension, she states \"it sounds like they are repeating a word over and over.  No generalized tonic-clonic convulsion, last generalized tonic-clonic convulsion 2014.     She states that she is under a lot of pressure in taking care of the twins is a lot of work.  She rarely has time to herself and she is overwhelmed.  Her fiancé hurt his back and he is not able to help with the kids.  She is doing the majority of the work right now.  She is been cognizant of her emotional help.  She is seeing a therapist and a psychiatrist.  Her psychiatrist has tried her on different antidepressant medicines.On sertraline she had felt her bones were hurting, loss control of her body, sleepy, she had oxygen desaturation. , she had taken sertraline for 6 months, loss of muscle control. She is now Effexor which has been helpful.     No recent ER visits and no hospitalizations since last visit.  She was feeling overwhelmed, crying, Currently, patient  feeling depressed, denies feeling anhedonia, denies, suicidal  thoughts, and denies having feelings of excessive guilt/worthlessness.  She misses her parents and wish they were here.     Prior to Admission medications    Medication Sig Start Date End Date Taking? Authorizing Provider   Acetaminophen (TYLENOL PO) Take by mouth as needed for mild pain or fever   Yes Reported, Patient   carBAMazepine (CARBATROL) 200 MG 12 hr capsule Take 200 mg at night 3/7/19  Yes Ashli Wilkinson MD   carBAMazepine (CARBATROL) 300 MG 12 hr capsule Take 600 mg QAM, 600 mg each afternoon along with one 200 mg cap at HS 3/18/19  Yes Ashli Wilkinson MD   ibuprofen (ADVIL/MOTRIN) 600 MG tablet TAKE 1 TABLET BY MOUTH EVERY 6 HOURS FOR 9 " DOSES 4/30/18  Yes Reported, Patient   lamoTRIgine (LAMICTAL) 100 MG tablet 400 mg am, 300 mg noon, 300 mg pm 1/24/19  Yes Ashli Wilkinson MD   levETIRAcetam (KEPPRA) 500 MG tablet Levetiracetam 1500 mg AM and 1500 HS 2/4/19  Yes Ashli Wilkinson MD   Prenatal Vit-Fe Fumarate-FA (PRENATAL MULTIVITAMIN PLUS IRON) 27-0.8 MG TABS per tablet Take 1 tablet by mouth daily 2/23/18  Yes Ashli Wilkinson MD   venlafaxine (EFFEXOR-XR) 37.5 MG 24 hr capsule  4/16/19  Yes Reported, Patient   VITAMIN C 1000 MG OR TABS 1 TABLET DAILY AT DINNER   Yes Reported, Patient   cephALEXin (KEFLEX) 500 MG capsule Take 1 capsule (500 mg) by mouth 3 times daily  Patient not taking: Reported on 6/8/2018 5/11/18   Alicia Rain DO   diazepam (DIAZEPAM INTENSOL) 5 MG/ML (HIGH CONC) solution Diazepam intensol 5mg/ml: Administer between gum and cheek  1 ml(5 mg)  for any generalized tonic clonic seizure or for more than 2 complex partial seizures within a 4 hour period, repeat once after 10 minutes. This is during pregnancy. Bottle needs to last 90 days!  Patient not taking: Reported on 5/14/2018 11/16/17   Ashli Wilkinson MD   oxyCODONE IR (ROXICODONE) 5 MG tablet Take 5-10 mg by mouth 4/30/18   Reported, Patient   sertraline (ZOLOFT) 50 MG tablet Take 1 tablet (50 mg) by mouth daily  Patient not taking: Reported on 5/3/2019 11/13/18   Alicia Rain DO   sertraline (ZOLOFT) 50 MG tablet Take 1/2 tablet (25 mg) for 1-2 weeks, then increase to 1 tablet orally daily  Patient not taking: Reported on 5/3/2019 6/8/18   Alicia Rain DO         MEDICATIONS:     Prepregnancy antiepileptic drug doses:   Carbamazepine 600-600  Levetiracetam 0214-9166  Lamotrigine 450-400-300       Current antiepileptic drug     Carbamazepine: 300-300-200  Lamotrigine 400-200-200  Levetiracetam 3054-0855    Component      Latest Ref Rng & Units 12/18/2017 1/17/2018 2/16/2018   Carbamazepine Total Level      4.0 - 12.0 ug/mL 7.9 6.6 6.3   10, 11 Epoxide  Level      0.4 - 4.0 ug/mL 2.2 2.0 1.9   Free Carbamazepine Level Ug/Ml      0.6 - 4.2 ug/mL 2.0 1.8 1.5   Free Epoxide Level      0.2 - 2.0 ug/mL 1.1 1.2 1.0   Lamotrigine Level      2.5 - 15.0 ug/mL 5.5 7.8 7.2   Keppra (Levetiracetam) Level      12 - 46 ug/mL 27 16 28           REVIEW OF SYSTEMS:  Intermittent dizziness.  diplopia improved.  B/l hand tremor improved.  Anxiety. Hip pain, fatigue. Depressed. Stressed. Unstable gait, sleeping more, nausea, no headaches, no joint pain, no suicidal ideations.      SOCIAL HISTORY: She is not working. She is engaged.  She smokes 1/2 pack of cigarettes per day. She is living alone. Pregnant. Stressors in last five years:  Ricardo passed away 11/2015. Unplanned pregnancy. She lost her disability benefits and she is very worried about this.      PHYSICAL EXAMINATION:  /75 (BP Location: Right arm, Patient Position: Chair, Cuff Size: Adult Regular)   Pulse 71   LMP 04/22/2019 (Exact Date)   Breastfeeding? No   Pregnant. Alert, orientated, speech is fluent, face is symmetric, extra-ocular movement in tact, no focal deficits noted.Gait is stable.    ASSESSMENT:   Focal Epilepsy with loss of awareness   Depression   Anxiety   History of eating disorder       Discussion: Focal epilepsy with impairment in awareness.  She has a seizure every month due to increased stress, sleep deprivation, and she had to reduce her lamotrigine due to double vision.  Etiology of her seizures is unclear and her MRI of the brain is nonlesional.  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  No generalized tonic-clonic convulsion. Her last generalized tonic-clonic convulsion was 2014.  We will increase her carbamazepine dose.  We are not able to further increase lamotrigine due to sensitive side effects and we are not able to increase levetiracetam because of worsening depression.  If needed, antiepileptic drug that may be considered in future:  banzel, onfi, fycompa, felbamate. I would avoid additional Na+ blocking agents.     After the birth of her twins she has had an increase in depression and is feeling overwhelmed.  She is taking Effexor and seeing a mental health care provider and psychiatrist. she is not suicidal not homicidal.  Overall she does lack support from her family but her friends have been helpful - Isadora Saleem, mother in law, Milagros.         PLAN:   1. Increase carbamazepine by 100 mg. She is not able to tolerate higher dose of lamotrigine (double vision) and levetiracetam (mood issues)     Carbamazepine: 300-300-300  Lamotrigine 400-200-200  Levetiracetam 9750-3250      3. Enrolled in UMPgenotyping study 2016      PEAM THORNE MD       I spent 35 minutes with the patient and family. During this time counseling and coordination of care exceeded 50% of the visit time. I addressed all questions and concerns the family raised in regards to the patients care.

## 2019-05-03 NOTE — PROGRESS NOTES
[facility] Epilepsy Care Progress Note    Patient:  Vernell Logan  :  1980   Age:  38 year old   Today's Office Visit:  5/3/2019    Epilepsy Data:  Patient History  Primary Epileptologist/Provider: Ashli Wilkinson M.D.  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 Last 5 minutes.    # of Type 1 Seizure since last visit: 14  Freq. Type 1 / Month: 0.98  Seizure Type 2: Partial seizures with secondary generalization  -  with complex partial seizures evolving to generalized seizures  Description of Sz Type 2: Stares off -> GTC  # of Type 2 Seizure since last visit: 0  Freq. Type 2 / Month: 0          EPILEPSY HISTORY: Onset of seizures was 14 years of age. Based on electrographic data 2012, the patient had frequent nonconvulsive seizures arising from the right and left temporal lobe, bitemporal lobe interictal discharges. Her MRI is normal. Her PET scan was remarkable for decreased metabolic activity in the left temporal lobe. Prior  she rarely had seizure. From 0596-2420 she had several seizure per week. She had antiepileptic drug.     INTERVAL HISTORY:  Came alone.  Twins delivered 2018.  She has once complex partial seizure per month.  Complex partial seizure are described as difficulty getting words out, feels confused, but, she is aware if someone is talking to her, she has  "difficulty with comprehension, she states \"it sounds like they are repeating a word over and over.  No generalized tonic-clonic convulsion, last generalized tonic-clonic convulsion 2014.     She states that she is under a lot of pressure in taking care of the twins is a lot of work.  She rarely has time to herself and she is overwhelmed.  Her fiancé hurt his back and he is not able to help with the kids.  She is doing the majority of the work right now.  She is been cognizant of her emotional help.  She is seeing a therapist and a psychiatrist.  Her psychiatrist has tried her on different antidepressant medicines.On sertraline she had felt her bones were hurting, loss control of her body, sleepy, she had oxygen desaturation. , she had taken sertraline for 6 months, loss of muscle control. She is now Effexor which has been helpful.     No recent ER visits and no hospitalizations since last visit.  She was feeling overwhelmed, crying, Currently, patient  feeling depressed, denies feeling anhedonia, denies, suicidal  thoughts, and denies having feelings of excessive guilt/worthlessness.  She misses her parents and wish they were here.     Prior to Admission medications    Medication Sig Start Date End Date Taking? Authorizing Provider   Acetaminophen (TYLENOL PO) Take by mouth as needed for mild pain or fever   Yes Reported, Patient   carBAMazepine (CARBATROL) 200 MG 12 hr capsule Take 200 mg at night 3/7/19  Yes Ashli Wilkinson MD   carBAMazepine (CARBATROL) 300 MG 12 hr capsule Take 600 mg QAM, 600 mg each afternoon along with one 200 mg cap at HS 3/18/19  Yes Ashli Wilkinson MD   ibuprofen (ADVIL/MOTRIN) 600 MG tablet TAKE 1 TABLET BY MOUTH EVERY 6 HOURS FOR 9 DOSES 4/30/18  Yes Reported, Patient   lamoTRIgine (LAMICTAL) 100 MG tablet 400 mg am, 300 mg noon, 300 mg pm 1/24/19  Yes Ashli Wilkinson MD   levETIRAcetam (KEPPRA) 500 MG tablet Levetiracetam 1500 mg AM and 1500 HS 2/4/19  Yes Ashli Wilkinson MD   Prenatal " Vit-Fe Fumarate-FA (PRENATAL MULTIVITAMIN PLUS IRON) 27-0.8 MG TABS per tablet Take 1 tablet by mouth daily 2/23/18  Yes Ashli Wilkinson MD   venlafaxine (EFFEXOR-XR) 37.5 MG 24 hr capsule  4/16/19  Yes Reported, Patient   VITAMIN C 1000 MG OR TABS 1 TABLET DAILY AT DINNER   Yes Reported, Patient   cephALEXin (KEFLEX) 500 MG capsule Take 1 capsule (500 mg) by mouth 3 times daily  Patient not taking: Reported on 6/8/2018 5/11/18   Alicia Rain DO   diazepam (DIAZEPAM INTENSOL) 5 MG/ML (HIGH CONC) solution Diazepam intensol 5mg/ml: Administer between gum and cheek  1 ml(5 mg)  for any generalized tonic clonic seizure or for more than 2 complex partial seizures within a 4 hour period, repeat once after 10 minutes. This is during pregnancy. Bottle needs to last 90 days!  Patient not taking: Reported on 5/14/2018 11/16/17   Ashli Wilkinson MD   oxyCODONE IR (ROXICODONE) 5 MG tablet Take 5-10 mg by mouth 4/30/18   Reported, Patient   sertraline (ZOLOFT) 50 MG tablet Take 1 tablet (50 mg) by mouth daily  Patient not taking: Reported on 5/3/2019 11/13/18   Alicia Rain DO   sertraline (ZOLOFT) 50 MG tablet Take 1/2 tablet (25 mg) for 1-2 weeks, then increase to 1 tablet orally daily  Patient not taking: Reported on 5/3/2019 6/8/18   Alicia Rain DO         MEDICATIONS:     Prepregnancy antiepileptic drug doses:   Carbamazepine 600-600  Levetiracetam 1656-8497  Lamotrigine 450-400-300       Current antiepileptic drug     Carbamazepine: 300-300-200  Lamotrigine 400-200-200  Levetiracetam 9178-8765    Component      Latest Ref Rng & Units 12/18/2017 1/17/2018 2/16/2018   Carbamazepine Total Level      4.0 - 12.0 ug/mL 7.9 6.6 6.3   10, 11 Epoxide Level      0.4 - 4.0 ug/mL 2.2 2.0 1.9   Free Carbamazepine Level Ug/Ml      0.6 - 4.2 ug/mL 2.0 1.8 1.5   Free Epoxide Level      0.2 - 2.0 ug/mL 1.1 1.2 1.0   Lamotrigine Level      2.5 - 15.0 ug/mL 5.5 7.8 7.2   Keppra (Levetiracetam) Level      12 - 46  ug/mL 27 16 28           REVIEW OF SYSTEMS:  Intermittent dizziness.  diplopia improved.  B/l hand tremor improved.  Anxiety. Hip pain, fatigue. Depressed. Stressed. Unstable gait, sleeping more, nausea, no headaches, no joint pain, no suicidal ideations.      SOCIAL HISTORY: She is not working. She is engaged.  She smokes 1/2 pack of cigarettes per day. She is living alone. Pregnant. Stressors in last five years:  Ricardo passed away 11/2015. Unplanned pregnancy. She lost her disability benefits and she is very worried about this.      PHYSICAL EXAMINATION:  /75 (BP Location: Right arm, Patient Position: Chair, Cuff Size: Adult Regular)   Pulse 71   LMP 04/22/2019 (Exact Date)   Breastfeeding? No   Pregnant. Alert, orientated, speech is fluent, face is symmetric, extra-ocular movement in tact, no focal deficits noted.Gait is stable.    ASSESSMENT:   Focal Epilepsy with loss of awareness   Depression   Anxiety   History of eating disorder       Discussion: Focal epilepsy with impairment in awareness.  She has a seizure every month due to increased stress, sleep deprivation, and she had to reduce her lamotrigine due to double vision.  Etiology of her seizures is unclear and her MRI of the brain is nonlesional.  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  No generalized tonic-clonic convulsion. Her last generalized tonic-clonic convulsion was 2014.  We will increase her carbamazepine dose.  We are not able to further increase lamotrigine due to sensitive side effects and we are not able to increase levetiracetam because of worsening depression.  If needed, antiepileptic drug that may be considered in future: banzel, onfi, fycompa, felbamate. I would avoid additional Na+ blocking agents.     After the birth of her twins she has had an increase in depression and is feeling overwhelmed.  She is taking Effexor and seeing a mental health care provider and psychiatrist.  she is not suicidal not homicidal.  Overall she does lack support from her family but her friends have been helpful - Stiven Isadora, mother in law, Milagros.         PLAN:   1. Increase carbamazepine by 100 mg. She is not able to tolerate higher dose of lamotrigine (double vision) and levetiracetam (mood issues)     Carbamazepine: 300-300-300  Lamotrigine 400-200-200  Levetiracetam 5478-3318      3. Enrolled in UMPgenotyping study 2016        PEMA THORNE MD       I spent 35 minutes with the patient and family. During this time counseling and coordination of care exceeded 50% of the visit time. I addressed all questions and concerns the family raised in regards to the patients care.

## 2019-05-05 LAB
LAMOTRIGINE SERPL-MCNC: 5.9 UG/ML (ref 2.5–15)
LEVETIRACETAM SERPL-MCNC: 51 UG/ML (ref 12–46)

## 2019-05-08 LAB
CARBAMAZEPINE EP FREE SERPL-MCNC: 0.9 UG/ML (ref 0.2–2)
CARBAMAZEPINE EP SERPL-MCNC: 2.1 UG/ML (ref 0.4–4)
CARBAMAZEPINE FREE SERPL-MCNC: 1.5 UG/ML (ref 0.6–4.2)
CARBAMAZEPINE SERPL-MCNC: 7.4 UG/ML (ref 4–12)

## 2019-06-14 ENCOUNTER — TELEPHONE (OUTPATIENT)
Dept: OBGYN | Facility: CLINIC | Age: 39
End: 2019-06-14

## 2019-06-14 NOTE — TELEPHONE ENCOUNTER
Pt is past due for Pap follow up  Reminder letter has been sent  LMTC her clinic with any questions or to schedule    Katherine Christy,   Pap Tracking

## 2019-07-15 ENCOUNTER — TELEPHONE (OUTPATIENT)
Dept: NEUROLOGY | Facility: CLINIC | Age: 39
End: 2019-07-15

## 2019-07-15 NOTE — TELEPHONE ENCOUNTER
Patient calls today to request a prior authorization for her Carbamazepine. She currently is taking both 200 mg and 300 mg tablets: 600 am - 200 noon- 800 pm.    PLAN: Send request to PA team to initiate PA.

## 2019-07-15 NOTE — TELEPHONE ENCOUNTER
[facility] Call Center    Phone Message    May a detailed message be left on voicemail: yes    Reason for Call: Patient received a letter from her insurance that they were not going to cover these meds for her. Would like a call from a nurse to discuss this.    Action Taken: Other: Yunier Kat

## 2019-07-15 NOTE — TELEPHONE ENCOUNTER
Prior Authorization Retail Medication Request    Medication/Dose: Carbamazepine 200 and 300 MG  ICD code (if different than what is on RX): G40,219  Previously Tried and Failed:    Rationale:      Insurance Name:  Blue Plus [employer]age  Insurance ID: [id number]      Pharmacy Information (if different than what is on RX)    [facility] [id number] [place] [address] - [address] [facility] [address] (Ph: [phone]

## 2019-07-19 ENCOUNTER — TELEPHONE (OUTPATIENT)
Dept: NEUROLOGY | Facility: CLINIC | Age: 39
End: 2019-07-19

## 2019-07-19 NOTE — TELEPHONE ENCOUNTER
Central Prior Authorization Team   Phone: [phone]    PA Initiation    Medication: carBAMazepine (CARBATROL) 200 MG 12 hr capsule  Insurance Company: Blue Plus PMA - Phone [phone] Fax [phone]  Pharmacy Filling the Rx: [facility] [address] [place] [address] - [address] [place] [address] [address]  Filling Pharmacy Phone: [phone]  Filling Pharmacy Fax:    Start Date: 7/19/2019

## 2019-07-19 NOTE — TELEPHONE ENCOUNTER
Central Prior Authorization Team   Phone: [phone]    PA Initiation    Medication: carBAMazepine (CARBATROL) 300 MG 12 hr capsule  Insurance Company: Blue Plus PMA - Phone [phone] Fax [phone]  Pharmacy Filling the Rx: [facility] [address] [place] [address] - [address] [place] [address] [address]  Filling Pharmacy Phone: [phone]  Filling Pharmacy Fax:    Start Date: 7/19/2019

## 2019-07-22 NOTE — TELEPHONE ENCOUNTER
Prior Authorization Approval    Authorization Effective Date: 4/19/2019  Authorization Expiration Date: 7/19/2020  Medication: carBAMazepine (CARBATROL) 200 MG 12 hr capsule - P/A APPROVED  Approved Dose/Quantity:   Reference #: [id number]   Insurance Company: Blue Plus PMAP - Phone [phone] Fax [phone]  Expected CoPay:       CoPay Card Available:      Foundation Assistance Needed:    Which Pharmacy is filling the prescription (Not needed for infusion/clinic administered): [facility] [address] [place] [address] [address]  Pharmacy Notified: Yes  Patient Notified:

## 2019-07-22 NOTE — TELEPHONE ENCOUNTER
Prior Authorization Approval    Authorization Effective Date: 4/19/2019  Authorization Expiration Date: 7/19/2020  Medication: carBAMazepine (CARBATROL) 300 MG 12 hr capsule - P/A APPROVED  Approved Dose/Quantity:   Reference #: [id number]   Insurance Company: Blue Plus PMAP - Phone [phone] Fax [phone]  Expected CoPay:       CoPay Card Available:      Foundation Assistance Needed:    Which Pharmacy is filling the prescription (Not needed for infusion/clinic administered): [facility] [address] [place] [address] [address]  Pharmacy Notified: Yes  Patient Notified:

## 2019-07-24 ENCOUNTER — TELEPHONE (OUTPATIENT)
Dept: NEUROLOGY | Facility: CLINIC | Age: 39
End: 2019-07-24
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
## 2019-07-24 NOTE — TELEPHONE ENCOUNTER
Patient calls the clinic today to ask if Chantix is an option for her to use for smoking cessation. She will see her PCP tomorrow to discuss this further as well. I advised that Chantix can lower the seizure threshold so it is not advisable to use this medication. She then states that she has used this in the past without worsening seizures. I again advised that previous use is helpful information to determine the risk benefit, however, it is not recommended to use this medication. I reported to her that I will call her back if Dr. Wilkinson feels that this medication is ok to use. She verbalized understanding.

## 2019-07-24 NOTE — TELEPHONE ENCOUNTER
What is the concern that needs to be addressed by a nurse? Pt is wondering is it safe to take Chantix.    May a detailed message be left on voicemail? yes    Date of last office visit:     Message routed to: MINCEP RN

## 2019-07-24 NOTE — PATIENT INSTRUCTIONS
Preventive Health Recommendations  Female Ages 26 - 39  Yearly exam:   See your health care provider every year in order to    Review health changes.     Discuss preventive care.      Review your medicines if you your doctor has prescribed any.    Until age 30: Get a Pap test every three years (more often if you have had an abnormal result).    After age 30: Talk to your doctor about whether you should have a Pap test every 3 years or have a Pap test with HPV screening every 5 years.   You do not need a Pap test if your uterus was removed (hysterectomy) and you have not had cancer.  You should be tested each year for STDs (sexually transmitted diseases), if you're at risk.   Talk to your provider about how often to have your cholesterol checked.  If you are at risk for diabetes, you should have a diabetes test (fasting glucose).  Shots: Get a flu shot each year. Get a tetanus shot every 10 years.   Nutrition:     Eat at least 5 servings of fruits and vegetables each day.    Eat whole-grain bread, whole-wheat pasta and brown rice instead of white grains and rice.    Get adequate Calcium and Vitamin D.     Lifestyle    Exercise at least 150 minutes a week (30 minutes a day, 5 days of the week). This will help you control your weight and prevent disease.    Limit alcohol to one drink per day.    No smoking.     Wear sunscreen to prevent skin cancer.    See your dentist every six months for an exam and cleaning.  ---    Start the chantix starter pack.   Call when you need the continuation pack  Congrats on efforts to quit!    Breathing-  Changes from smoking  Start the inhaler 2 puffs twice daily.  Rinse mouth after using/brush teeth. Can cause thrush.   See me in 1 month to follow up

## 2019-07-24 NOTE — TELEPHONE ENCOUNTER
This was discussed with Dr. Wilkinson whom states the patient may try using Chantix for smoking cessation. I shared this information with the patient.

## 2019-07-25 ENCOUNTER — ANCILLARY PROCEDURE (OUTPATIENT)
Dept: GENERAL RADIOLOGY | Facility: CLINIC | Age: 39
End: 2019-07-25
Attending: PHYSICIAN ASSISTANT
Payer: COMMERCIAL

## 2019-07-25 ENCOUNTER — OFFICE VISIT (OUTPATIENT)
Dept: FAMILY MEDICINE | Facility: CLINIC | Age: 39
End: 2019-07-25
Payer: COMMERCIAL

## 2019-07-25 VITALS
OXYGEN SATURATION: 96 % | DIASTOLIC BLOOD PRESSURE: 62 MMHG | RESPIRATION RATE: 18 BRPM | TEMPERATURE: 98.2 F | HEART RATE: 78 BPM | BODY MASS INDEX: 18.38 KG/M2 | SYSTOLIC BLOOD PRESSURE: 90 MMHG | WEIGHT: 110.3 LBS | HEIGHT: 65 IN

## 2019-07-25 DIAGNOSIS — R05.3 CHRONIC COUGH: ICD-10-CM

## 2019-07-25 DIAGNOSIS — F17.200 TOBACCO USE DISORDER: ICD-10-CM

## 2019-07-25 DIAGNOSIS — R63.0 ANOREXIA: ICD-10-CM

## 2019-07-25 DIAGNOSIS — D48.5 NEOPLASM OF UNCERTAIN BEHAVIOR OF SKIN: ICD-10-CM

## 2019-07-25 DIAGNOSIS — Z12.4 SCREENING FOR MALIGNANT NEOPLASM OF CERVIX: ICD-10-CM

## 2019-07-25 DIAGNOSIS — Z00.00 ROUTINE GENERAL MEDICAL EXAMINATION AT A HEALTH CARE FACILITY: Primary | ICD-10-CM

## 2019-07-25 DIAGNOSIS — Z23 NEED FOR TETANUS BOOSTER: ICD-10-CM

## 2019-07-25 LAB
ANION GAP SERPL CALCULATED.3IONS-SCNC: 7 MMOL/L (ref 3–14)
BASOPHILS # BLD AUTO: 0 10E9/L (ref 0–0.2)
BASOPHILS NFR BLD AUTO: 0.4 %
BUN SERPL-MCNC: 19 MG/DL (ref 7–30)
CALCIUM SERPL-MCNC: 8.7 MG/DL (ref 8.5–10.1)
CHLORIDE SERPL-SCNC: 106 MMOL/L (ref 94–109)
CHOLEST SERPL-MCNC: 255 MG/DL
CO2 SERPL-SCNC: 26 MMOL/L (ref 20–32)
CREAT SERPL-MCNC: 0.78 MG/DL (ref 0.52–1.04)
DIFFERENTIAL METHOD BLD: NORMAL
EOSINOPHIL # BLD AUTO: 0.1 10E9/L (ref 0–0.7)
EOSINOPHIL NFR BLD AUTO: 1 %
ERYTHROCYTE [DISTWIDTH] IN BLOOD BY AUTOMATED COUNT: 13.3 % (ref 10–15)
FEF 25/75: 1.77
FEV-1: 2.42
FEV1/FVC: NORMAL
FVC: 1.85
GFR SERPL CREATININE-BSD FRML MDRD: >90 ML/MIN/{1.73_M2}
GLUCOSE SERPL-MCNC: 75 MG/DL (ref 70–99)
HBA1C MFR BLD: 4.9 % (ref 0–5.6)
HCT VFR BLD AUTO: 37.5 % (ref 35–47)
HDLC SERPL-MCNC: 53 MG/DL
HGB BLD-MCNC: 12 G/DL (ref 11.7–15.7)
LDLC SERPL CALC-MCNC: 151 MG/DL
LYMPHOCYTES # BLD AUTO: 1.4 10E9/L (ref 0.8–5.3)
LYMPHOCYTES NFR BLD AUTO: 26.8 %
MCH RBC QN AUTO: 28.3 PG (ref 26.5–33)
MCHC RBC AUTO-ENTMCNC: 32 G/DL (ref 31.5–36.5)
MCV RBC AUTO: 88 FL (ref 78–100)
MONOCYTES # BLD AUTO: 0.4 10E9/L (ref 0–1.3)
MONOCYTES NFR BLD AUTO: 7.2 %
NEUTROPHILS # BLD AUTO: 3.3 10E9/L (ref 1.6–8.3)
NEUTROPHILS NFR BLD AUTO: 64.6 %
NONHDLC SERPL-MCNC: 202 MG/DL
PLATELET # BLD AUTO: 379 10E9/L (ref 150–450)
POTASSIUM SERPL-SCNC: 4.4 MMOL/L (ref 3.4–5.3)
RBC # BLD AUTO: 4.24 10E12/L (ref 3.8–5.2)
SODIUM SERPL-SCNC: 139 MMOL/L (ref 133–144)
TRIGL SERPL-MCNC: 254 MG/DL
TSH SERPL DL<=0.005 MIU/L-ACNC: 0.96 MU/L (ref 0.4–4)
WBC # BLD AUTO: 5 10E9/L (ref 4–11)

## 2019-07-25 PROCEDURE — 90715 TDAP VACCINE 7 YRS/> IM: CPT | Performed by: PHYSICIAN ASSISTANT

## 2019-07-25 PROCEDURE — 84443 ASSAY THYROID STIM HORMONE: CPT | Performed by: PHYSICIAN ASSISTANT

## 2019-07-25 PROCEDURE — 80048 BASIC METABOLIC PNL TOTAL CA: CPT | Performed by: PHYSICIAN ASSISTANT

## 2019-07-25 PROCEDURE — 85025 COMPLETE CBC W/AUTO DIFF WBC: CPT | Performed by: PHYSICIAN ASSISTANT

## 2019-07-25 PROCEDURE — 90471 IMMUNIZATION ADMIN: CPT | Performed by: PHYSICIAN ASSISTANT

## 2019-07-25 PROCEDURE — 80061 LIPID PANEL: CPT | Performed by: PHYSICIAN ASSISTANT

## 2019-07-25 PROCEDURE — 71046 X-RAY EXAM CHEST 2 VIEWS: CPT | Mod: FY

## 2019-07-25 PROCEDURE — 99214 OFFICE O/P EST MOD 30 MIN: CPT | Mod: 25 | Performed by: PHYSICIAN ASSISTANT

## 2019-07-25 PROCEDURE — 94010 BREATHING CAPACITY TEST: CPT | Performed by: PHYSICIAN ASSISTANT

## 2019-07-25 PROCEDURE — 83036 HEMOGLOBIN GLYCOSYLATED A1C: CPT | Performed by: PHYSICIAN ASSISTANT

## 2019-07-25 PROCEDURE — 36415 COLL VENOUS BLD VENIPUNCTURE: CPT | Performed by: PHYSICIAN ASSISTANT

## 2019-07-25 PROCEDURE — 99395 PREV VISIT EST AGE 18-39: CPT | Mod: 25 | Performed by: PHYSICIAN ASSISTANT

## 2019-07-25 RX ORDER — BUDESONIDE AND FORMOTEROL FUMARATE DIHYDRATE 80; 4.5 UG/1; UG/1
2 AEROSOL RESPIRATORY (INHALATION) 2 TIMES DAILY
Qty: 1 INHALER | Refills: 0 | Status: SHIPPED | OUTPATIENT
Start: 2019-07-25 | End: 2019-09-04

## 2019-07-25 ASSESSMENT — ANXIETY QUESTIONNAIRES
GAD7 TOTAL SCORE: 10
4. TROUBLE RELAXING: SEVERAL DAYS
2. NOT BEING ABLE TO STOP OR CONTROL WORRYING: SEVERAL DAYS
7. FEELING AFRAID AS IF SOMETHING AWFUL MIGHT HAPPEN: SEVERAL DAYS
1. FEELING NERVOUS, ANXIOUS, OR ON EDGE: MORE THAN HALF THE DAYS
5. BEING SO RESTLESS THAT IT IS HARD TO SIT STILL: SEVERAL DAYS
GAD7 TOTAL SCORE: 10
6. BECOMING EASILY ANNOYED OR IRRITABLE: MORE THAN HALF THE DAYS
3. WORRYING TOO MUCH ABOUT DIFFERENT THINGS: MORE THAN HALF THE DAYS
GAD7 TOTAL SCORE: 10
7. FEELING AFRAID AS IF SOMETHING AWFUL MIGHT HAPPEN: SEVERAL DAYS

## 2019-07-25 ASSESSMENT — ENCOUNTER SYMPTOMS
BREAST MASS: 0
FEVER: 0
HEADACHES: 1
FREQUENCY: 0
HEARTBURN: 0
MYALGIAS: 1
NAUSEA: 0
HEMATOCHEZIA: 0
SHORTNESS OF BREATH: 0
DIZZINESS: 0
COUGH: 0
HEMATURIA: 0
ARTHRALGIAS: 1
CONSTIPATION: 0
NERVOUS/ANXIOUS: 1
JOINT SWELLING: 0
ABDOMINAL PAIN: 0
DIARRHEA: 0
SORE THROAT: 0
DYSURIA: 0
EYE PAIN: 0
PALPITATIONS: 0
PARESTHESIAS: 0
WEAKNESS: 1
CHILLS: 0

## 2019-07-25 ASSESSMENT — MIFFLIN-ST. JEOR: SCORE: 1168.26

## 2019-07-25 ASSESSMENT — ACTIVITIES OF DAILY LIVING (ADL): CURRENT_FUNCTION: TELEPHONE REQUIRES ASSISTANCE

## 2019-07-25 ASSESSMENT — PATIENT HEALTH QUESTIONNAIRE - PHQ9
SUM OF ALL RESPONSES TO PHQ QUESTIONS 1-9: 11
SUM OF ALL RESPONSES TO PHQ QUESTIONS 1-9: 11

## 2019-07-26 ASSESSMENT — ANXIETY QUESTIONNAIRES: GAD7 TOTAL SCORE: 10

## 2019-07-26 ASSESSMENT — PATIENT HEALTH QUESTIONNAIRE - PHQ9: SUM OF ALL RESPONSES TO PHQ QUESTIONS 1-9: 11

## 2019-08-15 NOTE — PROGRESS NOTES
"Subjective     Vernell Logan is a 38 year old female who presents to clinic today for the following health issues:    Patient see therapist for her depression and anxiety.   History of Present Illness        Mental Health Follow-up:  Patient presents to follow-up on Depression & Anxiety.Patient's depression since last visit has been:  Worse  The patient is having other symptoms associated with depression. (fatigue)  Patient's anxiety since last visit has been:  Worse  The patient is not having other symptoms associated with anxiety.  Any significant life events: No         Social History  Tobacco Use    Smoking status: Current Every Day Smoker      Packs/day: 1.00      Years: 26.00      Pack years: 26      Types: Cigarettes    Smokeless tobacco: Never Used    Tobacco comment: 1 pack   Alcohol use: Not Currently    Comment: 0-2 per year   Drug use: No      Today's PHQ-9         PHQ-9 Total Score:     (P) 15   PHQ-9 Q9 Thoughts of better off dead/self-harm past 2 weeks :   (P) Several days   Thoughts of suicide or self harm:  (P) Yes   Self-harm Plan:    (P) Yes   Self-harm Action:      (P) No   Safety concerns for self or others: (P) Yes         She eats 2-3 servings of fruits and vegetables daily.She consumes 0 sweetened beverage(s) daily.  She is taking medications regularly.    Having a hard time with depression- this is a \"down time\" with my depression   Therapist and psychiatrist are aware.  Saw psychiatrist yesterday- \"Maura\". Adjusted her meds. They discussed possible inpatient care. [facility]. She asked if Vernell would be willing to go to the hospital. They did not set anything up.   effexor - increased dose from 37.5mg to 75mg daily.   She has thought about it more and today is interested in an inpatent program.   She has had more suicidal thoughts. \"I could take pills.\" feeling of guilt. I've lost so many loved ones. I want to be with them. BUt then guilt because my kids need me. " "Conflicting thoughts \"I know they don't make sense\".   Has close friend and neighbor who can bring her to the ER. Significant other is home w/their kids.   Hx of suicide attempt years ago- attempted overdose. Took the pills then \"I regretted it and my  took me to the ER\".    Smoking   Chantix - Not sure if it is helping.   Maybe smoking a little less. Hard to say. Not sure if chantix is a factor in the depression being worse. I've had depression so long with so many ups and downs I can't tell.   Not feeling like need to light up everytime I am in my car.  Less feeling of \"needing it\" in certain situations  Vivid dreams, not bad dreams    Chronic cough-  Using inhaler 2x per week.   Forgets  When she remembers it helps.     Patient Active Problem List   Diagnosis     Tobacco use disorder     CARDIOVASCULAR SCREENING; LDL GOAL LESS THAN 160     Status post left breast biopsy,sclerosing adenosis not concordant with imaging,12     Major depressive disorder, recurrent episode, mild (H)     Generalized anxiety disorder     Health Care Home     Anorexia     Major depressive disorder, single episode, moderate (H)     Partial epilepsy with impairment of consciousness, intractable (H)     Vitamin D deficiency     H/O:      Cervical high risk HPV (human papillomavirus) test positive     Past Surgical History:   Procedure Laterality Date     C/SECTION, LOW TRANSVERSE  , 2018    x 2      SECTION, TUBAL LIGATION, COMBINED         Social History     Tobacco Use     Smoking status: Current Every Day Smoker     Packs/day: 1.00     Years: 26.00     Pack years: 26.00     Types: Cigarettes     Smokeless tobacco: Never Used     Tobacco comment: 1 pack    Substance Use Topics     Alcohol use: Not Currently     Comment: 0-2 per year      Family History   Problem Relation Age of Onset     Heart Disease Father         62 yo when he had arrhythmia,  of CHF age 65     Diabetes Father      Neurologic " Disorder Father         epilepsy     Schizophrenia Father      Anxiety Disorder Father      Hypertension Father      Obesity Father      Cancer Maternal Grandfather      Cerebrovascular Disease Maternal Grandfather      Cerebrovascular Disease Paternal Grandmother      Heart Disease Paternal Grandfather      Psychotic Disorder Daughter      Neurologic Disorder Daughter         CP, she was adopted out in 1998     Schizophrenia Mother      Anxiety Disorder Mother      Depression Mother          Current Outpatient Medications   Medication Sig Dispense Refill     Acetaminophen (TYLENOL PO) Take by mouth as needed for mild pain or fever       budesonide-formoterol (SYMBICORT) 80-4.5 MCG/ACT Inhaler Inhale 2 puffs into the lungs 2 times daily 1 Inhaler 0     carBAMazepine (CARBATROL) 200 MG 12 hr capsule Take 200 mg at noon and night 180 capsule 3     carBAMazepine (CARBATROL) 300 MG 12 hr capsule Take 600 mg QAM, 600 mg each afternoon along with one 200 mg cap at noon and evening. 360 capsule 3     ibuprofen (ADVIL/MOTRIN) 200 MG capsule TAKE 1 TABLET BY MOUTH EVERY 6 HOURS FOR 9 DOSES  0     lamoTRIgine (LAMICTAL) 100 MG tablet 400 mg am, 200 mg noon, 200 mg pm 720 tablet 3     levETIRAcetam (KEPPRA) 500 MG tablet Levetiracetam 1500 mg AM and 1500  tablet 3     Prenatal Vit-Fe Fumarate-FA (PRENATAL MULTIVITAMIN PLUS IRON) 27-0.8 MG TABS per tablet Take 1 tablet by mouth daily 100 tablet 3     varenicline (CHANTIX SHELBIE) 0.5 MG X 11 & 1 MG X 42 tablet Take 0.5 mg tab daily for 3 days, THEN 0.5 mg tab twice daily for 4 days, THEN 1 mg twice daily. 53 tablet 0     varenicline (CHANTIX) 1 MG tablet Take 1 tablet (1 mg) by mouth 2 times daily 60 tablet 1     venlafaxine (EFFEXOR-XR) 37.5 MG 24 hr capsule 37.5 mg Take one tablet twice a day ( once in morning and one in evening ).  0     busPIRone (BUSPAR) 5 MG tablet TK 1 T PO BID  2     diazepam (DIAZEPAM INTENSOL) 5 MG/ML (HIGH CONC) solution Diazepam intensol 5mg/ml:  "Administer between gum and cheek  1 ml(5 mg)  for any generalized tonic clonic seizure or for more than 2 complex partial seizures within a 4 hour period, repeat once after 10 minutes. This is during pregnancy. Bottle needs to last 90 days! (Patient not taking: Reported on 5/14/2018) 30 mL 1     VITAMIN C 1000 MG OR TABS 1 TABLET DAILY AT DINNER       No Known Allergies  BP Readings from Last 3 Encounters:   08/21/19 90/56   07/25/19 90/62   05/03/19 115/75    Wt Readings from Last 3 Encounters:   08/21/19 49.4 kg (109 lb)   07/25/19 50 kg (110 lb 4.8 oz)   06/08/18 57.4 kg (126 lb 8 oz)                    Reviewed and updated as needed this visit by Provider         Review of Systems   ROS COMP: Constitutional, HEENT, cardiovascular, pulmonary, gi and gu systems are negative, except as otherwise noted.      Objective    BP 90/56   Pulse 70   Temp 98  F (36.7  C) (Oral)   Resp 20   Ht 1.626 m (5' 4\")   Wt 49.4 kg (109 lb)   LMP 08/10/2019   SpO2 98%   BMI 18.71 kg/m    Body mass index is 18.71 kg/m .  Physical Exam   GENERAL: thin, well appearing, alert and no distress  EYES: Eyes grossly normal to inspection, PERRL and conjunctivae and sclerae normal  NECK: no adenopathy, no asymmetry, masses, or scars and thyroid normal to palpation  RESP: lungs clear to auscultation - no rales, rhonchi or wheezes  CV: regular rate and rhythm, normal S1 S2, no S3 or S4, no murmur, click or rub, no peripheral edema and peripheral pulses strong  ABDOMEN: soft, slender, nontender, no hepatosplenomegaly, no masses and bowel sounds normal  MS: no gross musculoskeletal defects noted, no edema  NEURO: Normal strength and tone, mentation intact and speech normal  PSYCH: mentation appears normal, affect down depressed. Eye contact good. Briefly tears up. No agitation. SI with plans. No HI.     Diagnostic Test Results:  Labs reviewed in Epic        Assessment & Plan     1. Severe episode of recurrent major depressive disorder, " "without psychotic features (H)  2. Suicidal ideation  Patient was seen yesterday by her psychiatric provider. They discussed going inpatient but did not arrange for that yesterday and instead changed dose on her medications.  After thinking about it overnight she thinks she needs inpatient management    She has a close friend (Isadora MAYS) who she contacted while I was present at the visit who will meet her at home and bring her to the ER. She contracted for safety to get her self home \"we only have 1 car (Stiven) needs it and I want to kiss my kids\". Her friend is meeting her at home.   She has no imminent intent. She is motivated to feel better. She contracted for safety to get herself home to her friend    3. Tobacco abuse  Not sure chantix is helping  Discussed may be factor in worsening mood  Consider discontinuing.   - Tobacco Cessation - Order to Satisfy Health Maintenance    4. Tobacco abuse counseling  As above       Tobacco Cessation:   reports that she has been smoking cigarettes.  She has a 26.00 pack-year smoking history. She has never used smokeless tobacco.  Tobacco Cessation Action Plan: Pharmacotherapies : Chantix        Follow up: to the ER    Return in about 2 months (around 10/21/2019).    Subha Saenz PA-C  [facility]"

## 2019-08-17 ENCOUNTER — MYC REFILL (OUTPATIENT)
Dept: FAMILY MEDICINE | Facility: CLINIC | Age: 39
End: 2019-08-17

## 2019-08-17 DIAGNOSIS — F17.200 TOBACCO USE DISORDER: ICD-10-CM

## 2019-08-19 RX ORDER — VARENICLINE TARTRATE 1 MG/1
1 TABLET, FILM COATED ORAL 2 TIMES DAILY
Qty: 60 TABLET | Refills: 1 | Status: SHIPPED | OUTPATIENT
Start: 2019-08-19 | End: 2019-09-05 | Stop reason: SINTOL

## 2019-08-19 NOTE — TELEPHONE ENCOUNTER
Pending Prescriptions:                       Disp   Refills    varenicline (CHANTIX SHELBIE) 0.5 MG X 11 & 1*53 tab*0            Sig: Take 0.5 mg tab daily for 3 days, THEN 0.5 mg tab           twice daily for 4 days, THEN 1 mg twice daily.    Routing refill request to provider for review/approval because:  Update to a 1mg pack?    Marika Wade, RN, BSN

## 2019-08-21 ENCOUNTER — OFFICE VISIT (OUTPATIENT)
Dept: FAMILY MEDICINE | Facility: CLINIC | Age: 39
End: 2019-08-21
Payer: COMMERCIAL

## 2019-08-21 VITALS
TEMPERATURE: 98 F | DIASTOLIC BLOOD PRESSURE: 56 MMHG | WEIGHT: 109 LBS | SYSTOLIC BLOOD PRESSURE: 90 MMHG | HEIGHT: 64 IN | OXYGEN SATURATION: 98 % | HEART RATE: 70 BPM | RESPIRATION RATE: 20 BRPM | BODY MASS INDEX: 18.61 KG/M2

## 2019-08-21 DIAGNOSIS — Z72.0 TOBACCO ABUSE: ICD-10-CM

## 2019-08-21 DIAGNOSIS — F33.2 SEVERE EPISODE OF RECURRENT MAJOR DEPRESSIVE DISORDER, WITHOUT PSYCHOTIC FEATURES (H): Primary | ICD-10-CM

## 2019-08-21 DIAGNOSIS — R45.851 SUICIDAL IDEATION: ICD-10-CM

## 2019-08-21 DIAGNOSIS — Z71.6 TOBACCO ABUSE COUNSELING: ICD-10-CM

## 2019-08-21 PROCEDURE — 99214 OFFICE O/P EST MOD 30 MIN: CPT | Performed by: PHYSICIAN ASSISTANT

## 2019-08-21 RX ORDER — BUSPIRONE HYDROCHLORIDE 5 MG/1
TABLET ORAL
Refills: 2 | COMMUNITY
Start: 2019-07-16 | End: 2019-09-05 | Stop reason: ALTCHOICE

## 2019-08-21 ASSESSMENT — PATIENT HEALTH QUESTIONNAIRE - PHQ9
10. IF YOU CHECKED OFF ANY PROBLEMS, HOW DIFFICULT HAVE THESE PROBLEMS MADE IT FOR YOU TO DO YOUR WORK, TAKE CARE OF THINGS AT HOME, OR GET ALONG WITH OTHER PEOPLE: VERY DIFFICULT
SUM OF ALL RESPONSES TO PHQ QUESTIONS 1-9: 15
SUM OF ALL RESPONSES TO PHQ QUESTIONS 1-9: 15

## 2019-08-21 ASSESSMENT — ANXIETY QUESTIONNAIRES
GAD7 TOTAL SCORE: 12
GAD7 TOTAL SCORE: 12
3. WORRYING TOO MUCH ABOUT DIFFERENT THINGS: SEVERAL DAYS
1. FEELING NERVOUS, ANXIOUS, OR ON EDGE: NEARLY EVERY DAY
7. FEELING AFRAID AS IF SOMETHING AWFUL MIGHT HAPPEN: MORE THAN HALF THE DAYS
GAD7 TOTAL SCORE: 12
4. TROUBLE RELAXING: SEVERAL DAYS
2. NOT BEING ABLE TO STOP OR CONTROL WORRYING: SEVERAL DAYS
5. BEING SO RESTLESS THAT IT IS HARD TO SIT STILL: SEVERAL DAYS
6. BECOMING EASILY ANNOYED OR IRRITABLE: NEARLY EVERY DAY
7. FEELING AFRAID AS IF SOMETHING AWFUL MIGHT HAPPEN: MORE THAN HALF THE DAYS

## 2019-08-21 ASSESSMENT — PAIN SCALES - GENERAL: PAINLEVEL: EXTREME PAIN (8)

## 2019-08-21 ASSESSMENT — MIFFLIN-ST. JEOR: SCORE: 1154.42

## 2019-08-21 NOTE — PATIENT INSTRUCTIONS

## 2019-08-22 ASSESSMENT — PATIENT HEALTH QUESTIONNAIRE - PHQ9: SUM OF ALL RESPONSES TO PHQ QUESTIONS 1-9: 15

## 2019-08-22 ASSESSMENT — ANXIETY QUESTIONNAIRES: GAD7 TOTAL SCORE: 12

## 2019-08-30 NOTE — PROGRESS NOTES
"Subjective     Vernell Logan is a 38 year old female who presents to clinic today for the following health issues:    HPI       Hospital Follow-up Visit:    Hospital/Nursing Home/IP Rehab Facility: [facility]   Date of Admission: 8/21/2019  Date of Discharge: 8/28/2019  Reason(s) for Admission: Suicidal Ideation. \" I'm feeling better, but of course I still have some depression going on.\"             Problems taking medications regularly:  None       Medication changes since discharge: Added Klonopin 1 mg and increased dose on Effexor to 150 Mg once per day.        Problems adhering to non-medication therapy:  No     Summary of hospitalization:  CareEverywhere information obtained and reviewed  Diagnostic Tests/Treatments reviewed.  Follow up needed: psychiatry   Other Healthcare Providers Involved in Patient s Care:         mental health team  Update since discharge: improved/stable.     Post Discharge Medication Reconciliation: discharge medications reconciled, continue medications without change.  Plan of care communicated with patient     Coding guidelines for this visit:  Type of Medical   Decision Making Face-to-Face Visit       within 7 Days of discharge Face-to-Face Visit        within 14 days of discharge   Moderate Complexity [id number]   High Complexity [id number]          Depression  Pt was sent from clinic to the ER at  for suicidal thoughts and admission. She was admitted to [facility] in [place]  effexor was increased to 150mg   Added klonopin 1mg instead of buspirone.   stopped buspirone  Stopped chantix  Has not seen her psychiatrist yet since discharge but is scheduled on 09/16/19.   Just got done with therapy today. Went pretty well to talk about somethings she had been holding back on. About finding her late  dead.   No more suicidal thoughts  Still really tired- \"but it was a lot\"  Significant other - is being supportive, helpful.     Eating- \"was " "eating like crazy when in the hospital. Felt good to eat\". Less now. Has eating disorder hx. Always difficult to put on weight. Weight is stable with her admission weight.     Sleeping- good. Falling asleep easily. Taking naps at home    Has twins 16 months old. Doing good with the kids. Significant other, Stiven, told her when she got back he realized how much work the kids are and he has stepped up to do more with them. She states \"he understands now. That is a huge weight off my shoulders.\"     Smoking- still smoking 1 pack per day but seems less. Had patch in the hospital.     PHQ-9 SCORE 2019   PHQ-9 Total Score - - -   PHQ-9 Total Score MyChart 11 (Moderate depression) 15 (Moderately severe depression) 10 (Moderate depression)   PHQ-9 Total Score 11 15 10     HOOD-7 SCORE 2019   Total Score - - -   Total Score 10 (moderate anxiety) 12 (moderate anxiety) 9 (mild anxiety)   Total Score 10 12 9           Patient Active Problem List   Diagnosis     Tobacco use disorder     CARDIOVASCULAR SCREENING; LDL GOAL LESS THAN 160     Status post left breast biopsy,sclerosing adenosis not concordant with imaging,12     Major depressive disorder, recurrent episode, mild (H)     Generalized anxiety disorder     Health Care Home     Anorexia     Major depressive disorder, single episode, moderate (H)     Partial epilepsy with impairment of consciousness, intractable (H)     Vitamin D deficiency     H/O:      Cervical high risk HPV (human papillomavirus) test positive     Past Surgical History:   Procedure Laterality Date     C/SECTION, LOW TRANSVERSE  , 2018    x 2      SECTION, TUBAL LIGATION, COMBINED         Social History     Tobacco Use     Smoking status: Current Every Day Smoker     Packs/day: 1.00     Years: 26.00     Pack years: 26.00     Types: Cigarettes     Smokeless tobacco: Never Used     Tobacco comment: 1 pack    Substance Use Topics     " Alcohol use: Not Currently     Comment: 0-2 per year      Family History   Problem Relation Age of Onset     Heart Disease Father         64 yo when he had arrhythmia,  of CHF age 65     Diabetes Father      Neurologic Disorder Father         epilepsy     Schizophrenia Father      Anxiety Disorder Father      Hypertension Father      Obesity Father      Cancer Maternal Grandfather      Cerebrovascular Disease Maternal Grandfather      Cerebrovascular Disease Paternal Grandmother      Heart Disease Paternal Grandfather      Psychotic Disorder Daughter      Neurologic Disorder Daughter         CP, she was adopted out in      Schizophrenia Mother      Anxiety Disorder Mother      Depression Mother          Current Outpatient Medications   Medication Sig Dispense Refill     Acetaminophen (TYLENOL PO) Take by mouth as needed for mild pain or fever       carBAMazepine (CARBATROL) 200 MG 12 hr capsule Take 200 mg at noon and night 180 capsule 3     carBAMazepine (CARBATROL) 300 MG 12 hr capsule Take 600 mg QAM, 600 mg each afternoon along with one 200 mg cap at noon and evening. 360 capsule 3     clonazePAM (KLONOPIN) 1 MG tablet Take 1 mg by mouth       ibuprofen (ADVIL/MOTRIN) 200 MG capsule TAKE 1 TABLET BY MOUTH EVERY 6 HOURS FOR 9 DOSES  0     lamoTRIgine (LAMICTAL) 100 MG tablet 400 mg am, 200 mg noon, 200 mg pm 720 tablet 3     levETIRAcetam (KEPPRA) 500 MG tablet Levetiracetam 1500 mg AM and 1500  tablet 3     Prenatal Vit-Fe Fumarate-FA (PRENATAL MULTIVITAMIN PLUS IRON) 27-0.8 MG TABS per tablet Take 1 tablet by mouth daily 100 tablet 3     SYMBICORT 80-4.5 MCG/ACT Inhaler INHALE 2 PUFFS INTO THE LUNGS TWICE DAILY 10.2 g 1     varenicline (CHANTIX SHELBIE) 0.5 MG X 11 & 1 MG X 42 tablet Take 0.5 mg tab daily for 3 days, THEN 0.5 mg tab twice daily for 4 days, THEN 1 mg twice daily. 53 tablet 0     varenicline (CHANTIX) 1 MG tablet Take 1 tablet (1 mg) by mouth 2 times daily 60 tablet 1     venlafaxine  "(EFFEXOR-XR) 150 MG 24 hr capsule   0     VITAMIN C 1000 MG OR TABS 1 TABLET DAILY AT DINNER       busPIRone (BUSPAR) 5 MG tablet TK 1 T PO BID  2     diazepam (DIAZEPAM INTENSOL) 5 MG/ML (HIGH CONC) solution Diazepam intensol 5mg/ml: Administer between gum and cheek  1 ml(5 mg)  for any generalized tonic clonic seizure or for more than 2 complex partial seizures within a 4 hour period, repeat once after 10 minutes. This is during pregnancy. Bottle needs to last 90 days! (Patient not taking: Reported on 5/14/2018) 30 mL 1     No Known Allergies  BP Readings from Last 3 Encounters:   09/05/19 100/60   08/21/19 90/56   07/25/19 90/62    Wt Readings from Last 3 Encounters:   09/05/19 49.7 kg (109 lb 9.6 oz)   08/21/19 49.4 kg (109 lb)   07/25/19 50 kg (110 lb 4.8 oz)                    Reviewed and updated as needed this visit by Provider         Review of Systems   ROS COMP: Constitutional, HEENT, cardiovascular, pulmonary, gi and gu systems are negative, except as otherwise noted.      Objective    /60   Pulse 64   Temp 98.3  F (36.8  C) (Temporal)   Resp 18   Ht 1.626 m (5' 4\")   Wt 49.7 kg (109 lb 9.6 oz)   LMP 08/10/2019 (Exact Date)   SpO2 100%   Breastfeeding? No   BMI 18.81 kg/m    There is no height or weight on file to calculate BMI.  Physical Exam   GENERAL: healthy, alert and no distress  EYES: Eyes grossly normal to inspection, PERRL and conjunctivae and sclerae normal  HENT: ear canals and TM's normal, nose and mouth without ulcers or lesions  NECK: no adenopathy, no asymmetry, masses, or scars and thyroid normal to palpation  RESP: lungs clear to auscultation - no rales, rhonchi or wheezes  CV: regular rate and rhythm, normal S1 S2, no S3 or S4, no murmur, click or rub, no peripheral edema and peripheral pulses strong  ABDOMEN: soft, nontender, no hepatosplenomegaly, no masses and bowel sounds normal  MS: no gross musculoskeletal defects noted, no edema  NEURO: Normal strength and tone, " mentation intact and speech normal  PSYCH: mentation appears normal, affect flat, calm. Eye contact good. Neatly groomed. No Si/HI. No agitation.     Diagnostic Test Results:  none         Assessment & Plan     1. Major depressive disorder, single episode, moderate (H)  Continue on current medications per discharge summary  No longer any suicidal thoughts  continue w/therapist  Continue w/psychiatrist  Continue self cares    2. Anorexia  Weight is stable  Continue w/mental health team  Discussed nutrition, consistent meals, high quality snacks     3. Tobacco use disorder  Pt did well with the patch while inpatient  Rx for the patch as below  - nicotine (NICODERM CQ) 21 MG/24HR 24 hr patch; Place 1 patch onto the skin every 24 hours  Dispense: 28 patch; Refill: 1     Tobacco Cessation:   reports that she has been smoking cigarettes.  She has a 26.00 pack-year smoking history. She has never used smokeless tobacco.  Tobacco Cessation Action Plan: Pharmacotherapies : Nicotine patch  Self help information given to patient    Follow Up: The patient was instructed to contact clinic for worsening symptoms, non-improvement as expected/discussed, and for questions regarding medications or treatment plan. Discussed parameters for follow up and included in After Visit Summary given to patient.      Return in about 2 months (around 11/5/2019).    Subha Saenz PA-C  [facility]    Answers for HPI/ROS submitted by the patient on 9/5/2019   If you checked off any problems, how difficult have these problems made it for you to do your work, take care of things at home, or get along with other people?: Somewhat difficult  PHQ9 TOTAL SCORE: 10  HOOD 7 TOTAL SCORE: 9

## 2019-09-04 DIAGNOSIS — R05.3 CHRONIC COUGH: ICD-10-CM

## 2019-09-04 DIAGNOSIS — F17.200 TOBACCO USE DISORDER: ICD-10-CM

## 2019-09-04 RX ORDER — DILTIAZEM HYDROCHLORIDE 60 MG/1
TABLET, FILM COATED ORAL
Qty: 10.2 G | Refills: 1 | Status: SHIPPED | OUTPATIENT
Start: 2019-09-04 | End: 2019-11-18

## 2019-09-04 NOTE — TELEPHONE ENCOUNTER
Symbicort  Prescription approved per [facility] Refill Protocol.    Subha Judd, RN, BSN

## 2019-09-05 ENCOUNTER — OFFICE VISIT (OUTPATIENT)
Dept: FAMILY MEDICINE | Facility: CLINIC | Age: 39
End: 2019-09-05
Payer: COMMERCIAL

## 2019-09-05 VITALS
DIASTOLIC BLOOD PRESSURE: 60 MMHG | RESPIRATION RATE: 18 BRPM | OXYGEN SATURATION: 100 % | HEIGHT: 64 IN | HEART RATE: 64 BPM | TEMPERATURE: 98.3 F | WEIGHT: 109.6 LBS | BODY MASS INDEX: 18.71 KG/M2 | SYSTOLIC BLOOD PRESSURE: 100 MMHG

## 2019-09-05 DIAGNOSIS — R63.0 ANOREXIA: ICD-10-CM

## 2019-09-05 DIAGNOSIS — F17.200 TOBACCO USE DISORDER: ICD-10-CM

## 2019-09-05 DIAGNOSIS — F32.1 MAJOR DEPRESSIVE DISORDER, SINGLE EPISODE, MODERATE (H): Primary | ICD-10-CM

## 2019-09-05 PROCEDURE — 99214 OFFICE O/P EST MOD 30 MIN: CPT | Performed by: PHYSICIAN ASSISTANT

## 2019-09-05 RX ORDER — CLONAZEPAM 1 MG/1
1 TABLET ORAL
COMMUNITY
Start: 2019-08-28

## 2019-09-05 RX ORDER — NICOTINE 21 MG/24HR
1 PATCH, TRANSDERMAL 24 HOURS TRANSDERMAL EVERY 24 HOURS
Qty: 28 PATCH | Refills: 1 | Status: SHIPPED | OUTPATIENT
Start: 2019-09-05 | End: 2020-08-06

## 2019-09-05 ASSESSMENT — ANXIETY QUESTIONNAIRES
GAD7 TOTAL SCORE: 9
GAD7 TOTAL SCORE: 9
6. BECOMING EASILY ANNOYED OR IRRITABLE: MORE THAN HALF THE DAYS
4. TROUBLE RELAXING: SEVERAL DAYS
GAD7 TOTAL SCORE: 9
7. FEELING AFRAID AS IF SOMETHING AWFUL MIGHT HAPPEN: SEVERAL DAYS
5. BEING SO RESTLESS THAT IT IS HARD TO SIT STILL: SEVERAL DAYS
3. WORRYING TOO MUCH ABOUT DIFFERENT THINGS: SEVERAL DAYS
1. FEELING NERVOUS, ANXIOUS, OR ON EDGE: MORE THAN HALF THE DAYS
7. FEELING AFRAID AS IF SOMETHING AWFUL MIGHT HAPPEN: SEVERAL DAYS
2. NOT BEING ABLE TO STOP OR CONTROL WORRYING: SEVERAL DAYS

## 2019-09-05 ASSESSMENT — PATIENT HEALTH QUESTIONNAIRE - PHQ9
SUM OF ALL RESPONSES TO PHQ QUESTIONS 1-9: 10
SUM OF ALL RESPONSES TO PHQ QUESTIONS 1-9: 10
10. IF YOU CHECKED OFF ANY PROBLEMS, HOW DIFFICULT HAVE THESE PROBLEMS MADE IT FOR YOU TO DO YOUR WORK, TAKE CARE OF THINGS AT HOME, OR GET ALONG WITH OTHER PEOPLE: SOMEWHAT DIFFICULT

## 2019-09-05 ASSESSMENT — MIFFLIN-ST. JEOR: SCORE: 1157.14

## 2019-09-05 NOTE — PATIENT INSTRUCTIONS
Start nicotine replacement patch    Continue medications from the hospital  Continue [facility] therapist and mental health team      Continue to work on healthy eating habits  Support with spouse     Continue consistency with the inhaler

## 2019-09-06 ASSESSMENT — ANXIETY QUESTIONNAIRES: GAD7 TOTAL SCORE: 9

## 2019-09-06 ASSESSMENT — PATIENT HEALTH QUESTIONNAIRE - PHQ9: SUM OF ALL RESPONSES TO PHQ QUESTIONS 1-9: 10

## 2019-10-04 ENCOUNTER — TELEPHONE (OUTPATIENT)
Dept: FAMILY MEDICINE | Facility: CLINIC | Age: 39
End: 2019-10-04

## 2019-10-04 NOTE — TELEPHONE ENCOUNTER
Prior Authorization Retail Medication Request    Medication/Dose: SYMBICORT 80-4.5 MCG/ACT Inhaler  ICD code (if different than what is on RX):    Previously Tried and Failed:    Rationale:      Insurance Name:    Insurance ID:        Pharmacy Information (if different than what is on RX)  Name:    Phone:

## 2019-10-09 NOTE — TELEPHONE ENCOUNTER
Central Prior Authorization Team   Phone: [phone]    Prior Authorization Not Needed per Insurance    Medication: SYMBICORT 80-4.5 MCG/ACT Inhaler  Insurance Company: [employer] [facility] - Phone [phone] Fax [phone]  Expected CoPay:      Pharmacy Filling the Rx: [id number] - [place], [place] - [address] [facility] [address]  Pharmacy Notified: Yes  Patient Notified: Yes    Called insurance to verify if Symbicort was on formulary- it is a preferred medication. Called [employer] and made sure they received paid claim. No PA needed.

## 2019-10-26 DIAGNOSIS — F17.200 TOBACCO USE DISORDER: ICD-10-CM

## 2019-10-28 RX ORDER — VARENICLINE TARTRATE 1 MG/1
TABLET, FILM COATED ORAL
Qty: 60 TABLET | Refills: 0 | OUTPATIENT
Start: 2019-10-28

## 2019-10-28 NOTE — TELEPHONE ENCOUNTER
Pending Prescriptions:                       Disp   Refills    CHANTIX 1 MG tablet [Pharmacy Med Name: CH*60 tab*0        Sig: TAKE 1 TABLET(1 MG) BY MOUTH TWICE DAILY      Routing refill request to provider for review/approval because:  Drug not active on patient's medication list    Marika Wade RN, BSN

## 2019-10-28 NOTE — TELEPHONE ENCOUNTER
Refusing. Pt had worsening mental health sx and worsening suicidal thoughts after the start of this summer 2019. Subha Saenz PA-C
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
## 2019-11-03 ASSESSMENT — ANXIETY QUESTIONNAIRES
7. FEELING AFRAID AS IF SOMETHING AWFUL MIGHT HAPPEN: SEVERAL DAYS
6. BECOMING EASILY ANNOYED OR IRRITABLE: NEARLY EVERY DAY
GAD7 TOTAL SCORE: 12
3. WORRYING TOO MUCH ABOUT DIFFERENT THINGS: SEVERAL DAYS
4. TROUBLE RELAXING: MORE THAN HALF THE DAYS
GAD7 TOTAL SCORE: 12
7. FEELING AFRAID AS IF SOMETHING AWFUL MIGHT HAPPEN: SEVERAL DAYS
5. BEING SO RESTLESS THAT IT IS HARD TO SIT STILL: SEVERAL DAYS
1. FEELING NERVOUS, ANXIOUS, OR ON EDGE: MORE THAN HALF THE DAYS
2. NOT BEING ABLE TO STOP OR CONTROL WORRYING: MORE THAN HALF THE DAYS
GAD7 TOTAL SCORE: 12

## 2019-11-03 ASSESSMENT — PATIENT HEALTH QUESTIONNAIRE - PHQ9
10. IF YOU CHECKED OFF ANY PROBLEMS, HOW DIFFICULT HAVE THESE PROBLEMS MADE IT FOR YOU TO DO YOUR WORK, TAKE CARE OF THINGS AT HOME, OR GET ALONG WITH OTHER PEOPLE: SOMEWHAT DIFFICULT
SUM OF ALL RESPONSES TO PHQ QUESTIONS 1-9: 12
SUM OF ALL RESPONSES TO PHQ QUESTIONS 1-9: 12

## 2019-11-04 ENCOUNTER — OFFICE VISIT (OUTPATIENT)
Dept: FAMILY MEDICINE | Facility: CLINIC | Age: 39
End: 2019-11-04
Payer: COMMERCIAL

## 2019-11-04 VITALS
WEIGHT: 107.2 LBS | HEART RATE: 78 BPM | SYSTOLIC BLOOD PRESSURE: 122 MMHG | OXYGEN SATURATION: 98 % | RESPIRATION RATE: 16 BRPM | DIASTOLIC BLOOD PRESSURE: 72 MMHG | TEMPERATURE: 97.8 F | BODY MASS INDEX: 18.4 KG/M2

## 2019-11-04 DIAGNOSIS — R05.3 CHRONIC COUGH: ICD-10-CM

## 2019-11-04 DIAGNOSIS — M54.50 BILATERAL LOW BACK PAIN WITHOUT SCIATICA, UNSPECIFIED CHRONICITY: Primary | ICD-10-CM

## 2019-11-04 DIAGNOSIS — F32.1 MAJOR DEPRESSIVE DISORDER, SINGLE EPISODE, MODERATE (H): ICD-10-CM

## 2019-11-04 DIAGNOSIS — Z23 NEED FOR PNEUMOCOCCAL VACCINATION: ICD-10-CM

## 2019-11-04 DIAGNOSIS — Z23 NEED FOR PROPHYLACTIC VACCINATION AND INOCULATION AGAINST INFLUENZA: ICD-10-CM

## 2019-11-04 DIAGNOSIS — R63.4 WEIGHT LOSS: ICD-10-CM

## 2019-11-04 DIAGNOSIS — F17.200 TOBACCO USE DISORDER: ICD-10-CM

## 2019-11-04 PROCEDURE — 90471 IMMUNIZATION ADMIN: CPT | Performed by: PHYSICIAN ASSISTANT

## 2019-11-04 PROCEDURE — 96127 BRIEF EMOTIONAL/BEHAV ASSMT: CPT | Performed by: PHYSICIAN ASSISTANT

## 2019-11-04 PROCEDURE — 90732 PPSV23 VACC 2 YRS+ SUBQ/IM: CPT | Performed by: PHYSICIAN ASSISTANT

## 2019-11-04 PROCEDURE — 99214 OFFICE O/P EST MOD 30 MIN: CPT | Mod: 25 | Performed by: PHYSICIAN ASSISTANT

## 2019-11-04 RX ORDER — ALBUTEROL SULFATE 90 UG/1
2 AEROSOL, METERED RESPIRATORY (INHALATION) EVERY 4 HOURS PRN
Qty: 1 INHALER | Refills: 1 | Status: SHIPPED | OUTPATIENT
Start: 2019-11-04 | End: 2020-01-03

## 2019-11-04 RX ORDER — BUSPIRONE HYDROCHLORIDE 15 MG/1
15 TABLET ORAL 2 TIMES DAILY
Refills: 1 | COMMUNITY
Start: 2019-11-02

## 2019-11-04 RX ORDER — METHOCARBAMOL 750 MG/1
750 TABLET, FILM COATED ORAL
Qty: 20 TABLET | Refills: 0 | Status: SHIPPED | OUTPATIENT
Start: 2019-11-04 | End: 2020-08-06

## 2019-11-04 ASSESSMENT — PATIENT HEALTH QUESTIONNAIRE - PHQ9: SUM OF ALL RESPONSES TO PHQ QUESTIONS 1-9: 12

## 2019-11-04 ASSESSMENT — PAIN SCALES - GENERAL: PAINLEVEL: EXTREME PAIN (8)

## 2019-11-04 ASSESSMENT — ANXIETY QUESTIONNAIRES: GAD7 TOTAL SCORE: 12

## 2019-11-04 NOTE — NURSING NOTE
Prior to immunization administration, verified patients identity using patient s name and date of birth. Please see Immunization Activity for additional information.     Screening Questionnaire for Adult Immunization    Are you sick today?   No   Do you have allergies to medications, food, a vaccine component or latex?   No   Have you ever had a serious reaction after receiving a vaccination?   No   Do you have a long-term health problem with heart disease, lung disease, asthma, kidney disease, metabolic disease (e.g. diabetes), anemia, or other blood disorder?   No   Do you have cancer, leukemia, HIV/AIDS, or any other immune system problem?   No   In the past 3 months, have you taken medications that affect  your immune system, such as prednisone, other steroids, or anticancer drugs; drugs for the treatment of rheumatoid arthritis, Crohn s disease, or psoriasis; or have you had radiation treatments?   No   Have you had a seizure, or a brain or other nervous system problem?   Yes   During the past year, have you received a transfusion of blood or blood     products, or been given immune (gamma) globulin or antiviral drug?   No   For women: Are you pregnant or is there a chance you could become        pregnant during the next month?   No   Have you received any vaccinations in the past 4 weeks?   No     Immunization questionnaire answers one was positive         Per orders of Subha Saenz, injection of PP23 given by Rudolph Lujan MA. Patient instructed to remain in clinic for 15 minutes afterwards, and to report any adverse reaction to me immediately.       Screening performed by Rudolph Lujan MA on 11/4/2019 at 9:54 AM.

## 2019-11-04 NOTE — PATIENT INSTRUCTIONS
"Coughing-  Albuterol inhaler- this is the \"rescue\" inhaler. Use when needed for cough, wheezing, shortness of breath.  The symbicort is a daily inhaler to \"prevent\" breathing symptoms, to improve daily cough, prevent lungs from making so much mucus, and keep you from wheezing.     Back pain-   I advise physical therapy for a maintenance regimen to strengthen back and core.  Physical Therapy:  [facility]  [phone]    Smoking-  Patch when you are ready    Weight- please add in lunch.     Pneumonia vaccine.  Consider the flu shot  Pap as scheduled.     You were prescribed a muscle relaxer. This can make you dizzy and/or drowsy.   Do NOT drink alcohol while taking.   Try for the first time at home (ie before bed) so you know how it affects you.   Do not drive while taking if you feel dizzy or drowsy.     "

## 2019-11-15 ENCOUNTER — OFFICE VISIT (OUTPATIENT)
Dept: OBGYN | Facility: CLINIC | Age: 39
End: 2019-11-15
Payer: COMMERCIAL

## 2019-11-15 ENCOUNTER — RESULT FOLLOW UP (OUTPATIENT)
Dept: OBGYN | Facility: CLINIC | Age: 39
End: 2019-11-15

## 2019-11-15 VITALS
HEART RATE: 78 BPM | BODY MASS INDEX: 17.77 KG/M2 | WEIGHT: 103.5 LBS | DIASTOLIC BLOOD PRESSURE: 68 MMHG | SYSTOLIC BLOOD PRESSURE: 100 MMHG

## 2019-11-15 DIAGNOSIS — R87.810 CERVICAL HIGH RISK HPV (HUMAN PAPILLOMAVIRUS) TEST POSITIVE: ICD-10-CM

## 2019-11-15 DIAGNOSIS — N63.0 BREAST MASS: ICD-10-CM

## 2019-11-15 DIAGNOSIS — Z00.00 ANNUAL PHYSICAL EXAM: Primary | ICD-10-CM

## 2019-11-15 PROCEDURE — 99214 OFFICE O/P EST MOD 30 MIN: CPT | Performed by: OBSTETRICS & GYNECOLOGY

## 2019-11-15 PROCEDURE — G0476 HPV COMBO ASSAY CA SCREEN: HCPCS | Performed by: OBSTETRICS & GYNECOLOGY

## 2019-11-15 PROCEDURE — G0145 SCR C/V CYTO,THINLAYER,RESCR: HCPCS | Performed by: OBSTETRICS & GYNECOLOGY

## 2019-11-15 PROCEDURE — 87624 HPV HI-RISK TYP POOLED RSLT: CPT | Performed by: OBSTETRICS & GYNECOLOGY

## 2019-11-15 NOTE — PATIENT INSTRUCTIONS
PREVENTIVE HEALTH RECOMMENDATIONS:   Get a physical every year.  A pap test is important to have done starting at age 21 and then every three years as long as your pap is normal.  When you receive the results of your pap test it will include when you need to have your next pap test.    You should be tested each year for chlamydia and gonorrhea if you are aged 16-25 and if you have had a new sexual partner since you were last tested.   Vaccines: Get a flu shot each year.   Eat at least 8-10 servings of fruits and vegetables daily.  Eat whole-grain bread and cereal, whole-wheat pasta and brown rice instead of white grains and white rice.   For bone health: Eat calcium-rich foods (dairy products) or take calcium pills (500 to 600 mg with vitamin D) twice a day with food.   Exercise for an average of 30 minutes a day, 5 days of the week. It can be as simple as taking a brisk walk.  This will help you control your weight and prevent many diseases.   Limit alcohol to one drink per day.   Don't smoke and limit your exposure to second hand smoke.  If you smoke consider making a plan to quit. Go to [employer] and clink on   [employer]  for help   Wear sunscreen with at least SPF15 to prevent skin damage and skin cancer.   Brush your teeth twice a day and floss once a day and see your dentist twice a year for an exam and cleaning.   Have a great year and I will look forward to seeing you next year.   Alicia Rain, DO

## 2019-11-15 NOTE — PROGRESS NOTES
"Subjective  38 year old non-pregnant female presents today for the female portion of her annual exam.  Patient had her annual exam done in July.  Her menses are regular, monthly.  She bleeds for  5-7 days.  Patient uses pads and has to change them every few hours especially on the second day.  No problems urinating.  Normal bowel movements.  Patient is sexually active.  No dyspareunia.  No vaginal spotting after.  2 c sections.  Last pregnancy was a twin pregnancy.   Patient declines the flu vaccine.  Patient complains of sore nipples.  No drainage or bleeding from them.  Patient stands in the shower and covers her nipples due to the pain.  No sores or lesions.  She has a breast bx in .  Tobacco abuse 1 pack/day.  I reviewed over the counter options for breast/nipple pain.  She has a seizure disorder and states she hasn't had a seizure in \"a long time\".         ROS: 10 point ROS neg other than the symptoms noted above in the HPI.  Past Medical History:   Diagnosis Date     Anorexia 3/5/2013     Anxiety      Depressive disorder 1999     Heart murmur     told benign     Localization-related epilepsy (H)      Major depression      Migraine      Seizure disorder (H)     working with [email or web address], really bad episodes      Seizures (H)      Status post left breast biopsy,sclerosing adenosis not concordant with imaging,12     Past Surgical History:   Procedure Laterality Date     C/SECTION, LOW TRANSVERSE  , 2018    x 2      SECTION, TUBAL LIGATION, COMBINED       Family History   Problem Relation Age of Onset     Heart Disease Father         62 yo when he had arrhythmia,  of CHF age 65     Diabetes Father      Neurologic Disorder Father         epilepsy     Schizophrenia Father      Anxiety Disorder Father      Hypertension Father      Obesity Father      Cancer Maternal Grandfather      Cerebrovascular Disease Maternal Grandfather      Cerebrovascular Disease Paternal " Grandmother      Heart Disease Paternal Grandfather      Psychotic Disorder Daughter      Neurologic Disorder Daughter         CP, she was adopted out in 1998     Schizophrenia Mother      Anxiety Disorder Mother      Depression Mother      Social History     Tobacco Use     Smoking status: Current Every Day Smoker     Packs/day: 1.00     Years: 26.00     Pack years: 26.00     Types: Cigarettes     Smokeless tobacco: Current User     Tobacco comment: 1 pack    Substance Use Topics     Alcohol use: Not Currently     Comment: 0-2 per year          Objective  Vitals: /68   Pulse 78   Wt 46.9 kg (103 lb 8 oz)   LMP 10/30/2019 (Exact Date)   BMI 17.77 kg/m    BMI= Body mass index is 17.77 kg/m .      General appearance=well developed, well-nourished female  Gait=normal  Psych=mood is stable, alert and oriented x3  HEENT=mucous membranes moist  Skin=no rashes or lesions seen,normal turgor   Breast:  Dense breasts, mass palpated on right breast at the 8:00 position, No skin changes, no axillary lymphadenopathy, no nipple discharge.  Axilla feel completely normal, no lymph node enlargement and non-tender.  Neck=overall appearance is normal  Heart=RRR, no murmurs, no swelling noted  Thyroid=normal, no masses, no TTP, no enlargement  Lungs=non-labored breathing, no use of accessory muscles, wheezing  Abd=soft, Nontender/nondistended, +bowel sounds x4, no masses, no signs of hernias, no evidence of hepatosplenomegaly  PELVIC:    External genitalia: normal without lesions or masses  Urethral meatus: no lesions or prolapse noted, normal size  Urethra: no masses, non tender  Bladder: non tender, no fullness  Vagina: normal mucosa and rugae, no discharge.  Cervix: normal without lesion, no cervical motion tenderness, healthy, multiparous, pap smear obtained  Uterus: small, mobile, nontender.  Adnexa: non tender, without masses  Rectal: deferred  Ext=no clubbing or cyanosis, no swelling      Assessment  1.)  Female  portion of physical exam  2.)  Right breast mass  3.)  Nipple pain      Plan  1.)  Dx mammogram and ultrasound  2.)  Pap smear      25 minutes was spent face to face with the patient today discussing her history, diagnosis, and follow-up plan as noted above.  Over 50% of the visit was spent in counseling and coordination of care.        Nursing notes read and reviewed    Alicia Rain DO

## 2019-11-18 DIAGNOSIS — F17.200 TOBACCO USE DISORDER: ICD-10-CM

## 2019-11-18 DIAGNOSIS — R05.3 CHRONIC COUGH: ICD-10-CM

## 2019-11-19 RX ORDER — DILTIAZEM HYDROCHLORIDE 60 MG/1
TABLET, FILM COATED ORAL
Qty: 10.2 G | Refills: 10 | Status: SHIPPED | OUTPATIENT
Start: 2019-11-19 | End: 2020-08-06

## 2019-11-20 LAB
COPATH REPORT: NORMAL
PAP: NORMAL

## 2019-11-22 LAB
FINAL DIAGNOSIS: NORMAL
HPV HR 12 DNA CVX QL NAA+PROBE: NEGATIVE
HPV16 DNA SPEC QL NAA+PROBE: NEGATIVE
HPV18 DNA SPEC QL NAA+PROBE: NEGATIVE
SPECIMEN DESCRIPTION: NORMAL
SPECIMEN SOURCE CVX/VAG CYTO: NORMAL

## 2019-11-26 NOTE — PROGRESS NOTES
"8/9/12 NIL pap  11/3/17 NIL pap, + HR HPV 18 and other (no 16). 13w1d pregnant.  Plan: colp about 20 weeks gestation, around Dec 28th.  12/11/17 [place] without bx, due to pregnancy. \"Mild acetowhite epithelial changes noted from the 10-12:00 position\". Plan: Repeat colpo in 4-8 weeks.  If stable, recommend biopsy post partum  02/22/18: Plan [place] at next prenatal visit per provider.  3/2/18 [place] without bx during pregnancy. \"acetowhitening noted 10-12:00\" o'clock position. Plan: colp with bx due approx 6 wk pp, (approx 6/27/18)  4/27/18 Baby delivered  6/8/18 [place] bx and ECC: negative. Plan: cotest in 1 yr.  7/17/19 Lost to follow-up for pap tracking  11/15/19 NIL pap, Neg HPV. Plan cotest in 1 year per provider.   11/26/19 Result letter sent to pt via [employer]. ([facility]) Patient read same day ([facility])    "

## 2019-12-18 DIAGNOSIS — M54.50 BILATERAL LOW BACK PAIN WITHOUT SCIATICA, UNSPECIFIED CHRONICITY: ICD-10-CM

## 2019-12-18 NOTE — TELEPHONE ENCOUNTER
Pending Prescriptions:                       Disp   Refills    methocarbamol (ROBAXIN) 750 MG tablet [Pha*20 tab*0        Sig: TAKE 1 TABLET(750 MG) BY MOUTH EVERY NIGHT AS NEEDED           FOR MUSCLE SPASMS    Routing refill request to provider for review/approval because:  Drug not on the G refill protocol       Jayde Coelho, RN, BSN

## 2019-12-19 NOTE — TELEPHONE ENCOUNTER
Please call pt- is she requesting this medication or is this an auto fill from the pharmacy?   Subha Saenz PA-C

## 2019-12-20 RX ORDER — METHOCARBAMOL 750 MG/1
TABLET, FILM COATED ORAL
Qty: 20 TABLET | Refills: 0 | OUTPATIENT
Start: 2019-12-20

## 2019-12-20 NOTE — TELEPHONE ENCOUNTER
Called patient, she states she was looking to refill, she does have and upcoming appt 1/16/20 and will address it then.  She will call if she feels she needs this sooner.     Unable to cancel pended med, will flag for RN to assist with this.

## 2020-01-02 DIAGNOSIS — R05.3 CHRONIC COUGH: ICD-10-CM

## 2020-01-03 RX ORDER — ALBUTEROL SULFATE 90 UG/1
AEROSOL, METERED RESPIRATORY (INHALATION)
Qty: 18 G | Refills: 9 | Status: SHIPPED | OUTPATIENT
Start: 2020-01-03

## 2020-01-03 NOTE — TELEPHONE ENCOUNTER
Prescription approved per [facility] [facility].    Subha Judd, RN, BSN

## 2020-03-22 DIAGNOSIS — O30.001 TWIN GESTATION IN FIRST TRIMESTER, UNSPECIFIED MULTIPLE GESTATION TYPE: ICD-10-CM

## 2020-03-22 DIAGNOSIS — O09.92 HIGH-RISK PREGNANCY IN SECOND TRIMESTER: ICD-10-CM

## 2020-03-22 DIAGNOSIS — F33.0 MAJOR DEPRESSIVE DISORDER, RECURRENT EPISODE, MILD (H): ICD-10-CM

## 2020-03-22 DIAGNOSIS — G40.219 PARTIAL EPILEPSY WITH IMPAIRMENT OF CONSCIOUSNESS, INTRACTABLE (H): ICD-10-CM

## 2020-03-22 DIAGNOSIS — G40.219 LOCALIZATION-RELATED EPILEPSY WITH COMPLEX PARTIAL SEIZURES WITH INTRACTABLE EPILEPSY (H): ICD-10-CM

## 2020-03-24 RX ORDER — CARBAMAZEPINE 200 MG/1
CAPSULE, EXTENDED RELEASE ORAL
Qty: 90 CAPSULE | OUTPATIENT
Start: 2020-03-24

## 2020-03-24 RX ORDER — CARBAMAZEPINE 200 MG/1
CAPSULE, EXTENDED RELEASE ORAL
Qty: 180 CAPSULE | Refills: 0 | Status: SHIPPED | OUTPATIENT
Start: 2020-03-24 | End: 2020-04-08

## 2020-03-24 NOTE — TELEPHONE ENCOUNTER
Pending Prescriptions:                       Disp   Refills    carBAMazepine (CARBATROL) 200 MG 12 hr ca*90 cap*             Sig: TAKE 1 CAPSULE BY MOUTH AT NIGHT    Last Office Visit: 5/3/2019 with Dr. Wilkinson.   PLAN:   1. Increase carbamazepine by 100 mg. She is not able to tolerate higher dose of lamotrigine (double vision) and levetiracetam (mood issues)         Carbamazepine: 300-300-300  Lamotrigine 400-200-200  Levetiracetam 1111-2864        [facility]     Last prescription:  carBAMazepine (CARBATROL) 200 MG 12 hr capsule  180 capsule  3  5/3/2019   No    Sig: Take 200 mg at noon and night    Sent to pharmacy as: carBAMazepine (CARBATROL) 200 MG 12 hr capsule    Class: E-Prescribe    Order: [id number]    E-Prescribing Status: Receipt confirmed by pharmacy (5/3/2019 10:34 AM CDT)    Printout Tracking     External Result Report    Medication Administration Instructions     Take 200 mg at noon and night    Pharmacy     [id number] - [place], [place] - [address] AT [facility] [address]      carBAMazepine (CARBATROL) 300 MG 12 hr capsule  360 capsule  3  5/3/2019   No    Sig: Take 600 mg QAM, 600 mg each afternoon along with one 200 mg cap at noon and evening.    Sent to pharmacy as: carBAMazepine (CARBATROL) 300 MG 12 hr capsule    Class: E-Prescribe    Order: [id number]    E-Prescribing Status: Receipt confirmed by pharmacy (5/3/2019 10:34 AM CDT)    Printout Tracking     External Result Report    Medication Administration Instructions     Take 600 mg QAM, 600 mg each afternoon along with one 200 mg cap at noon and evening.    Pharmacy     [id number] - [place], [place] - [address] AT [facility] [address]        Action:  Call placed to the patient to review her current medications. She reports the following medication schedule:  Patient is takin AM: 2 x 300 mg cap, noon: 1 x 200 mg cap, 8 pm: 2 x 300 mg cap +  1 x 200 mg cap.   In summary:  -200-800  LEV 1070-3615  -200-200      Rx denied. Correct refill routed to MD for review.

## 2020-04-08 ENCOUNTER — VIRTUAL VISIT (OUTPATIENT)
Dept: NEUROLOGY | Facility: CLINIC | Age: 40
End: 2020-04-08
Payer: COMMERCIAL

## 2020-04-08 DIAGNOSIS — O30.001 TWIN GESTATION IN FIRST TRIMESTER, UNSPECIFIED MULTIPLE GESTATION TYPE: ICD-10-CM

## 2020-04-08 DIAGNOSIS — F33.0 MAJOR DEPRESSIVE DISORDER, RECURRENT EPISODE, MILD (H): ICD-10-CM

## 2020-04-08 DIAGNOSIS — G40.219 PARTIAL EPILEPSY WITH IMPAIRMENT OF CONSCIOUSNESS, INTRACTABLE (H): ICD-10-CM

## 2020-04-08 DIAGNOSIS — G40.219 LOCALIZATION-RELATED EPILEPSY WITH COMPLEX PARTIAL SEIZURES WITH INTRACTABLE EPILEPSY (H): ICD-10-CM

## 2020-04-08 DIAGNOSIS — O09.92 HIGH-RISK PREGNANCY IN SECOND TRIMESTER: ICD-10-CM

## 2020-04-08 RX ORDER — LAMOTRIGINE 200 MG/1
TABLET ORAL
Qty: 120 TABLET | Refills: 11 | Status: SHIPPED | OUTPATIENT
Start: 2020-04-08 | End: 2020-08-14

## 2020-04-08 RX ORDER — LEVETIRACETAM 500 MG/1
TABLET ORAL
Qty: 540 TABLET | Refills: 3 | Status: SHIPPED | OUTPATIENT
Start: 2020-04-08 | End: 2020-05-06

## 2020-04-08 RX ORDER — GABAPENTIN 100 MG/1
CAPSULE ORAL
Qty: 90 CAPSULE | Refills: 1 | Status: SHIPPED | OUTPATIENT
Start: 2020-04-08 | End: 2020-06-09

## 2020-04-08 RX ORDER — CARBAMAZEPINE 200 MG/1
CAPSULE, EXTENDED RELEASE ORAL
Qty: 60 CAPSULE | Refills: 11 | Status: SHIPPED | OUTPATIENT
Start: 2020-04-08 | End: 2020-06-24

## 2020-04-08 RX ORDER — CARBAMAZEPINE 300 MG/1
CAPSULE, EXTENDED RELEASE ORAL
Qty: 60 CAPSULE | Refills: 11 | Status: SHIPPED | OUTPATIENT
Start: 2020-04-08 | End: 2020-05-19

## 2020-04-08 NOTE — PROGRESS NOTES
"Vernell Logan is a 39 year old female who is being evaluated via a billable telephone visit.      The patient has been notified of following:     \"This telephone visit will be conducted via a call between you and your physician/provider. We have found that certain health care needs can be provided without the need for a physical exam.  This service lets us provide the care you need with a short phone conversation.  If a prescription is necessary we can send it directly to your pharmacy.  If lab work is needed we can place an order for that and you can then stop by our lab to have the test done at a later time.    Telephone visits are billed at different rates depending on your insurance coverage. During this emergency period, for some insurers they may be billed the same as an in-person visit.  Please reach out to your insurance provider with any questions.    If during the course of the call the physician/provider feels a telephone visit is not appropriate, you will not be charged for this service.\"    Patient has given verbal consent for Telephone visit?  Yes    Vernell Logan complains of  No chief complaint on file.      I have reviewed and updated the patient's Past Medical History, Social History, Family History and Medication List.  Thank you  Rashad Granda LPN      ALLERGIES  Chantix [varenicline]    Additional provider notes:    [facility]    Patient:  Vernell Logan  :  1980   Age:  39 year old   Today's Office Visit:  2020    Epilepsy Data:  Patient History  Primary Epileptologist/Provider: Ashli Wilkinson M.D.  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 Last 5 minutes.    # of Type 1 Seizure since last visit: 20  Freq. Type 1 / Month: 1.79  Seizure Type 2: Partial seizures with secondary generalization  -  with complex partial seizures evolving to generalized seizures  Description of Sz Type 2: Stares off -> GTC  # of Type 2 Seizure since last visit: 0  Freq. Type 2 / Month: 0       HPI: This visit was completed over the telephone to comply with social distancing during COVID 19 pandemic. Patient/caregiver has consented to this telephone visit. In the last couple of month she had increased complex partial seizure seizures. In the last two months she has 2 seizure per week (afternoon or evening), she is feeling stressed, worried about getting kids sick. Prior to last two months she had 1 complex partial seizure per month. No generalized tonic-clonic convulsion.     Currently, on antiepileptic drugs there is no fatigue, no upset stomach , no dizziness, no double vision  or no mood changes (feelings of depression, irritablity, more argumentative). In the past if levetiracetam was increased she had auditory hallucinations, higher lamotrigine caused double vision. She is not sleeping well. We talked about her antiepileptic drug levels being high. She wakes up at 1-2 times per night. On this visit we spoke about patient's seizures, antiepileptic drug, and plan of epilepsy are. Patient/caregiver was agreeable with plan of care.     Current Antiepileptic drug:   Levetiracetam 1500 mg twice a day   Lamotrigine 400-200-200  Carbamazepine 600 mg morning, 200 mg noon, 800 mg night     Review of System: No nausea, no vomiting, no diarrhea, no fevers, no cough.      Exam: weight. 110. Speech was fluent, patient comprehension was in tact, and he was able to recall pertinent medical history in regard to her care.     Impression:   Focal Epilepsy with loss of awareness   Depression   Anxiety   History of eating disorder         Discussion: Focal epilepsy with impairment in awareness.  She has a seizure every month, but, in the past 2 month her seizure have increased due to increased stress, sleep deprivation.  Etiology of her seizures is unclear and her MRI of the brain is nonlesional.  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  No generalized tonic-clonic convulsion. Her last generalized tonic-clonic convulsion was 2014.  We will start  gabapentin  to help with sleep and seizures.     If needed, antiepileptic drug that may be considered in future: banzel, onfi, fycompa, felbamate. I would avoid additional Na+ blocking agents.      With COVID she has increased stress and anxiety. She is taking Effexor and seeing a mental health care provider once a week and psychiatrist. She is not suicidal not homicidal.  Overall she does lack support from her family but her friends have been helpful - Isadora Saleem, mother in law, Milagros.       Plan:   Follow-up virtual face to face visit in 2 weeks (patient needs to download ron)  Renewed seizure medication and sent 1 year script   Continue antiepileptic drug  Levetiracetam 1500 mg twice a day   Lamotrigine 400-200-200  Carbamazepine 600 mg morning, 200 mg noon, 800 mg night     Start  gabapentin  - Day 1-3 take 1 capsule at night, day 4-7 take 2 capsule, and after day 7 increase to 3 capsule.      Encouraged self care activity of stress management, mental health care, sleep, and healthy diet.       Time on telephone visit: I spent 24 minutes with the patient for our visit. I addressed all questions the patient/caregiver raised in regards to the patient's medical care. Good di communication efforts  were made with patient over the phone to be HIPPA compliant.     Ashli Wilkinson MD   Epilepsy Staff

## 2020-04-24 ENCOUNTER — VIRTUAL VISIT (OUTPATIENT)
Dept: NEUROLOGY | Facility: CLINIC | Age: 40
End: 2020-04-24
Payer: COMMERCIAL

## 2020-04-24 DIAGNOSIS — G40.219 PARTIAL EPILEPSY WITH IMPAIRMENT OF CONSCIOUSNESS, INTRACTABLE (H): Primary | ICD-10-CM

## 2020-05-01 DIAGNOSIS — F33.0 MAJOR DEPRESSIVE DISORDER, RECURRENT EPISODE, MILD (H): ICD-10-CM

## 2020-05-01 DIAGNOSIS — O30.001 TWIN GESTATION IN FIRST TRIMESTER, UNSPECIFIED MULTIPLE GESTATION TYPE: ICD-10-CM

## 2020-05-01 DIAGNOSIS — O09.92 HIGH-RISK PREGNANCY IN SECOND TRIMESTER: ICD-10-CM

## 2020-05-01 DIAGNOSIS — G40.219 LOCALIZATION-RELATED EPILEPSY WITH COMPLEX PARTIAL SEIZURES WITH INTRACTABLE EPILEPSY (H): ICD-10-CM

## 2020-05-01 DIAGNOSIS — G40.219 PARTIAL EPILEPSY WITH IMPAIRMENT OF CONSCIOUSNESS, INTRACTABLE (H): ICD-10-CM

## 2020-05-04 RX ORDER — CARBAMAZEPINE 300 MG/1
CAPSULE, EXTENDED RELEASE ORAL
Qty: 360 CAPSULE | OUTPATIENT
Start: 2020-05-04

## 2020-05-06 DIAGNOSIS — F33.0 MAJOR DEPRESSIVE DISORDER, RECURRENT EPISODE, MILD (H): ICD-10-CM

## 2020-05-06 DIAGNOSIS — G40.219 PARTIAL EPILEPSY WITH IMPAIRMENT OF CONSCIOUSNESS, INTRACTABLE (H): ICD-10-CM

## 2020-05-06 DIAGNOSIS — O09.92 HIGH-RISK PREGNANCY IN SECOND TRIMESTER: ICD-10-CM

## 2020-05-06 DIAGNOSIS — O30.001 TWIN GESTATION IN FIRST TRIMESTER, UNSPECIFIED MULTIPLE GESTATION TYPE: ICD-10-CM

## 2020-05-06 DIAGNOSIS — G40.219 LOCALIZATION-RELATED EPILEPSY WITH COMPLEX PARTIAL SEIZURES WITH INTRACTABLE EPILEPSY (H): ICD-10-CM

## 2020-05-07 RX ORDER — CARBAMAZEPINE 300 MG/1
CAPSULE, EXTENDED RELEASE ORAL
Qty: 60 CAPSULE | Refills: 11 | OUTPATIENT
Start: 2020-05-07

## 2020-05-07 NOTE — TELEPHONE ENCOUNTER
levETIRAcetam (KEPPRA) 500 MG tablet  Levetiracetam 1500 mg AM and 1500 HS   Last Written Prescription Date:  4/8/20  Last Fill Quantity: 540,   # refills: 3  Last Office Visit : 4/24/20  Future Office visit:  None    Routing refill request to provider for review/approval because:  Requesting alternate as 500 mG not available, 1000 mg is available. Pended as requested    carBAMazepine (CARBATROL) 300 MG 12 hr capsule  60 capsule  11  4/8/2020   No    Sig: Take 600 mg QAM, 600 mg each evening (take along with one 200 mg cap)      Refused carbatrol 300 MG / duplicate as noted

## 2020-05-19 ENCOUNTER — TELEPHONE (OUTPATIENT)
Dept: NEUROLOGY | Facility: CLINIC | Age: 40
End: 2020-05-19

## 2020-05-19 DIAGNOSIS — O30.001 TWIN GESTATION IN FIRST TRIMESTER, UNSPECIFIED MULTIPLE GESTATION TYPE: ICD-10-CM

## 2020-05-19 DIAGNOSIS — G40.219 PARTIAL EPILEPSY WITH IMPAIRMENT OF CONSCIOUSNESS, INTRACTABLE (H): ICD-10-CM

## 2020-05-19 DIAGNOSIS — G40.219 LOCALIZATION-RELATED EPILEPSY WITH COMPLEX PARTIAL SEIZURES WITH INTRACTABLE EPILEPSY (H): ICD-10-CM

## 2020-05-19 DIAGNOSIS — O09.92 HIGH-RISK PREGNANCY IN SECOND TRIMESTER: ICD-10-CM

## 2020-05-19 DIAGNOSIS — F33.0 MAJOR DEPRESSIVE DISORDER, RECURRENT EPISODE, MILD (H): ICD-10-CM

## 2020-05-19 RX ORDER — CARBAMAZEPINE 300 MG/1
600 CAPSULE, EXTENDED RELEASE ORAL 2 TIMES DAILY
Qty: 120 CAPSULE | Refills: 10 | Status: SHIPPED | OUTPATIENT
Start: 2020-05-19 | End: 2020-06-24

## 2020-05-19 NOTE — TELEPHONE ENCOUNTER
Patient medication was sent over with the wrong quantity for the carbamazepine 300 mg.Please re-send.

## 2020-05-19 NOTE — TELEPHONE ENCOUNTER
Chart reviewed.  Quantity sent for a one month supply with refills was incorrect based on MD notes. Refill denials had been made erroneously    Corrected prescription sent

## 2020-05-27 RX ORDER — LEVETIRACETAM 1000 MG/1
1500 TABLET ORAL 2 TIMES DAILY
Qty: 270 TABLET | Refills: 3 | Status: SHIPPED | OUTPATIENT
Start: 2020-05-27 | End: 2020-08-07

## 2020-06-05 DIAGNOSIS — G40.219 LOCALIZATION-RELATED EPILEPSY WITH COMPLEX PARTIAL SEIZURES WITH INTRACTABLE EPILEPSY (H): ICD-10-CM

## 2020-06-09 RX ORDER — GABAPENTIN 100 MG/1
CAPSULE ORAL
Qty: 90 CAPSULE | Refills: 11 | Status: SHIPPED | OUTPATIENT
Start: 2020-06-09 | End: 2021-08-06

## 2020-06-09 NOTE — TELEPHONE ENCOUNTER
gabapentin (NEURONTIN) 100 MG capsule   Last Written Prescription Date:  4/8/2020  Last Fill Quantity: 90,   # refills: 1  Last Office Visit : 4/24/2020  Future Office visit:  None  Routing refill request to provider for review/approval because:  Drug not on the [facility], [facility] or [facility] refill protocol or controlled substance    Juliana Hernadez RN  Central Triage Red Flags/Med Refills

## 2020-06-24 DIAGNOSIS — F33.0 MAJOR DEPRESSIVE DISORDER, RECURRENT EPISODE, MILD (H): ICD-10-CM

## 2020-06-24 DIAGNOSIS — O09.92 HIGH-RISK PREGNANCY IN SECOND TRIMESTER: ICD-10-CM

## 2020-06-24 DIAGNOSIS — O30.001 TWIN GESTATION IN FIRST TRIMESTER, UNSPECIFIED MULTIPLE GESTATION TYPE: ICD-10-CM

## 2020-06-24 DIAGNOSIS — G40.219 PARTIAL EPILEPSY WITH IMPAIRMENT OF CONSCIOUSNESS, INTRACTABLE (H): ICD-10-CM

## 2020-06-24 DIAGNOSIS — G40.219 LOCALIZATION-RELATED EPILEPSY WITH COMPLEX PARTIAL SEIZURES WITH INTRACTABLE EPILEPSY (H): ICD-10-CM

## 2020-06-24 RX ORDER — CARBAMAZEPINE 200 MG/1
CAPSULE, EXTENDED RELEASE ORAL
Qty: 120 CAPSULE | Refills: 11 | Status: SHIPPED | OUTPATIENT
Start: 2020-06-24 | End: 2020-08-14

## 2020-06-24 RX ORDER — CARBAMAZEPINE 300 MG/1
600 CAPSULE, EXTENDED RELEASE ORAL 2 TIMES DAILY
Qty: 120 CAPSULE | Refills: 10 | Status: SHIPPED | OUTPATIENT
Start: 2020-06-24 | End: 2020-07-29

## 2020-07-07 ENCOUNTER — TELEPHONE (OUTPATIENT)
Dept: NEUROLOGY | Facility: CLINIC | Age: 40
End: 2020-07-07

## 2020-07-07 NOTE — TELEPHONE ENCOUNTER
"What is the concern that needs to be addressed by a nurse?   Having side effects from her medication, not sure which on but she feels like \"overdose\"    May a detailed message be left on voicemail? yes    Date of last office visit: 4/24/20    Message routed to: nurse    "

## 2020-07-08 NOTE — TELEPHONE ENCOUNTER
Call returned to patient    Patient reports she cut down on her medications because she had been having severe medication side effects    Patient had been getting some double vision, unsteady gait, and she ended up vomiting.  She did fall due to her unsteady gait, and has some bruises on her arms and face, but no cuts, or broken bones.    Patient stopped taking the gabapentin 2 days ago (was on 300mg at bedtime), and reduced the carbamazepine by 200mg at bedtime  Since changing the meds, the side effects have improved.    Current doses  Carbamazepine 600-200-600 (from 600-200-800)  Levetiracetam 0485-7208  Lamotrigine 400-200-200  Gabapentin STOPPED (from 300 at bedtime)      Patient reports she has not had any seizures recently (none since the last MD contact)  She has a lot of stresses recently, because she moved house,  and she has 2 year old twins.  She has been sleeping okay, and has support at home.  No signs or symptoms of illness.    Patient was offered a video call with , ans she will do this.  Scheduling to call    No other questions at this time  Instructed to call if questions or concerns arise

## 2020-07-28 DIAGNOSIS — O09.92 HIGH-RISK PREGNANCY IN SECOND TRIMESTER: ICD-10-CM

## 2020-07-28 DIAGNOSIS — G40.219 LOCALIZATION-RELATED EPILEPSY WITH COMPLEX PARTIAL SEIZURES WITH INTRACTABLE EPILEPSY (H): ICD-10-CM

## 2020-07-28 DIAGNOSIS — G40.219 PARTIAL EPILEPSY WITH IMPAIRMENT OF CONSCIOUSNESS, INTRACTABLE (H): ICD-10-CM

## 2020-07-28 DIAGNOSIS — F33.0 MAJOR DEPRESSIVE DISORDER, RECURRENT EPISODE, MILD (H): ICD-10-CM

## 2020-07-28 DIAGNOSIS — O30.001 TWIN GESTATION IN FIRST TRIMESTER, UNSPECIFIED MULTIPLE GESTATION TYPE: ICD-10-CM

## 2020-07-29 ENCOUNTER — TELEPHONE (OUTPATIENT)
Dept: NEUROLOGY | Facility: CLINIC | Age: 40
End: 2020-07-29

## 2020-07-29 RX ORDER — CARBAMAZEPINE 300 MG/1
600 CAPSULE, EXTENDED RELEASE ORAL 2 TIMES DAILY
Qty: 120 CAPSULE | Refills: 9 | Status: SHIPPED | OUTPATIENT
Start: 2020-07-29 | End: 2020-08-14

## 2020-07-29 NOTE — TELEPHONE ENCOUNTER
Central Prior Authorization Team   [phone]    PA Initiation    Medication: carBAMazepine (CARBATROL) 300 MG 12 hr capsule  Insurance Company: [employer] - Phone [phone] Fax [phone]  Pharmacy Filling the Rx: [facility] - [address]  Filling Pharmacy Phone: [phone]  Filling Pharmacy Fax: [phone]  Start Date: 7/29/2020

## 2020-07-29 NOTE — TELEPHONE ENCOUNTER
PHARMACY TRANSFER: PER WRITTEN ORDER (see below)  REMAINING RF SENT TO PHARM 120:9  carBAMazepine (CARBATROL) 300 MG 12 hr capsule  120 capsule  10  6/24/2020      Sig - Route: Take 2 capsules (600 mg) by mouth 2 times daily - Oral     Prescribing Provider's NPI: [id number]  Ashli Wilkinson

## 2020-07-29 NOTE — TELEPHONE ENCOUNTER
Prior Authorization Retail Medication Request    Medication/Dose: carBAMazepine (CARBATROL) 300 MG 12 hr capsule  ICD code (if different than what is on RX):    Previously Tried and Failed:    Rationale:      Insurance Name:    Insurance ID:        Pharmacy Information (if different than what is on RX)  Name:    Phone:

## 2020-07-30 NOTE — TELEPHONE ENCOUNTER
Prior Authorization Approval    Authorization Effective Date: 7/20/2020  Authorization Expiration Date: 7/30/2021  Medication: carBAMazepine (CARBATROL) 300 MG 12 hr capsule-PA APPROVED   Approved Dose/Quantity:   Reference #: CASE # [id number]   Insurance Company: [employer] - Phone [phone] Fax [phone]  Expected CoPay:       CoPay Card Available:      Foundation Assistance Needed:    Which Pharmacy is filling the prescription (Not needed for infusion/clinic administered): [facility] - [address]  Pharmacy Notified: Yes- **Instructed pharmacy to notify patient when script is ready to /ship.**  Patient Notified: Yes

## 2020-08-06 ENCOUNTER — VIRTUAL VISIT (OUTPATIENT)
Dept: NEUROLOGY | Facility: CLINIC | Age: 40
End: 2020-08-06
Payer: COMMERCIAL

## 2020-08-06 DIAGNOSIS — F33.0 MAJOR DEPRESSIVE DISORDER, RECURRENT EPISODE, MILD (H): ICD-10-CM

## 2020-08-06 DIAGNOSIS — G40.219 PARTIAL EPILEPSY WITH IMPAIRMENT OF CONSCIOUSNESS, INTRACTABLE (H): ICD-10-CM

## 2020-08-06 DIAGNOSIS — O30.001 TWIN GESTATION IN FIRST TRIMESTER, UNSPECIFIED MULTIPLE GESTATION TYPE: ICD-10-CM

## 2020-08-06 DIAGNOSIS — G40.219 LOCALIZATION-RELATED EPILEPSY WITH COMPLEX PARTIAL SEIZURES WITH INTRACTABLE EPILEPSY (H): Primary | ICD-10-CM

## 2020-08-06 DIAGNOSIS — O09.92 HIGH-RISK PREGNANCY IN SECOND TRIMESTER: ICD-10-CM

## 2020-08-06 DIAGNOSIS — G40.219 LOCALIZATION-RELATED EPILEPSY WITH COMPLEX PARTIAL SEIZURES WITH INTRACTABLE EPILEPSY (H): ICD-10-CM

## 2020-08-06 RX ORDER — LEVETIRACETAM 1000 MG/1
500 TABLET ORAL 2 TIMES DAILY
Qty: 270 TABLET | Refills: 3 | Status: CANCELLED | OUTPATIENT
Start: 2020-08-06

## 2020-08-06 NOTE — PROGRESS NOTES
"Vernell Logan is a 39 year old female who is being evaluated via a billable video visit.      The patient has been notified of following:     \"This video visit will be conducted via a call between you and your physician/provider. We have found that certain health care needs can be provided without the need for an in-person physical exam.  This service lets us provide the care you need with a video conversation.  If a prescription is necessary we can send it directly to your pharmacy.  If lab work is needed we can place an order for that and you can then stop by our lab to have the test done at a later time.    Video visits are billed at different rates depending on your insurance coverage.  Please reach out to your insurance provider with any questions.    If during the course of the call the physician/provider feels a video visit is not appropriate, you will not be charged for this service.\"    Patient has given verbal consent for Video visit? Yes  How would you like to obtain your AVS? MyChart  If you are dropped from the video visit, the video invite should be resent to: Text to cell phone: [phone]  Will anyone else be joining your video visit? No        Video-Visit Details    Type of service:  Video Visit    Video Start Time: 12:21 PM  Video End Time: 12:40 PM    Originating Location (pt. Location): Home    Distant Location (provider location):  [facility] EPILEPSY CARE     Platform used for Video Visit: Jovi Wilkinson MD      [facility] Epilepsy Care Progress Note    Patient:  Vernell Logan  :  1980   Age:  39 year old   Today's Office Visit:  2020    Epilepsy Data:  Patient History  Primary Epileptologist/Provider: Ashli Wilkinson M.D.  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 Last 5 minutes.    # of Type 1 Seizure since last visit: 10  Freq. Type 1 / Month: 2.54  Seizure Type 2: Partial seizures with secondary generalization  -  with complex partial seizures evolving to generalized seizures  Description of Sz Type 2: Stares off -> GTC  # of Type 2 Seizure since last visit: 0  Freq. Type 2 / Month: 0       EPILEPSY HISTORY: Onset of seizures was 14 years of age. Based on electrographic data 2012, the patient had frequent nonconvulsive seizures arising from the right and left temporal lobe, bitemporal lobe interictal discharges. Her MRI is normal. Her PET scan was remarkable for decreased metabolic activity in the left temporal lobe. Prior  she rarely had seizure. From 7258-3258 she had several seizure per week. She had antiepileptic drug.     INTERVAL HISTORY:   Her last seizure was 2020 and last generalized tonic-clonic convulsion . She stopped  gabapentin  Because she had nausea and vomiting, dizzy. She felt like she overdosed. Gabapentin  Made her feel sleepy during the day. She is sleeping well currently. She stopped Gabapentin early 2020. Now she is feeling better. She does not think she took extra lamotrigine or carbamazepine. She has PCA who helps her with medications.     She has once complex partial seizure per month.  Complex partial seizure are described as difficulty getting words out, feels confused, but, she is aware if someone is talking to her, she has difficulty with comprehension, she states \"it sounds like they are repeating a word over " and over. No recent ER visits and no hospitalizations since last visit.      MEDICATIONS:     Prepregnancy antiepileptic drug doses:   Carbamazepine 600-200-800  Levetiracetam 8613-7233  Lamotrigine 400-200-200       Current antiepileptic drug     Carbamazepine: 300-300-200  Lamotrigine 400-200-200  Levetiracetam 6641-7929    Component      Latest Ref Rng & Units 12/18/2017 1/17/2018 2/16/2018   Carbamazepine Total Level      4.0 - 12.0 ug/mL 7.9 6.6 6.3   10, 11 Epoxide Level      0.4 - 4.0 ug/mL 2.2 2.0 1.9   Free Carbamazepine Level Ug/Ml      0.6 - 4.2 ug/mL 2.0 1.8 1.5   Free Epoxide Level      0.2 - 2.0 ug/mL 1.1 1.2 1.0   Lamotrigine Level      2.5 - 15.0 ug/mL 5.5 7.8 7.2   Keppra (Levetiracetam) Level      12 - 46 ug/mL 27 16 28           REVIEW OF SYSTEMS:  Intermittent dizziness.  diplopia improved.  B/l hand tremor improved.  Anxiety. Hip pain, fatigue. Depressed. Stressed. Unstable gait, sleeping more, nausea, no headaches, no joint pain, no suicidal ideations.      SOCIAL HISTORY: She is not working. She is engaged.  She smokes 1/2 pack of cigarettes per day. She is living alone. Pregnant. Stressors in last five years:  Ricardo passed away 11/2015. Twins born 2018 (Aimee and Omar).      PHYSICAL EXAMINATION:  There were no vitals taken for this visit.  Pregnant. Alert, orientated, speech is fluent, face is symmetric, extra-ocular movement in tact, no focal deficits noted.Gait is stable.    ASSESSMENT:   Focal Epilepsy with loss of awareness   Depression   Anxiety   History of eating disorder       Discussion: Focal epilepsy with impairment in awareness.  She has 1 complex partial seizure per month.Her last generalized tonic-clonic convulsion was 2014 At this time she does not want to change antiepileptic drug. She is focusing on emotional health.  Etiology of her seizures is unclear and her MRI of the brain is nonlesional.  Based on electrographic data patient most likely has bitemporal lobe  epilepsy.  If needed, antiepileptic drug that may be considered in future: banzel, onfi, fycompa, felbamate. I would avoid additional Na+ blocking agents.      PLAN:   Continue same antiepileptic drug   Carbamazepine: 300-300-300  Lamotrigine 400-200-200  Levetiracetam 0441-7090    Check antiepileptic drug for efficacy, toxicity, and side effects.      Follow up  6 months     Enrolled in [employer] study 2016        PEMA THORNE MD       I spent 19 minutes with the patient and family. During this time counseling and coordination of care exceeded 50% of the visit time. I addressed all questions and concerns the family raised in regards to the patients care.

## 2020-08-06 NOTE — LETTER
2020     RE: Vernell Logan  : 1980   MRN: [id number]      Dear Colleague,    Thank you for referring your patient, Vernell Logan, to the [facility] EPILEPSY CARE at [facility]. Please see a copy of my visit note below.    Vernell Logan is a 39 year old female who is being evaluated via a billable video visit.      [facility]    Patient:  Vernell Logan  :  1980   Age:  39 year old   Today's Office Visit:  2020    Epilepsy Data:  Patient History  Primary Epileptologist/Provider: Ashli Wilkinson M.D.  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 Last 5 minutes.    # of Type 1 Seizure since last visit: 10  Freq. Type 1 / Month: 2.54  Seizure Type 2: Partial seizures with secondary generalization  -  with complex partial seizures evolving to generalized seizures  Description of Sz Type 2: Stares off -> GTC  # of Type 2 Seizure since last visit: 0  Freq. Type 2 / Month: 0     EPILEPSY HISTORY: Onset of seizures was 14 years of age. Based on electrographic data 2012, the patient had frequent nonconvulsive seizures arising from the right and left temporal lobe, bitemporal lobe interictal discharges. Her MRI is normal. Her PET scan was remarkable for decreased metabolic activity in the left temporal lobe. Prior  she  "rarely had seizure. From 4877-6543 she had several seizure per week. She had antiepileptic drug.     INTERVAL HISTORY:   Her last seizure was June 2020 and last generalized tonic-clonic convulsion 2014. She stopped  gabapentin  Because she had nausea and vomiting, dizzy. She felt like she overdosed. Gabapentin  Made her feel sleepy during the day. She is sleeping well currently. She stopped Gabapentin early July 2020. Now she is feeling better. She does not think she took extra lamotrigine or carbamazepine. She has PCA who helps her with medications.     She has once complex partial seizure per month.  Complex partial seizure are described as difficulty getting words out, feels confused, but, she is aware if someone is talking to her, she has difficulty with comprehension, she states \"it sounds like they are repeating a word over and over. No recent ER visits and no hospitalizations since last visit.      MEDICATIONS:     Prepregnancy antiepileptic drug doses:   Carbamazepine 600-200-800  Levetiracetam 7123-4706  Lamotrigine 400-200-200       Current antiepileptic drug     Carbamazepine: 300-300-200  Lamotrigine 400-200-200  Levetiracetam 6529-7716    Component      Latest Ref Rng & Units 12/18/2017 1/17/2018 2/16/2018   Carbamazepine Total Level      4.0 - 12.0 ug/mL 7.9 6.6 6.3   10, 11 Epoxide Level      0.4 - 4.0 ug/mL 2.2 2.0 1.9   Free Carbamazepine Level Ug/Ml      0.6 - 4.2 ug/mL 2.0 1.8 1.5   Free Epoxide Level      0.2 - 2.0 ug/mL 1.1 1.2 1.0   Lamotrigine Level      2.5 - 15.0 ug/mL 5.5 7.8 7.2   Keppra (Levetiracetam) Level      12 - 46 ug/mL 27 16 28       REVIEW OF SYSTEMS:  Intermittent dizziness.  diplopia improved.  B/l hand tremor improved.  Anxiety. Hip pain, fatigue. Depressed. Stressed. Unstable gait, sleeping more, nausea, no headaches, no joint pain, no suicidal ideations.      SOCIAL HISTORY: She is not working. She is engaged.  She smokes 1/2 pack of cigarettes per day. She is living alone. " Pregnant. Stressors in last five years:  Ricardo passed away 11/2015. Twins born 2018 (Aimee and Omar).      PHYSICAL EXAMINATION:  There were no vitals taken for this visit.  Pregnant. Alert, orientated, speech is fluent, face is symmetric, extra-ocular movement in tact, no focal deficits noted.Gait is stable.    ASSESSMENT:   Focal Epilepsy with loss of awareness   Depression   Anxiety   History of eating disorder       Discussion: Focal epilepsy with impairment in awareness.  She has 1 complex partial seizure per month.Her last generalized tonic-clonic convulsion was 2014 At this time she does not want to change antiepileptic drug. She is focusing on emotional health.  Etiology of her seizures is unclear and her MRI of the brain is nonlesional.  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  If needed, antiepileptic drug that may be considered in future: banzel, onfi, fycompa, felbamate. I would avoid additional Na+ blocking agents.      PLAN:   Continue same antiepileptic drug   Carbamazepine: 300-300-300  Lamotrigine 400-200-200  Levetiracetam 2875-7862    Check antiepileptic drug for efficacy, toxicity, and side effects.      Follow up  6 months     Enrolled in UMPgenotyping study 2016        PEMA THORNE MD       I spent 19 minutes with the patient and family. During this time counseling and coordination of care exceeded 50% of the visit time. I addressed all questions and concerns the family raised in regards to the patients care.

## 2020-08-06 NOTE — TELEPHONE ENCOUNTER
Patient requesting Keppra 500 mg instead of the 1000 mg tablets so she doesn't have to break pills in half.

## 2020-08-07 RX ORDER — LEVETIRACETAM 500 MG/1
1500 TABLET ORAL 2 TIMES DAILY
Qty: 540 TABLET | Refills: 2 | Status: SHIPPED | OUTPATIENT
Start: 2020-08-07 | End: 2020-08-14

## 2020-08-14 DIAGNOSIS — O30.001 TWIN GESTATION IN FIRST TRIMESTER, UNSPECIFIED MULTIPLE GESTATION TYPE: ICD-10-CM

## 2020-08-14 DIAGNOSIS — G40.219 PARTIAL EPILEPSY WITH IMPAIRMENT OF CONSCIOUSNESS, INTRACTABLE (H): ICD-10-CM

## 2020-08-14 DIAGNOSIS — O09.92 HIGH-RISK PREGNANCY IN SECOND TRIMESTER: ICD-10-CM

## 2020-08-14 DIAGNOSIS — G40.219 LOCALIZATION-RELATED EPILEPSY WITH COMPLEX PARTIAL SEIZURES WITH INTRACTABLE EPILEPSY (H): ICD-10-CM

## 2020-08-14 DIAGNOSIS — F33.0 MAJOR DEPRESSIVE DISORDER, RECURRENT EPISODE, MILD (H): ICD-10-CM

## 2020-08-14 RX ORDER — CARBAMAZEPINE 200 MG/1
CAPSULE, EXTENDED RELEASE ORAL
Qty: 180 CAPSULE | Refills: 3 | Status: SHIPPED | OUTPATIENT
Start: 2020-08-14 | End: 2021-08-06

## 2020-08-14 NOTE — TELEPHONE ENCOUNTER
What is the concern that needs to be addressed by a nurse? Patient would like her order for carBAMazepine (CARBATROL) 200 MG 12 hr capsule transferred to    [facility] - [address]      May a detailed message be left on voicemail? n/a    Date of last office visit: 8/6/20    Message routed to: [facility] RN Pool

## 2020-08-17 RX ORDER — LAMOTRIGINE 200 MG/1
TABLET ORAL
Qty: 360 TABLET | Refills: 3 | Status: SHIPPED | OUTPATIENT
Start: 2020-08-17 | End: 2021-07-28

## 2020-08-17 RX ORDER — CARBAMAZEPINE 300 MG/1
600 CAPSULE, EXTENDED RELEASE ORAL 2 TIMES DAILY
Qty: 360 CAPSULE | Refills: 3 | Status: SHIPPED | OUTPATIENT
Start: 2020-08-17 | End: 2021-08-06

## 2020-08-17 RX ORDER — LEVETIRACETAM 500 MG/1
1500 TABLET ORAL 2 TIMES DAILY
Qty: 540 TABLET | Refills: 3 | Status: SHIPPED | OUTPATIENT
Start: 2020-08-17 | End: 2021-08-06

## 2020-12-23 ENCOUNTER — PATIENT OUTREACH (OUTPATIENT)
Dept: OBGYN | Facility: CLINIC | Age: 40
End: 2020-12-23

## 2020-12-23 NOTE — TELEPHONE ENCOUNTER
"8/9/12 NIL pap  11/3/17 NIL pap, + HR HPV 18 and other (no 16). 13w1d pregnant.  Plan: colp about 20 weeks gestation, around Dec 28th.  12/11/17 [place] without bx, due to pregnancy. \"Mild acetowhite epithelial changes noted from the 10-12:00 position\". Plan: Repeat colpo in 4-8 weeks.  If stable, recommend biopsy post partum  02/22/18: Plan [place] at next prenatal visit per provider.  3/2/18 [place] without bx during pregnancy. \"acetowhitening noted 10-12:00\" o'clock position. Plan: colp with bx due approx 6 wk pp, (approx 6/27/18)  4/27/18 Baby delivered  6/8/18 [place] bx and ECC: negative. Plan: cotest in 1 yr.  7/17/19 Lost to follow-up for pap tracking  11/15/19 NIL pap, Neg HPV. Plan cotest in 1 year  11/24/20 Reminder Mychart- not read  12/23/20 Reminder call - LM        "

## 2021-01-21 PROBLEM — R87.810 CERVICAL HIGH RISK HPV (HUMAN PAPILLOMAVIRUS) TEST POSITIVE: Status: ACTIVE | Noted: 2017-11-03

## 2021-01-21 NOTE — TELEPHONE ENCOUNTER
"Ranjit Rain,  Patient is lost to pap tracking follow-up. Attempts to contact pt have been made per reminder process and there has been no reply and/or no appt scheduled.     Pap Hx:  8/9/12 NIL pap  11/3/17 NIL pap, + HR HPV 18 and other (no 16). 13w1d pregnant.  Plan: colp about 20 weeks gestation, around Dec 28th.  12/11/17 [place] without bx, due to pregnancy. \"Mild acetowhite epithelial changes noted from the 10-12:00 position\". Plan: Repeat colpo in 4-8 weeks.  If stable, recommend biopsy post partum  02/22/18: Plan [place] at next prenatal visit per provider.  3/2/18 [place] without bx during pregnancy. \"acetowhitening noted 10-12:00\" o'clock position. Plan: colp with bx due approx 6 wk pp, (approx 6/27/18)  4/27/18 Baby delivered  6/8/18 [place] bx and ECC: negative. Plan: cotest in 1 yr.  7/17/19 Lost to follow-up for pap tracking  11/15/19 NIL pap, Neg HPV. Plan cotest in 1 year  11/24/20 Reminder Mychart- not read  12/23/20 Reminder call - LM  01/21/21  Lost to follow-up for pap tracking      "

## 2021-02-11 ENCOUNTER — VIRTUAL VISIT (OUTPATIENT)
Dept: NEUROLOGY | Facility: CLINIC | Age: 41
End: 2021-02-11
Payer: COMMERCIAL

## 2021-02-11 DIAGNOSIS — G40.219 PARTIAL EPILEPSY WITH IMPAIRMENT OF CONSCIOUSNESS, INTRACTABLE (H): Primary | ICD-10-CM

## 2021-02-11 NOTE — PROGRESS NOTES
Several messages left to call back for rooming process before video appointment. Not able to reach patient.

## 2021-02-11 NOTE — PROGRESS NOTES
Called and she asked is she may reschedule. I asked her to call our office.  Ashli Wilkinson MD

## 2021-02-11 NOTE — LETTER
Date:February 12, 2021      Provider requested that no letter be sent. Do not send.       [facility]

## 2021-02-11 NOTE — LETTER
2021       RE: Vernell Logan  : 1980   MRN: [id number]      Dear Colleague,    Thank you for referring your patient, Vernell Logan, to the [facility] EPILEPSY CARE at [facility]. Please see a copy of my visit note below.      Called and she asked is she may reschedule. I asked her to call our office.  Ashli Wilkinson MD     I call patient to do the rooming but no answer, left a message for patient to call the clinic back.    Ariana Palacios CMA.    Several messages left to call back for rooming process before video appointment. Not able to reach patient.       Again, thank you for allowing me to participate in the care of your patient.      Sincerely,    Ashli Wilkinson MD

## 2021-02-11 NOTE — PROGRESS NOTES
I call patient to do the rooming but no answer, left a message for patient to call the clinic back.    Ariana Palacios CMA.

## 2021-04-12 ENCOUNTER — TELEPHONE (OUTPATIENT)
Dept: FAMILY MEDICINE | Facility: CLINIC | Age: 41
End: 2021-04-12

## 2021-04-12 NOTE — TELEPHONE ENCOUNTER
Patient Quality Outreach Summary      Summary:    Patient is due/failing the following:   Physical, PAP and mood follow up    Type of outreach:    Phone, left message for patient/parent to call back.    Questions for provider review:    None                                                                                                                    Gregoria Morejon CMA       Chart routed to Care Team.

## 2021-05-03 DIAGNOSIS — G40.219 LOCALIZATION-RELATED EPILEPSY WITH COMPLEX PARTIAL SEIZURES WITH INTRACTABLE EPILEPSY (H): ICD-10-CM

## 2021-05-03 DIAGNOSIS — G40.219 PARTIAL EPILEPSY WITH IMPAIRMENT OF CONSCIOUSNESS, INTRACTABLE (H): ICD-10-CM

## 2021-05-03 DIAGNOSIS — O09.92 HIGH-RISK PREGNANCY IN SECOND TRIMESTER: ICD-10-CM

## 2021-05-03 DIAGNOSIS — O30.001 TWIN GESTATION IN FIRST TRIMESTER, UNSPECIFIED MULTIPLE GESTATION TYPE: ICD-10-CM

## 2021-05-03 DIAGNOSIS — F33.0 MAJOR DEPRESSIVE DISORDER, RECURRENT EPISODE, MILD (H): ICD-10-CM

## 2021-05-03 RX ORDER — LAMOTRIGINE 200 MG/1
TABLET ORAL
Qty: 360 TABLET | Refills: 3 | OUTPATIENT
Start: 2021-05-03

## 2021-06-02 RX ORDER — LEVETIRACETAM 1000 MG/1
TABLET ORAL
Qty: 270 TABLET | Refills: 3 | OUTPATIENT
Start: 2021-06-02

## 2021-06-23 ENCOUNTER — TELEPHONE (OUTPATIENT)
Dept: NEUROLOGY | Facility: CLINIC | Age: 41
End: 2021-06-23

## 2021-06-23 NOTE — TELEPHONE ENCOUNTER
LVM to schedule return appt w Dr. Wilkinson, rx refill request, last seen 2/11/21, provided clinic number for call back.

## 2021-07-01 ENCOUNTER — TELEPHONE (OUTPATIENT)
Dept: NEUROLOGY | Facility: CLINIC | Age: 41
End: 2021-07-01

## 2021-07-01 NOTE — TELEPHONE ENCOUNTER
Prior Authorization Retail Medication Request    Medication/Dose: Carbamazepine ER 300MG er capsules  ICD code (if different than what is on RX):    Previously Tried and Failed:  See Chart  Rationale:  See Chart    Insurance Name:     Insurance ID:        Pharmacy Information (if different than what is on RX)  Name:    Phone:

## 2021-07-01 NOTE — TELEPHONE ENCOUNTER
Central Prior Authorization Team   Phone: [phone]      PA Initiation    Medication: Carbamazepine ER 300MG er capsules  Insurance Company: [employer] [facility] - Phone [phone] Fax [phone]  Pharmacy Filling the Rx: [facility] - [place], [place] - [facility] [address]  Filling Pharmacy Phone: [phone]  Filling Pharmacy Fax:    Start Date: 7/1/2021

## 2021-07-01 NOTE — TELEPHONE ENCOUNTER
Prior Authorization Approval    Authorization Effective Date: 7/31/2021  Authorization Expiration Date: 7/31/2022  Medication: Carbamazepine ER 300MG er capsules  Approved Dose/Quantity:   Reference #: [id number]   Insurance Company: [employer] - Phone [phone] Fax [phone]  Expected CoPay:       CoPay Card Available:      Foundation Assistance Needed:    Which Pharmacy is filling the prescription (Not needed for infusion/clinic administered): [facility] - [place], [place] - [facility] [address]  Pharmacy Notified: Yes - spoke to Mandy  Patient Notified: No

## 2021-07-26 DIAGNOSIS — F33.0 MAJOR DEPRESSIVE DISORDER, RECURRENT EPISODE, MILD (H): ICD-10-CM

## 2021-07-26 DIAGNOSIS — O30.001 TWIN GESTATION IN FIRST TRIMESTER, UNSPECIFIED MULTIPLE GESTATION TYPE: ICD-10-CM

## 2021-07-26 DIAGNOSIS — G40.219 LOCALIZATION-RELATED EPILEPSY WITH COMPLEX PARTIAL SEIZURES WITH INTRACTABLE EPILEPSY (H): ICD-10-CM

## 2021-07-26 DIAGNOSIS — G40.219 PARTIAL EPILEPSY WITH IMPAIRMENT OF CONSCIOUSNESS, INTRACTABLE (H): ICD-10-CM

## 2021-07-26 DIAGNOSIS — O09.92 HIGH-RISK PREGNANCY IN SECOND TRIMESTER: ICD-10-CM

## 2021-07-26 NOTE — LETTER
7/26/2021         RE: Vernell Logan  [address] [facility] [address]          Dear Vernell,     At the office visit last August with Dr. Wilkinson lab orders were placed but these have not yet been completed. It would be helpful if you could have these completed before your upcoming visit on 8/6/21. Please call [address] [place] [address] to schedule a lab appointment. If you prefer to have these drawn at a different healthcare center, please call the office and we will send them to the location of your choice. We can be reached at [phone].       Sincerely,   Yunier Kat RN Care Coordinator  [facility]

## 2021-07-28 RX ORDER — LAMOTRIGINE 200 MG/1
TABLET ORAL
Qty: 360 TABLET | Refills: 0 | Status: SHIPPED | OUTPATIENT
Start: 2021-07-28 | End: 2021-08-06

## 2021-07-28 NOTE — TELEPHONE ENCOUNTER
Medication Refill request received for   Pending Prescriptions:                       Disp   Refills    lamoTRIgine (LAMICTAL) 200 MG tablet      360 ta*3            Si mg am, 200 mg noon, 200 mg pm      Last Written Prescription Date:  20  Length of last prescription in time: one year supply  Last Office Visit : 20  Future Office visit:  21    [facility] Medication Refill Protocol requirements:  Labs were ordered last visit, but these were not completed. A reminder letter was mailed to the patient regarding overdue labs.      Refill request was approved per [facility] Medication Refill Protocol requirements.

## 2021-08-06 ENCOUNTER — VIRTUAL VISIT (OUTPATIENT)
Dept: NEUROLOGY | Facility: CLINIC | Age: 41
End: 2021-08-06

## 2021-08-06 DIAGNOSIS — F33.0 MAJOR DEPRESSIVE DISORDER, RECURRENT EPISODE, MILD (H): ICD-10-CM

## 2021-08-06 DIAGNOSIS — O09.92 HIGH-RISK PREGNANCY IN SECOND TRIMESTER: ICD-10-CM

## 2021-08-06 DIAGNOSIS — G40.219 LOCALIZATION-RELATED EPILEPSY WITH COMPLEX PARTIAL SEIZURES WITH INTRACTABLE EPILEPSY (H): Primary | ICD-10-CM

## 2021-08-06 DIAGNOSIS — O30.001 TWIN GESTATION IN FIRST TRIMESTER, UNSPECIFIED MULTIPLE GESTATION TYPE: ICD-10-CM

## 2021-08-06 DIAGNOSIS — G40.219 PARTIAL EPILEPSY WITH IMPAIRMENT OF CONSCIOUSNESS, INTRACTABLE (H): ICD-10-CM

## 2021-08-06 RX ORDER — CARBAMAZEPINE 200 MG/1
CAPSULE, EXTENDED RELEASE ORAL
Qty: 180 CAPSULE | Refills: 3 | Status: SHIPPED | OUTPATIENT
Start: 2021-08-06 | End: 2022-06-30

## 2021-08-06 RX ORDER — LEVETIRACETAM 500 MG/1
1500 TABLET ORAL 2 TIMES DAILY
Qty: 540 TABLET | Refills: 3 | Status: SHIPPED | OUTPATIENT
Start: 2021-08-06 | End: 2022-06-30

## 2021-08-06 RX ORDER — CARBAMAZEPINE 300 MG/1
CAPSULE, EXTENDED RELEASE ORAL
Qty: 360 CAPSULE | Refills: 3 | Status: SHIPPED | OUTPATIENT
Start: 2021-08-06 | End: 2022-06-30

## 2021-08-06 RX ORDER — LAMOTRIGINE 200 MG/1
TABLET ORAL
Qty: 360 TABLET | Refills: 0 | Status: SHIPPED | OUTPATIENT
Start: 2021-08-06 | End: 2021-12-01

## 2021-08-06 NOTE — PATIENT INSTRUCTIONS
Increase Lamotrigine 400-250-200 for 2 weeks. Then increase to 400-300-200    Continue   Levetiracetam 2653-5806  Carbamazepine 600-200-800    Get labs done near home, we will send you lab orders     Follow up  2-3 months     We can support paper work for PCA (she has several seizure per month, possible night time seizures, high levels of antiepileptic drug, some cognitive issues).     Do not drive and seizure precautions     Focus on self care (walking, sleeping, eating, breathing exercise)    Ashli Wilkinson MD

## 2021-08-06 NOTE — PROGRESS NOTES
Vernell is a 40 year old who is being evaluated via a billable video visit.      How would you like to obtain your AVS? MyChart  If the video visit is dropped, the invitation should be resent by: Send to e-mail at: [email or web address]  Will anyone else be joining your video visit? No      Video Start Time: 10:09 AM  Video-Visit Details    Type of service:  Video Visit    Video End Time:10:42 am     Originating Location (pt. Location): Home    Distant Location (provider location):  [facility]     Platform used for Video Visit: streamit     [facility]    Patient:  Vernell Logan  :  1980   Age:  40 year old   Today's Visit:  2021    Epilepsy Data:  Patient History  Primary Epileptologist/Provider: Ashli Wilkinson M.D.  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 Last 5 minutes.    # of Type 1 Seizure since last visit: 35  Freq. Type 1 / Month: 2.92  Seizure Type 2: Partial seizures with secondary generalization  -  with complex partial seizures evolving to generalized seizures  Description of Sz Type 2: Stares off -> GTC  # of Type 2 Seizure since last visit: 0  Freq. Type 2 / Month: 0      EPILEPSY HISTORY: Onset of seizures was 14 years of age. Based on electrographic data 2012, the patient had frequent nonconvulsive seizures arising from the  right and left temporal lobe, bitemporal lobe interictal discharges. Her MRI is normal. Her PET scan was remarkable for decreased metabolic activity in the left temporal lobe. Prior 2010 she rarely had seizure. From 0003-2184 she had several seizure per week. She had antiepileptic drug.     INTERVAL HISTORY:   In the last year she has seizure (can not communicate, no loss of awareness, sometimes she may stare off 10-15 seconds). Her partner has not noticed staring spells during the day. Seizure in daytime worsened in the last 6 months, prior to this she had seizure once a month. She is driving, she will start monitoring seizure frequency. At night she thinks she has seizure, she wakes up with bad headache in the morning, tongue was bitten, she has unexplained bruises on body, her bed partner has not noticed anything (he is a heavy/deep sleeper).     She has high levels of stress, she is very worried about her kids,  her kids may autism, her kids are 3 years old (development delay not talking). She has lots of doctors visits and she is stressed.     Prior to this year, her last generalized tonic-clonic convulsion 2014. Patient denies dizziness, no double vision, no nausea, no vomiting, no abdominal pain, no mood changes, no ER visits, no hospitalizations, and had no significant fall with trauma.      Current antiepileptic drug  Carbamazepine 600-200-800  Levetiracetam 2083-0785  Lamotrigine 400-200-200       SOCIAL HISTORY: She is not working. She is engaged.  She stopped smoking 2 weeks ago.  She makes several attempts to quit smoking, this is very hard for her.  She has 2 toddlers born in 2018.  Very to have developmental delays and are nonverbal at age 2.  Key stressors ex  Ricardo passed away 11/2015. Twins born 2018 (Aimee and Omar).  She does have a good social support system at this time.     PHYSICAL EXAMINATION:  There were no vitals taken for this visit.  Alert, orientated, speech is fluent, face is  symmetric, extra-ocular movement in tact, no focal deficits noted.G.    ASSESSMENT:   Focal Epilepsy with loss of awareness   Depression   Anxiety   History of eating disorder       Discussion: Focal epilepsy with impairment in awareness.  Etiology of her seizures is unclear and her MRI of the brain is nonlesional.  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  She has seizure burden 2-3 focal impaired seizures during the day, sometimes they are unimpaired seizures with speech difficulty.  At nighttime she has several episodes of tongue biting, unexplained bruises, early morning headaches raising the concern for nocturnal convulsions.  We will increase her lamotrigine.  She is on a strong inducer which will lower her lamotrigine concentrations.  Interestingly, at very high doses her carbamazepine and lamotrigine levels are not optimized.  Certainly, some of her spells may be psychogenic nonepileptic spells.  But, I suspect psychogenic nonepileptic spells are lower on the differential because she has a very severe epilepsy.  She does have high levels of stress (her kids may have autism and are nonverbal, there is developmental delays that are concerning for her).     We will increase lamotrigine as noted below.  Future consideration may be to further optimize carbamazepine (however lamotrigine level will go down). If needed, antiepileptic drug that may be considered in future: banzel, onfi, fycompa, felbamate. I would avoid additional Na+ blocking agents.       PLAN:   Increase Lamotrigine 400-250-200 for 2 weeks. Then increase to 400-300-200    Continue   Levetiracetam 7134-0383    Carbamazepine 600-200-800    Check antiepileptic drug for efficacy, toxicity, and side effects.  Carbamazepine, lamotrigine, levetiracetam, ast, alt, na+, cbc     Follow up  2-3 months     Enrolled in UMPgenotyping study 2016    We can support paper work for PCA (she has several seizure per month,  "possible night time seizures, high levels of antiepileptic drug, some cognitive issues).     Do not drive and seizure precautions     Focus on self care (walking, sleeping, eating, breathing exercise)     ASHLI THORNE MD       I spent 38 minutes today to provide comprehensive  medical care. During this time key medical decisions were made with review of medical chart prior to visit, visit with patient, counseling/education, and post visit work, including documentation on the day of visit. I addressed all questions the patient/caregiver raised in regards to the patient's medical care. This note was created with voice recognition software. Inadvertent grammatical errors, typographical errors, and \"sound a like\" substitutions may occur due to limitations of the software.  Read the note carefully and apply context when erroneous substitutions have occurred. Thank you.     Ashli Thorne MD             "

## 2021-08-06 NOTE — LETTER
2021       RE: Vernell Logan  : 1980   MRN: [id number]      Dear Colleague,    Thank you for referring your patient, Vernell Logan, to the [facility] EPILEPSY CARE at [facility]. Please see a copy of my visit note below.    Vernell is a 40 year old who is being evaluated via a billable video visit.      How would you like to obtain your AVS? MyChart  If the video visit is dropped, the invitation should be resent by: Send to e-mail at: [email or web address]  Will anyone else be joining your video visit? No      Video Start Time: 10:09 AM  Video-Visit Details    Type of service:  Video Visit    Video End Time:10:42 am     Originating Location (pt. Location): Home    Distant Location (provider location):  [facility] EPILEPSY CARE     Platform used for Video Visit: [employer]     [facility] Progress Note    Patient:  Vernell Logan  :  1980   Age:  40 year old   Today's Visit:  2021    Epilepsy Data:  Patient History  Primary Epileptologist/Provider: Ashli Wilkinson M.D.  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 Last 5 minutes.    # of Type 1 Seizure since last visit: 35  Freq. Type 1 / Month: 2.92  Seizure Type 2: Partial seizures with secondary generalization  -  with complex partial seizures evolving to generalized  seizures  Description of Sz Type 2: Stares off -> GTC  # of Type 2 Seizure since last visit: 0  Freq. Type 2 / Month: 0      EPILEPSY HISTORY: Onset of seizures was 14 years of age. Based on electrographic data 09/2012, the patient had frequent nonconvulsive seizures arising from the right and left temporal lobe, bitemporal lobe interictal discharges. Her MRI is normal. Her PET scan was remarkable for decreased metabolic activity in the left temporal lobe. Prior 2010 she rarely had seizure. From 5551-9018 she had several seizure per week. She had antiepileptic drug.     INTERVAL HISTORY:   In the last year she has seizure (can not communicate, no loss of awareness, sometimes she may stare off 10-15 seconds). Her partner has not noticed staring spells during the day. Seizure in daytime worsened in the last 6 months, prior to this she had seizure once a month. She is driving, she will start monitoring seizure frequency. At night she thinks she has seizure, she wakes up with bad headache in the morning, tongue was bitten, she has unexplained bruises on body, her bed partner has not noticed anything (he is a heavy/deep sleeper).     She has high levels of stress, she is very worried about her kids,  her kids may autism, her kids are 3 years old (development delay not talking). She has lots of doctors visits and she is stressed.     Prior to this year, her last generalized tonic-clonic convulsion 2014. Patient denies dizziness, no double vision, no nausea, no vomiting, no abdominal pain, no mood changes, no ER visits, no hospitalizations, and had no significant fall with trauma.      Current antiepileptic drug  Carbamazepine 600-200-800  Levetiracetam [id number]  Lamotrigine 400-200-200       SOCIAL HISTORY: She is not working. She is engaged.  She stopped smoking 2 weeks ago.  She makes several attempts to quit smoking, this is very hard for her.  She has 2 toddlers born in 2018.  Very to have developmental delays and  are nonverbal at age 2.  Key stressors ex  Ricardo passed away 11/2015. Twins born 2018 (Aimee and Omar).  She does have a good social support system at this time.     PHYSICAL EXAMINATION:  There were no vitals taken for this visit.  Alert, orientated, speech is fluent, face is symmetric, extra-ocular movement in tact, no focal deficits noted.G.    ASSESSMENT:   Focal Epilepsy with loss of awareness   Depression   Anxiety   History of eating disorder       Discussion: Focal epilepsy with impairment in awareness.  Etiology of her seizures is unclear and her MRI of the brain is nonlesional.  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  She has seizure burden 2-3 focal impaired seizures during the day, sometimes they are unimpaired seizures with speech difficulty.  At nighttime she has several episodes of tongue biting, unexplained bruises, early morning headaches raising the concern for nocturnal convulsions.  We will increase her lamotrigine.  She is on a strong inducer which will lower her lamotrigine concentrations.  Interestingly, at very high doses her carbamazepine and lamotrigine levels are not optimized.  Certainly, some of her spells may be psychogenic nonepileptic spells.  But, I suspect psychogenic nonepileptic spells are lower on the differential because she has a very severe epilepsy.  She does have high levels of stress (her kids may have autism and are nonverbal, there is developmental delays that are concerning for her).     We will increase lamotrigine as noted below.  Future consideration may be to further optimize carbamazepine (however lamotrigine level will go down). If needed, antiepileptic drug that may be considered in future: banzel, onfi, fycompa, felbamate. I would avoid additional Na+ blocking agents.       PLAN:   Increase Lamotrigine 400-250-200 for 2 weeks. Then increase to 400-300-200    Continue   Levetiracetam [id number]    Carbamazepine  "600-200-800    Check antiepileptic drug for efficacy, toxicity, and side effects.  Carbamazepine, lamotrigine, levetiracetam, ast, alt, na+, cbc     Follow up  2-3 months     [facility]    We can support paper work for [employer] (she has several seizure per month, possible night time seizures, high levels of antiepileptic drug, some cognitive issues).     Do not drive and seizure precautions     Focus on self care (walking, sleeping, eating, breathing exercise)     ASHLI THORNE MD       I spent 38 minutes today to provide comprehensive  medical care. During this time key medical decisions were made with review of medical chart prior to visit, visit with patient, counseling/education, and post visit work, including documentation on the day of visit. I addressed all questions the patient/caregiver raised in regards to the patient's medical care. This note was created with voice recognition software. Inadvertent grammatical errors, typographical errors, and \"sound a like\" substitutions may occur due to limitations of the software.  Read the note carefully and apply context when erroneous substitutions have occurred. Thank you.     Ashli Thorne MD     "

## 2021-08-12 ENCOUNTER — TELEPHONE (OUTPATIENT)
Dept: NEUROLOGY | Facility: CLINIC | Age: 41
End: 2021-08-12

## 2021-08-12 NOTE — TELEPHONE ENCOUNTER
LVM for check out to schedule return appt w Dr. Wilkinson, follow up in 2 months, provided clinic number for call back.

## 2021-08-30 DIAGNOSIS — G40.219 PARTIAL EPILEPSY WITH IMPAIRMENT OF CONSCIOUSNESS, INTRACTABLE (H): ICD-10-CM

## 2021-08-30 DIAGNOSIS — O30.001 TWIN GESTATION IN FIRST TRIMESTER, UNSPECIFIED MULTIPLE GESTATION TYPE: ICD-10-CM

## 2021-08-30 DIAGNOSIS — G40.219 LOCALIZATION-RELATED EPILEPSY WITH COMPLEX PARTIAL SEIZURES WITH INTRACTABLE EPILEPSY (H): ICD-10-CM

## 2021-08-30 DIAGNOSIS — O09.92 HIGH-RISK PREGNANCY IN SECOND TRIMESTER: ICD-10-CM

## 2021-08-30 DIAGNOSIS — F33.0 MAJOR DEPRESSIVE DISORDER, RECURRENT EPISODE, MILD (H): ICD-10-CM

## 2021-09-01 RX ORDER — CARBAMAZEPINE 300 MG/1
CAPSULE, EXTENDED RELEASE ORAL
Qty: 360 CAPSULE | Refills: 3 | OUTPATIENT
Start: 2021-09-01

## 2021-09-01 NOTE — TELEPHONE ENCOUNTER
"Carbatrol 300mg caps refill requested.    Last  visit 2021  Instructions  \"Increase Lamotrigine 400-250-200 for 2 weeks. Then increase to 400-300-200     Continue   Levetiracetam [id number]  Carbamazepine 600-200-800\"        Last prescription sent 2021  carBAMazepine (CARBATROL) 300 MG 12 hr capsule 360 capsule 3 2021  No   Si tablets in the morning and night (take along with 200 mg noon and night for total dose of 600 mg morning -200 mg noon -800 mg night)   Sent to pharmacy as: carBAMazepine  MG Oral Capsule Extended Release 12 Hour (CARBATROL)   Class: E-Prescribe   Order: [id number]   E-Prescribing Status: Receipt confirmed by pharmacy (2021 10:39 AM CDT)   Prior authorization: Closed - Other           Call placed to the pharmacy.  Spoke with Edilma.  She reports the 300mg capsule prescription is ready to be picked up    Call placed to patient to let her know  Voice message left      "

## 2021-12-01 DIAGNOSIS — G40.219 PARTIAL EPILEPSY WITH IMPAIRMENT OF CONSCIOUSNESS, INTRACTABLE (H): ICD-10-CM

## 2021-12-01 DIAGNOSIS — G40.219 LOCALIZATION-RELATED EPILEPSY WITH COMPLEX PARTIAL SEIZURES WITH INTRACTABLE EPILEPSY (H): ICD-10-CM

## 2021-12-01 DIAGNOSIS — O09.92 HIGH-RISK PREGNANCY IN SECOND TRIMESTER: ICD-10-CM

## 2021-12-01 DIAGNOSIS — F33.0 MAJOR DEPRESSIVE DISORDER, RECURRENT EPISODE, MILD (H): ICD-10-CM

## 2021-12-01 DIAGNOSIS — O30.001 TWIN GESTATION IN FIRST TRIMESTER, UNSPECIFIED MULTIPLE GESTATION TYPE: ICD-10-CM

## 2021-12-01 RX ORDER — LAMOTRIGINE 200 MG/1
TABLET ORAL
Qty: 360 TABLET | Refills: 0 | Status: SHIPPED | OUTPATIENT
Start: 2021-12-01 | End: 2022-04-08

## 2021-12-01 NOTE — TELEPHONE ENCOUNTER
Medication Refill request received for   Pending Prescriptions:                       Disp   Refills    lamoTRIgine (LAMICTAL) 200 MG tablet      360 ta*0            Sig: Increase to 400 mg am, 300 mg noon, 200 mg pm as           advised      Last Written Prescription Date:  8/6/2021  Last Fill Quantity: 360,   # refills: 0 Length of last prescription in time: 3 months  Last Office Visit : 8/6/2021  Future Office visit:  12/27/2021    [facility] Medication Refill Protocol requirements:    Results for ALLYSSA WU (MRN [id number]) as of 12/1/2021 10:14   Ref. Range 5/3/2019 10:29   Lamotrigine Level Latest Ref Range: 2.5 - 15.0 ug/mL 5.9       Refill request was approved per [facility] Medication Refill Protocol requirements.

## 2021-12-27 ENCOUNTER — VIRTUAL VISIT (OUTPATIENT)
Dept: NEUROLOGY | Facility: CLINIC | Age: 41
End: 2021-12-27

## 2021-12-27 DIAGNOSIS — G40.219 LOCALIZATION-RELATED EPILEPSY WITH COMPLEX PARTIAL SEIZURES WITH INTRACTABLE EPILEPSY (H): Primary | ICD-10-CM

## 2021-12-27 NOTE — LETTER
2021     RE: Vernell Logan  : 1980   MRN: [id number]      Dear Colleague,    Thank you for referring your patient, Vernell Logan, to the [facility] EPILEPSY CARE at [facility]. Please see a copy of my visit note below.    Vernell is a 41 year old who is being evaluated via a billable video visit.      How would you like to obtain your AVS? MyChart  If the video visit is dropped, the invitation should be resent by: Send to e-mail at: [email or web address]  Will anyone else be joining your video visit? No    Video Start Time: 9:45 AM  Video-Visit Details  Type of service:  Video Visit  Video End Time:10:10 am   Originating Location (pt. Location): Home  Distant Location (provider location):  [facility] EPILEPSY CARE   Platform used for Video Visit: [employer]le     [facility]    Patient:  Vernell Logan  :  1980   Age:  41 year old   Today's Office Visit:  2021    Epilepsy Data:  Patient History  Primary Epileptologist/Provider: Ashli Wilkinson M.D.  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 Last 5 minutes.    # of Type 1 Seizure since last visit: 35  Freq. Type 1 / Month: 7.45  Seizure Type 2: Partial seizures with secondary generalization  -  with complex partial seizures evolving to generalized  "seizures  Description of Sz Type 2: Stares off -> GTC  # of Type 2 Seizure since last visit: 1  Freq. Type 2 / Month: 0.21    EPILEPSY HISTORY: Onset of seizures was 14 years of age. Based on electrographic data 09/2012, the patient had frequent nonconvulsive seizures arising from the right and left temporal lobe, bitemporal lobe interictal discharges. Her MRI is normal. Her PET scan was remarkable for decreased metabolic activity in the left temporal lobe. Prior 2010 she rarely had seizure. From 4735-4730 she had several seizure per week. She had antiepileptic drug.     INTERVAL HISTORY:   In the last year she has one generalized tonic-clonic convulsion 8/7/2021. Prior to this year, her last generalized tonic-clonic convulsion 2014. She also has 2-3 focal seizures with impairment in awareness per week, seizure are described as \"can not communicate, no loss of awareness, sometimes she may stare off 10-15 seconds\". Her partner has not noticed staring spells during the day. She thinks every ten years seizure worsen (2001, 2011, 2021 seizure worsened)    She has high levels of stress, she moved and takes care of her kids. Her kids may autism, her kids are 3 years old (development delay not talking). She has lots of doctors visits and she is stressed.     Patient denies dizziness, no double vision, no nausea, no vomiting, no abdominal pain, no mood changes, no ER visits, no hospitalizations, and had no significant fall with trauma.      Current antiepileptic drug  Carbamazepine 600-200-800  Levetiracetam [id number] (not able to increase she has auditory hallucinations)   Lamotrigine 400-300-200 (not able to tolerate higher doses)        SOCIAL HISTORY: She is not working. She is engaged.  She stopped smoking 2 weeks ago.  She makes several attempts to quit smoking, this is very hard for her.  She has 2 toddlers born in 2018 (walking, say a few words). Ex  Ricardo passed away 11/2015. Twins born 2018 (Aimee and " Omar).  She does have a good social support system at this time.    She is driving, she was made aware of risk and state laws. She was advised not drive.      PHYSICAL EXAMINATION:  There were no vitals taken for this visit.  Alert, orientated, speech is fluent, face is symmetric, extra-ocular movement in tact, no focal deficits noted.G.    ASSESSMENT:   Focal Epilepsy with loss of awareness   Depression   Anxiety   History of eating disorder       Discussion: Focal epilepsy with impairment in awareness.  Etiology of her seizures is unclear and her MRI of the brain is nonlesional.  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      We spoke about epilepsy surgery on this visit including scalp VEEG admission to localize seizures, need for MRI, PET scan, WADA, neuropsychology testing. After this data is collected, there will be a Case Management Conference to determine plan of care for epilepsy surgery, then a second hospitalization with invasive electrode monitoring to localize seizure onset will need to be completed (this will require a neurosurgical implantation of electrodes). After this a second Case Management Conference a plan of care for epilepsy surgery will be determined. Lastly, patient was educated they need medical and psychiatry clearance for epilepsy surgery. Patient would proceed, goal of surgery is to reduce seizure frequency. She was told she will have to continue epilepsy medications.        Future consideration may be to further optimize carbamazepine (however lamotrigine level will go down). If needed, antiepileptic drug that may be considered in future: banzel, onfi, fycompa, felbamate. I would avoid additional Na+ blocking agents.     Mental health: restarted anxiety medications, she is working with [facility] in [place].     PLAN:     Continue   Carbamazepine 600 mg morning-200 mg noon-800 mg evening  Levetiracetam 1500 mg twice a day   Lamotrigine 400 mg  "morning, 300 mg noon, and 200 mg evening      Check antiepileptic drug for efficacy, toxicity, and side effects.  Carbamazepine, lamotrigine, levetiracetam, ast, alt, na+, cbc     Video EEG admission for epilepsy surgery evaluation, it will also be important to evaluate/characterize spells make sure they are not non epileptic spell. I suspect she has focal seizures with impairment in awareness. Video EEG day 1: reduce levetiracetam by 50%, day 2: stop levetiracetam.     MRI brain   WADA with Video EEG   Neuropsychology testing   PET scan with Video EEG   Nurse education epilepsy surgery     Follow up 6-8 weeks      [facility]    Do not drive and seizure precautions     Focus on self care (walking, sleeping, eating, breathing exercise)     PEMA THORNE MD       I spent 43 minutes today to provide comprehensive  medical care. During this time key medical decisions were made with review of medical chart prior to visit, visit with patient, counseling/education, and post visit work, including documentation on the day of visit. I addressed all questions the patient/caregiver raised in regards to the patient's medical care. This note was created with voice recognition software. Inadvertent grammatical errors, typographical errors, and \"sound a like\" substitutions may occur due to limitations of the software.  Read the note carefully and apply context when erroneous substitutions have occurred. Thank you.     Pema Thorne MD   "

## 2021-12-27 NOTE — PROGRESS NOTES
Vernell is a 41 year old who is being evaluated via a billable video visit.      How would you like to obtain your AVS? MyChart  If the video visit is dropped, the invitation should be resent by: Send to e-mail at: [email or web address]  Will anyone else be joining your video visit? No      Video Start Time: 9:45 AM  Video-Visit Details    Type of service:  Video Visit    Video End Time:10:10 am     Originating Location (pt. Location): Home    Distant Location (provider location):  [facility]     Platform used for Video Visit: TapCommerce     [facility]    Patient:  Vernell Logan  :  1980   Age:  41 year old   Today's Office Visit:  2021    Epilepsy Data:  Patient History  Primary Epileptologist/Provider: Ashli Wilkinson M.D.  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 Last 5 minutes.    # of Type 1 Seizure since last visit: 35  Freq. Type 1 / Month: 7.45  Seizure Type 2: Partial seizures with secondary generalization  -  with complex partial seizures evolving to generalized seizures  Description of Sz Type 2: Stares off -> GTC  # of Type 2 Seizure since last visit: 1  Freq. Type 2 / Month: 0.21      EPILEPSY HISTORY: Onset of seizures was 14 years of age. Based on electrographic data 2012, the patient had frequent nonconvulsive seizures arising  "from the right and left temporal lobe, bitemporal lobe interictal discharges. Her MRI is normal. Her PET scan was remarkable for decreased metabolic activity in the left temporal lobe. Prior 2010 she rarely had seizure. From 2884-6104 she had several seizure per week. She had antiepileptic drug.     INTERVAL HISTORY:   In the last year she has one generalized tonic-clonic convulsion 8/7/2021. Prior to this year, her last generalized tonic-clonic convulsion 2014. She also has 2-3 focal seizures with impairment in awareness per week, seizure are described as \"can not communicate, no loss of awareness, sometimes she may stare off 10-15 seconds\". Her partner has not noticed staring spells during the day. She thinks every ten years seizure worsen (2001, 2011, 2021 seizure worsened)    She has high levels of stress, she moved and takes care of her kids. Her kids may autism, her kids are 3 years old (development delay not talking). She has lots of doctors visits and she is stressed.     Patient denies dizziness, no double vision, no nausea, no vomiting, no abdominal pain, no mood changes, no ER visits, no hospitalizations, and had no significant fall with trauma.      Current antiepileptic drug  Carbamazepine 600-200-800  Levetiracetam [id number] (not able to increase she has auditory hallucinations)   Lamotrigine 400-300-200 (not able to tolerate higher doses)        SOCIAL HISTORY: She is not working. She is engaged.  She stopped smoking 2 weeks ago.  She makes several attempts to quit smoking, this is very hard for her.  She has 2 toddlers born in 2018 (walking, say a few words). Ex  Ricardo passed away 11/2015. Twins born 2018 (Aimee and Omar).  She does have a good social support system at this time.    She is driving, she was made aware of risk and state laws. She was advised not drive.      PHYSICAL EXAMINATION:  There were no vitals taken for this visit.  Alert, orientated, speech is fluent, face is " symmetric, extra-ocular movement in tact, no focal deficits noted.G.    ASSESSMENT:   Focal Epilepsy with loss of awareness   Depression   Anxiety   History of eating disorder       Discussion: Focal epilepsy with impairment in awareness.  Etiology of her seizures is unclear and her MRI of the brain is nonlesional.  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      We spoke about epilepsy surgery on this visit including scalp VEEG admission to localize seizures, need for MRI, PET scan, WADA, neuropsychology testing. After this data is collected, there will be a Case Management Conference to determine plan of care for epilepsy surgery, then a second hospitalization with invasive electrode monitoring to localize seizure onset will need to be completed (this will require a neurosurgical implantation of electrodes). After this a second Case Management Conference a plan of care for epilepsy surgery will be determined. Lastly, patient was educated they need medical and psychiatry clearance for epilepsy surgery. Patient would proceed, goal of surgery is to reduce seizure frequency. She was told she will have to continue epilepsy medications.        Future consideration may be to further optimize carbamazepine (however lamotrigine level will go down). If needed, antiepileptic drug that may be considered in future: banzel, onfi, fycompa, felbamate. I would avoid additional Na+ blocking agents.     Mental health: restarted anxiety medications, she is working with [employer] in [place].     PLAN:     Continue   Carbamazepine 600 mg morning-200 mg noon-800 mg evening  Levetiracetam 1500 mg twice a day   Lamotrigine 400 mg morning, 300 mg noon, and 200 mg evening      Check antiepileptic drug for efficacy, toxicity, and side effects.  Carbamazepine, lamotrigine, levetiracetam, ast, alt, na+, cbc     Video EEG admission for epilepsy surgery evaluation, it will also be important to  "evaluate/characterize spells make sure they are not non epileptic spell. I suspect she has focal seizures with impairment in awareness. Video EEG day 1: reduce levetiracetam by 50%, day 2: stop levetiracetam.     MRI brain   WADA with Video EEG   Neuropsychology testing   PET scan with Video EEG   Nurse education epilepsy surgery     Follow up 6-8 weeks      [facility]    Do not drive and seizure precautions     Focus on self care (walking, sleeping, eating, breathing exercise)     PEMA THORNE MD       I spent 43 minutes today to provide comprehensive  medical care. During this time key medical decisions were made with review of medical chart prior to visit, visit with patient, counseling/education, and post visit work, including documentation on the day of visit. I addressed all questions the patient/caregiver raised in regards to the patient's medical care. This note was created with voice recognition software. Inadvertent grammatical errors, typographical errors, and \"sound a like\" substitutions may occur due to limitations of the software.  Read the note carefully and apply context when erroneous substitutions have occurred. Thank you.     Pema Thorne MD             "

## 2021-12-27 NOTE — PATIENT INSTRUCTIONS
Continue   Carbamazepine 600 mg morning-200 mg noon-800 mg evening  Levetiracetam 1500 mg twice a day   Lamotrigine 400 mg morning, 300 mg noon, and 200 mg evening      Get labs near home       HOSPITAL ADMISSION   [facility] nurse will review the hospital admission process and what to expect during your inpatient hospital stay. Family members, friends, or people in your support system may visit during the hospital stay. We hope to answer your questions and make this process easier for you.    I have ordered inpatient Video EEG admission to better understand your case, please schedule up to 7 days for this study.  On average patients stay 5 to 7 days at the [facility].  [address] [place] [address].      During this admission please bring the following:     A list of all your medications or pill bottles    Enough of each medication to last 24 hours after discharge if you have a long commute home    Activities that we will keep you entertained and comfortable during the hospital stay, such as books, DVDs, computer, etc.      It is also helpful to meet with our nurses to better understand the hospital admission process. [facility] nurse will review the hospital admission process and what to expect during your inpatient hospital stay. Family members, friends, or people in your support system may visit during the hospital stay. We hope to answer your questions and make this process easier for you.        Information on Epilepsy brain surgery :  Nurse education to review hospitalization, surgery process, and answer questions about the process  First hospital admission is with scalp EEG electrodes to understand where seizure start in your brain.   MRI of brain (outpatient procedure)   PET of brain scan with video EEG (outpatient procedure)   Neuro psychiatry test (outpatient procedure) to understand memory scores  WADA test with Video EEG (outpatient procedure) to  determine memory and language area       All of the above data will be presented to a team of epilepsy providers (epilepsy doctor, neurosurgeon, neuropsychologist, nurse, and physician assistant) at the Epilepsy Surgery Case Management Conference to determine plan of care for your epilepsy surgery. Then we will determine next steps which will be second hospitalization with electrodes inside brain to better map your brain and seizure onset.        You may review [facility] [place], [facility],   to learn about a patient's experience with Responsive Neurostimulation (Neuropace). [email or web address] and neuropace.com.     Ashli Wilkinson MD

## 2022-02-23 ENCOUNTER — TELEPHONE (OUTPATIENT)
Dept: NEUROLOGY | Facility: CLINIC | Age: 42
End: 2022-02-23
Payer: COMMERCIAL

## 2022-02-23 NOTE — TELEPHONE ENCOUNTER
Received Medical Opinion form from [place] [employer] Services to be completed.  Form saved to the [employer] drive.  Encounter routed.  Carmenza Cherry CMA

## 2022-03-04 ENCOUNTER — HOSPITAL ENCOUNTER (OUTPATIENT)
Facility: CLINIC | Age: 42
End: 2022-03-04
Admitting: NEUROLOGICAL SURGERY
Payer: COMMERCIAL

## 2022-03-04 DIAGNOSIS — R56.9 SEIZURES (H): Primary | ICD-10-CM

## 2022-03-08 ENCOUNTER — TELEPHONE (OUTPATIENT)
Dept: NEUROLOGY | Facility: CLINIC | Age: 42
End: 2022-03-08

## 2022-03-08 NOTE — TELEPHONE ENCOUNTER
LVM to schedule Inpatient EEG monitoring, needs to be completed before pre-surgical work can be done

## 2022-03-17 ENCOUNTER — TELEPHONE (OUTPATIENT)
Dept: NEUROLOGY | Facility: CLINIC | Age: 42
End: 2022-03-17
Payer: COMMERCIAL

## 2022-03-17 NOTE — TELEPHONE ENCOUNTER
Transportation Mileage Exception Form received, Saved to [employer] Encounter routed, Needs to be completed Asap For Inpatient EEG Next week

## 2022-03-18 ENCOUNTER — VIRTUAL VISIT (OUTPATIENT)
Dept: NEUROLOGY | Facility: CLINIC | Age: 42
End: 2022-03-18
Payer: COMMERCIAL

## 2022-03-18 DIAGNOSIS — Z11.52 ENCOUNTER FOR PREPROCEDURE SCREENING LABORATORY TESTING FOR COVID-19: Primary | ICD-10-CM

## 2022-03-18 DIAGNOSIS — G40.219 LOCALIZATION-RELATED EPILEPSY WITH COMPLEX PARTIAL SEIZURES WITH INTRACTABLE EPILEPSY (H): ICD-10-CM

## 2022-03-18 DIAGNOSIS — Z01.812 ENCOUNTER FOR PREPROCEDURE SCREENING LABORATORY TESTING FOR COVID-19: Primary | ICD-10-CM

## 2022-03-18 NOTE — PROGRESS NOTES
Admission Planning for the Epilepsy Monitoring Unit at the [facility]    [facility] provider: Ashli Wilkinson M.D.   Admission date:  3/24/2022   Reason for admit (pre surgical, characterize, quantify burden, classify diagnosis) Presurgical Evaluation       Nurse education regarding admission: Reviewed by telephone   Imaging files are accessible: Yes   EEG reports are accessible: Yes   VEEG Consent scanned on file No    needs: No   Is this a local patient?  No   Harness room No   Barriers to discharge:  MNET; 1-2 day notice   Home environment  House; independent living       COVID-19 testing planning: ordered           Information was provided regarding length of stay, seizure induction, privacy concerns, activity limitations, COVID precautions, and discharge planning. We also briefly reviewed the entire pre-surgical workup process.     Patient identified the following seizure triggers that could be used during EMU study: sleep deprivation, heat, stress, flashing lights, talking on the phone.    Margo Kat RN  Epilepsy Nurse Coordinator

## 2022-03-24 DIAGNOSIS — G40.219 LOCALIZATION-RELATED EPILEPSY WITH COMPLEX PARTIAL SEIZURES WITH INTRACTABLE EPILEPSY (H): Primary | ICD-10-CM

## 2022-03-24 DIAGNOSIS — Z11.52 ENCOUNTER FOR PREPROCEDURE SCREENING LABORATORY TESTING FOR COVID-19: ICD-10-CM

## 2022-03-24 DIAGNOSIS — Z01.812 ENCOUNTER FOR PREPROCEDURE SCREENING LABORATORY TESTING FOR COVID-19: ICD-10-CM

## 2022-03-24 NOTE — PROGRESS NOTES
Patient admission has been postponed to 4/25/2022  Covid test re-entered to meet current requirements for pre admission testing

## 2022-04-06 DIAGNOSIS — O09.92 HIGH-RISK PREGNANCY IN SECOND TRIMESTER: ICD-10-CM

## 2022-04-06 DIAGNOSIS — F33.0 MAJOR DEPRESSIVE DISORDER, RECURRENT EPISODE, MILD (H): ICD-10-CM

## 2022-04-06 DIAGNOSIS — G40.219 LOCALIZATION-RELATED EPILEPSY WITH COMPLEX PARTIAL SEIZURES WITH INTRACTABLE EPILEPSY (H): ICD-10-CM

## 2022-04-06 DIAGNOSIS — G40.219 PARTIAL EPILEPSY WITH IMPAIRMENT OF CONSCIOUSNESS, INTRACTABLE (H): ICD-10-CM

## 2022-04-06 DIAGNOSIS — O30.001 TWIN GESTATION IN FIRST TRIMESTER, UNSPECIFIED MULTIPLE GESTATION TYPE: ICD-10-CM

## 2022-04-08 RX ORDER — LAMOTRIGINE 200 MG/1
TABLET ORAL
Qty: 360 TABLET | Refills: 0 | Status: SHIPPED | OUTPATIENT
Start: 2022-04-08 | End: 2022-06-30

## 2022-04-18 DIAGNOSIS — Z11.59 ENCOUNTER FOR SCREENING FOR OTHER VIRAL DISEASES: Primary | ICD-10-CM

## 2022-04-25 ENCOUNTER — VIRTUAL VISIT (OUTPATIENT)
Dept: NEUROLOGY | Facility: CLINIC | Age: 42
End: 2022-04-25
Payer: COMMERCIAL

## 2022-04-25 DIAGNOSIS — G40.219 PARTIAL EPILEPSY WITH IMPAIRMENT OF CONSCIOUSNESS, INTRACTABLE (H): Primary | ICD-10-CM

## 2022-04-25 NOTE — PROGRESS NOTES
Admission Planning for the Epilepsy Monitoring Unit at the [facility]    [facility] provider: Ashli Wilkinson M.D.   Admission date:  4/28/2022   Reason for admit (pre surgical, characterize, quantify burden, classify diagnosis) Presurgical Evaluation       Nurse education regarding admission: Reviewed by telephone   Imaging files are accessible: MRI is schedule 5/25/22 at [facility]   EEG reports are accessible: Yes   VEEG Consent scanned on file No    needs: No   Is this a local patient?  No   Harness room No   Barriers to discharge:  Health plan transportation    Home environment  house       COVID-19 testing planning: Did it at [facility] 4/25/22         We reviewed again today what to expect with the inpatient EEG and things related to COVID-19.     Yunier Kat RN  Epilepsy Nurse Coordinator

## 2022-04-28 ENCOUNTER — HOSPITAL ENCOUNTER (INPATIENT)
Facility: CLINIC | Age: 42
LOS: 10 days | Discharge: HOME | End: 2022-05-08
Attending: PSYCHIATRY & NEUROLOGY | Admitting: PSYCHIATRY & NEUROLOGY
Payer: COMMERCIAL

## 2022-04-28 ENCOUNTER — HOSPITAL ENCOUNTER (INPATIENT)
Dept: NEUROLOGY | Facility: CLINIC | Age: 42
Discharge: HOME | End: 2022-04-28
Attending: PSYCHIATRY & NEUROLOGY
Payer: COMMERCIAL

## 2022-04-28 ENCOUNTER — APPOINTMENT (OUTPATIENT)
Dept: GENERAL RADIOLOGY | Facility: CLINIC | Age: 42
End: 2022-04-28
Attending: PHYSICIAN ASSISTANT
Payer: COMMERCIAL

## 2022-04-28 DIAGNOSIS — D64.9 ANEMIA, UNSPECIFIED TYPE: ICD-10-CM

## 2022-04-28 DIAGNOSIS — R56.9 SEIZURES (H): ICD-10-CM

## 2022-04-28 DIAGNOSIS — R56.9 SEIZURE (H): ICD-10-CM

## 2022-04-28 DIAGNOSIS — G40.119 INTRACTABLE FOCAL EPILEPSY (H): Primary | ICD-10-CM

## 2022-04-28 LAB
ABO/RH(D): NORMAL
ALBUMIN SERPL-MCNC: 3.6 G/DL (ref 3.4–5)
ALP SERPL-CCNC: 116 U/L (ref 40–150)
ALT SERPL W P-5'-P-CCNC: 49 U/L (ref 0–50)
ANION GAP SERPL CALCULATED.3IONS-SCNC: 5 MMOL/L (ref 3–14)
ANTIBODY SCREEN: NEGATIVE
AST SERPL W P-5'-P-CCNC: 32 U/L (ref 0–45)
BASOPHILS # BLD AUTO: 0 10E3/UL (ref 0–0.2)
BASOPHILS # BLD AUTO: 0 10E3/UL (ref 0–0.2)
BASOPHILS NFR BLD AUTO: 1 %
BASOPHILS NFR BLD AUTO: 1 %
BILIRUB SERPL-MCNC: 0.2 MG/DL (ref 0.2–1.3)
BUN SERPL-MCNC: 11 MG/DL (ref 7–30)
CALCIUM SERPL-MCNC: 8.8 MG/DL (ref 8.5–10.1)
CARBAMAZEPINE SERPL-MCNC: 9.2 MG/L
CHLORIDE BLD-SCNC: 107 MMOL/L (ref 94–109)
CO2 SERPL-SCNC: 28 MMOL/L (ref 20–32)
CREAT SERPL-MCNC: 0.78 MG/DL (ref 0.52–1.04)
EOSINOPHIL # BLD AUTO: 0.1 10E3/UL (ref 0–0.7)
EOSINOPHIL # BLD AUTO: 0.1 10E3/UL (ref 0–0.7)
EOSINOPHIL NFR BLD AUTO: 2 %
EOSINOPHIL NFR BLD AUTO: 2 %
ERYTHROCYTE [DISTWIDTH] IN BLOOD BY AUTOMATED COUNT: 16 % (ref 10–15)
ERYTHROCYTE [DISTWIDTH] IN BLOOD BY AUTOMATED COUNT: 16.1 % (ref 10–15)
ERYTHROCYTE [DISTWIDTH] IN BLOOD BY AUTOMATED COUNT: 16.2 % (ref 10–15)
FERRITIN SERPL-MCNC: 3 NG/ML (ref 12–150)
FOLATE SERPL-MCNC: 11.8 NG/ML
GFR SERPL CREATININE-BSD FRML MDRD: >90 ML/MIN/1.73M2
GLUCOSE BLD-MCNC: 81 MG/DL (ref 70–99)
HCT VFR BLD AUTO: 29.7 % (ref 35–47)
HCT VFR BLD AUTO: 29.9 % (ref 35–47)
HCT VFR BLD AUTO: 30 % (ref 35–47)
HGB BLD-MCNC: 8.1 G/DL (ref 11.7–15.7)
HGB BLD-MCNC: 8.2 G/DL (ref 11.7–15.7)
HGB BLD-MCNC: 8.3 G/DL (ref 11.7–15.7)
HOLD SPECIMEN: NORMAL
IMM GRANULOCYTES # BLD: 0 10E3/UL
IMM GRANULOCYTES # BLD: 0 10E3/UL
IMM GRANULOCYTES NFR BLD: 1 %
IMM GRANULOCYTES NFR BLD: 1 %
IRON SATN MFR SERPL: 3 % (ref 15–46)
IRON SERPL-MCNC: 10 UG/DL (ref 35–180)
LDH SERPL L TO P-CCNC: 137 U/L (ref 81–234)
LYMPHOCYTES # BLD AUTO: 1 10E3/UL (ref 0.8–5.3)
LYMPHOCYTES # BLD AUTO: 1 10E3/UL (ref 0.8–5.3)
LYMPHOCYTES NFR BLD AUTO: 18 %
LYMPHOCYTES NFR BLD AUTO: 18 %
MCH RBC QN AUTO: 18.8 PG (ref 26.5–33)
MCH RBC QN AUTO: 18.9 PG (ref 26.5–33)
MCH RBC QN AUTO: 19 PG (ref 26.5–33)
MCHC RBC AUTO-ENTMCNC: 27.3 G/DL (ref 31.5–36.5)
MCHC RBC AUTO-ENTMCNC: 27.3 G/DL (ref 31.5–36.5)
MCHC RBC AUTO-ENTMCNC: 27.8 G/DL (ref 31.5–36.5)
MCV RBC AUTO: 69 FL (ref 78–100)
MONOCYTES # BLD AUTO: 0.5 10E3/UL (ref 0–1.3)
MONOCYTES # BLD AUTO: 0.6 10E3/UL (ref 0–1.3)
MONOCYTES NFR BLD AUTO: 10 %
MONOCYTES NFR BLD AUTO: 9 %
NEUTROPHILS # BLD AUTO: 3.7 10E3/UL (ref 1.6–8.3)
NEUTROPHILS # BLD AUTO: 3.8 10E3/UL (ref 1.6–8.3)
NEUTROPHILS NFR BLD AUTO: 68 %
NEUTROPHILS NFR BLD AUTO: 69 %
NRBC # BLD AUTO: 0 10E3/UL
NRBC # BLD AUTO: 0 10E3/UL
NRBC BLD AUTO-RTO: 0 /100
NRBC BLD AUTO-RTO: 0 /100
PLAT MORPH BLD: NORMAL
PLATELET # BLD AUTO: 295 10E3/UL (ref 150–450)
PLATELET # BLD AUTO: 308 10E3/UL (ref 150–450)
PLATELET # BLD AUTO: 335 10E3/UL (ref 150–450)
POTASSIUM BLD-SCNC: 4.4 MMOL/L (ref 3.4–5.3)
PROT SERPL-MCNC: 6.8 G/DL (ref 6.8–8.8)
RBC # BLD AUTO: 4.32 10E6/UL (ref 3.8–5.2)
RBC # BLD AUTO: 4.33 10E6/UL (ref 3.8–5.2)
RBC # BLD AUTO: 4.36 10E6/UL (ref 3.8–5.2)
RBC MORPH BLD: NORMAL
RETICS # AUTO: 0.05 10E6/UL (ref 0.03–0.1)
RETICS/RBC NFR AUTO: 1.2 % (ref 0.5–2)
SARS-COV-2 RNA RESP QL NAA+PROBE: NEGATIVE
SODIUM SERPL-SCNC: 140 MMOL/L (ref 133–144)
SPECIMEN EXPIRATION DATE: NORMAL
TIBC SERPL-MCNC: 387 UG/DL (ref 240–430)
TSH SERPL DL<=0.005 MIU/L-ACNC: 0.57 MU/L (ref 0.4–4)
VIT B12 SERPL-MCNC: 453 PG/ML (ref 193–986)
WBC # BLD AUTO: 3.3 10E3/UL (ref 4–11)
WBC # BLD AUTO: 5.4 10E3/UL (ref 4–11)
WBC # BLD AUTO: 5.4 10E3/UL (ref 4–11)

## 2022-04-28 PROCEDURE — U0003 INFECTIOUS AGENT DETECTION BY NUCLEIC ACID (DNA OR RNA); SEVERE ACUTE RESPIRATORY SYNDROME CORONAVIRUS 2 (SARS-COV-2) (CORONAVIRUS DISEASE [COVID-19]), AMPLIFIED PROBE TECHNIQUE, MAKING USE OF HIGH THROUGHPUT TECHNOLOGIES AS DESCRIBED BY CMS-2020-01-R: HCPCS | Performed by: PSYCHIATRY & NEUROLOGY

## 2022-04-28 PROCEDURE — 93010 ELECTROCARDIOGRAM REPORT: CPT | Performed by: INTERNAL MEDICINE

## 2022-04-28 PROCEDURE — 36415 COLL VENOUS BLD VENIPUNCTURE: CPT | Performed by: PHYSICIAN ASSISTANT

## 2022-04-28 PROCEDURE — 86901 BLOOD TYPING SEROLOGIC RH(D): CPT | Performed by: PHYSICIAN ASSISTANT

## 2022-04-28 PROCEDURE — 250N000013 HC RX MED GY IP 250 OP 250 PS 637: Performed by: PSYCHIATRY & NEUROLOGY

## 2022-04-28 PROCEDURE — 73590 X-RAY EXAM OF LOWER LEG: CPT | Mod: 26 | Performed by: RADIOLOGY

## 2022-04-28 PROCEDURE — 85025 COMPLETE CBC W/AUTO DIFF WBC: CPT | Performed by: PHYSICIAN ASSISTANT

## 2022-04-28 PROCEDURE — 85045 AUTOMATED RETICULOCYTE COUNT: CPT | Performed by: PHYSICIAN ASSISTANT

## 2022-04-28 PROCEDURE — 83615 LACTATE (LD) (LDH) ENZYME: CPT | Performed by: PHYSICIAN ASSISTANT

## 2022-04-28 PROCEDURE — 82728 ASSAY OF FERRITIN: CPT | Performed by: PHYSICIAN ASSISTANT

## 2022-04-28 PROCEDURE — 73610 X-RAY EXAM OF ANKLE: CPT | Mod: LT

## 2022-04-28 PROCEDURE — 86850 RBC ANTIBODY SCREEN: CPT | Performed by: PHYSICIAN ASSISTANT

## 2022-04-28 PROCEDURE — 99207 PR APP CREDIT; MD BILLING SHARED VISIT: CPT | Performed by: PHYSICIAN ASSISTANT

## 2022-04-28 PROCEDURE — 95714 VEEG EA 12-26 HR UNMNTR: CPT

## 2022-04-28 PROCEDURE — 99222 1ST HOSP IP/OBS MODERATE 55: CPT | Mod: AI | Performed by: PHYSICIAN ASSISTANT

## 2022-04-28 PROCEDURE — 83550 IRON BINDING TEST: CPT | Performed by: PHYSICIAN ASSISTANT

## 2022-04-28 PROCEDURE — 80156 ASSAY CARBAMAZEPINE TOTAL: CPT | Performed by: PHYSICIAN ASSISTANT

## 2022-04-28 PROCEDURE — 120N000002 HC R&B MED SURG/OB UMMC

## 2022-04-28 PROCEDURE — 80177 DRUG SCRN QUAN LEVETIRACETAM: CPT | Performed by: PHYSICIAN ASSISTANT

## 2022-04-28 PROCEDURE — 82746 ASSAY OF FOLIC ACID SERUM: CPT | Performed by: PHYSICIAN ASSISTANT

## 2022-04-28 PROCEDURE — 80175 DRUG SCREEN QUAN LAMOTRIGINE: CPT | Performed by: PHYSICIAN ASSISTANT

## 2022-04-28 PROCEDURE — 84443 ASSAY THYROID STIM HORMONE: CPT | Performed by: PHYSICIAN ASSISTANT

## 2022-04-28 PROCEDURE — 93005 ELECTROCARDIOGRAM TRACING: CPT

## 2022-04-28 PROCEDURE — 82607 VITAMIN B-12: CPT | Performed by: PHYSICIAN ASSISTANT

## 2022-04-28 PROCEDURE — 250N000013 HC RX MED GY IP 250 OP 250 PS 637: Performed by: PHYSICIAN ASSISTANT

## 2022-04-28 PROCEDURE — 95720 EEG PHY/QHP EA INCR W/VEEG: CPT | Mod: GC | Performed by: PSYCHIATRY & NEUROLOGY

## 2022-04-28 PROCEDURE — 73590 X-RAY EXAM OF LOWER LEG: CPT | Mod: LT

## 2022-04-28 PROCEDURE — 85660 RBC SICKLE CELL TEST: CPT | Performed by: PHYSICIAN ASSISTANT

## 2022-04-28 PROCEDURE — 99222 1ST HOSP IP/OBS MODERATE 55: CPT | Performed by: INTERNAL MEDICINE

## 2022-04-28 PROCEDURE — 83010 ASSAY OF HAPTOGLOBIN QUANT: CPT | Performed by: PHYSICIAN ASSISTANT

## 2022-04-28 PROCEDURE — 999N000128 HC STATISTIC PERIPHERAL IV START W/O US GUIDANCE

## 2022-04-28 PROCEDURE — 73610 X-RAY EXAM OF ANKLE: CPT | Mod: 26 | Performed by: RADIOLOGY

## 2022-04-28 PROCEDURE — 80053 COMPREHEN METABOLIC PANEL: CPT | Performed by: PHYSICIAN ASSISTANT

## 2022-04-28 RX ORDER — LIDOCAINE 40 MG/G
CREAM TOPICAL
Status: DISCONTINUED | OUTPATIENT
Start: 2022-04-28 | End: 2022-05-08 | Stop reason: HOSPADM

## 2022-04-28 RX ORDER — CARBAMAZEPINE 300 MG/1
600 CAPSULE, EXTENDED RELEASE ORAL DAILY
Status: DISCONTINUED | OUTPATIENT
Start: 2022-04-29 | End: 2022-05-01

## 2022-04-28 RX ORDER — DOCUSATE SODIUM 100 MG/1
100 CAPSULE, LIQUID FILLED ORAL 2 TIMES DAILY PRN
Status: DISCONTINUED | OUTPATIENT
Start: 2022-04-28 | End: 2022-05-08 | Stop reason: HOSPADM

## 2022-04-28 RX ORDER — CARBAMAZEPINE 200 MG/1
400 CAPSULE, EXTENDED RELEASE ORAL EVERY EVENING
Status: DISCONTINUED | OUTPATIENT
Start: 2022-04-28 | End: 2022-05-01

## 2022-04-28 RX ORDER — BUSPIRONE HYDROCHLORIDE 10 MG/1
10 TABLET ORAL 2 TIMES DAILY
Status: DISCONTINUED | OUTPATIENT
Start: 2022-04-28 | End: 2022-05-08 | Stop reason: HOSPADM

## 2022-04-28 RX ORDER — LIDOCAINE HYDROCHLORIDE 20 MG/ML
5 SOLUTION OROPHARYNGEAL EVERY 6 HOURS PRN
Status: DISCONTINUED | OUTPATIENT
Start: 2022-04-28 | End: 2022-05-08 | Stop reason: HOSPADM

## 2022-04-28 RX ORDER — ALBUTEROL SULFATE 90 UG/1
2 AEROSOL, METERED RESPIRATORY (INHALATION) EVERY 4 HOURS PRN
Status: DISCONTINUED | OUTPATIENT
Start: 2022-04-28 | End: 2022-05-08 | Stop reason: HOSPADM

## 2022-04-28 RX ORDER — CARBAMAZEPINE 200 MG/1
200 CAPSULE, EXTENDED RELEASE ORAL DAILY
Status: DISCONTINUED | OUTPATIENT
Start: 2022-04-28 | End: 2022-04-28

## 2022-04-28 RX ORDER — CARBAMAZEPINE 200 MG/1
200 CAPSULE, EXTENDED RELEASE ORAL DAILY
Status: DISCONTINUED | OUTPATIENT
Start: 2022-04-29 | End: 2022-04-30

## 2022-04-28 RX ORDER — GINSENG 100 MG
CAPSULE ORAL 3 TIMES DAILY PRN
Status: DISCONTINUED | OUTPATIENT
Start: 2022-04-28 | End: 2022-05-08 | Stop reason: HOSPADM

## 2022-04-28 RX ORDER — IBUPROFEN 200 MG
400-600 TABLET ORAL EVERY 6 HOURS PRN
Status: DISCONTINUED | OUTPATIENT
Start: 2022-04-28 | End: 2022-05-08 | Stop reason: HOSPADM

## 2022-04-28 RX ORDER — ACETAMINOPHEN 325 MG/1
650 TABLET ORAL EVERY 4 HOURS PRN
Status: DISCONTINUED | OUTPATIENT
Start: 2022-04-28 | End: 2022-05-08 | Stop reason: HOSPADM

## 2022-04-28 RX ORDER — LEVETIRACETAM 750 MG/1
1500 TABLET ORAL 2 TIMES DAILY
Status: DISCONTINUED | OUTPATIENT
Start: 2022-04-28 | End: 2022-05-03

## 2022-04-28 RX ORDER — CARBAMAZEPINE 300 MG/1
600 CAPSULE, EXTENDED RELEASE ORAL 2 TIMES DAILY
Status: DISCONTINUED | OUTPATIENT
Start: 2022-04-28 | End: 2022-04-28

## 2022-04-28 RX ORDER — LAMOTRIGINE 200 MG/1
200 TABLET ORAL 3 TIMES DAILY
Status: DISCONTINUED | OUTPATIENT
Start: 2022-04-28 | End: 2022-05-01

## 2022-04-28 RX ORDER — CARBAMAZEPINE 300 MG/1
600 CAPSULE, EXTENDED RELEASE ORAL DAILY
Status: DISCONTINUED | OUTPATIENT
Start: 2022-04-29 | End: 2022-04-28

## 2022-04-28 RX ORDER — LORAZEPAM 2 MG/ML
2 INJECTION INTRAMUSCULAR
Status: DISCONTINUED | OUTPATIENT
Start: 2022-04-28 | End: 2022-05-08 | Stop reason: HOSPADM

## 2022-04-28 RX ORDER — CARBAMAZEPINE 200 MG/1
200 CAPSULE, EXTENDED RELEASE ORAL 2 TIMES DAILY
Status: DISCONTINUED | OUTPATIENT
Start: 2022-04-28 | End: 2022-04-28

## 2022-04-28 RX ORDER — CLONAZEPAM 1 MG/1
1 TABLET ORAL
Status: DISCONTINUED | OUTPATIENT
Start: 2022-04-28 | End: 2022-05-08 | Stop reason: HOSPADM

## 2022-04-28 RX ADMIN — LAMOTRIGINE 200 MG: 200 TABLET ORAL at 13:57

## 2022-04-28 RX ADMIN — CARBAMAZEPINE 400 MG: 200 CAPSULE, EXTENDED RELEASE ORAL at 20:07

## 2022-04-28 RX ADMIN — CARBAMAZEPINE 200 MG: 200 CAPSULE, EXTENDED RELEASE ORAL at 13:57

## 2022-04-28 RX ADMIN — IBUPROFEN 600 MG: 200 TABLET, FILM COATED ORAL at 20:07

## 2022-04-28 RX ADMIN — BUSPIRONE HYDROCHLORIDE 10 MG: 10 TABLET ORAL at 20:07

## 2022-04-28 RX ADMIN — IBUPROFEN 600 MG: 200 TABLET, FILM COATED ORAL at 14:33

## 2022-04-28 RX ADMIN — LEVETIRACETAM 1500 MG: 750 TABLET, FILM COATED ORAL at 20:07

## 2022-04-28 RX ADMIN — LAMOTRIGINE 200 MG: 200 TABLET ORAL at 20:07

## 2022-04-28 ASSESSMENT — ACTIVITIES OF DAILY LIVING (ADL)
ADLS_ACUITY_SCORE: 12
ADLS_ACUITY_SCORE: 12
DIFFICULTY_EATING/SWALLOWING: NO
ADLS_ACUITY_SCORE: 4
DOING_ERRANDS_INDEPENDENTLY_DIFFICULTY: YES
ADLS_ACUITY_SCORE: 4
CONCENTRATING,_REMEMBERING_OR_MAKING_DECISIONS_DIFFICULTY: NO
FALL_HISTORY_WITHIN_LAST_SIX_MONTHS: NO
ADLS_ACUITY_SCORE: 4
WEAR_GLASSES_OR_BLIND: NO
TOILETING_ISSUES: NO
ADLS_ACUITY_SCORE: 4
ADLS_ACUITY_SCORE: 4
CHANGE_IN_FUNCTIONAL_STATUS_SINCE_ONSET_OF_CURRENT_ILLNESS/INJURY: NO
ADLS_ACUITY_SCORE: 4
ADLS_ACUITY_SCORE: 4
DRESSING/BATHING_DIFFICULTY: NO
ADLS_ACUITY_SCORE: 4
ADLS_ACUITY_SCORE: 4
WALKING_OR_CLIMBING_STAIRS_DIFFICULTY: NO
ADLS_ACUITY_SCORE: 4
ADLS_ACUITY_SCORE: 4

## 2022-04-28 ASSESSMENT — VISUAL ACUITY: OU: BASELINE

## 2022-04-28 NOTE — PLAN OF CARE
Status: admitted for presurgical workup for epilepsy  Vitals: VSS  Neuros: intact  IV: PIV saline locked  Labs/Electrolytes: WNL  Resp/trach: WNL  Diet: tolerating regular diet without difficulty  Bowel status: BM this AM  : voiding without difficulty, continent  Skin: intact  Pain: denies  Activity: up with SBA, GB  Social: no visitors or phone calls this shift  Plan: monitor to capture staring spells  Updates this shift: pt compliant with seizure precautions    Arrived from: home   Belongings/meds: meds in security  2 RN Skin Assessment Completed by: writer and Hannah CLAY RN    Non-intact findings documented (yes/no/NA): intact

## 2022-04-28 NOTE — H&P
[facility] Epilepsy Admission     Vernell Logan MRN# [id number]   YOB: 1980 Age: 41 year old        Reason for Admission: Patient is a 41 year old female with a history of depression, anxiety and intractable focal epilepsy who is being admitted for a presurgical evaluation.     HPI:  She reports seizure onset at age 14 with a generalized tonic-clonic seizure. She reports seizures were rare between [age] and she was treated with carbamazepine. Seizures worsened in  and she started following at [facility]. Work-up at that time indicated a bitemporal epilepsy. Her MRI was normal. Her PET scan was remarkable for decreased metabolic activity in the left temporal lobe. Currently she describes 3 seizure types:    Type 1: About 10% of the time she will have an aura consisting of a wave running through her head. She will be unable to talk and has difficulty comprehending what people are saying to her. She thinks she sometimes has lip movements. She is generally aware that she had a seizure.This lasts 2-5 minutes. After she is tired, has a headache and confused. This is happening 2-3 times a week.     Type 2: these are staring spells and she feels she can't move her eyes. She is aware of her surroundings. This happens 2-3 times a week. After she may have a slight headache but is able to resume normal activity.    Type 3: GTC seizure. She recalls feeling her head turn to the left and having drooling. Then she is told she stiffens, sometimes has shaking and lips may turn blue. No incontinence but may bite her tongue. Her last one was 2021 and prior to that it was .     Triggers: lack of sleep, strobe lights, being too warm, missing medications , talking on the phone     Past antiepileptic drugs: carbamazepine, lamotrigine, levetiracetam     Risk Factors: No  injury, developmental delay, CNS infections, febrile seizures, tumors, strokes, head injuries or history of substance abuse. Her father had  "seizures that started in his 30's.     Prior Epilepsy Work-up:  1. Brain MRI 2011 at [facility] was \"negative\"  2. Brain PET 3/30/2011 showed mildly decreased metabolic activity in the left temporal lobe compared to the right.   3. 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 Electrographic seizures were recorded with onset in right temporal lobe.     Past Medical History:   1. Intractable focal epilepsy, bitemporal   2. Depression  3. Anxiety   4. History of eating disorder (anorexia)  5. History of tubal ligation     Past Medical History:   Diagnosis Date     Anorexia 3/5/2013     Anxiety      Depressive disorder 1999     Heart murmur     told benign     Localization-related epilepsy (H)      Major depression      Migraine      Seizure disorder (H)     working with [facility] [place] episodes      Seizures (H)      Status post left breast biopsy,sclerosing adenosis not concordant with imaging,12     Past Surgical History:   Procedure Laterality Date     C/SECTION, LOW TRANSVERSE  , 2018    x 2      SECTION, TUBAL LIGATION, COMBINED           Medications:   Medications Prior to Admission   Medication Sig Dispense Refill Last Dose     Acetaminophen (TYLENOL PO) Take by mouth as needed for mild pain or fever   2022 at Unknown time     albuterol (PROAIR HFA/PROVENTIL HFA/VENTOLIN HFA) 108 (90 Base) MCG/ACT inhaler INHALE 2 PUFFS EVERY 4 HOURS AS NEEDED FOR SHORTNESS OF BREATH OR WHEEZING 18 g 9 More than a month at Unknown time     busPIRone (BUSPAR) 10 MG tablet Take 10 mg by mouth 2 times daily  1 2022 at am     carBAMazepine (CARBATROL) 200 MG 12 hr capsule Take 200 mg at noon and night (night dose take along with 600 mg dose) 180 capsule 3 2022 at 1200     carBAMazepine (CARBATROL) 300 MG 12 hr capsule 2 tablets in the morning and night (take along with 200 mg noon " and night for total dose of 600 mg morning -200 mg noon -800 mg night) 360 capsule 3 2022 at am     clonazePAM (KLONOPIN) 1 MG tablet Take 1 mg by mouth nightly as needed    More than a month at Unknown time     lamoTRIgine (LAMICTAL) 200 MG tablet TAKE TWO Tablets BY MOUTH EVERY MORNING, ONE & ONE-HALF tablets (300mg) AT NOON, AND ONE tablet in THE evening as advised (Patient taking differently: Take 200 mg by mouth 3 times daily) 360 tablet 0 2022 at am     levETIRAcetam (KEPPRA) 500 MG tablet Take 3 tablets (1,500 mg) by mouth 2 times daily 540 tablet 3 2022 at am     venlafaxine (EFFEXOR-XR) 150 MG 24 hr capsule daily   0 Unknown at Unknown time     VITAMIN C 1000 MG OR TABS Take by mouth daily    Unknown at Unknown time     ibuprofen (ADVIL/MOTRIN) 200 MG capsule TAKE 1 TABLET BY MOUTH EVERY 6 HOURS FOR 9 DOSES  0        Allergies:   Allergies   Allergen Reactions     Chantix [Varenicline]      Suicidal thoughts        Family History:   Family History   Problem Relation Age of Onset     Heart Disease Father         64 yo when he had arrhythmia,  of CHF age 65     Diabetes Father      Neurologic Disorder Father         epilepsy     Schizophrenia Father      Anxiety Disorder Father      Hypertension Father      Obesity Father      Cancer Maternal Grandfather      Cerebrovascular Disease Maternal Grandfather      Cerebrovascular Disease Paternal Grandmother      Heart Disease Paternal Grandfather      Psychotic Disorder Daughter      Neurologic Disorder Daughter         CP, she was adopted out in      Schizophrenia Mother      Anxiety Disorder Mother      Depression Mother        Social History:   Social History     Tobacco Use     Smoking status: Current Every Day Smoker     Packs/day: 1.00     Years: 26.00     Pack years: 26.00     Types: Cigarettes     Smokeless tobacco: Current User     Tobacco comment: 1 pack    Substance Use Topics     Alcohol use: Not Currently     Comment: 0-2 per  "year    Took regular classes in school and graduated high school. 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 Last worked 2005. Not working currently. She is  since 2015. Lives with significant other and has 4 year old twins. Lives in [place]. Not driving currently. No tobacco, alcohol or drug use.    She reports depression and anxiety. Was previously following with psychiatrist but PCP is managing medications currently. She is working on starting with therapy again soon.     Review of Systems: Positive poor short term memory  And daily headaches. She also reports left ankle pain. She twisted her ankle a few weeks ago and it still hurst to walk on. She denies current double/blurry vision, dizziness, N/V/D/C, cough, SOB, chest pain, numbness/tingling or weakness. 10 point review of systems negative except per HPI    Physical Exam/Vitals:  Blood pressure 119/65, pulse 72, temperature 98.7  F (37.1  C), temperature source Oral, resp. rate 16, height 1.626 m (5' 4\"), weight 52.8 kg (116 lb 8 oz), SpO2 97 %, not currently breastfeeding.  General: NAD  Head: NC/AT  Neck: supple  Respiratory: lungs CTA bilaterally  Cardiac: RRR, no murmur  Abdomen: soft, nontender  Extremities: no LE edema, left ankle with tenderness on medial malleolus, no swelling, no bruising  Neuro: Alert and oriented. Speech fluent. Hearing intact to normal conversation. PERRL, EOM's intact, visual fields full. Face symmetric, tongue midline. Strong should shrug bilaterally. Strength 5/5 bilaterally. No drif to or pronation. Slight bilateral hand tremor. Sensation intact to light touch. DTR's 2+ bilaterally.       Data:  Patient able to show me NEGATIVE PCR covid test from [facility] 4/25/22    Current antiepileptic drugs:  1. Carbamazepine 600-200-800  2. Levetiracetam [id number]  3. Lamotrigine 200-200-200 (prescribed dose was 400-300-200 but she had double vision with this so she decreased about 3 weeks " ago)    Assessment and Plan:  1. Patient is a 41 year old female with a history of depression, anxiety and intractable focal epilepsy who is being admitted for a presurgical evaluation.     -admit for vEEG monitoring  -seizure precautions  -ativan PRN seizures per protocol  -SCD's for DVT prophylaxis  -CBC, CMP, antiepileptic drug levels  -decrease carbamazepine to 600-200-400    2. Anemia, likely iron deficiency. CBC today shows hemoglobin of 8.2 and MCV of 69. Most recent CBC in 7/2019 hemoglobin was 12. Was last low in 2017.  Patient reports has had iron deficiency anemia in past but has been many years. Not currently menstruating, LMP was 1 week ago. She denies blood in stool    -medicine consult      3. Leukopenia, may be related to carbamazepine use       Lisa Velazquez PA-C    Total time: I spent 60 minutes face-to-face with patient and family reviewing history and performing a physical examination. I spent an additional 15 minutes coordinating care, reviewing labs and imaging. I answered all of patients questions and addressed immediate concerns.

## 2022-04-28 NOTE — CONSULTS
[facility]  Consult Note - Hospitalist Service, GOLD TEAM   Date of Admission:  4/28/2022  Consult Requested by: Amberly Soto  Reason for Consult: Anemia    Assessment & Plan   Vernell Logan is a 41 year old female patient with a past medical history significant for depression, anxiety, anorexia, heart murmur, Vit D deficiency, tobacco use disorder, migraine headaches, and intractable focal epilepsy admitted to neurology service for pre-surgical clearance (planning for epilepsy surgery).    Recommendations:  - Anemia work-up as below (ordered for you)  - XR L Ankle and Tib/Fib (ordered for you), consider Ortho consult if evidence of fracture.  - Psychiatry consult for Effexor initiation and pre-surgical clearance for planned epilepsy surgery. She will need to get connected with an outpatient provider as well.  - EKG for QTc evaluation (ordered for you)    Microcytic Anemia  Leukopenia  Hemoglobin 8.2 and WBC 3.3. Platelets 295. Total bili 0.2. Most recent CBC in 7/2019 hemoglobin was 12. Was last low in 2017.  Patient reports history of iron deficiency anemia maintained well on iron supplement previously, though has been off for years. Not currently menstruating, LMP was 1 week ago. She denies any bleeding; no hematochezia, hematemesis, hematuria, melena, easy bruising, bleeding from gums.    - CBC with Dif, Retic, peripheral smear, LDH, Haptoglobin, iron panel with ferritin, folate, B12, TSH, type and screen. Given low MCV will check for hemoglobinopathy.   - Consider Hematology involvement pending above work-up.   - If above work-up is unrevealing, medications could be potential cause. Possibly related to Carbamazepine: Bone marrow suppression-related hematological effects present as anemia, aplastic anemia, leukopenia, neutropenia, or thrombocytopenia. Lamotrigine is associated with hematologic adverse effects as well including agranulocytosis,   neutropenia, pancytopenia, and pure red cell aplasia, aplastic anemia has also been reported.     Bitemporal Epilepsy   Pre-Surgical Clearance   - Management per primary neurology team:   -Admitted for vEEG monitoring   -seizure precautions   -ativan PRN seizures per protocol   -SCD's for DVT prophylaxis   -CBC, CMP, antiepileptic drug levels   -decreased carbamazepine to 600-200-400    Depression  Anxiety   - Continue PTA Buspar and prn Klonopin at bedtime.   - Patient previously on Effexor but was unable to get it filled. Restarted and filled by PCP recently but patient reports she needs a slow upward taper. Notably, patient will require psychiatry clearance before surgery can be considered as well so will place inpatient consultation for clearance and Effexor re-initiation.   - Will check EKG for QTc evaluation prior to restarting Effexor    Left Ankle Pain  Patient reports she slipped off the bottom step leading out to her garage ~2 weeks ago and rolled her ankle. No bruising or swelling currently, however noted point bony tenderness over fibula.   - XRs ordered   - Pain control with ice/heat and tylenol   - ACE wrap for now     The patient's care was discussed with the attending medicine physician Dr. Roberto Carlos Knight, RIVERA  [facility]  Securely message with the Vocera Web Console (learn more here)  Text page via [facility] Paging/Directory   Please see signed in provider for up to date coverage information    Hospitalist Service, GOLD TEAM     Clinically Significant Risk Factors Present on Admission                      ______________________________________________________________________    Chief Complaint   Anemia    History is obtained from the patient and EMR.    History of Present Illness   Vernell Logan is a 41 year old female patient with a past medical history significant for depression, anxiety, anorexia, heart murmur, Vit D deficiency, tobacco use  disorder, migraine headaches, and intractable focal epilepsy admitted to neurology service for pre-surgical clearance (planning for epilepsy surgery). Medicine was consulted for evaluation of anemia.    Patient reports history of iron deficiency anemia maintained well on iron supplement previously, though has been off for years. Not currently menstruating, LMP was 1 week ago. She denies any bleeding; no hematochezia, hematemesis, hematuria, melena, easy bruising, bleeding from gums. No family history of blood related disorders. No recent illness- no fevers, chills, chest pain/pressure, shortness of breath, cough, nausea, vomiting, abdominal pain reported. Does report some left ankle pain after she rolled her ankle ~2 weeks ago. No numbness or tingling.    Review of Systems   The 10 point Review of Systems is negative other than noted in the HPI or here.    Past Medical History    I have reviewed this patient's medical history and updated it with pertinent information if needed.   Past Medical History:   Diagnosis Date     Anorexia 3/5/2013     Anxiety      Depressive disorder 1999     Heart murmur     told benign     Localization-related epilepsy (H)      Major depression      Migraine      Seizure disorder (H)     working with [facility] [place], really bad episodes      Seizures (H)      Status post left breast biopsy,sclerosing adenosis not concordant with imaging,12       Past Surgical History   I have reviewed this patient's surgical history and updated it with pertinent information if needed.  Past Surgical History:   Procedure Laterality Date     C/SECTION, LOW TRANSVERSE  , 2018    x 2      SECTION, TUBAL LIGATION, COMBINED         Social History   I have reviewed this patient's social history and updated it with pertinent information if needed.  Social History     Tobacco Use     Smoking status: Current Every Day Smoker     Packs/day: 1.00     Years: 26.00     Pack years:  26.00     Types: Cigarettes     Smokeless tobacco: Current User     Tobacco comment: 1 pack    Substance Use Topics     Alcohol use: Not Currently     Comment: 0-2 per year      Drug use: No       Family History   I have reviewed this patient's family history and updated it with pertinent information if needed.  Family History   Problem Relation Age of Onset     Heart Disease Father         64 yo when he had arrhythmia,  of CHF age 65     Diabetes Father      Neurologic Disorder Father         epilepsy     Schizophrenia Father      Anxiety Disorder Father      Hypertension Father      Obesity Father      Cancer Maternal Grandfather      Cerebrovascular Disease Maternal Grandfather      Cerebrovascular Disease Paternal Grandmother      Heart Disease Paternal Grandfather      Psychotic Disorder Daughter      Neurologic Disorder Daughter         CP, she was adopted out in      Schizophrenia Mother      Anxiety Disorder Mother      Depression Mother        Medications   Current Facility-Administered Medications   Medication     acetaminophen (TYLENOL) tablet 650 mg     albuterol (PROVENTIL HFA/VENTOLIN HFA) inhaler     bacitracin ointment     busPIRone (BUSPAR) tablet 10 mg     carBAMazepine (CARBATROL) 12 hr capsule 400 mg    And     [START ON 2022] carBAMazepine (CARBATROL) 12 hr capsule 600 mg    And     [START ON 2022] carBAMazepine (CARBATROL) 12 hr capsule 200 mg     clonazePAM (klonoPIN) tablet 1 mg     docusate sodium (COLACE) capsule 100 mg     ibuprofen (ADVIL/MOTRIN) tablet 400-600 mg     lamoTRIgine (LaMICtal) tablet 200 mg     levETIRAcetam (KEPPRA) tablet 1,500 mg     lidocaine (LMX4) cream     lidocaine (viscous) (XYLOCAINE) 2 % solution 5 mL     lidocaine 1 % 0.1-1 mL     LORazepam (ATIVAN) injection 2 mg     magnesium hydroxide (MILK OF MAGNESIA) suspension 30 mL     sodium chloride (PF) 0.9% PF flush 3 mL     sodium chloride (PF) 0.9% PF flush 3 mL       Allergies   Allergies    Allergen Reactions     Chantix [Varenicline]      Suicidal thoughts        Physical Exam   Vital Signs: Temp: 98.9  F (37.2  C) Temp src: Oral BP: 113/44 Pulse: 77   Resp: 16 SpO2: 97 % O2 Device: None (Room air)    Weight: 116 lbs 8 oz    GENERAL: Alert and oriented x 3. Well nourished, well developed.  No acute distress.    HEENT: Normocephalic, atraumatic. Anicteric sclera. Mucous membranes moist.   CV: RRR. S1, S2. No murmurs appreciated.   RESPIRATORY: Effort normal on room air. Lungs CTAB with no wheezing, rales, or rhonchi.   GI: Abdomen soft and non distended, bowel sounds present x all 4 quadrants. No tenderness, rebound, or guarding.   NEUROLOGICAL: No focal deficits. Follows commands. vEEG in place.  MUSCULOSKELETAL: Left ankle with point bony tenderness laterally and on fibula. Ankle joint ROM intact and non-tender with movement. Pedal pulses intact and equal bilaterally. Sensation grossly intact bilateral lower extremities. No bruising noted.  EXTREMITIES: No gross deformities. No peripheral edema.   SKIN: Grossly warm, dry, and intact. No jaundice. No rashes.     Data   Results for orders placed or performed during the hospital encounter of 04/28/22 (from the past 24 hour(s))   CBC with platelets   Result Value Ref Range    WBC Count 3.3 (L) 4.0 - 11.0 10e3/uL    RBC Count 4.33 3.80 - 5.20 10e6/uL    Hemoglobin 8.2 (L) 11.7 - 15.7 g/dL    Hematocrit 30.0 (L) 35.0 - 47.0 %    MCV 69 (L) 78 - 100 fL    MCH 18.9 (L) 26.5 - 33.0 pg    MCHC 27.3 (L) 31.5 - 36.5 g/dL    RDW 16.2 (H) 10.0 - 15.0 %    Platelet Count 295 150 - 450 10e3/uL   Comprehensive metabolic panel   Result Value Ref Range    Sodium 140 133 - 144 mmol/L    Potassium 4.4 3.4 - 5.3 mmol/L    Chloride 107 94 - 109 mmol/L    Carbon Dioxide (CO2) 28 20 - 32 mmol/L    Anion Gap 5 3 - 14 mmol/L    Urea Nitrogen 11 7 - 30 mg/dL    Creatinine 0.78 0.52 - 1.04 mg/dL    Calcium 8.8 8.5 - 10.1 mg/dL    Glucose 81 70 - 99 mg/dL    Alkaline  Phosphatase 116 40 - 150 U/L    AST 32 0 - 45 U/L    ALT 49 0 - 50 U/L    Protein Total 6.8 6.8 - 8.8 g/dL    Albumin 3.6 3.4 - 5.0 g/dL    Bilirubin Total 0.2 0.2 - 1.3 mg/dL    GFR Estimate >90 >60 mL/min/1.73m2   Carbamazepine total   Result Value Ref Range    Carbamazepine 9.2   mg/L   Extra Tube    Narrative    The following orders were created for panel order Extra Tube.  Procedure                               Abnormality         Status                     ---------                               -----------         ------                     Extra Green Top (Lithium...[[id number]]                      Final result                 Please view results for these tests on the individual orders.   Extra Green Top (Lithium Heparin) Tube   Result Value Ref Range    Hold Specimen JIC

## 2022-04-29 ENCOUNTER — APPOINTMENT (OUTPATIENT)
Dept: NEUROLOGY | Facility: CLINIC | Age: 42
End: 2022-04-29
Attending: PHYSICIAN ASSISTANT
Payer: COMMERCIAL

## 2022-04-29 LAB
ATRIAL RATE - MUSE: 74 BPM
BASOPHILS # BLD AUTO: 0 10E3/UL (ref 0–0.2)
BASOPHILS NFR BLD AUTO: 1 %
DIASTOLIC BLOOD PRESSURE - MUSE: NORMAL MMHG
EOSINOPHIL # BLD AUTO: 0.1 10E3/UL (ref 0–0.7)
EOSINOPHIL NFR BLD AUTO: 3 %
ERYTHROCYTE [DISTWIDTH] IN BLOOD BY AUTOMATED COUNT: 16.2 % (ref 10–15)
HAPTOGLOB SERPL-MCNC: 124 MG/DL (ref 32–197)
HCT VFR BLD AUTO: 30.9 % (ref 35–47)
HGB BLD-MCNC: 8.4 G/DL (ref 11.7–15.7)
HOLD SPECIMEN: NORMAL
IMM GRANULOCYTES # BLD: 0 10E3/UL
IMM GRANULOCYTES NFR BLD: 1 %
INTERPRETATION ECG - MUSE: NORMAL
LAMOTRIGINE SERPL-MCNC: 5.5 UG/ML
LEVETIRACETAM SERPL-MCNC: 38 UG/ML
LYMPHOCYTES # BLD AUTO: 0.9 10E3/UL (ref 0.8–5.3)
LYMPHOCYTES NFR BLD AUTO: 25 %
MCH RBC QN AUTO: 19 PG (ref 26.5–33)
MCHC RBC AUTO-ENTMCNC: 27.2 G/DL (ref 31.5–36.5)
MCV RBC AUTO: 70 FL (ref 78–100)
MONOCYTES # BLD AUTO: 0.4 10E3/UL (ref 0–1.3)
MONOCYTES NFR BLD AUTO: 11 %
NEUTROPHILS # BLD AUTO: 2.2 10E3/UL (ref 1.6–8.3)
NEUTROPHILS NFR BLD AUTO: 59 %
NRBC # BLD AUTO: 0 10E3/UL
NRBC BLD AUTO-RTO: 0 /100
P AXIS - MUSE: 65 DEGREES
PATH REPORT.COMMENTS IMP SPEC: NORMAL
PATH REPORT.COMMENTS IMP SPEC: NORMAL
PATH REPORT.FINAL DX SPEC: NORMAL
PATH REPORT.MICROSCOPIC SPEC OTHER STN: NORMAL
PATH REPORT.MICROSCOPIC SPEC OTHER STN: NORMAL
PATH REPORT.RELEVANT HX SPEC: NORMAL
PLATELET # BLD AUTO: 328 10E3/UL (ref 150–450)
PR INTERVAL - MUSE: 180 MS
QRS DURATION - MUSE: 68 MS
QT - MUSE: 374 MS
QTC - MUSE: 415 MS
R AXIS - MUSE: 49 DEGREES
RBC # BLD AUTO: 4.41 10E6/UL (ref 3.8–5.2)
SYSTOLIC BLOOD PRESSURE - MUSE: NORMAL MMHG
T AXIS - MUSE: 12 DEGREES
VENTRICULAR RATE- MUSE: 74 BPM
WBC # BLD AUTO: 3.7 10E3/UL (ref 4–11)

## 2022-04-29 PROCEDURE — 95714 VEEG EA 12-26 HR UNMNTR: CPT

## 2022-04-29 PROCEDURE — 250N000013 HC RX MED GY IP 250 OP 250 PS 637: Performed by: PSYCHIATRY & NEUROLOGY

## 2022-04-29 PROCEDURE — 85060 BLOOD SMEAR INTERPRETATION: CPT | Performed by: PATHOLOGY

## 2022-04-29 PROCEDURE — 85004 AUTOMATED DIFF WBC COUNT: CPT | Performed by: PHYSICIAN ASSISTANT

## 2022-04-29 PROCEDURE — 99232 SBSQ HOSP IP/OBS MODERATE 35: CPT | Mod: 25 | Performed by: PHYSICIAN ASSISTANT

## 2022-04-29 PROCEDURE — 99252 IP/OBS CONSLTJ NEW/EST SF 35: CPT | Performed by: PSYCHIATRY & NEUROLOGY

## 2022-04-29 PROCEDURE — 99233 SBSQ HOSP IP/OBS HIGH 50: CPT | Performed by: STUDENT IN AN ORGANIZED HEALTH CARE EDUCATION/TRAINING PROGRAM

## 2022-04-29 PROCEDURE — 250N000013 HC RX MED GY IP 250 OP 250 PS 637: Performed by: STUDENT IN AN ORGANIZED HEALTH CARE EDUCATION/TRAINING PROGRAM

## 2022-04-29 PROCEDURE — 36415 COLL VENOUS BLD VENIPUNCTURE: CPT | Performed by: PHYSICIAN ASSISTANT

## 2022-04-29 PROCEDURE — 250N000013 HC RX MED GY IP 250 OP 250 PS 637: Performed by: PHYSICIAN ASSISTANT

## 2022-04-29 PROCEDURE — 120N000002 HC R&B MED SURG/OB UMMC

## 2022-04-29 PROCEDURE — 95720 EEG PHY/QHP EA INCR W/VEEG: CPT | Performed by: PSYCHIATRY & NEUROLOGY

## 2022-04-29 RX ORDER — FERROUS SULFATE 325(65) MG
325 TABLET ORAL DAILY
Status: DISCONTINUED | OUTPATIENT
Start: 2022-04-29 | End: 2022-04-30

## 2022-04-29 RX ORDER — VENLAFAXINE HYDROCHLORIDE 150 MG/1
150 TABLET, EXTENDED RELEASE ORAL
Status: DISCONTINUED | OUTPATIENT
Start: 2022-05-07 | End: 2022-05-06

## 2022-04-29 RX ORDER — VENLAFAXINE HYDROCHLORIDE 75 MG/1
75 CAPSULE, EXTENDED RELEASE ORAL
Status: COMPLETED | OUTPATIENT
Start: 2022-04-29 | End: 2022-05-06

## 2022-04-29 RX ADMIN — IBUPROFEN 600 MG: 200 TABLET, FILM COATED ORAL at 04:12

## 2022-04-29 RX ADMIN — LAMOTRIGINE 200 MG: 200 TABLET ORAL at 20:28

## 2022-04-29 RX ADMIN — LAMOTRIGINE 200 MG: 200 TABLET ORAL at 13:22

## 2022-04-29 RX ADMIN — FERROUS SULFATE TAB 325 MG (65 MG ELEMENTAL FE) 325 MG: 325 (65 FE) TAB at 18:07

## 2022-04-29 RX ADMIN — LEVETIRACETAM 1500 MG: 750 TABLET, FILM COATED ORAL at 08:02

## 2022-04-29 RX ADMIN — IBUPROFEN 600 MG: 200 TABLET, FILM COATED ORAL at 22:09

## 2022-04-29 RX ADMIN — CARBAMAZEPINE 200 MG: 200 CAPSULE, EXTENDED RELEASE ORAL at 13:22

## 2022-04-29 RX ADMIN — BUSPIRONE HYDROCHLORIDE 10 MG: 10 TABLET ORAL at 20:27

## 2022-04-29 RX ADMIN — IBUPROFEN 600 MG: 200 TABLET, FILM COATED ORAL at 15:55

## 2022-04-29 RX ADMIN — LEVETIRACETAM 1500 MG: 750 TABLET, FILM COATED ORAL at 20:28

## 2022-04-29 RX ADMIN — IBUPROFEN 600 MG: 200 TABLET, FILM COATED ORAL at 09:56

## 2022-04-29 RX ADMIN — LAMOTRIGINE 200 MG: 200 TABLET ORAL at 08:02

## 2022-04-29 RX ADMIN — VENLAFAXINE HYDROCHLORIDE 75 MG: 75 CAPSULE, EXTENDED RELEASE ORAL at 18:07

## 2022-04-29 RX ADMIN — CARBAMAZEPINE 400 MG: 200 CAPSULE, EXTENDED RELEASE ORAL at 20:27

## 2022-04-29 RX ADMIN — BUSPIRONE HYDROCHLORIDE 10 MG: 10 TABLET ORAL at 08:02

## 2022-04-29 RX ADMIN — CARBAMAZEPINE 600 MG: 300 CAPSULE, EXTENDED RELEASE ORAL at 08:02

## 2022-04-29 ASSESSMENT — ACTIVITIES OF DAILY LIVING (ADL)
ADLS_ACUITY_SCORE: 4

## 2022-04-29 NOTE — PROGRESS NOTES
[facility]    Medicine Consult Note - Hospitalist Service, GOLD TEAM 7    Date of Admission:  4/28/2022    Assessment & Plan          Vernellrios Logan is a 41 year old female patient with a past medical history significant for depression, anxiety, anorexia, heart murmur, Vit D deficiency, tobacco use disorder, migraine headaches, and intractable focal epilepsy admitted to neurology service for pre-surgical clearance (planning for epilepsy surgery).     Recommendations:  - Recommend restarting oral iron supplementation (ordered 325mg daily).      Chronic iron deficiency anemia  Leukopenia - resolved  Menorrhagia  Hemoglobin 8.2 and WBC 3.3. Platelets 295. Total bili 0.2. Most recent CBC in 7/2019 hemoglobin was 12. Was last low in 2017.  Patient reports history of iron deficiency anemia maintained well on iron supplement previously, though has been off for years. Not currently menstruating, LMP was 1 week ago - does endorse long and heavy menses. Is not interested into menses reducing options such as an IUD or birth control but did encourage her to speak to her obgyn in the future. Iron low at 10 with low ferritin of 3 consistent with mixed blood loss and iron deficient anemia.    - Restart PO iron supplementation   - Encourage discussion with her OP OB/GYN regarding menorrhagia and treatment possibilities     Bitemporal Epilepsy   Pre-Surgical Clearance  Management per primary neurology team:               -Admitted for vEEG monitoring               -seizure precautions               -ativan PRN seizures per protocol               -SCD's for DVT prophylaxis               -CBC, CMP, antiepileptic drug levels               -decreased carbamazepine to 600-200-400     Depression  Anxiety   - Continue PTA Buspar and prn Klonopin at bedtime.   - Patient previously on Effexor but was unable to get it filled. Restarted and filled by PCP recently but patient reports she needs a slow  "upward taper. Notably, patient will require psychiatry clearance before surgery can be considered as well so will place inpatient consultation for clearance and Effexor re-initiation (QTc 415, okay to restart)     Left Ankle Pain  Patient reports she slipped off the bottom step leading out to her garage ~2 weeks ago and rolled her ankle. No bruising or swelling currently, however noted point bony tenderness over fibula. XRs negative.    - Pain control with ice/heat and tylenol   - ACE wrap for now       The patient's care was discussed with the Primary team.    Laura Santos MD  Hospitalist Service, [facility] TEAM [address] [place] [address]  Securely message with the Vocera Web Console (learn more here)  Text page via [facility] Paging/Directory   Please see signed in provider for up to date coverage information      Clinically Significant Risk Factors Present on Admission                 ______________________________________________________________________    Interval History   Doing well this AM. Quite hungry. States she eats a well balanced diet with good green vegetable intake. Denies chest pain and SOB. Does endorse heavy and long periods, recently finished ~1 week ago. Does endorse some fatigue.     Data reviewed today: I reviewed all medications, new labs and imaging results over the last 24 hours. Labs and Imaging reviewed in Epic, pertinent discussed in A&P.       Physical Exam   Vital Signs: Temp: (!) 96.1  F (35.6  C) Temp src: Oral BP: 114/50 Pulse: 86   Resp: 16 SpO2: 97 % O2 Device: None (Room air)    Weight: 116 lbs 8 oz    /50 (BP Location: Left arm)   Pulse 86   Temp (!) 96.1  F (35.6  C) (Oral)   Resp 16   Ht 1.626 m (5' 4\")   Wt 52.8 kg (116 lb 8 oz)   SpO2 97%   BMI 20.00 kg/m      Gen: NAD, alert, pleasant, cooperative, non-toxic, EEG leads on head  HEENT: EOMI, no conjunctival icterus, tracking appropriately, no conjunctival pallor  Resp: CTAB, no " crackles or wheezes, no increased WOB  Cardiac: RRR, no S3/S4, no M/R/G appreciated  GI: soft, non-tender, non-distended  Ext: WWP, no edema, spontaneous movement in all 4  Neuro: AOx3, CN 2-12 grossly intact, appropriate mentation
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
## 2022-04-29 NOTE — PLAN OF CARE
Status: admitted for presurgical workup for epilepsy.   Vitals: VSS on RA  Neuros: A&O x4 intact  IV: PIV SL  Labs/Electrolytes: WNL  Resp/trach: WNL  Diet: tolerating a reg diet w/good intake  Bowel status: BM 4/28  : voiding spont  Skin: intact  Pain: L ankle pain, improved with ibuprofen and ACE wrap- removed at 0415  Activity: up with SBA and GB  Plan: continue to monitor and follow POC  Updates this shift: no events this shift

## 2022-04-29 NOTE — PLAN OF CARE
Status: admitted for presurgical workup for epilepsy.   Vitals: VSS on RA  Neuros: A&O x4 intact  IV: PIV SL  Labs/Electrolytes: WNL  Resp/trach: WNL  Diet: tolerating a reg diet w/good intake  Bowel status: BM this AM  : voiding spont  Skin: intact  Pain: C/o hip pain, relieved with walking and ankle pain from recent fall, relieved w/ice.   Activity: up with SBA and GB; ambulated in halls w/RN  Social: no visitors or phone calls this shift  Plan: continue to monitor and follow POC  Updates this shift: pt compliant with seizure precautions. XRay of ankle did not show fracture. Ace wrap can be applied to site.

## 2022-04-29 NOTE — PROGRESS NOTES
"[facility], [place]   Epilepsy Service Daily Note      Interval History:   Patient had no seizures overnight. No major complaints.     Review of System:   Headache after strobe light. No nausea, No vomiting, no dizziness, no chest pain, no SOB    Medications:   Antiepileptic Medications Home Doses: carbamazepine 600-200-800, levetiracetam 6825-0322, lamotrigine 200-200-200  Antiepileptic Medications Current Doses: carbamazepine 600-200-400, levetiracetam 5150-6207, lamotrigine 200-200-200    Exam: Blood pressure 104/56, pulse 70, temperature 97.2  F (36.2  C), temperature source Oral, resp. rate 16, height 1.626 m (5' 4\"), weight 52.8 kg (116 lb 8 oz), SpO2 99 %, not currently breastfeeding.   General: NAD  Neuro: Alert and oriented. Speech fluent. Hearing intact to normal conversation. PERRL, EOM's intact. Face symmetric, tongue midline. Strong should shrug bilaterally. Strength 5/5 bilaterally. No drift  or pronation. Slight bilateral hand tremor. Sensation intact to light touch    EEG: bilateral, independent frontotemporal dysfunction and cortical irritability with maximal dysfunction/irritability in the right frontotemporal region.  Labs:   Latest Reference Range & Units 04/28/22 10:37   Carbamazepine Level   mg/L 9.2 [1]     Assessment and Plan: patient seen and discussed with Dr. Wilkinson   Intractable focal epilepsy  Patient is a 41 year old female with a history of depression, anxiety and intractable focal epilepsy who is being admitted for a presurgical evaluation. No target seizures thus far.     -continue vEEG monitoring  -continue PTA levetiracetam and lamotrigine   -continue lowered carbamazepine 600-200-400  -sleep deprive tonight (12-6)  -psychiatry consult for re-initiation of Effexor and presurgical clearance   -MRI, PET and wada all scheduled as outpatient       Microcytic Anemia. Ferritin and iron both low. TSH, folate and B12 normal.   Leukopenia, possibly related to " carbamazepine     -appreciate medicine team consult and will wait for recommendations       Left ankle pain  Xray negative for fracture        Lisa Velazquez PA-C    Type of target event identified: staring spell with altered awareness and convulsion   Number of events: more needed  Discharge medication plan: To be decided  Further Imaging studies needed prior to discharge: No imaging required prior to discharge  Discharge transportation: family to provide  Other pertinent issues/goals for discharge: psychiatry consult       Total time: 25 minute was spent in the care of this patient. The patient agrees with the above mentioned plan of care. I answered all the patient's questions and addressed immediate concerns. More than 50% of time spent consisted of counseling and coordinating care, including discussion of the diagnostic significance of EEG findings, anti-seizure medication management, and planning for discharge home

## 2022-04-29 NOTE — PLAN OF CARE
Status: Admitted for seizure characterization  VS: VSS on RA  Neuros: A&Ox4. Intact  GI: Tolerating regular diet; No BM this shift  : Voiding w/out difficulty  IV: PIV SL  Activity: SBA w/gb, walk x1  Pain: L ankle pain d/t sprain; managed w/ibuprofen  Skin: EEG leads intact  Labs/Tests: Psych consult completed; no events this shift  Social: No visitors this shift  Plan of care: Sleep deprive tonight; awake until 12pm, sleep until 6am, awake until 10pm on 4/30

## 2022-04-29 NOTE — PLAN OF CARE
Vitals: VSS  Neuros: Intact  IV: SLd  Labs/Electrolytes: WNL. 4/28 labs show low iron  Resp/trach: WNL  Diet: Regular - Good po  Bowel status: BM 4/28  : Voiding spontaneously  Skin: Intact  Pain: Left ankle pain - Ibuprofen given with good relief, declined ice packs.  Activity: SBA and GB. Walked halls with RN.   Social: NA  Updates this shift: None - No events since admission.

## 2022-04-30 ENCOUNTER — APPOINTMENT (OUTPATIENT)
Dept: NEUROLOGY | Facility: CLINIC | Age: 42
End: 2022-04-30
Attending: PHYSICIAN ASSISTANT
Payer: COMMERCIAL

## 2022-04-30 LAB
ERYTHROCYTE [DISTWIDTH] IN BLOOD BY AUTOMATED COUNT: 16.2 % (ref 10–15)
GLUCOSE BLDC GLUCOMTR-MCNC: 90 MG/DL (ref 70–99)
HCT VFR BLD AUTO: 30.8 % (ref 35–47)
HGB BLD-MCNC: 8.4 G/DL (ref 11.7–15.7)
MCH RBC QN AUTO: 18.8 PG (ref 26.5–33)
MCHC RBC AUTO-ENTMCNC: 27.3 G/DL (ref 31.5–36.5)
MCV RBC AUTO: 69 FL (ref 78–100)
PLATELET # BLD AUTO: 300 10E3/UL (ref 150–450)
RBC # BLD AUTO: 4.47 10E6/UL (ref 3.8–5.2)
WBC # BLD AUTO: 5.4 10E3/UL (ref 4–11)

## 2022-04-30 PROCEDURE — 120N000002 HC R&B MED SURG/OB UMMC

## 2022-04-30 PROCEDURE — 250N000011 HC RX IP 250 OP 636: Performed by: STUDENT IN AN ORGANIZED HEALTH CARE EDUCATION/TRAINING PROGRAM

## 2022-04-30 PROCEDURE — 95714 VEEG EA 12-26 HR UNMNTR: CPT

## 2022-04-30 PROCEDURE — 250N000013 HC RX MED GY IP 250 OP 250 PS 637: Performed by: PSYCHIATRY & NEUROLOGY

## 2022-04-30 PROCEDURE — 250N000013 HC RX MED GY IP 250 OP 250 PS 637: Performed by: STUDENT IN AN ORGANIZED HEALTH CARE EDUCATION/TRAINING PROGRAM

## 2022-04-30 PROCEDURE — 85014 HEMATOCRIT: CPT | Performed by: STUDENT IN AN ORGANIZED HEALTH CARE EDUCATION/TRAINING PROGRAM

## 2022-04-30 PROCEDURE — 258N000003 HC RX IP 258 OP 636: Performed by: STUDENT IN AN ORGANIZED HEALTH CARE EDUCATION/TRAINING PROGRAM

## 2022-04-30 PROCEDURE — 250N000013 HC RX MED GY IP 250 OP 250 PS 637: Performed by: PHYSICIAN ASSISTANT

## 2022-04-30 PROCEDURE — 36415 COLL VENOUS BLD VENIPUNCTURE: CPT | Performed by: STUDENT IN AN ORGANIZED HEALTH CARE EDUCATION/TRAINING PROGRAM

## 2022-04-30 PROCEDURE — 99232 SBSQ HOSP IP/OBS MODERATE 35: CPT | Performed by: STUDENT IN AN ORGANIZED HEALTH CARE EDUCATION/TRAINING PROGRAM

## 2022-04-30 PROCEDURE — 99231 SBSQ HOSP IP/OBS SF/LOW 25: CPT | Mod: 25 | Performed by: PSYCHIATRY & NEUROLOGY

## 2022-04-30 PROCEDURE — 95720 EEG PHY/QHP EA INCR W/VEEG: CPT | Performed by: PSYCHIATRY & NEUROLOGY

## 2022-04-30 RX ORDER — DIPHENHYDRAMINE HYDROCHLORIDE 50 MG/ML
50 INJECTION INTRAMUSCULAR; INTRAVENOUS
Status: DISCONTINUED | OUTPATIENT
Start: 2022-04-30 | End: 2022-05-08 | Stop reason: HOSPADM

## 2022-04-30 RX ORDER — METHYLPREDNISOLONE SODIUM SUCCINATE 125 MG/2ML
125 INJECTION, POWDER, LYOPHILIZED, FOR SOLUTION INTRAMUSCULAR; INTRAVENOUS
Status: DISCONTINUED | OUTPATIENT
Start: 2022-04-30 | End: 2022-05-08 | Stop reason: HOSPADM

## 2022-04-30 RX ADMIN — LEVETIRACETAM 1500 MG: 750 TABLET, FILM COATED ORAL at 08:16

## 2022-04-30 RX ADMIN — CARBAMAZEPINE 600 MG: 300 CAPSULE, EXTENDED RELEASE ORAL at 08:27

## 2022-04-30 RX ADMIN — ACETAMINOPHEN 650 MG: 325 TABLET ORAL at 09:26

## 2022-04-30 RX ADMIN — IRON SUCROSE 200 MG: 20 INJECTION, SOLUTION INTRAVENOUS at 17:24

## 2022-04-30 RX ADMIN — IBUPROFEN 600 MG: 200 TABLET, FILM COATED ORAL at 14:15

## 2022-04-30 RX ADMIN — VENLAFAXINE HYDROCHLORIDE 75 MG: 75 CAPSULE, EXTENDED RELEASE ORAL at 08:17

## 2022-04-30 RX ADMIN — LAMOTRIGINE 200 MG: 200 TABLET ORAL at 19:46

## 2022-04-30 RX ADMIN — BUSPIRONE HYDROCHLORIDE 10 MG: 10 TABLET ORAL at 08:15

## 2022-04-30 RX ADMIN — LAMOTRIGINE 200 MG: 200 TABLET ORAL at 14:15

## 2022-04-30 RX ADMIN — IBUPROFEN 600 MG: 200 TABLET, FILM COATED ORAL at 04:59

## 2022-04-30 RX ADMIN — LAMOTRIGINE 200 MG: 200 TABLET ORAL at 08:16

## 2022-04-30 RX ADMIN — FERROUS SULFATE TAB 325 MG (65 MG ELEMENTAL FE) 325 MG: 325 (65 FE) TAB at 08:16

## 2022-04-30 RX ADMIN — LEVETIRACETAM 1500 MG: 750 TABLET, FILM COATED ORAL at 19:46

## 2022-04-30 RX ADMIN — BUSPIRONE HYDROCHLORIDE 10 MG: 10 TABLET ORAL at 19:46

## 2022-04-30 RX ADMIN — CARBAMAZEPINE 400 MG: 200 CAPSULE, EXTENDED RELEASE ORAL at 19:46

## 2022-04-30 ASSESSMENT — ACTIVITIES OF DAILY LIVING (ADL)
ADLS_ACUITY_SCORE: 4

## 2022-04-30 NOTE — PROGRESS NOTES
[facility]    Medicine Consult Note - Hospitalist Service, GOLD TEAM 7    Date of Admission:  4/28/2022    Assessment & Plan          Vernell AGUS Logan is a 41 year old female patient with a past medical history significant for depression, anxiety, anorexia, heart murmur, Vit D deficiency, tobacco use disorder, migraine headaches, and intractable focal epilepsy admitted to neurology service for pre-surgical clearance (planning for epilepsy surgery).     Recommendations:  - Will transition to IV iron 200mg q24 hr for 5 doses (ordered with medications in case of reaction), then transition to oral iron supplementation for maintenance upon discharge. Okay to transition to oral on discharge if discharging prior to complete of 5 days of IV iron.      Chronic iron deficiency anemia  Leukopenia - resolved  Menorrhagia  Hemoglobin 8.2 and WBC 3.3. Platelets 295. Total bili 0.2. Most recent CBC in 7/2019 hemoglobin was 12. Was last low in 2017.  Patient reports history of iron deficiency anemia maintained well on iron supplement previously, though has been off for years. Not currently menstruating, LMP was 1 week ago - does endorse long and heavy menses. Is not interested into menses reducing options such as an IUD or birth control but did encourage her to speak to her obgyn in the future. Iron low at 10 with low ferritin of 3 consistent with mixed blood loss and iron deficient anemia. Peripheral smear consistent with JESÚS.    - IV iron 200mg q24 hr for 5 doses, then transition to 325mg ferrous sulfate daily upon disharge   - Encourage discussion with her OP OB/GYN regarding menorrhagia and treatment possibilities     Bitemporal Epilepsy   Pre-Surgical Clearance  Management per primary neurology team:               -Admitted for vEEG monitoring               -seizure precautions               -ativan PRN seizures per protocol               -SCD's for DVT prophylaxis               -CBC,  CMP, antiepileptic drug levels               -decreased carbamazepine to 600-200-400     Depression  Anxiety   - Continue PTA Buspar and prn Klonopin at bedtime.   - Patient previously on Effexor but was unable to get it filled. Restarted and filled by PCP recently but patient reports she needs a slow upward taper. Notably, patient will require psychiatry clearance before surgery can be considered as well so will place inpatient consultation for clearance and Effexor re-initiation (QTc 415, okay to restart)     Left Ankle Pain  Patient reports she slipped off the bottom step leading out to her garage ~2 weeks ago and rolled her ankle. No bruising or swelling currently, however noted point bony tenderness over fibula. XRs negative.    - Pain control with ice/heat and tylenol   - ACE wrap for now       The patient's care was discussed with the Primary team.    Medicine team will sign off at this point, please do not hesitate to contact/re consult with questions.    Laura Santos MD  Hospitalist Service, [address]  Securely message with the Vocera Web Console (learn more here)  Text page via [facility] Paging/Directory   Please see signed in provider for up to date coverage information      Clinically Significant Risk Factors Present on Admission                 ______________________________________________________________________    Interval History   Doing well this AM. Dealt well with sleep deprivation overnight. Eating well. No chest pain or SOB. Fatigue improving.      Data reviewed today: I reviewed all medications, new labs and imaging results over the last 24 hours. Labs and Imaging reviewed in Epic, pertinent discussed in A&P.       Physical Exam   Vital Signs: Temp: 97.8  F (36.6  C) Temp src: Oral BP: 99/47 Pulse: 65   Resp: 16 SpO2: 99 % O2 Device: None (Room air)    Weight: 116 lbs 8 oz    Gen: NAD, alert, pleasant, cooperative, non-toxic, EEG  leads on head  HEENT: EOMI, no conjunctival icterus, tracking appropriately, no conjunctival pallor  Resp: CTAB, no crackles or wheezes, no increased WOB  Cardiac: RRR, no S3/S4, no M/R/G appreciated  GI: soft, non-tender, non-distended  Ext: WWP, no edema, spontaneous movement in all 4  Neuro: AOx3, CN 2-12 grossly intact, appropriate mentation

## 2022-04-30 NOTE — PLAN OF CARE
VSS  Neuros: A&Ox4  Respiratory: WDL  GI: Regular diet, last BM 4/28  : voiding spontaneously  IV: L IV patent, saline locked  Activity: up to restroom, assistance on standby  Pain: left ankle pain, headache following seizure event this morning; given PRN tylenol, PRN ibuprofen   Skin: WDL  Social: friend Berta came to visit

## 2022-04-30 NOTE — PROGRESS NOTES
"[facility], [place]   Epilepsy Service Daily Note      Interval History:   Patient had no seizures overnight. No major complaints. She felt she had event of difficulty moving tongue that had no seizure correlate on EEG.     System:   Headache after strobe light. No nausea, No vomiting, no dizziness, no chest pain, no SOB    Medications:   Antiepileptic Medications Home Doses: carbamazepine 600-200-800, levetiracetam 0768-9177, lamotrigine 200-200-200  Antiepileptic Medications Current Doses: carbamazepine 600-200-400, levetiracetam 2848-2548, lamotrigine 200-200-200    Exam: Blood pressure 99/47, pulse 65, temperature 97.8  F (36.6  C), temperature source Oral, resp. rate 16, height 1.626 m (5' 4\"), weight 52.8 kg (116 lb 8 oz), SpO2 99 %, not currently breastfeeding.   General: NAD  Neuro: Alert and oriented. Speech fluent. Hearing intact to normal conversation. PERRL, EOM's intact. Face symmetric, tongue midline. Strong should shrug bilaterally. Strength 5/5 bilaterally. No drift  or pronation. Slight bilateral hand tremor. Sensation intact to light touch    EEG: bilateral, independent frontotemporal dysfunction and cortical irritability with maximal dysfunction/irritability in the right frontotemporal region.  Labs:   Latest Reference Range & Units 04/28/22 10:37   Carbamazepine Level   mg/L 9.2 [1]     Assessment and Plan: patient seen and discussed with Dr. Wilkinson   Intractable focal epilepsy  Patient is a 41 year old female with a history of depression, anxiety and intractable focal epilepsy who is being admitted for a presurgical evaluation. No target seizures thus far.     -continue vEEG monitoring  -continue PTA levetiracetam and lamotrigine   -lower carbamazepine 600-400  -MRI, PET and wada all scheduled as outpatient       Microcytic Anemia. Ferritin and iron both low. TSH, folate and B12 normal.   Leukopenia, possibly related to carbamazepine     -appreciate medicine team " consult. They will give IV Iron.   - we reviewed birth control consideration (Mirena) to reduce menstrual bleeding and follow up  With primary care provider or OB/GYN    Left ankle pain  Xray negative for fracture    Discharge planning: Will need ride scheduled on day of discharge through insurance      Ashli Wilkinson MD     Type of target event identified: staring spell with altered awareness and convulsion   Number of events: more needed  Discharge medication plan: To be decided  Further Imaging studies needed prior to discharge: No imaging required prior to discharge  Discharge transportation: family to provide  Other pertinent issues/goals for discharge: psychiatry consult       Total time: 15 minute was spent in the care of this patient. The patient agrees with the above mentioned plan of care. I answered all the patient's questions and addressed immediate concerns. More than 50% of time spent consisted of counseling and coordinating care, including discussion of the diagnostic significance of EEG findings, anti-seizure medication management, and planning for discharge home

## 2022-04-30 NOTE — PLAN OF CARE
Status: VEEG monitoring. No events witnessed or reported on day shift.   Vitals: VSS on RA   Neuros: Intact.   IV: saline locked   Labs/Electrolytes: Hgb 8.3 - medicine consulted for possible iron infusion  Diet: regular   Bowel status: BS+   : voids without difficulty   Skin: EEG leads intact   Pain: ankle pain and headache managed with ibuprofen and tylenol   Activity: SBA within arms reach. Walked halls x1.   Plan: Continue to monitor and follow POC.   Updates this shift: Pt stopped sleep deprivation from 1pm-2pm due to headache and increasing tiredness, team Crys

## 2022-04-30 NOTE — PLAN OF CARE
Status: Admitted for seizure characterization. Event this shift at 0525 - see note.   Vitals: VSS on RA  Neuros: A&Ox4. Intact  Resp: WNL  GI: Tolerating regular diet; No BM this shift  : Voiding w/out difficulty  IV: L PIV SL  Activity: SBA/GB. Walked in [place] x1 this shift  Pain: L ankle pain d/t sprain; managed with PRN ibuprofen Q6 hours   Skin: EEG leads intact  Social: No visitors this shift  Plan: Pt sleep deprived this shift, up at 6 AM. Pt to stay awake until 10 PM today, no naps.

## 2022-04-30 NOTE — SIGNIFICANT EVENT
"Time/length of seizure event: 4/30 0525, 10 minutes  Movements (head, eyes, extremities): Pt unable to speak or answer the writers questions for a period of the event. Unable to completely move her tongue.   Orientation during seizure: NA - pt could not answer orientation questions d/t not being able to move tongue.   After seizure, remembers the unique phrase given during seizure: Yes   After seizure, remembers an unnamed visual object shown during seizure: Yes   Able to identify/name aloud item during seizure: Yes  Able to follow command during seizure: Yes  Able to read test sentence aloud during seizure: Yes  Able to read test sentence aloud again after the seizure: Yes  After seizure, remembers name of object shown during seizure: Yes  Orientation and level of consciousness after seizure: A&Ox4   VS and oxygen saturation during/after seizure:   Before - T: 98.2, RR: 16, HR: 60, BP: 114/58, O2: 94%, BG 90  After - T: 97.9, RR: 14, HR: 64, BP: 113/55, O2 95%  Recall of the event?: Yes  Was this a typical seizure/event?: Pt says she has never lost control of tongue movement and has never felt \"heaviness\" on her tongue before.   Presence of aura or pre-seizure activity: No  Incontinence: No  "

## 2022-05-01 ENCOUNTER — APPOINTMENT (OUTPATIENT)
Dept: NEUROLOGY | Facility: CLINIC | Age: 42
End: 2022-05-01
Attending: PHYSICIAN ASSISTANT
Payer: COMMERCIAL

## 2022-05-01 LAB
HGB A1 MFR BLD: 97.7 %
HGB A2 MFR BLD: 2 %
HGB C MFR BLD: 0 %
HGB E MFR BLD: 0 %
HGB F MFR BLD: 0.3 %
HGB FRACT BLD ELPH-IMP: NORMAL
HGB OTHER MFR BLD: 0 %
HGB S BLD QL SOLY: NORMAL
HGB S MFR BLD: 0 %
PATH INTERP BLD-IMP: NORMAL

## 2022-05-01 PROCEDURE — 250N000013 HC RX MED GY IP 250 OP 250 PS 637: Performed by: PSYCHIATRY & NEUROLOGY

## 2022-05-01 PROCEDURE — 99231 SBSQ HOSP IP/OBS SF/LOW 25: CPT | Mod: 25 | Performed by: PSYCHIATRY & NEUROLOGY

## 2022-05-01 PROCEDURE — 95720 EEG PHY/QHP EA INCR W/VEEG: CPT | Performed by: PSYCHIATRY & NEUROLOGY

## 2022-05-01 PROCEDURE — 250N000011 HC RX IP 250 OP 636: Performed by: STUDENT IN AN ORGANIZED HEALTH CARE EDUCATION/TRAINING PROGRAM

## 2022-05-01 PROCEDURE — 999N000128 HC STATISTIC PERIPHERAL IV START W/O US GUIDANCE

## 2022-05-01 PROCEDURE — 120N000002 HC R&B MED SURG/OB UMMC

## 2022-05-01 PROCEDURE — 95714 VEEG EA 12-26 HR UNMNTR: CPT

## 2022-05-01 PROCEDURE — 250N000013 HC RX MED GY IP 250 OP 250 PS 637: Performed by: PHYSICIAN ASSISTANT

## 2022-05-01 PROCEDURE — 258N000003 HC RX IP 258 OP 636: Performed by: STUDENT IN AN ORGANIZED HEALTH CARE EDUCATION/TRAINING PROGRAM

## 2022-05-01 RX ORDER — CARBAMAZEPINE 200 MG/1
400 CAPSULE, EXTENDED RELEASE ORAL EVERY EVENING
Status: DISCONTINUED | OUTPATIENT
Start: 2022-05-01 | End: 2022-05-02

## 2022-05-01 RX ORDER — LAMOTRIGINE 200 MG/1
200 TABLET ORAL 2 TIMES DAILY
Status: DISCONTINUED | OUTPATIENT
Start: 2022-05-01 | End: 2022-05-07

## 2022-05-01 RX ORDER — CARBAMAZEPINE 200 MG/1
400 CAPSULE, EXTENDED RELEASE ORAL DAILY
Status: DISCONTINUED | OUTPATIENT
Start: 2022-05-02 | End: 2022-05-02

## 2022-05-01 RX ADMIN — BUSPIRONE HYDROCHLORIDE 10 MG: 10 TABLET ORAL at 20:53

## 2022-05-01 RX ADMIN — IRON SUCROSE 200 MG: 20 INJECTION, SOLUTION INTRAVENOUS at 17:24

## 2022-05-01 RX ADMIN — BUSPIRONE HYDROCHLORIDE 10 MG: 10 TABLET ORAL at 07:40

## 2022-05-01 RX ADMIN — LEVETIRACETAM 1500 MG: 750 TABLET, FILM COATED ORAL at 20:53

## 2022-05-01 RX ADMIN — LAMOTRIGINE 200 MG: 200 TABLET ORAL at 20:53

## 2022-05-01 RX ADMIN — CARBAMAZEPINE 400 MG: 200 CAPSULE, EXTENDED RELEASE ORAL at 20:53

## 2022-05-01 RX ADMIN — VENLAFAXINE HYDROCHLORIDE 75 MG: 75 CAPSULE, EXTENDED RELEASE ORAL at 07:40

## 2022-05-01 RX ADMIN — IBUPROFEN 600 MG: 200 TABLET, FILM COATED ORAL at 11:09

## 2022-05-01 RX ADMIN — CARBAMAZEPINE 600 MG: 300 CAPSULE, EXTENDED RELEASE ORAL at 07:42

## 2022-05-01 RX ADMIN — LAMOTRIGINE 200 MG: 200 TABLET ORAL at 07:39

## 2022-05-01 RX ADMIN — IBUPROFEN 600 MG: 200 TABLET, FILM COATED ORAL at 23:56

## 2022-05-01 RX ADMIN — LEVETIRACETAM 1500 MG: 750 TABLET, FILM COATED ORAL at 07:38

## 2022-05-01 ASSESSMENT — ACTIVITIES OF DAILY LIVING (ADL)
ADLS_ACUITY_SCORE: 4

## 2022-05-01 NOTE — PROGRESS NOTES
"[facility], [place]   Epilepsy Service Daily Note      Interval History:   Patient had no seizures overnight. No major complaints.     System:   No nausea, No vomiting, no dizziness, no chest pain, no SOB    Medications:   Antiepileptic Medications Home Doses: carbamazepine 600-200-800, levetiracetam [id number], lamotrigine 200-200-200  Antiepileptic Medications Current Doses: carbamazepine 600-400, levetiracetam 5612-4783, lamotrigine 200-200-200    Exam: Blood pressure 106/48, pulse 64, temperature 97.5  F (36.4  C), temperature source Oral, resp. rate 16, height 1.626 m (5' 4\"), weight 52.8 kg (116 lb 8 oz), SpO2 99 %, not currently breastfeeding.   General: NAD  Neuro: Alert and oriented. Speech fluent. Hearing intact to normal conversation. PERRL, EOM's intact. Face symmetric, tongue midline. Strong should shrug bilaterally. Strength 5/5 bilaterally. No drift  or pronation. Slight bilateral hand tremor. Sensation intact to light touch    EEG: bilateral, independent frontotemporal dysfunction and cortical irritability with maximal dysfunction/irritability in the right frontotemporal region.  Labs:   Latest Reference Range & Units 04/28/22 10:37   Carbamazepine Level   mg/L 9.2 [1]     Assessment and Plan: patient seen and discussed with Dr. Wilkinson   Intractable focal epilepsy  Patient is a 41 year old female with a history of depression, anxiety and intractable focal epilepsy who is being admitted for a presurgical evaluation. No target seizures thus far.     -continue vEEG monitoring  -continue PTA levetiracetam and lamotrigine   -lower carbamazepine 400-400 and lamotrigine 200 mg twice a day (we are lowering na+ blocking agents because these work best in reducing seizures)  -MRI, PET and wada all scheduled as outpatient       Microcytic Anemia. Ferritin and iron both low. TSH, folate and B12 normal.   Leukopenia, possibly related to carbamazepine   -appreciate medicine team consult. " They will give IV Iron.   - we reviewed birth control consideration (Mirena) to reduce menstrual bleeding and follow up  With primary care provider or OB/GYN    Left ankle pain  Xray negative for fracture    Discharge planning: Will need ride scheduled on day of discharge through insurance      Ashli Wilkinson MD     Type of target event identified: staring spell with altered awareness and convulsion   Number of events: more needed  Discharge medication plan: To be decided  Further Imaging studies needed prior to discharge: No imaging required prior to discharge  Discharge transportation: family to provide  Other pertinent issues/goals for discharge: psychiatry consult       Total time: 15 minute was spent in the care of this patient. The patient agrees with the above mentioned plan of care. I answered all the patient's questions and addressed immediate concerns. More than 50% of time spent consisted of counseling and coordinating care, including discussion of the diagnostic significance of EEG findings, anti-seizure medication management, and planning for discharge home

## 2022-05-01 NOTE — PLAN OF CARE
Status: VEEG monitoring. No events witnessed or reported on day shift.   Vitals: VSS on RA   Neuros: Intact.   IV: saline locked   Diet: regular   Bowel status: BS+   : voids without difficulty   Skin: EEG leads intact   Pain: ankle pain with ibuprofen    Activity: SBA within arms reach. Walked halls x1.   Plan: Carbamazepine dose reduced. Continue to monitor and follow POC.

## 2022-05-01 NOTE — PLAN OF CARE
Status: Admitted for VEEG monitoring. No events witnessed or reported this shift.   Vitals: VSS on RA  Neuros: Intact  IV: PIV SL   Labs/Electrolytes: Received IV dose of iron.  Resp/trach: WNL  Diet: Regular  Bowel status: BS+  : Voiding spont w/o difficulty   Skin: EEG leads intact, all other skin intact.   Pain: Mild c/o pain for ankle, however, managed w/ ibuprofen and tylenol   Activity: SBA, GB, keep within arms reach. Walked halls x1  Plan: Follow current POC.

## 2022-05-01 NOTE — PLAN OF CARE
VSS on room air  Neuros: A&Ox4, sensation intact  IV: removed IV on left forearm, vascular team inserted new IV  Respiratory: WDL  Cardiac: WDL  Diet: regular, eating everything  : voiding spontaneously  GI: BS+  Skin: EEG leads intact  Activity: walked around unitx2  Pain: severe discomfort (8) in ankle following walk, given PRN ibuprofen

## 2022-05-01 NOTE — PLAN OF CARE
Status: Admitted for VEEG monitoring. No events witnessed or reported this shift.   Vitals: VSS on RA  Neuros: Intact  IV: PIV SL   Labs/Electrolytes: Received IV dose of iron.  Resp/trach: WNL  Diet: Regular  Bowel status: BS+  : Voiding spont w/o difficulty   Skin: EEG leads intact, all other skin intact.   Pain: Mild c/o pain for ankle, however, managed w/ ibuprofen and tylenol   Activity: SBA, GB, keep within arms reach.   Plan: Follow current POC.

## 2022-05-02 ENCOUNTER — PATIENT OUTREACH (OUTPATIENT)
Dept: NEUROLOGY | Facility: CLINIC | Age: 42
End: 2022-05-02

## 2022-05-02 ENCOUNTER — APPOINTMENT (OUTPATIENT)
Dept: NEUROLOGY | Facility: CLINIC | Age: 42
End: 2022-05-02
Attending: PHYSICIAN ASSISTANT
Payer: COMMERCIAL

## 2022-05-02 PROCEDURE — 250N000011 HC RX IP 250 OP 636: Performed by: STUDENT IN AN ORGANIZED HEALTH CARE EDUCATION/TRAINING PROGRAM

## 2022-05-02 PROCEDURE — 250N000013 HC RX MED GY IP 250 OP 250 PS 637: Performed by: PHYSICIAN ASSISTANT

## 2022-05-02 PROCEDURE — 258N000003 HC RX IP 258 OP 636: Performed by: STUDENT IN AN ORGANIZED HEALTH CARE EDUCATION/TRAINING PROGRAM

## 2022-05-02 PROCEDURE — 250N000013 HC RX MED GY IP 250 OP 250 PS 637: Performed by: PSYCHIATRY & NEUROLOGY

## 2022-05-02 PROCEDURE — 95714 VEEG EA 12-26 HR UNMNTR: CPT

## 2022-05-02 PROCEDURE — 120N000002 HC R&B MED SURG/OB UMMC

## 2022-05-02 PROCEDURE — 999N000128 HC STATISTIC PERIPHERAL IV START W/O US GUIDANCE

## 2022-05-02 PROCEDURE — 95720 EEG PHY/QHP EA INCR W/VEEG: CPT | Mod: GC | Performed by: PSYCHIATRY & NEUROLOGY

## 2022-05-02 PROCEDURE — 99232 SBSQ HOSP IP/OBS MODERATE 35: CPT | Mod: 25 | Performed by: PHYSICIAN ASSISTANT

## 2022-05-02 RX ORDER — FERROUS SULFATE 325(65) MG
325 TABLET ORAL
Status: CANCELLED | OUTPATIENT
Start: 2022-05-02

## 2022-05-02 RX ADMIN — ACETAMINOPHEN 650 MG: 325 TABLET ORAL at 19:45

## 2022-05-02 RX ADMIN — IBUPROFEN 600 MG: 200 TABLET, FILM COATED ORAL at 11:15

## 2022-05-02 RX ADMIN — CARBAMAZEPINE 400 MG: 200 CAPSULE, EXTENDED RELEASE ORAL at 07:31

## 2022-05-02 RX ADMIN — LEVETIRACETAM 1500 MG: 750 TABLET, FILM COATED ORAL at 19:45

## 2022-05-02 RX ADMIN — LAMOTRIGINE 200 MG: 200 TABLET ORAL at 07:31

## 2022-05-02 RX ADMIN — LEVETIRACETAM 1500 MG: 750 TABLET, FILM COATED ORAL at 07:31

## 2022-05-02 RX ADMIN — LAMOTRIGINE 200 MG: 200 TABLET ORAL at 19:45

## 2022-05-02 RX ADMIN — BUSPIRONE HYDROCHLORIDE 10 MG: 10 TABLET ORAL at 07:31

## 2022-05-02 RX ADMIN — VENLAFAXINE HYDROCHLORIDE 75 MG: 75 CAPSULE, EXTENDED RELEASE ORAL at 07:31

## 2022-05-02 RX ADMIN — IRON SUCROSE 200 MG: 20 INJECTION, SOLUTION INTRAVENOUS at 16:27

## 2022-05-02 RX ADMIN — BUSPIRONE HYDROCHLORIDE 10 MG: 10 TABLET ORAL at 19:45

## 2022-05-02 ASSESSMENT — ACTIVITIES OF DAILY LIVING (ADL)
ADLS_ACUITY_SCORE: 4
ADLS_ACUITY_SCORE: 6
ADLS_ACUITY_SCORE: 4

## 2022-05-02 NOTE — PLAN OF CARE
Status: Admitted for seizure characterization.   Vitals: VSS   Neuros: A/Ox4. No events witnessed or reported this shift   Diet: regular  GI: no BM   : Voiding w/out difficulty  IV: PIV SL  Activity: SBA/GB.   Pain: ibuprofen for generalized pain x1  Skin: EEG leads in place  Plan: Continue to follow POC

## 2022-05-02 NOTE — PLAN OF CARE
Status: Admitted for seizure characterization. No events this shift.   Vitals: VSS on RA  Neuros: A&Ox4. Intact  Resp: WNL  GI: Tolerating regular diet; No BM this shift  : Voiding w/out difficulty  IV: PIV SL  Activity: SBA/GB.   Pain: L ankle pain d/t sprain; managed with PRN ibuprofen Q6 hours   Skin: EEG leads intact  Social: No visitors this shift  Plan: Continue to monitor and follow POC.    Type 1: About 10% of the time she will have an aura consisting of a wave running through her head. She will be unable to talk and has difficulty comprehending what people are saying to her. She thinks she sometimes has lip movements. She is generally aware that she had a seizure.This lasts 2-5 minutes. After she is tired, has a headache and confused. This is happening 2-3 times a week.      Type 2: these are staring spells and she feels she can't move her eyes. She is aware of her surroundings. This happens 2-3 times a week. After she may have a slight headache but is able to resume normal activity.     Type 3: GTC seizure. She recalls feeling her head turn to the left and having drooling. Then she is told she stiffens, sometimes has shaking and lips may turn blue. No incontinence but may bite her tongue. Her last one was 9/2021 and prior to that it was 2014.      Triggers: lack of sleep, strobe lights, being too warm, missing medications , talking on the phone

## 2022-05-02 NOTE — PROGRESS NOTES
"[facility], [place]   Epilepsy Service Daily Note      Interval History:   Patient had no seizures thus far this admission. No major complaints.     Review of System:   No Headaches. No nausea, No vomiting, no dizziness, no chest pain, no SOB    Medications:   Antiepileptic Medications Home Doses: carbamazepine 600-200-800, levetiracetam 1679-3680, lamotrigine 200-200-200  Antiepileptic Medications Current Doses: carbamazepine 400-400, levetiracetam 6625-9694, lamotrigine 200-200    Exam: Blood pressure 99/48, pulse 73, temperature 96.9  F (36.1  C), temperature source Oral, resp. rate 16, height 1.626 m (5' 4\"), weight 52.8 kg (116 lb 8 oz), SpO2 98 %, not currently breastfeeding.   General: NAD  Extremities: no LE edema or tenderness  Neuro: Alert and oriented. Speech fluent. Hearing intact to normal conversation. PERRL, EOM's intact, visual fields intact. Face symmetric, tongue midline. Strong should shrug bilaterally. Strength 5/5 bilaterally. No drift  or pronation. Slight bilateral hand tremor. Sensation intact to light touch    EEG: bilateral, independent frontotemporal dysfunction and cortical irritability with maximal dysfunction/irritability in the right frontotemporal region.  Labs:   Latest Reference Range & Units 04/28/22 10:37   Carbamazepine Level   mg/L 9.2 [1]   Keppra (Levetiracetam) Level 10 - 40 ug/mL 38 [2]   Lamotrigine 3.0 - 15.0 ug/mL 5.5 [3]     Assessment and Plan: patient seen and discussed with Dr. Dolan  Intractable focal epilepsy  Patient is a 41 year old female with a history of depression, anxiety and intractable focal epilepsy who is being admitted for a presurgical evaluation. No target seizures thus far.     -continue vEEG monitoring  -continue PTA levetiracetam and lamotrigine   -stop carbamazepine   -MRI, PET and wada all scheduled as outpatient       Microcytic Anemia. Ferritin and iron both low. TSH, folate and B12 normal. Patient started on Iron " infusion x 5 days and then to discharge on oral iron supplement. She also plans to discuss menorrhagia and treatment with OB/gyn as outpatient  Leukopenia, possibly related to carbamazepine; resolved.     -appreciate medicine team consult      Depression  Anxiety    -psychiatry consulted and patient re-started on Effexor       Left ankle pain  Xray negative for fracture        Lisa Velazquez PA-C    Type of target event identified: staring spell with altered awareness and convulsion   Number of events: more needed  Discharge medication plan: To be decided  Further Imaging studies needed prior to discharge: No imaging required prior to discharge  Discharge transportation: family to provide  Other pertinent issues/goals for discharge: psychiatry consult       Total time: 25 minute was spent in the care of this patient. The patient agrees with the above mentioned plan of care. I answered all the patient's questions and addressed immediate concerns. More than 50% of time spent consisted of counseling and coordinating care, including discussion of the diagnostic significance of EEG findings, anti-seizure medication management, and planning for discharge home

## 2022-05-02 NOTE — PROGRESS NOTES
LVMM for patient to return call. Deisi Calloway RN 5/2/2022 3:02 PM     Addendum:  Second VMM left for patient to return call. Deisi Calloway RN 5/4/2022 3:44 PM     Addendum:  Third VMM left. Informed will send MyChart message as well. Deisi Calloway RN 5/9/2022 3:25 PM     Addendum:  Received message from Epilepsy Team that patient no longer needs Wada and to cancel. Deisi Calloway RN 5/10/2022 1:24 PM

## 2022-05-02 NOTE — PLAN OF CARE
Status: VEEG monitoring. No events witnessed or reported this evening.  Vitals: VSS on RA   Neuros: Intact.   IV: saline locked   Diet: regular- good po intake   Bowel status: BS+   : voids without difficulty   Skin: EEG leads intact   Pain: ankle pain from recent sprain at home, denied need for pain meds.  Activity: SBA within arms reach. Walked halls x1.   Plan: Carbamazepine dose reduced. Continue to monitor and follow POC.

## 2022-05-03 ENCOUNTER — APPOINTMENT (OUTPATIENT)
Dept: NEUROLOGY | Facility: CLINIC | Age: 42
End: 2022-05-03
Attending: PHYSICIAN ASSISTANT
Payer: COMMERCIAL

## 2022-05-03 PROCEDURE — 250N000013 HC RX MED GY IP 250 OP 250 PS 637: Performed by: PSYCHIATRY & NEUROLOGY

## 2022-05-03 PROCEDURE — 250N000013 HC RX MED GY IP 250 OP 250 PS 637: Performed by: PHYSICIAN ASSISTANT

## 2022-05-03 PROCEDURE — 95720 EEG PHY/QHP EA INCR W/VEEG: CPT | Performed by: PSYCHIATRY & NEUROLOGY

## 2022-05-03 PROCEDURE — 95714 VEEG EA 12-26 HR UNMNTR: CPT

## 2022-05-03 PROCEDURE — 99232 SBSQ HOSP IP/OBS MODERATE 35: CPT | Mod: 25 | Performed by: PHYSICIAN ASSISTANT

## 2022-05-03 PROCEDURE — 120N000002 HC R&B MED SURG/OB UMMC

## 2022-05-03 PROCEDURE — 999N000128 HC STATISTIC PERIPHERAL IV START W/O US GUIDANCE

## 2022-05-03 RX ORDER — LEVETIRACETAM 750 MG/1
750 TABLET ORAL 2 TIMES DAILY
Status: DISCONTINUED | OUTPATIENT
Start: 2022-05-03 | End: 2022-05-05

## 2022-05-03 RX ORDER — FERROUS SULFATE 325(65) MG
325 TABLET ORAL DAILY
Status: DISCONTINUED | OUTPATIENT
Start: 2022-05-03 | End: 2022-05-08 | Stop reason: HOSPADM

## 2022-05-03 RX ADMIN — ACETAMINOPHEN 650 MG: 325 TABLET ORAL at 19:35

## 2022-05-03 RX ADMIN — VENLAFAXINE HYDROCHLORIDE 75 MG: 75 CAPSULE, EXTENDED RELEASE ORAL at 08:40

## 2022-05-03 RX ADMIN — LEVETIRACETAM 1500 MG: 750 TABLET, FILM COATED ORAL at 08:40

## 2022-05-03 RX ADMIN — IBUPROFEN 600 MG: 200 TABLET, FILM COATED ORAL at 00:45

## 2022-05-03 RX ADMIN — Medication 325 MG: at 15:49

## 2022-05-03 RX ADMIN — BUSPIRONE HYDROCHLORIDE 10 MG: 10 TABLET ORAL at 19:26

## 2022-05-03 RX ADMIN — LAMOTRIGINE 200 MG: 200 TABLET ORAL at 08:40

## 2022-05-03 RX ADMIN — LAMOTRIGINE 200 MG: 200 TABLET ORAL at 19:26

## 2022-05-03 RX ADMIN — IBUPROFEN 600 MG: 200 TABLET, FILM COATED ORAL at 08:47

## 2022-05-03 RX ADMIN — BUSPIRONE HYDROCHLORIDE 10 MG: 10 TABLET ORAL at 08:40

## 2022-05-03 RX ADMIN — LEVETIRACETAM 750 MG: 750 TABLET, FILM COATED ORAL at 19:26

## 2022-05-03 ASSESSMENT — ACTIVITIES OF DAILY LIVING (ADL)
ADLS_ACUITY_SCORE: 6

## 2022-05-03 NOTE — PROGRESS NOTES
"Vital signs:  Temp: 98.5  F (36.9  C) Temp src: Oral BP: 134/59 Pulse: 75       Pt hit seizure button @ 1840. RN assessed in room and patient stated the event had passed, \"lasted about 30 seconds, felt head turn to left and drooled\"    MD's notified            "

## 2022-05-03 NOTE — PROGRESS NOTES
"[facility], [place]   Epilepsy Service Daily Note      Interval History:   Patient had no seizures thus far this admission. Tolerating admission well.     Review of System:   No Headaches. No nausea, No vomiting, no dizziness, no chest pain, no SOB    Medications:   Antiepileptic Medications Home Doses: carbamazepine 600-200-800, levetiracetam 5462-1384, lamotrigine 200-200-200  Antiepileptic Medications Current Doses: carbamazepine dc'd, levetiracetam 3551-3132, lamotrigine 200-200    Exam: Blood pressure 97/46, pulse 67, temperature 97.6  F (36.4  C), temperature source Oral, resp. rate 18, height 1.626 m (5' 4\"), weight 52.8 kg (116 lb 8 oz), SpO2 99 %, not currently breastfeeding.   General: NAD  Extremities: no LE edema or tenderness  Neuro: Alert and oriented. Speech fluent. Hearing intact to normal conversation. PERRL, EOM's intact, visual fields intact. Face symmetric, tongue midline. Strong should shrug bilaterally. Strength 5/5 bilaterally. No drift  or pronation. Slight bilateral hand tremor. Sensation intact to light touch    EEG: bilateral, independent frontotemporal dysfunction and cortical irritability with maximal dysfunction/irritability in the right frontotemporal region.  Labs:   Latest Reference Range & Units 04/28/22 10:37   Carbamazepine Level   mg/L 9.2 [1]   Keppra (Levetiracetam) Level 10 - 40 ug/mL 38 [2]   Lamotrigine 3.0 - 15.0 ug/mL 5.5 [3]     Assessment and Plan: patient seen and discussed with Dr. Dolan  Intractable focal epilepsy  Patient is a 41 year old female with a history of depression, anxiety and intractable focal epilepsy who is being admitted for a presurgical evaluation. No target seizures thus far.     -continue vEEG monitoring  -continue lowered lamotrigine 200-200  -PTA carbamazepine discontinued 5/2  -decrease levetiracetam to 750-750  -sleep deprivation tonight   -MRI, PET and wada all scheduled as outpatient       Microcytic Anemia. Ferritin " and iron both low. TSH, folate and B12 normal. Patient started on Iron infusion x 5 days and then to discharge on oral iron supplement. She also plans to discuss menorrhagia and treatment with ob/gyn as outpatient  Leukopenia, possibly related to carbamazepine; resolved.     -appreciate medicine team consult  -per patient preference will switch to oral ferrous sulfate 325 mg/d      Depression  Anxiety    -psychiatry consulted and patient re-started on Effexor       Left ankle pain  Xray negative for fracture        Lisa Velazquez PA-C    Type of target event identified: staring spell with altered awareness and convulsion   Number of events: more needed  Discharge medication plan: To be decided  Further Imaging studies needed prior to discharge: No imaging required prior to discharge  Discharge transportation: family to provide  Other pertinent issues/goals for discharge: psychiatry consult       Total time: 25 minute was spent in the care of this patient. The patient agrees with the above mentioned plan of care. I answered all the patient's questions and addressed immediate concerns. More than 50% of time spent consisted of counseling and coordinating care, including discussion of the diagnostic significance of EEG findings, anti-seizure medication management, and planning for discharge home

## 2022-05-03 NOTE — PLAN OF CARE
Status: Admitted for seizure characterization.   Vitals: VSS   Neuros: A/Ox4. No events witnessed or reported this shift   Diet: regular  GI: no BM   : Voiding w/out difficulty  IV: PIV SL  Activity: SBA/GB.   Pain: ibuprofen for generalized pain x1  Skin: EEG leads in place  Plan: Sleep deprived tonight until 0000. Continue to follow POC

## 2022-05-03 NOTE — PROGRESS NOTES
"/44 (BP Location: Left arm)   Pulse 69   Temp 99.3  F (37.4  C) (Oral)   Resp 16   Ht 1.626 m (5' 4\")   Wt 52.8 kg (116 lb 8 oz)   SpO2 98%   BMI 20.00 kg/m      Status: Vernell is a 41 y.o female admitted 04/28/22 for seizure characterization. She has hx of intractable focal epilepsy, major depressive disorder, generalized anxiety disorder.    Activity: SBA, GB  Neuros: A&O x4.   Cardiac: WDL.   Respiratory:  WDL on RA. Denies SOB, sating 98%  GI/: Voiding spontaneously. +BS, passing flatus, LBM 05/02/22, no BM this shift  Diet: Regular  Skin: In tact, EEG leads on head  Lines: LPIV, SL, Right PIV was removed d/t pain and swelling, ice pack applied, no further damage observed  Incisions/Drains: none  Labs: Reviewed  Pain/nausea: Mouth pain d/t denture, prn tylenol, ibuprofen given, patient reports relief  New changes this shift: No changes from previous shift  Plan:  EEG monitoring,   POC     "

## 2022-05-04 ENCOUNTER — APPOINTMENT (OUTPATIENT)
Dept: NEUROLOGY | Facility: CLINIC | Age: 42
End: 2022-05-04
Attending: PHYSICIAN ASSISTANT
Payer: COMMERCIAL

## 2022-05-04 PROCEDURE — 120N000002 HC R&B MED SURG/OB UMMC

## 2022-05-04 PROCEDURE — 250N000013 HC RX MED GY IP 250 OP 250 PS 637: Performed by: PSYCHIATRY & NEUROLOGY

## 2022-05-04 PROCEDURE — 250N000013 HC RX MED GY IP 250 OP 250 PS 637: Performed by: PHYSICIAN ASSISTANT

## 2022-05-04 PROCEDURE — 95714 VEEG EA 12-26 HR UNMNTR: CPT

## 2022-05-04 PROCEDURE — 99231 SBSQ HOSP IP/OBS SF/LOW 25: CPT | Mod: 25 | Performed by: PHYSICIAN ASSISTANT

## 2022-05-04 PROCEDURE — 95720 EEG PHY/QHP EA INCR W/VEEG: CPT | Mod: GC | Performed by: PSYCHIATRY & NEUROLOGY

## 2022-05-04 RX ADMIN — IBUPROFEN 600 MG: 200 TABLET, FILM COATED ORAL at 03:06

## 2022-05-04 RX ADMIN — LEVETIRACETAM 750 MG: 750 TABLET, FILM COATED ORAL at 08:20

## 2022-05-04 RX ADMIN — Medication 325 MG: at 08:20

## 2022-05-04 RX ADMIN — LAMOTRIGINE 200 MG: 200 TABLET ORAL at 08:20

## 2022-05-04 RX ADMIN — VENLAFAXINE HYDROCHLORIDE 75 MG: 75 CAPSULE, EXTENDED RELEASE ORAL at 08:20

## 2022-05-04 RX ADMIN — BUSPIRONE HYDROCHLORIDE 10 MG: 10 TABLET ORAL at 19:35

## 2022-05-04 RX ADMIN — BUSPIRONE HYDROCHLORIDE 10 MG: 10 TABLET ORAL at 08:20

## 2022-05-04 RX ADMIN — LEVETIRACETAM 750 MG: 750 TABLET, FILM COATED ORAL at 19:35

## 2022-05-04 RX ADMIN — LAMOTRIGINE 200 MG: 200 TABLET ORAL at 19:35

## 2022-05-04 ASSESSMENT — ACTIVITIES OF DAILY LIVING (ADL)
ADLS_ACUITY_SCORE: 6

## 2022-05-04 NOTE — PLAN OF CARE
Status: Admitted here 4/28 for VEEG monitoring/ pre-surgical evaluation for her seizures.  Vitals: VSS on RA  Neuros: Intact, A&Ox4   IV: PIV SL  Resp/trach: WNL  Diet: Regular  Bowel status: LBM 5/4, BS+  : Voiding spont  Skin: Intact  Pain: Denied  Activity: Ax1, GB  Plan: Follow current POC.

## 2022-05-04 NOTE — PLAN OF CARE
[id number]  Status: Admitted for seizure characterization.   Vitals: VSS on RA.  Neuros: A/Ox4. No events witnessed or reported this shift. Patient stated they are anxious about weaning off of medications.  Diet: Regular, good PO.  GI: BS+, no BM this shift. LBM 5/3.  : Voiding spontaneously.  IV: PIV SL  Activity: SBA/GB. Patient sleep deprived overnight. Awake until midnight and sleep 12-6 and then up until 10 pm. Patient had difficulty sleeping overnight, up most of 12-6 block.  Pain: C/o of mild HA controlled with PRN tylenol x1 and ibuprofen x1.  Skin: EEG leads in place  Plan: Continue to monitor and follow POC. Patient to stay awake until 10pm tonight.

## 2022-05-04 NOTE — PROGRESS NOTES
"[facility], [place]   Epilepsy Service Daily Note      Interval History:   Patient reports she hit event monitor button yesterday evening. She recalls her head turning to left and maybe having some drooling. She reports she didn't sleep at all because she was feeling anxious. Tolerating admission well.     Review of System:   No Headaches. No nausea, No vomiting, no dizziness, no chest pain, no SOB    Medications:   Antiepileptic Medications Home Doses: carbamazepine 600-200-800, levetiracetam 6594-3860, lamotrigine 200-200-200  Antiepileptic Medications Current Doses: carbamazepine dc'd, levetiracetam 750-750, lamotrigine 200-200    Exam: Blood pressure 113/58, pulse 82, temperature (!) 96.4  F (35.8  C), temperature source Oral, resp. rate 18, height 1.626 m (5' 4\"), weight 52.8 kg (116 lb 8 oz), SpO2 98 %, not currently breastfeeding.   General: NAD  Extremities: no LE edema or tenderness  Neuro: Alert and oriented. Speech fluent. Hearing intact to normal conversation. PERRL, EOM's intact, visual fields intact. Face symmetric, tongue midline. Strong should shrug bilaterally. Strength 5/5 bilaterally. No drift  or pronation. Slight bilateral hand tremor. Sensation intact to light touch    EEG: frequent independent bitemporal slowing and sharps. One seizure 5/3 at 1840  Labs:   Latest Reference Range & Units 04/28/22 10:37   Carbamazepine Level   mg/L 9.2 [1]   Keppra (Levetiracetam) Level 10 - 40 ug/mL 38 [2]   Lamotrigine 3.0 - 15.0 ug/mL 5.5 [3]     Assessment and Plan: patient seen and discussed with Dr. Dolan  Intractable focal epilepsy  Patient is a 41 year old female with a history of depression, anxiety and intractable focal epilepsy who is being admitted for a presurgical evaluation. One target seizure recorded thus far consisting of head turning to left and some mild arm jerking.     -continue vEEG monitoring  -continue lowered lamotrigine 200-200  -PTA carbamazepine " discontinued 5/2  -continue lowered levetiracetam at 750-750  -ok to take nap today if needed  -MRI, PET and wada all scheduled as outpatient       Microcytic Anemia. Ferritin and iron both low. TSH, folate and B12 normal. Patient started on Iron infusion x 5 days and then to discharge on oral iron supplement. She also plans to discuss menorrhagia and treatment with ob/gyn as outpatient  Leukopenia, possibly related to carbamazepine; resolved.     -appreciate medicine team consult  -continue oral ferrous sulfate 325 mg/d      Depression  Anxiety    -psychiatry consulted and patient re-started on Effexor. Will take 75 mg/d x 1 week, then increase to 150 mg/d. She is tolerating without problems.       Left ankle pain  Xray negative for fracture        Lisa Velazquez PA-C    Type of target event identified: staring spell with altered awareness and convulsion   Number of events: more needed  Discharge medication plan: To be decided  Further Imaging studies needed prior to discharge: No imaging required prior to discharge  Discharge transportation: family to provide  Other pertinent issues/goals for discharge: psychiatry consult       Total time: 20 minute was spent in the care of this patient. The patient agrees with the above mentioned plan of care. I answered all the patient's questions and addressed immediate concerns. More than 50% of time spent consisted of counseling and coordinating care, including discussion of the diagnostic significance of EEG findings, anti-seizure medication management, and planning for discharge home

## 2022-05-04 NOTE — PLAN OF CARE
Pt here for pre-surgical evaluation for her seizures, vss, neuros include: intact. PIV SL, regular diet, voiding spont, up SBA w/GB, LBM 4/5. All seizure precautions maintained, pt sleep deprived but was given okay to nap for 2hrs d/t significant anxiety. Pt denies pain, no events were witnessed/supported during shift. Continue to care per orders.

## 2022-05-05 ENCOUNTER — APPOINTMENT (OUTPATIENT)
Dept: NEUROLOGY | Facility: CLINIC | Age: 42
End: 2022-05-05
Attending: PSYCHIATRY & NEUROLOGY
Payer: COMMERCIAL

## 2022-05-05 PROCEDURE — 120N000002 HC R&B MED SURG/OB UMMC

## 2022-05-05 PROCEDURE — 250N000013 HC RX MED GY IP 250 OP 250 PS 637: Performed by: PSYCHIATRY & NEUROLOGY

## 2022-05-05 PROCEDURE — 99231 SBSQ HOSP IP/OBS SF/LOW 25: CPT | Mod: 25 | Performed by: PHYSICIAN ASSISTANT

## 2022-05-05 PROCEDURE — 95720 EEG PHY/QHP EA INCR W/VEEG: CPT | Performed by: PSYCHIATRY & NEUROLOGY

## 2022-05-05 PROCEDURE — 95714 VEEG EA 12-26 HR UNMNTR: CPT

## 2022-05-05 PROCEDURE — 250N000013 HC RX MED GY IP 250 OP 250 PS 637: Performed by: PHYSICIAN ASSISTANT

## 2022-05-05 RX ORDER — LEVETIRACETAM 250 MG/1
250 TABLET ORAL 2 TIMES DAILY
Status: DISCONTINUED | OUTPATIENT
Start: 2022-05-05 | End: 2022-05-06

## 2022-05-05 RX ADMIN — BUSPIRONE HYDROCHLORIDE 10 MG: 10 TABLET ORAL at 08:30

## 2022-05-05 RX ADMIN — LAMOTRIGINE 200 MG: 200 TABLET ORAL at 08:30

## 2022-05-05 RX ADMIN — Medication 325 MG: at 08:30

## 2022-05-05 RX ADMIN — LEVETIRACETAM 750 MG: 750 TABLET, FILM COATED ORAL at 08:30

## 2022-05-05 RX ADMIN — LAMOTRIGINE 200 MG: 200 TABLET ORAL at 19:16

## 2022-05-05 RX ADMIN — IBUPROFEN 600 MG: 200 TABLET, FILM COATED ORAL at 21:42

## 2022-05-05 RX ADMIN — VENLAFAXINE HYDROCHLORIDE 75 MG: 75 CAPSULE, EXTENDED RELEASE ORAL at 08:30

## 2022-05-05 RX ADMIN — LEVETIRACETAM 250 MG: 250 TABLET, FILM COATED ORAL at 19:16

## 2022-05-05 RX ADMIN — BUSPIRONE HYDROCHLORIDE 10 MG: 10 TABLET ORAL at 19:16

## 2022-05-05 ASSESSMENT — ACTIVITIES OF DAILY LIVING (ADL)
ADLS_ACUITY_SCORE: 6

## 2022-05-05 NOTE — PLAN OF CARE
Pt here for seizure monitoring for pre-surgical treatment, vs ex hypotensive (MD team aware), neuros include: tremulous but otherwise intact. PIV SL, regular diet, voiding spont, up SBA w/GB. No events were witnessed/reported, all seizure safety features in place, pt compliant. Plan for pt do be sleep deprived tonight and adjustments made to her Keppra were done today. Pt ambulated hallways with nursing staff, up in chair for meals, denies need for pain meds. Continue to monitor per orders.

## 2022-05-05 NOTE — PLAN OF CARE
Status: Admitted 4/28 for VEEG monitoring for pre-surgical treatment.   Vitals: VSS, soft BP's  Neuros: A&Ox4, intact.   IV: PIV SL   Resp/trach: WNL  Diet: Regular  Bowel status: LBM 5/5  : Voiding spont  Skin: Intact  Pain: Managed w/ PRN tylenol and ibuprofen  Activity: SBA, GB, keep within arms reach at all times during transfers.   Plan: Patient to be sleep deprived tonight and adjustments to Keppra.      no

## 2022-05-05 NOTE — PROGRESS NOTES
"[facility], [place]   Epilepsy Service Daily Note      Interval History:   No further seizures. Slept well last night. Reports she is hoping to discharge Sunday or Monday.     Review of System:   No Headaches. No nausea, No vomiting, no dizziness, no chest pain, no SOB    Medications:   Antiepileptic Medications Home Doses: carbamazepine 600-200-800, levetiracetam 3078-2539, lamotrigine 200-200-200  Antiepileptic Medications Current Doses: carbamazepine dc'd, levetiracetam 750-750, lamotrigine 200-200    Exam: Blood pressure (!) 96/36, pulse 60, temperature 97.6  F (36.4  C), temperature source Oral, resp. rate 18, height 1.626 m (5' 4\"), weight 52.8 kg (116 lb 8 oz), SpO2 96 %, not currently breastfeeding.   General: NAD  Extremities: no LE edema or tenderness  Neuro: Alert and oriented. Speech fluent. Hearing intact to normal conversation. PERRL, EOM's intact, visual fields intact. Face symmetric, tongue midline. Strong should shrug bilaterally. Strength 5/5 bilaterally. No drift  or pronation. Slight bilateral hand tremor. Sensation intact to light touch    EEG: frequent independent bitemporal slowing and sharps. One seizure 5/3 at 1840  Labs:   Latest Reference Range & Units 04/28/22 10:37   Carbamazepine Level   mg/L 9.2 [1]   Keppra (Levetiracetam) Level 10 - 40 ug/mL 38 [2]   Lamotrigine 3.0 - 15.0 ug/mL 5.5 [3]     Assessment and Plan: patient seen and discussed with Dr. Dolan  Intractable focal epilepsy  Patient is a 41 year old female with a history of depression, anxiety and intractable focal epilepsy who is being admitted for a presurgical evaluation. One target seizure recorded thus far consisting of head turning to left and some mild arm jerking.     -continue vEEG monitoring  -continue lowered lamotrigine 200-200  -PTA carbamazepine discontinued 5/2  -decrease levetiracetam to 250-250  -sleep deprive tonight  -MRI, PET and wada all scheduled as outpatient       Microcytic " Anemia. Ferritin and iron both low. TSH, folate and B12 normal. Patient started on Iron infusion x 5 days and then to discharge on oral iron supplement. She also plans to discuss menorrhagia and treatment with ob/gyn as outpatient  Leukopenia, possibly related to carbamazepine; resolved.     -appreciate medicine team consult  -continue oral ferrous sulfate 325 mg/d      Depression  Anxiety    -psychiatry consulted and patient re-started on Effexor. Will take 75 mg/d x 1 week, then increase to 150 mg/d. She is tolerating without problems.       Left ankle pain  Xray negative for fracture        Lisa Velazquez PA-C    Type of target event identified: staring spell with altered awareness and convulsion   Number of events: more needed  Discharge medication plan: To be decided  Further Imaging studies needed prior to discharge: No imaging required prior to discharge  Discharge transportation: family to provide  Other pertinent issues/goals for discharge: psychiatry consult       Total time: 20 minute was spent in the care of this patient. The patient agrees with the above mentioned plan of care. I answered all the patient's questions and addressed immediate concerns. More than 50% of time spent consisted of counseling and coordinating care, including discussion of the diagnostic significance of EEG findings, anti-seizure medication management, and planning for discharge home

## 2022-05-05 NOTE — PROGRESS NOTES
CLINICAL NUTRITION SERVICES    Reviewed nutrition risk factors due to LOS. Pt is tolerating diet, eating well per nursing documentation. Eating 100% per flowsheets. No nutrition issues identified at this time. RD will follow per policy at this time, unless consulted.     Wt Readings from Last 9 Encounters:   04/28/22 52.8 kg (116 lb 8 oz)   11/15/19 46.9 kg (103 lb 8 oz)   11/04/19 48.6 kg (107 lb 3.2 oz)   09/05/19 49.7 kg (109 lb 9.6 oz)   08/21/19 49.4 kg (109 lb)   07/25/19 50 kg (110 lb 4.8 oz)   06/08/18 57.4 kg (126 lb 8 oz)   05/14/18 59 kg (130 lb)   05/11/18 60.8 kg (134 lb)       Cass Espinal, CHET, LD, [facility]  Neuro ICU  Pager: [phone]

## 2022-05-05 NOTE — PLAN OF CARE
"BP (!) 105/35 (BP Location: Right arm)   Pulse 69   Temp 98.2  F (36.8  C) (Oral)   Resp 20   Ht 1.626 m (5' 4\")   Wt 52.8 kg (116 lb 8 oz)   SpO2 96%   BMI 20.00 kg/m       Time: 2451-5781    Reason for admission: Intractable focal epilepsy.   Activity: Assist of one person with gait belt.   Pain: Denied pain or discomfort.   Neuro: Alert and oriented, denied numbness or tingling, strength, UE 5/5, and LE 5/5.   Cardiac: WDL  Resp: Denied SOB, lung sounds clear bilaterally.   GI/: Regular diet, voids without difficulty, had BM yesterday, bowel sounds present x four quadrants.   Lines: L PIV saline locked.   Skin: WDL  Labs/Imaging: EEG monitor continues.     New changes to shift: No change.     Plan: Pre-surgical evaluation for seizures.                       "

## 2022-05-06 ENCOUNTER — APPOINTMENT (OUTPATIENT)
Dept: NEUROLOGY | Facility: CLINIC | Age: 42
End: 2022-05-06
Attending: PSYCHIATRY & NEUROLOGY
Payer: COMMERCIAL

## 2022-05-06 LAB — GLUCOSE BLDC GLUCOMTR-MCNC: 155 MG/DL (ref 70–99)

## 2022-05-06 PROCEDURE — 99231 SBSQ HOSP IP/OBS SF/LOW 25: CPT | Mod: 25 | Performed by: PHYSICIAN ASSISTANT

## 2022-05-06 PROCEDURE — 95720 EEG PHY/QHP EA INCR W/VEEG: CPT | Performed by: PSYCHIATRY & NEUROLOGY

## 2022-05-06 PROCEDURE — 250N000013 HC RX MED GY IP 250 OP 250 PS 637: Performed by: PSYCHIATRY & NEUROLOGY

## 2022-05-06 PROCEDURE — 95714 VEEG EA 12-26 HR UNMNTR: CPT

## 2022-05-06 PROCEDURE — 120N000002 HC R&B MED SURG/OB UMMC

## 2022-05-06 PROCEDURE — 250N000013 HC RX MED GY IP 250 OP 250 PS 637: Performed by: PHYSICIAN ASSISTANT

## 2022-05-06 RX ORDER — LORAZEPAM 1 MG/1
2 TABLET ORAL ONCE
Status: COMPLETED | OUTPATIENT
Start: 2022-05-06 | End: 2022-05-06

## 2022-05-06 RX ORDER — LEVETIRACETAM 750 MG/1
1500 TABLET ORAL 2 TIMES DAILY
Status: DISCONTINUED | OUTPATIENT
Start: 2022-05-07 | End: 2022-05-08 | Stop reason: HOSPADM

## 2022-05-06 RX ORDER — VENLAFAXINE HYDROCHLORIDE 150 MG/1
150 CAPSULE, EXTENDED RELEASE ORAL
Status: DISCONTINUED | OUTPATIENT
Start: 2022-05-07 | End: 2022-05-08 | Stop reason: HOSPADM

## 2022-05-06 RX ADMIN — IBUPROFEN 600 MG: 200 TABLET, FILM COATED ORAL at 22:09

## 2022-05-06 RX ADMIN — LAMOTRIGINE 200 MG: 200 TABLET ORAL at 07:48

## 2022-05-06 RX ADMIN — LEVETIRACETAM 250 MG: 250 TABLET, FILM COATED ORAL at 07:48

## 2022-05-06 RX ADMIN — LEVETIRACETAM 250 MG: 250 TABLET, FILM COATED ORAL at 20:26

## 2022-05-06 RX ADMIN — LORAZEPAM 2 MG: 1 TABLET ORAL at 23:11

## 2022-05-06 RX ADMIN — VENLAFAXINE HYDROCHLORIDE 75 MG: 75 CAPSULE, EXTENDED RELEASE ORAL at 07:48

## 2022-05-06 RX ADMIN — LEVETIRACETAM 1250 MG: 750 TABLET, FILM COATED ORAL at 23:11

## 2022-05-06 RX ADMIN — Medication 325 MG: at 07:48

## 2022-05-06 RX ADMIN — LAMOTRIGINE 200 MG: 200 TABLET ORAL at 20:26

## 2022-05-06 RX ADMIN — BUSPIRONE HYDROCHLORIDE 10 MG: 10 TABLET ORAL at 07:48

## 2022-05-06 RX ADMIN — BUSPIRONE HYDROCHLORIDE 10 MG: 10 TABLET ORAL at 20:26

## 2022-05-06 ASSESSMENT — ACTIVITIES OF DAILY LIVING (ADL)
ADLS_ACUITY_SCORE: 6
ADLS_ACUITY_SCORE: 8
ADLS_ACUITY_SCORE: 6
ADLS_ACUITY_SCORE: 6
ADLS_ACUITY_SCORE: 8
ADLS_ACUITY_SCORE: 6
ADLS_ACUITY_SCORE: 8
ADLS_ACUITY_SCORE: 6
ADLS_ACUITY_SCORE: 8
ADLS_ACUITY_SCORE: 8
ADLS_ACUITY_SCORE: 6
ADLS_ACUITY_SCORE: 8
ADLS_ACUITY_SCORE: 6
ADLS_ACUITY_SCORE: 8
ADLS_ACUITY_SCORE: 6
ADLS_ACUITY_SCORE: 8

## 2022-05-06 NOTE — PLAN OF CARE
"/45 (BP Location: Right arm)   Pulse 69   Temp 98.5  F (36.9  C) (Oral)   Resp 18   Ht 1.626 m (5' 4\")   Wt 52.8 kg (116 lb 8 oz)   SpO2 98%   BMI 20.00 kg/m       Time: 4105-4446     Reason for admission: Intractable focal epilepsy.   Activity: SBA with gait belt transfers.   Pain: Denied pain or discomfort.   Neuro: Alert and oriented, denied numbness or tingling, strength, UE 5/5, and LE 5/5.   Cardiac: WDL  Resp: Denied SOB, lung sounds clear bilaterally.   GI/: Regular diet, voids without difficulty, had BM yesterday, bowel sounds present x four quadrants.   Lines: L PIV saline locked.   Skin: WDL  Labs/Imaging: EEG monitor.     New changes to shift: No change.    Plan: Patient to be sleep deprived this shift,she went to bed at 0200.                         "

## 2022-05-06 NOTE — PROGRESS NOTES
"[facility], [place]   Epilepsy Service Daily Note      Interval History:   No further seizures. Slept about 4 hours last night. Tolerating admission well.     Review of System:   No Headaches. No nausea, No vomiting, no dizziness, no chest pain, no SOB    Medications:   Antiepileptic Medications Home Doses: carbamazepine 600-200-800, levetiracetam 5270-3838, lamotrigine 200-200-200  Antiepileptic Medications Current Doses: carbamazepine dc'd, levetiracetam 250-250, lamotrigine 200-200    Exam: Blood pressure 102/46, pulse 63, temperature 97.4  F (36.3  C), temperature source Oral, resp. rate 12, height 1.626 m (5' 4\"), weight 52.8 kg (116 lb 8 oz), SpO2 100 %, not currently breastfeeding.   General: NAD  Extremities: no LE edema or tenderness  Neuro: Alert and oriented. Speech fluent. Hearing intact to normal conversation. PERRL, EOM's intact, visual fields intact. Face symmetric, tongue midline. Strong should shrug bilaterally. Strength 5/5 bilaterally. No drift  or pronation. Slight bilateral hand tremor. Sensation intact to light touch    EEG: frequent independent bitemporal slowing and sharps. One seizure 5/3 at 1840  Labs:   Latest Reference Range & Units 04/28/22 10:37   Carbamazepine Level   mg/L 9.2 [1]   Keppra (Levetiracetam) Level 10 - 40 ug/mL 38 [2]   Lamotrigine 3.0 - 15.0 ug/mL 5.5 [3]     Assessment and Plan: patient seen and discussed with Dr. Dolan  Intractable focal epilepsy  Patient is a 41 year old female with a history of depression, anxiety and intractable focal epilepsy who is being admitted for a presurgical evaluation. One target seizure recorded thus far consisting of head turning to left and some mild arm jerking.     -continue vEEG monitoring  -continue lowered lamotrigine 200-200  -PTA carbamazepine discontinued 5/2  -continue levetiracetam to 250-250  -up until 10pm  -MRI, PET scheduled as outpatient       Microcytic Anemia. Ferritin and iron both low. TSH, " folate and B12 normal. Patient started on Iron infusion x 5 days and then to discharge on oral iron supplement. She also plans to discuss menorrhagia and treatment with ob/gyn as outpatient  Leukopenia, possibly related to carbamazepine; resolved.     -appreciate medicine team consult  -continue oral ferrous sulfate 325 mg/d      Depression  Anxiety    -psychiatry consulted and patient re-started on Effexor. Will take 75 mg/d x 1 week, then increase to 150 mg/d. She is tolerating without problems.       Left ankle pain  Xray negative for fracture        Lisa Velazquez PA-C    Type of target event identified: staring spell with altered awareness and convulsion   Number of events: more needed  Discharge medication plan: To be decided  Further Imaging studies needed prior to discharge: No imaging required prior to discharge  Discharge transportation: family to provide  Other pertinent issues/goals for discharge: psychiatry consult       Total time: 20 minute was spent in the care of this patient. The patient agrees with the above mentioned plan of care. I answered all the patient's questions and addressed immediate concerns. More than 50% of time spent consisted of counseling and coordinating care, including discussion of the diagnostic significance of EEG findings, anti-seizure medication management, and planning for discharge home

## 2022-05-06 NOTE — PLAN OF CARE
Pt here for epilepsy monitoring with presurgical evaluation, vs ex soft BPs, neuros include: tremulous body movements. PIV SL, regular diet with good PO intake, voids spont, up SBA w/GB, reported BM 5/5. Pt sleep deprived through tonight, no changes made to AEDs. Continue to care per orders.

## 2022-05-07 ENCOUNTER — APPOINTMENT (OUTPATIENT)
Dept: NEUROLOGY | Facility: CLINIC | Age: 42
End: 2022-05-07
Attending: PSYCHIATRY & NEUROLOGY
Payer: COMMERCIAL

## 2022-05-07 PROCEDURE — 120N000002 HC R&B MED SURG/OB UMMC

## 2022-05-07 PROCEDURE — 250N000013 HC RX MED GY IP 250 OP 250 PS 637: Performed by: PHYSICIAN ASSISTANT

## 2022-05-07 PROCEDURE — 95720 EEG PHY/QHP EA INCR W/VEEG: CPT | Performed by: PSYCHIATRY & NEUROLOGY

## 2022-05-07 PROCEDURE — 250N000013 HC RX MED GY IP 250 OP 250 PS 637: Performed by: PSYCHIATRY & NEUROLOGY

## 2022-05-07 PROCEDURE — 99231 SBSQ HOSP IP/OBS SF/LOW 25: CPT | Mod: 25 | Performed by: PSYCHIATRY & NEUROLOGY

## 2022-05-07 PROCEDURE — 95714 VEEG EA 12-26 HR UNMNTR: CPT

## 2022-05-07 RX ORDER — LAMOTRIGINE 200 MG/1
200 TABLET ORAL 3 TIMES DAILY
Status: DISCONTINUED | OUTPATIENT
Start: 2022-05-07 | End: 2022-05-08 | Stop reason: HOSPADM

## 2022-05-07 RX ORDER — CARBAMAZEPINE 400 MG/1
800 TABLET, EXTENDED RELEASE ORAL AT BEDTIME
Status: DISCONTINUED | OUTPATIENT
Start: 2022-05-07 | End: 2022-05-08 | Stop reason: HOSPADM

## 2022-05-07 RX ORDER — CARBAMAZEPINE 200 MG/1
200 TABLET, EXTENDED RELEASE ORAL DAILY
Status: DISCONTINUED | OUTPATIENT
Start: 2022-05-08 | End: 2022-05-08 | Stop reason: HOSPADM

## 2022-05-07 RX ADMIN — LAMOTRIGINE 200 MG: 200 TABLET ORAL at 09:13

## 2022-05-07 RX ADMIN — LAMOTRIGINE 200 MG: 200 TABLET ORAL at 20:45

## 2022-05-07 RX ADMIN — IBUPROFEN 600 MG: 200 TABLET, FILM COATED ORAL at 09:43

## 2022-05-07 RX ADMIN — Medication 325 MG: at 09:13

## 2022-05-07 RX ADMIN — LEVETIRACETAM 1500 MG: 750 TABLET, FILM COATED ORAL at 20:45

## 2022-05-07 RX ADMIN — BUSPIRONE HYDROCHLORIDE 10 MG: 10 TABLET ORAL at 20:45

## 2022-05-07 RX ADMIN — CARBAMAZEPINE 800 MG: 400 TABLET, EXTENDED RELEASE ORAL at 21:54

## 2022-05-07 RX ADMIN — IBUPROFEN 600 MG: 200 TABLET, FILM COATED ORAL at 21:54

## 2022-05-07 RX ADMIN — VENLAFAXINE HYDROCHLORIDE 150 MG: 150 CAPSULE, EXTENDED RELEASE ORAL at 09:13

## 2022-05-07 RX ADMIN — LEVETIRACETAM 1500 MG: 750 TABLET, FILM COATED ORAL at 09:12

## 2022-05-07 RX ADMIN — BUSPIRONE HYDROCHLORIDE 10 MG: 10 TABLET ORAL at 09:13

## 2022-05-07 ASSESSMENT — ACTIVITIES OF DAILY LIVING (ADL)
ADLS_ACUITY_SCORE: 8
ADLS_ACUITY_SCORE: 6
ADLS_ACUITY_SCORE: 8
ADLS_ACUITY_SCORE: 6
ADLS_ACUITY_SCORE: 8
ADLS_ACUITY_SCORE: 6
ADLS_ACUITY_SCORE: 6
ADLS_ACUITY_SCORE: 8
ADLS_ACUITY_SCORE: 6
ADLS_ACUITY_SCORE: 8
ADLS_ACUITY_SCORE: 6
ADLS_ACUITY_SCORE: 6

## 2022-05-07 NOTE — PROGRESS NOTES
Status: VEEG monitoring, leads intact, no events witnessed or reported, patient refused hygiene break  Vitals: VSS  Neuros: Intact  IV: PIV saline locked  Labs/Electrolytes: WNL  Resp/trach: Lung sounds clear throughout  Diet: Regular  Bowel status: Last BM yesterday per patient  : Voiding spontaneously  Skin: Intact  Pain: Patient c/o body aches this AM relieved with Ibuprofen  Activity: Up with SBA  Social: Cooperative with cares  Plan: Discharge home Monday, continue to monitor and follow POC

## 2022-05-07 NOTE — PROGRESS NOTES
Time/length of seizure event: Unknown, pt found unresponsive in chair  Movements (head, eyes, extremities): Pt found laying sideways in her chair, totally stiff with eyes open staring at the ceiling and fists clenched  Orientation during seizure: unresponsive  After seizure, remembers the unique phrase given during seizure:NA  After seizure, remembers an unnamed visual object shown during seizure:NA  Able to identify/name aloud item during seizure:NA  Able to follow command during seizure: No  Able to read test sentence aloud during seizure:NA  Able to read test sentence aloud again after the seizure:NA  After seizure, remembers name of object shown during seizure:NA  Orientation and level of consciousness after seizure:Oriented to self only after event, lethargic  VS and oxygen saturation during/after seizure:Remained stable during event ex pulse in 120s  Recall of the event?:No  Was this a typical seizure/event?:Unsure  Presence of aura or pre-seizure activity:No  Incontinence:No    Vitals: Temp: 99.3  F (37.4  C) Temp src: Oral BP: (!) 141/64 Pulse: 107   Resp: 20 SpO2: 93 % O2 Device: None (Room air)

## 2022-05-07 NOTE — PLAN OF CARE
Status: here for epilepsy monitoring with presurgical evaluation. 2 events noted this shift, see previous notes.   Vitals: VSS on RA. Tachycardic during both events.  Neuros: AO x4. Strengths 5/5 throughout.   IV: PIV SL.  Resp/trach: WNL  Diet: Regular diet, good intake  Bowel status: LBM 5/6  : voiding spontaneously   Skin: EEG leads intact.  Pain: denies  Activity: SBA/GB  Plan: no changes made to AEDs today, continue POC.

## 2022-05-07 NOTE — PLAN OF CARE
Status: Epilepsy monitoring with presurgical evaluation.   Vitals: VSS on RA. Continuous pulse ox   Neuros: AO x4. Strengths 5/5 throughout. Denies neuro changes  IV: PIV SL.  Resp/trach: WNL  Diet: Regular diet  Bowel status: LBM 5/6  : voiding spontaneously without difficulty   Skin: EEG leads intact.  Pain: denies  Activity: SBA/GB  Plan: Monitor seizure like activities

## 2022-05-07 NOTE — PROVIDER NOTIFICATION
Time/length of seizure event: unknown, pt found in middle of event at 2013.  Movements (head, eyes, extremities): pt found laying sideways in her chair with head slightly shaking. No other movements noted.  Orientation during seizure: pt unable to speak during seizure  After seizure, remembers the unique phrase given during seizure: NO  After seizure, remembers an unnamed visual object shown during seizure: NO  Able to identify/name aloud item during seizure:NO  Able to follow command during seizure:NO  Able to read test sentence aloud during seizure: NO  Able to read test sentence aloud again after the seizure:NO  After seizure, remembers name of object shown during seizure:NO  Orientation and level of consciousness after seizure: immediately after, pt was only oriented to self. A minute later, pt was AO x4.  VS and oxygen saturation during/after seizure: VSS ex tachycardic  Recall of the event?: No  Was this a typical seizure/event?: unknown  Presence of aura or pre-seizure activity: No  Incontinence: No    Vital signs:  Temp: 97.8  F (36.6  C) Temp src: Oral BP: (!) 143/66 Pulse: (!) 124   Resp: 24 SpO2: 96 % O2 Device: None (Room air)      MD notified, no changes at this time.

## 2022-05-08 ENCOUNTER — APPOINTMENT (OUTPATIENT)
Dept: NEUROLOGY | Facility: CLINIC | Age: 42
End: 2022-05-08
Attending: PSYCHIATRY & NEUROLOGY
Payer: COMMERCIAL

## 2022-05-08 VITALS
TEMPERATURE: 96.1 F | OXYGEN SATURATION: 99 % | RESPIRATION RATE: 16 BRPM | HEIGHT: 64 IN | WEIGHT: 116.5 LBS | SYSTOLIC BLOOD PRESSURE: 94 MMHG | DIASTOLIC BLOOD PRESSURE: 41 MMHG | HEART RATE: 66 BPM | BODY MASS INDEX: 19.89 KG/M2

## 2022-05-08 PROCEDURE — 250N000013 HC RX MED GY IP 250 OP 250 PS 637: Performed by: PHYSICIAN ASSISTANT

## 2022-05-08 PROCEDURE — 95711 VEEG 2-12 HR UNMONITORED: CPT

## 2022-05-08 PROCEDURE — 250N000013 HC RX MED GY IP 250 OP 250 PS 637: Performed by: PSYCHIATRY & NEUROLOGY

## 2022-05-08 PROCEDURE — 95718 EEG PHYS/QHP 2-12 HR W/VEEG: CPT | Performed by: PSYCHIATRY & NEUROLOGY

## 2022-05-08 RX ORDER — FERROUS SULFATE 325(65) MG
325 TABLET ORAL DAILY
Qty: 30 TABLET | Refills: 1 | Status: SHIPPED | OUTPATIENT
Start: 2022-05-08 | End: 2022-07-11

## 2022-05-08 RX ADMIN — VENLAFAXINE HYDROCHLORIDE 150 MG: 150 CAPSULE, EXTENDED RELEASE ORAL at 09:36

## 2022-05-08 RX ADMIN — LAMOTRIGINE 200 MG: 200 TABLET ORAL at 09:35

## 2022-05-08 RX ADMIN — Medication 325 MG: at 09:36

## 2022-05-08 RX ADMIN — BUSPIRONE HYDROCHLORIDE 10 MG: 10 TABLET ORAL at 09:35

## 2022-05-08 RX ADMIN — CARBAMAZEPINE 600 MG: 400 TABLET, EXTENDED RELEASE ORAL at 09:35

## 2022-05-08 RX ADMIN — CARBAMAZEPINE 200 MG: 200 TABLET, EXTENDED RELEASE ORAL at 13:18

## 2022-05-08 RX ADMIN — LAMOTRIGINE 200 MG: 200 TABLET ORAL at 13:18

## 2022-05-08 RX ADMIN — LEVETIRACETAM 1500 MG: 750 TABLET, FILM COATED ORAL at 09:34

## 2022-05-08 ASSESSMENT — ACTIVITIES OF DAILY LIVING (ADL)
ADLS_ACUITY_SCORE: 8
ADLS_ACUITY_SCORE: 6
ADLS_ACUITY_SCORE: 8
ADLS_ACUITY_SCORE: 6
ADLS_ACUITY_SCORE: 8
ADLS_ACUITY_SCORE: 6
ADLS_ACUITY_SCORE: 6

## 2022-05-08 NOTE — DISCHARGE SUMMARY
Service Date: 05/08/2022  Discharge Date: 05/08/2022    ADMITTING DIAGNOSES:  Intractable focal epilepsy, depression, anxiety, history of eating disorder.    DISCHARGE DIAGNOSES:  Intractable focal epilepsy, depression, anxiety, history of eating disorder, anemia.    HISTORY OF PRESENT ILLNESS:  This patient is a 41-year-old female with a history of depression, anxiety and intractable focal epilepsy, who was admitted for presurgical evaluation for her epilepsy.  Her seizure onset was at age 14 years old.      She had 3 types of seizures:  Type 1 was an aura consisting of a wave running through her head, she would not be able to talk and had difficulties comprehending.  This happened 2-3 times a week.  Type #2:  Likely, focal impaired seizures.  She would have staring spells and she would become unresponsive for a few minutes.  This happened 2-3 times a week.  Type  #3:  Generalized tonic-clonic seizures.  She would have whole body shaking, body stiffness.  This was rare.  Her last GTC was in September of 2021.    MEDICATIONS PRIOR TO ADMISSION:  Carbamazepine 600-200-800.  Levetiracetam [id number].  Lamotrigine 200-200-200.  (She was prescribed with 400-300-200, but she had significant side effects with double vision, though she was taking 200 mg t.i.d.)   BuSpar 10 mg b.i.d.  Klonopin 1 mg p.r.n.    HOSPITAL COURSE:  The patient was admitted for video EEG monitoring for presurgical evaluation of her intractable epilepsy.  During the hospital stay, her anti-seizure medications were tapered.  Carbamazepine was tapered and stopped, and the lamotrigine was decreased to 200 mg twice daily.  A total of 3 seizures were recorded.  One focal impaired seizure was recorded on day #6, 2 generalized tonic-clonic seizures were recorded on day #9.  Unfortunately, one of the seizures on day #9 was not recorded fully on video and EEG because of technical issues.  The focal impaired seizure and one of the generalized tonic-clonic  seizures both had head version to the left side at the beginning of the seizure, indicating a possible right hemisphere onset.    The patient was consulted by Medicine for anemia during the hospital stay and iron supplement was recommended by the Medicine team.    The patient was discharged on the same anti-seizure medications.    CONSULTATIONS:    1.  Consultation by the Medicine team diagnosed with microcytic anemia and leukopenia.  Anemia workup was performed, which showed iron deficiency and a low ferritin level. Iron supplement was recommended by the Medicine team.  2.  Psychiatry consult was performed and the diagnoses were major depression, anxiety disorder, PTSD, without dissociative symptoms.  Effexor XR 75 mg was recommended and increase to 150 mg in a week.    PROCEDURES:  Video EEG monitoring was performed for 11 days.  During the video EEG monitoring, EEG remained abnormal throughout the recording due to the presence of intermittent independent bilateral temporal slowing and independent bilateral temporal epileptiform activities.  A total of 3 seizures were recorded, 1 focal impaired seizure was recorded on day #6, 2 generalized tonic-clonic seizures were recorded on day #9.  Unfortunately, the first seizure on day #9  was not recorded fully on either EEG or video due to technical issues.  The other GTC and the focal impaired seizure on day #3 had head version to the left side at the beginning of the seizure.  The localization of the seizures was not entirely clear at this time, but possibly with right hemisphere onset.  The patient will probably need invasive EEG monitoring for more accurate localization of the seizure onset zone.    DISCHARGE MEDICATIONS:      Current Outpatient Medications   Medication Sig Dispense Refill    Acetaminophen (TYLENOL PO) Take by mouth as needed for mild pain or fever      albuterol (PROAIR HFA/PROVENTIL HFA/VENTOLIN HFA) 108 (90 Base) MCG/ACT inhaler INHALE 2 PUFFS  "EVERY 4 HOURS AS NEEDED FOR SHORTNESS OF BREATH OR WHEEZING 18 g 9    busPIRone (BUSPAR) 10 MG tablet Take 10 mg by mouth 2 times daily  1    carBAMazepine (CARBATROL) 200 MG 12 hr capsule Take 200 mg at noon and night (night dose take along with 600 mg dose) 180 capsule 3    carBAMazepine (CARBATROL) 300 MG 12 hr capsule 2 tablets in the morning and night (take along with 200 mg noon and night for total dose of 600 mg morning -200 mg noon -800 mg night) 360 capsule 3    clonazePAM (KLONOPIN) 1 MG tablet Take 1 mg by mouth nightly as needed       ferrous sulfate (FEROSUL) 325 (65 Fe) MG tablet Take 1 tablet (325 mg) by mouth daily Absorbed best on an empty stomach. If stomach upset occurs, can take with meals. 30 tablet 1    lamoTRIgine (LAMICTAL) 200 MG tablet TAKE TWO Tablets BY MOUTH EVERY MORNING, ONE & ONE-HALF tablets (300mg) AT NOON, AND ONE tablet in THE evening as advised (Patient taking differently: Take 200 mg by mouth 3 times daily) 360 tablet 0    levETIRAcetam (KEPPRA) 500 MG tablet Take 3 tablets (1,500 mg) by mouth 2 times daily 540 tablet 3    venlafaxine (EFFEXOR-XR) 150 MG 24 hr capsule daily   0    VITAMIN C 1000 MG OR TABS Take by mouth daily       ibuprofen (ADVIL/MOTRIN) 200 MG capsule TAKE 1 TABLET BY MOUTH EVERY 6 HOURS FOR 9 DOSES  0       EXAM ON DISCHARGE:      Blood pressure 94/41, pulse 66, temperature (!) 96.1  F (35.6  C), temperature source Oral, resp. rate 16, height 1.626 m (5' 4\"), weight 52.8 kg (116 lb 8 oz), SpO2 99 %, not currently breastfeeding.  General exam: General Appearance: No acute distress. HEENT: Normocephalic, atraumatic. Neck: Supple.  Extremities: No edema, no clubbing, no cyanosis.     Neurologic Exam: Alert and oriented x3. Speech fluent, appropriate. Normal attention. Cranial Nerves: Pupils are equal, round, reactive to light and accomodation. Extraocular movement intact. No facial weakness or asymmetry. Hearing normal. Motor Exam: Normal. Coordination:no " ataxia.    DISCHARGE DIET:  Regular diet.    ACTIVITY:  As tolerated.  No driving.    FOLLOWUP:  Will follow up with Dr. Wilkinson in 4 weeks.    Holley Dolan MD        D: 2022   T: 2022   MT: PORTILLO    Name:     ALLYSSA WUMiryam  MRN:      [id number]        Account:      [id number]   :      1980           Service Date: 2022                                  Discharge Date: 2022     Document: [id number]

## 2022-05-08 NOTE — PLAN OF CARE
Status: VEEG monitoring, leads intact No events witnessed or reported this shift.  Vitals: VSS on RA, continuous pulse ox  Neuros: AO x4. Strengths 5/5 throughout. Intact  IV: PIV SL.  Resp/trach: WNL  Diet: Regular diet,  Bowel status: LBM 5/7 per pt.  : voiding spontaneously without difficulty  Skin: EEG leads intact.  Pain: Denies body aches  Activity: SBA/GB.    Plan: restarts PTA AEDs, discharge home Monday.

## 2022-05-08 NOTE — PLAN OF CARE
Status: here for epilepsy monitoring with presurgical evaluation. No events witnessed or reported this shift.  Vitals: VSS on RA.   Neuros: AO x4. Strengths 5/5 throughout.   IV: PIV SL.  Resp/trach: WNL  Diet: Regular diet, good intake.  Bowel status: LBM 5/7 per pt.  : voiding spontaneously   Skin: EEG leads intact.  Pain: c/o generalized body aches, prn ibuprofen given at 2154.  Activity: SBA/GB. Ambulated in [place] this evening.   Plan: restarts PTA AEDs today, discharge home Monday. Continue POC.

## 2022-05-08 NOTE — PLAN OF CARE
"Pt admitted for VEEG monitoring, no events witnessed or reported. VSS on RA. Neuros intact. Denies pain. Up with assist of 1, GB. Discharge instructions explained to pt with verbalization of understanding. Home meds retrieved from security prior to leaving. Pt left at 1330 with belongings in hand to return home with family.    Patient forgot her sweatshirt in room. Patient phoned around 1400 and reports that she cannot turn around now as she has a long drive but will be back in [place] in a \"few weeks\" for follow up appointments. Note written on belongings bag with sweatshirt inside.    "

## 2022-05-11 ENCOUNTER — VIRTUAL VISIT (OUTPATIENT)
Dept: NEUROLOGY | Facility: CLINIC | Age: 42
End: 2022-05-11
Payer: COMMERCIAL

## 2022-05-11 DIAGNOSIS — G40.219 PARTIAL EPILEPSY WITH IMPAIRMENT OF CONSCIOUSNESS, INTRACTABLE (H): Primary | ICD-10-CM

## 2022-05-11 NOTE — PROGRESS NOTES
Discharge phone call:    Patient was admitted to [facility] from 4/28/22 to 5/8/22 for characterization of seizures.    Admission diagnosis:  Intractable focal epilepsy, depression, anxiety, history of eating disorder    Discharge diagnosis:  Intractable focal epilepsy, depression, anxiety, history of eating disorder, anemia.        *Patient did not return a call to the clinic.

## 2022-05-25 ENCOUNTER — OFFICE VISIT (OUTPATIENT)
Dept: NEUROLOGY | Facility: CLINIC | Age: 42
End: 2022-05-25
Attending: PSYCHIATRY & NEUROLOGY
Payer: COMMERCIAL

## 2022-05-25 ENCOUNTER — TRANSFERRED RECORDS (OUTPATIENT)
Dept: HEALTH INFORMATION MANAGEMENT | Facility: CLINIC | Age: 42
End: 2022-05-25
Payer: COMMERCIAL

## 2022-05-25 DIAGNOSIS — G40.219 LOCALIZATION-RELATED EPILEPSY WITH COMPLEX PARTIAL SEIZURES WITH INTRACTABLE EPILEPSY (H): ICD-10-CM

## 2022-05-25 DIAGNOSIS — F41.9 ANXIETY: ICD-10-CM

## 2022-05-25 DIAGNOSIS — F06.8 OTHER SPECIFIED MENTAL DISORDERS DUE TO KNOWN PHYSIOLOGICAL CONDITION: Primary | ICD-10-CM

## 2022-05-25 DIAGNOSIS — F33.0 MAJOR DEPRESSIVE DISORDER, RECURRENT EPISODE, MILD (H): ICD-10-CM

## 2022-05-25 NOTE — PROGRESS NOTES
Patient was seen for neuropsychological evaluation at the request of Dr. Ashli Wilkinson, for the purposes of diagnostic clarification and treatment planning.  2 hrs 37 min of test administration and scoring were provided by this writer, Iraida Hooks.  Please see Dr. Marko Snyder's report for a full interpretation of the findings.

## 2022-05-25 NOTE — PROGRESS NOTES
Name: Vernell Logan MRN: [id number]  : 1980 KEENAN: 2022  Staff: CIARA Tech: HH Age: 41  Sex: Female Hand: Right Educ: 12  Vision: 20/20 ?with correction / ?without correction    ORIENTATION     Time  -0     Place          Personal info         WAIS-IV     VCI: `93    TERRY: `92     FSIQ:   `91     WMI: `92    PSI: `94       Raw SSa     Similarities  24 9     Vocabulary  31 8     Block Design  44 10     Matrix Reasoning 12 7     Digit Span  22 7 RDS= 8     Arithmetic  15 10     Symbol Search  35 11     Coding  52 7       AVLT      Trial 1 2 3 4 5 B 6             5 7 9 12 12 3 7       Raw Z      Learning Over Trials (1-5) 45 -0.71      Short Delay Recall  7 -1.31      30  Recall   6 -1.50      30  Recognition Hits  15 0.71     30  False Positives  1     NAOMI-O    Raw    T %ile     Copy    31.0     2-5     Short Delay Recall 25.0 57 76     Long Delay Recall 18.0 43 24     Recognition Total 23 63 90     Time to Copy  196        BVRT     Form: C       Raw Expected   Correct  7 8   Incorrect  8 3    COWAT (FAS)   Raw: 37   T: 43    Animals   Raw:  17  T-score:  41    BOSTON NAMING TEST   Raw: 46   T: 31     COMPLEX IDEATIONAL MATERIAL   Raw:  T: 58    TRAILS  Raw  Err T    A 50  0 31   B 53  0 55    STROOP Raw T   Word 65 29   Color  53 32       Color/Word  40 45    DELGADO FACIAL RECOGNITION   Raw: 49  Interp: WNL    GROOVED PEGBOARD    Raw  T Drops   RH  74  39 0   LH 92  31 0    BDI-II  Raw:  19  Interpretation: Mild    KERRY  Raw:  8  Interpretation: Mild    WMS-IV  Raw SS / %ile     LM I  33 13     LM II  25 11     LM Recog. 27  >75   WMS-III       Spatial Span 17 11    DCT E-score: 15    MMPI-2-RF   RCd: 64 VRIN-r:  53   RC1: 68 MIGEL-r:  50   RC2: 69 F-r:  70   RC3: 49 Fp-r:  42   RC4: 52 Fs:  74   RC6: 43 FBS-r:  61   RC7: 50 RBS:  88   RC8: 63 L-r:  57   RC9: 36 K-r:  38
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
## 2022-06-20 ENCOUNTER — TELEPHONE (OUTPATIENT)
Dept: NEUROLOGY | Facility: CLINIC | Age: 42
End: 2022-06-20

## 2022-06-20 NOTE — TELEPHONE ENCOUNTER
Received Request for Medical Opinion to be completed.  Form saved to the [employer].  Encounter routed.  Carmenza Cherry CMA

## 2022-06-30 ENCOUNTER — OFFICE VISIT (OUTPATIENT)
Dept: NEUROLOGY | Facility: CLINIC | Age: 42
End: 2022-06-30
Payer: COMMERCIAL

## 2022-06-30 ENCOUNTER — ANCILLARY PROCEDURE (OUTPATIENT)
Dept: NEUROLOGY | Facility: CLINIC | Age: 42
End: 2022-06-30
Payer: COMMERCIAL

## 2022-06-30 VITALS
DIASTOLIC BLOOD PRESSURE: 70 MMHG | TEMPERATURE: 97.7 F | HEART RATE: 78 BPM | BODY MASS INDEX: 19.43 KG/M2 | SYSTOLIC BLOOD PRESSURE: 113 MMHG | HEIGHT: 64 IN | WEIGHT: 113.8 LBS

## 2022-06-30 DIAGNOSIS — O09.92 HIGH-RISK PREGNANCY IN SECOND TRIMESTER: ICD-10-CM

## 2022-06-30 DIAGNOSIS — F33.0 MAJOR DEPRESSIVE DISORDER, RECURRENT EPISODE, MILD (H): ICD-10-CM

## 2022-06-30 DIAGNOSIS — G40.219 LOCALIZATION-RELATED EPILEPSY WITH COMPLEX PARTIAL SEIZURES WITH INTRACTABLE EPILEPSY (H): ICD-10-CM

## 2022-06-30 DIAGNOSIS — O30.001 TWIN GESTATION IN FIRST TRIMESTER, UNSPECIFIED MULTIPLE GESTATION TYPE: ICD-10-CM

## 2022-06-30 DIAGNOSIS — G40.219 PARTIAL EPILEPSY WITH IMPAIRMENT OF CONSCIOUSNESS, INTRACTABLE (H): ICD-10-CM

## 2022-06-30 DIAGNOSIS — G40.219 LOCALIZATION-RELATED EPILEPSY WITH COMPLEX PARTIAL SEIZURES WITH INTRACTABLE EPILEPSY (H): Primary | ICD-10-CM

## 2022-06-30 LAB
ALT SERPL W P-5'-P-CCNC: 33 U/L (ref 10–35)
AST SERPL W P-5'-P-CCNC: 27 U/L (ref 10–35)
BASOPHILS # BLD AUTO: 0 10E3/UL (ref 0–0.2)
BASOPHILS NFR BLD AUTO: 1 %
CREAT SERPL-MCNC: 0.89 MG/DL (ref 0.51–0.95)
EOSINOPHIL # BLD AUTO: 0 10E3/UL (ref 0–0.7)
EOSINOPHIL NFR BLD AUTO: 1 %
ERYTHROCYTE [DISTWIDTH] IN BLOOD BY AUTOMATED COUNT: 24.7 % (ref 10–15)
GFR SERPL CREATININE-BSD FRML MDRD: 83 ML/MIN/1.73M2
HCT VFR BLD AUTO: 40.3 % (ref 35–47)
HGB BLD-MCNC: 11.8 G/DL (ref 11.7–15.7)
IMM GRANULOCYTES # BLD: 0 10E3/UL
IMM GRANULOCYTES NFR BLD: 0 %
LYMPHOCYTES # BLD AUTO: 1.2 10E3/UL (ref 0.8–5.3)
LYMPHOCYTES NFR BLD AUTO: 32 %
MCH RBC QN AUTO: 25.5 PG (ref 26.5–33)
MCHC RBC AUTO-ENTMCNC: 29.3 G/DL (ref 31.5–36.5)
MCV RBC AUTO: 87 FL (ref 78–100)
MONOCYTES # BLD AUTO: 0.2 10E3/UL (ref 0–1.3)
MONOCYTES NFR BLD AUTO: 6 %
NEUTROPHILS # BLD AUTO: 2.3 10E3/UL (ref 1.6–8.3)
NEUTROPHILS NFR BLD AUTO: 60 %
NRBC # BLD AUTO: 0 10E3/UL
NRBC BLD AUTO-RTO: 0 /100
PLATELET # BLD AUTO: 283 10E3/UL (ref 150–450)
RBC # BLD AUTO: 4.63 10E6/UL (ref 3.8–5.2)
SODIUM SERPL-SCNC: 139 MMOL/L (ref 136–145)
WBC # BLD AUTO: 3.8 10E3/UL (ref 4–11)

## 2022-06-30 PROCEDURE — 80161 ASY CARBAMAZEPIN 10,11-EPXID: CPT | Performed by: PSYCHIATRY & NEUROLOGY

## 2022-06-30 PROCEDURE — 84295 ASSAY OF SERUM SODIUM: CPT | Performed by: PSYCHIATRY & NEUROLOGY

## 2022-06-30 PROCEDURE — 82565 ASSAY OF CREATININE: CPT | Performed by: PSYCHIATRY & NEUROLOGY

## 2022-06-30 PROCEDURE — 84460 ALANINE AMINO (ALT) (SGPT): CPT | Performed by: PSYCHIATRY & NEUROLOGY

## 2022-06-30 PROCEDURE — 80175 DRUG SCREEN QUAN LAMOTRIGINE: CPT | Performed by: PSYCHIATRY & NEUROLOGY

## 2022-06-30 PROCEDURE — 85025 COMPLETE CBC W/AUTO DIFF WBC: CPT | Performed by: PSYCHIATRY & NEUROLOGY

## 2022-06-30 PROCEDURE — 84450 TRANSFERASE (AST) (SGOT): CPT | Performed by: PSYCHIATRY & NEUROLOGY

## 2022-06-30 RX ORDER — LEVETIRACETAM 500 MG/1
1500 TABLET ORAL 2 TIMES DAILY
Qty: 540 TABLET | Refills: 3 | Status: SHIPPED | OUTPATIENT
Start: 2022-06-30 | End: 2023-07-16

## 2022-06-30 RX ORDER — LAMOTRIGINE 200 MG/1
200 TABLET ORAL 3 TIMES DAILY
Qty: 270 TABLET | Refills: 3 | Status: SHIPPED | OUTPATIENT
Start: 2022-06-30 | End: 2023-07-19

## 2022-06-30 RX ORDER — CARBAMAZEPINE 200 MG/1
CAPSULE, EXTENDED RELEASE ORAL
Qty: 180 CAPSULE | Refills: 3 | Status: SHIPPED | OUTPATIENT
Start: 2022-06-30 | End: 2023-07-13

## 2022-06-30 RX ORDER — CARBAMAZEPINE 300 MG/1
CAPSULE, EXTENDED RELEASE ORAL
Qty: 360 CAPSULE | Refills: 3 | Status: SHIPPED | OUTPATIENT
Start: 2022-06-30 | End: 2023-07-19

## 2022-06-30 ASSESSMENT — PATIENT HEALTH QUESTIONNAIRE - PHQ9: SUM OF ALL RESPONSES TO PHQ QUESTIONS 1-9: 7

## 2022-06-30 NOTE — Clinical Note
Hi. Video EEG data from 2011 was more helpful to review for [facility]. Please have EEG download this for [facility]. Thanks! Ashli

## 2022-06-30 NOTE — LETTER
2022       RE: Vernell Logan  : 1980   MRN: [id number]      Dear Colleague,    Thank you for referring your patient, Vernell Logan, to the [facility] EPILEPSY CARE at [facility]. Please see a copy of my visit note below.    [facility]    Patient:  Vernell Logan  :  1980   Age:  41 year old   Today's Office Visit:  2022    Epilepsy Data:  Patient History  Primary Epileptologist/Provider: Ashli Wilkinson M.D.  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 Last 5 minutes.    # of Type 1 Seizure since last visit: 50  Freq. Type 1 / Month: 8.22  Seizure Type 2: Partial seizures with secondary generalization  -  with complex partial seizures evolving to generalized seizures  Description of Sz Type 2: Stares off -> GTC  # of Type 2 Seizure since last visit: 0  Freq. Type 2 / Month: 0         EPILEPSY HISTORY: Onset of seizures was 14 years of age. Based on electrographic data 2012, the patient had frequent nonconvulsive seizures arising from the right and left temporal lobe, bitemporal lobe interictal discharges. Her MRI is normal. Her PET scan was remarkable for decreased metabolic activity in the left temporal lobe. Prior  she rarely had seizure. From 8197-2596 she had several seizure per week. She had  "antiepileptic drug.     INTERVAL HISTORY:   In the last year she continues to have 5-10 focal impaired seizures per month.  On this visit we spent the entire time reviewing epilepsy surgery and necessary work-up.  She expressed understanding plan of care and next steps. She has high levels of stress, she moved and takes care of her kids. Her kids may autism, her kids are 3 years old (development delay not talking). She has lots of doctors visits and she is stressed. Patient denies dizziness, no double vision, no nausea, no vomiting, no abdominal pain, no mood changes, no ER visits, no hospitalizations, and had no significant fall with trauma.      Seizure frequency:   Seizure type 1: seizure are described as \"can not communicate, no loss of awareness, sometimes she may stare off 10-15 seconds\". Her partner has not noticed staring spells during the day.    2021-June 2022. 5-10 focal seizures with impairment in awareness  Per month   8/7/2021 and 2014 - generalized tonic-clonic convulsion     Current antiepileptic drug  Carbamazepine 600-200-800  Levetiracetam 7702-6344 (not able to increase she has auditory hallucinations)   Lamotrigine 400-300-200 (not able to tolerate higher doses)        SOCIAL HISTORY: She is not working. She is engaged.  She stopped smoking 2 weeks ago.  She makes several attempts to quit smoking, this is very hard for her.  She has 2 toddlers born in 2018 (walking, say a few words). Ex  Ricardo passed away 11/2015. Twins born 2018 (Aimee and Omar).  She does have a good social support system at this time.    She is driving, she was made aware of risk and state laws. She was advised not drive.      PHYSICAL EXAMINATION:  /70 (BP Location: Right arm, Patient Position: Sitting, Cuff Size: Adult Regular)   Pulse 78   Temp 97.7  F (36.5  C) (Temporal)   Ht 5' 4\" (162.6 cm)   Wt 113 lb 12.8 oz (51.6 kg)   Breastfeeding No   BMI 19.53 kg/m    Alert, orientated, speech is fluent, " face is symmetric, extra-ocular movement in tact, no focal deficits noted.G.    ASSESSMENT:   Focal Epilepsy with loss of awareness   Depression   Anxiety   History of eating disorder   Tubal Ligation       Discussion: Focal epilepsy with impairment in awareness.  Etiology of her seizures is unclear and her MRI of the brain is nonlesional.  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      We spoke about epilepsy surgery on this visit including scalp VEEG admission to localize seizures, need for MRI, PET scan, WADA, neuropsychology testing. After this data is collected, there will be a Case Management Conference to determine plan of care for epilepsy surgery, then a second hospitalization with invasive electrode monitoring to localize seizure onset will need to be completed (this will require a neurosurgical implantation of electrodes). After this a second Case Management Conference a plan of care for epilepsy surgery will be determined. Lastly, patient was educated they need medical and psychiatry clearance for epilepsy surgery. Patient would proceed, goal of surgery is to reduce seizure frequency. She was told she will have to continue epilepsy medications.        Future consideration may be to further optimize carbamazepine (however lamotrigine level will go down). If needed, antiepileptic drug that may be considered in future: banzel, onfi, fycompa, felbamate. I would avoid additional Na+ blocking agents.     Mental health: restarted anxiety medications, she is working with [employer] in [place].     Workup:   MRI 5/2022: Nonlesional     Video EEG EMU 5/2022: Summary of 11 Days of VEEG Monitoring:  During the VEEG monitoring, EEG remained abnormal throughout the recording due to the presence of intermittent independent bilateral temporal slowing, and independent bilateral temporal epileptiform activities.  Total of 3 seizures were recorded.  One focal impaired seizure was recorded  on day #6, Two GTCs were recorded on day #9.  One of the seizure one day #9 was not recorded due to technical issues.  The other GTC and one focal impaired seizure one day #3 showed head version to the left side at the beginning of the seizure. Among theses seizures,  lateralization of the seizure onset was not entirely clear, but possibly right hemisphere onset.  Patient will probably need invasive VEEG monitoring for further localization of the seizure onset zone.    2/2011: right temporal seizure. Focal to secondary generalization.     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 Semiology, she has tingling in her tongue, she is not able to talk or get words out, and she has difficulty with comprehension. Invasive recommendation per Wilkinson: left and right hippocampus, amygdala, dominant hemisphere Wernicke and broca area, cingulate.     PLAN:     Continue   Carbamazepine 600 mg morning-200 mg noon-800 mg evening  Levetiracetam 1500 mg twice a day   Lamotrigine 400 mg morning, 300 mg noon, and 200 mg evening      Check antiepileptic drug for efficacy, toxicity, and side effects.  Carbamazepine, lamotrigine, levetiracetam, ast, alt, na+, cbc     Video EEG admission for epilepsy surgery evaluation, it will also be important to evaluate/characterize spells make sure they are not non epileptic spell. I suspect she has focal seizures with impairment in awareness. Video EEG day 1: reduce levetiracetam by 50%, day 2: stop levetiracetam.     MRI brain done - non lestional  WADA with Video EEG pendi ng we should get this done because she may be right temporal only.   PET scan with Video EEG pending  Neuropsychology testing doneNurse education epilepsy surgery done  Video EEG data from 2011 was more helpful to review for [facility]    Follow up 8 weeks      [facility]    Do  not drive and seizure precautions     Focus on self care (walking, sleeping, eating, breathing exercise)     ASHLI THORNE MD         I spent 42 minutes in total today to provide comprehensive  medical care.   I spent 5 minutes writing the note and placing orders.   I spent 3 minutes  reviewing the chart.     The rest of the time was spent with the patient in face to face interview. During this time key medical decisions were made with review of medical chart prior to visit, visit with patient, counseling/education, and post visit work, including documentation of note on the day of visit. I addressed all questions the patient/caregiver raised in regards to epilepsy or related medical questions.         Ashli Thorne MD

## 2022-06-30 NOTE — PROGRESS NOTES
[facility] Progress Note    Patient:  Vernell Logan  :  1980   Age:  41 year old   Today's Office Visit:  2022    Epilepsy Data:  Patient History  Primary Epileptologist/Provider: Ashli Wilkinson M.D.  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 Last 5 minutes.    # of Type 1 Seizure since last visit: 50  Freq. Type 1 / Month: 8.22  Seizure Type 2: Partial seizures with secondary generalization  -  with complex partial seizures evolving to generalized seizures  Description of Sz Type 2: Stares off -> GTC  # of Type 2 Seizure since last visit: 0  Freq. Type 2 / Month: 0         EPILEPSY HISTORY: Onset of seizures was 14 years of age. Based on electrographic data 2012, the patient had frequent nonconvulsive seizures arising from the right and left temporal lobe, bitemporal lobe interictal discharges. Her MRI is normal. Her PET scan was remarkable for decreased metabolic activity in the left temporal lobe. Prior  she rarely had seizure. From 4065-9733 she had several seizure per week. She had antiepileptic drug.     INTERVAL HISTORY:   In the last year she continues to have 5-10 focal impaired seizures per month.  On this visit we spent the entire time reviewing epilepsy surgery and necessary work-up.  She expressed understanding plan of care and next steps. She has  "high levels of stress, she moved and takes care of her kids. Her kids may autism, her kids are 3 years old (development delay not talking). She has lots of doctors visits and she is stressed. Patient denies dizziness, no double vision, no nausea, no vomiting, no abdominal pain, no mood changes, no ER visits, no hospitalizations, and had no significant fall with trauma.      Seizure frequency:   Seizure type 1: seizure are described as \"can not communicate, no loss of awareness, sometimes she may stare off 10-15 seconds\". Her partner has not noticed staring spells during the day.    2021-June 2022. 5-10 focal seizures with impairment in awareness  Per month   8/7/2021 and 2014 - generalized tonic-clonic convulsion     Current antiepileptic drug  Carbamazepine 600-200-800  Levetiracetam 5531-1247 (not able to increase she has auditory hallucinations)   Lamotrigine 400-300-200 (not able to tolerate higher doses)        SOCIAL HISTORY: She is not working. She is engaged.  She stopped smoking 2 weeks ago.  She makes several attempts to quit smoking, this is very hard for her.  She has 2 toddlers born in 2018 (walking, say a few words). Ex  Ricardo passed away 11/2015. Twins born 2018 (Aimee and Omar).  She does have a good social support system at this time.    She is driving, she was made aware of risk and state laws. She was advised not drive.      PHYSICAL EXAMINATION:  /70 (BP Location: Right arm, Patient Position: Sitting, Cuff Size: Adult Regular)   Pulse 78   Temp 97.7  F (36.5  C) (Temporal)   Ht 5' 4\" (162.6 cm)   Wt 113 lb 12.8 oz (51.6 kg)   Breastfeeding No   BMI 19.53 kg/m    Alert, orientated, speech is fluent, face is symmetric, extra-ocular movement in tact, no focal deficits noted.G.    ASSESSMENT:   Focal Epilepsy with loss of awareness   Depression   Anxiety   History of eating disorder   Tubal Ligation       Discussion: Focal epilepsy with impairment in awareness.  Etiology of her " seizures is unclear and her MRI of the brain is nonlesional.  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      We spoke about epilepsy surgery on this visit including scalp VEEG admission to localize seizures, need for MRI, PET scan, WADA, neuropsychology testing. After this data is collected, there will be a Case Management Conference to determine plan of care for epilepsy surgery, then a second hospitalization with invasive electrode monitoring to localize seizure onset will need to be completed (this will require a neurosurgical implantation of electrodes). After this a second Case Management Conference a plan of care for epilepsy surgery will be determined. Lastly, patient was educated they need medical and psychiatry clearance for epilepsy surgery. Patient would proceed, goal of surgery is to reduce seizure frequency. She was told she will have to continue epilepsy medications.        Future consideration may be to further optimize carbamazepine (however lamotrigine level will go down). If needed, antiepileptic drug that may be considered in future: banzel, onfi, fycompa, felbamate. I would avoid additional Na+ blocking agents.     Mental health: restarted anxiety medications, she is working with [employer] in [place].     Workup:   MRI 5/2022: Nonlesional     Video EEG EMU 5/2022: Summary of 11 Days of VEEG Monitoring:  During the VEEG monitoring, EEG remained abnormal throughout the recording due to the presence of intermittent independent bilateral temporal slowing, and independent bilateral temporal epileptiform activities.  Total of 3 seizures were recorded.  One focal impaired seizure was recorded on day #6, Two GTCs were recorded on day #9.  One of the seizure one day #9 was not recorded due to technical issues.  The other GTC and one focal impaired seizure one day #3 showed head version to the left side at the beginning of the seizure. Among theses seizures,   lateralization of the seizure onset was not entirely clear, but possibly right hemisphere onset.  Patient will probably need invasive VEEG monitoring for further localization of the seizure onset zone.    2/2011: right temporal seizure. Focal to secondary generalization.     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 Semiology, she has tingling in her tongue, she is not able to talk or get words out, and she has difficulty with comprehension. Invasive recommendation per Minh: left and right hippocampus, amygdala, dominant hemisphere Wernicke and broca area, cingulate.     PLAN:     Continue   Carbamazepine 600 mg morning-200 mg noon-800 mg evening  Levetiracetam 1500 mg twice a day   Lamotrigine 400 mg morning, 300 mg noon, and 200 mg evening      Check antiepileptic drug for efficacy, toxicity, and side effects.  Carbamazepine, lamotrigine, levetiracetam, ast, alt, na+, cbc     Video EEG admission for epilepsy surgery evaluation, it will also be important to evaluate/characterize spells make sure they are not non epileptic spell. I suspect she has focal seizures with impairment in awareness. Video EEG day 1: reduce levetiracetam by 50%, day 2: stop levetiracetam.     MRI brain done - non lestional  WADA with Video EEG pendi ng we should get this done because she may be right temporal only.   PET scan with Video EEG pending  Neuropsychology testing doneNurse education epilepsy surgery done  Video EEG data from 2011 was more helpful to review for [facility]    Follow up 8 weeks      [facility]    Do not drive and seizure precautions     Focus on self care (walking, sleeping, eating, breathing exercise)     PEMA THORNE MD         I spent 42 minutes in total today to provide comprehensive  medical care.   I spent 5 minutes writing the note and placing orders.   I  spent 3 minutes  reviewing the chart.     The rest of the time was spent with the patient in face to face interview. During this time key medical decisions were made with review of medical chart prior to visit, visit with patient, counseling/education, and post visit work, including documentation of note on the day of visit. I addressed all questions the patient/caregiver raised in regards to epilepsy or related medical questions.         Ashli Wilkinson MD

## 2022-06-30 NOTE — Clinical Note
I reordered WADA on her. I think we need it. Please do [facility] after this. Thanks. Ashli  MRI brain done - non lestional WADA with Video EEG pendi ng we should get this done because she may be right temporal only.  PET scan with Video EEG pending Neuropsychology testing doneNurse education epilepsy surgery done Video EEG data from 2011 was more helpful to review for [facility]

## 2022-07-01 ENCOUNTER — ANCILLARY PROCEDURE (OUTPATIENT)
Dept: PET IMAGING | Facility: CLINIC | Age: 42
End: 2022-07-01
Attending: PSYCHIATRY & NEUROLOGY
Payer: COMMERCIAL

## 2022-07-01 DIAGNOSIS — G40.219 LOCALIZATION-RELATED EPILEPSY WITH COMPLEX PARTIAL SEIZURES WITH INTRACTABLE EPILEPSY (H): ICD-10-CM

## 2022-07-01 LAB — GLUCOSE SERPL-MCNC: 90 MG/DL (ref 70–99)

## 2022-07-01 RX ORDER — LAMOTRIGINE 200 MG/1
TABLET ORAL
Qty: 360 TABLET | Refills: 0 | OUTPATIENT
Start: 2022-07-01

## 2022-07-01 NOTE — TELEPHONE ENCOUNTER
Refused Prescriptions:                       Disp   Refills    lamoTRIgine (LAMICTAL) 200 MG tablet [Phar*360 ta*0        Sig: take TWO tabs BY MOUTH EVERY MORNING AND ONE AND           ONE-HALF tabs AT NOON, AND ONE tab in THE evening           as advised  Refused By: JULIANNE CRUZ  Reason for Refusal: Should already have refills on file  Reason for Refusal Comment: new rx sent 6/30/2022

## 2022-07-02 LAB — LAMOTRIGINE SERPL-MCNC: 5.2 UG/ML

## 2022-07-05 LAB
CARBAMAZEPINE EP SERPL-MCNC: 2.5 UG/ML
CARBAMAZEPINE SERPL-MCNC: 7.2 UG/ML

## 2022-07-11 DIAGNOSIS — D64.9 ANEMIA, UNSPECIFIED TYPE: ICD-10-CM

## 2022-07-11 RX ORDER — FERROUS SULFATE 325(65) MG
325 TABLET ORAL DAILY
Qty: 30 TABLET | Refills: 1 | Status: SHIPPED | OUTPATIENT
Start: 2022-07-11 | End: 2022-09-29

## 2022-07-11 NOTE — TELEPHONE ENCOUNTER
Refill requested for: Iron 65 mg    Last ordered: 5/8/22 (2 month supply)    Last Filled: 6/15/22    Last Clinic Visit: 6/30/22    Next Clinic Visit: None scheduled    Labs: 4/28/22 Hgb = 2.0      Action taken:   Pended to MD

## 2022-07-19 ENCOUNTER — MEDICAL CORRESPONDENCE (OUTPATIENT)
Dept: HEALTH INFORMATION MANAGEMENT | Facility: CLINIC | Age: 42
End: 2022-07-19

## 2022-07-21 ENCOUNTER — TELEPHONE (OUTPATIENT)
Dept: NEUROLOGY | Facility: CLINIC | Age: 42
End: 2022-07-21

## 2022-07-21 NOTE — TELEPHONE ENCOUNTER
Received Ability To Do Work-Related Activities (Physical) Form to be completed. Form saved to R drive, encounter routed.

## 2022-07-28 NOTE — TELEPHONE ENCOUNTER
Workability forms were prepared for the provider. They are ready for MD's final review and signature.

## 2022-08-03 DIAGNOSIS — R56.9 SEIZURES (H): Primary | ICD-10-CM

## 2022-09-14 ENCOUNTER — TELEPHONE (OUTPATIENT)
Dept: NEUROLOGY | Facility: CLINIC | Age: 42
End: 2022-09-14

## 2022-09-14 NOTE — TELEPHONE ENCOUNTER
Received a letter wanting an update on the [employer] Statement of Ability to do Work Related Activities form to be completed. Letter saved to [employer] [place], encounter routed.

## 2022-09-19 ENCOUNTER — PATIENT OUTREACH (OUTPATIENT)
Dept: NEUROLOGY | Facility: CLINIC | Age: 42
End: 2022-09-19

## 2022-09-19 DIAGNOSIS — Z11.59 ENCOUNTER FOR SCREENING FOR OTHER VIRAL DISEASES: Primary | ICD-10-CM

## 2022-09-19 NOTE — PROGRESS NOTES
LVMM informing patient instructions sent via Pro Breath MD. Encouraged to respond or return call to discuss where she would like to do lab and COVID test. Deisi Calloway RN 9/19/2022 12:57 PM       Addendum:  Pro Breath MD message status:   Last read by Vernell Logan at 9:25 AM on 9/23/2022. Second VMM left informing patient to call or respond to Pro Breath MD message with facility where she would like to do lab and COVID test. Deisi Calloway RN 9/23/2022 4:08 PM     Addendum:  Informed patient of procedure instructions. Confirmed date and time. Patient verbalized understanding. All questions answered.     Will do lab and COVID test at [facility] in [place], [place], fax# [phone]. Will get medical rides both ways. Staying with her Aunt locally. Aunt does not drive.    Patient has my contact information and was encouraged to call with questions/concerns. Deisi Calloway RN 9/28/2022 10:19 AM

## 2022-09-19 NOTE — PATIENT INSTRUCTIONS
You are scheduled for a cerebral angiogram with Wada testing with Dr. Woodson on 10/6/22 at 10:00 AM.     Please follow these instructions:    * Prior to your procedure you will need to obtain COVID-19 testing and blood work within 2-4 days of your scheduled procedure. Please let me know at what facility you would like to have this done and we will fax orders. Results should be faxed to [phone].     * You [facility] arrive at [facility] 7:30 AM. The address is [address] [place] [address]. The phone number is [phone].     * Do not eat after Midnight; you may drink clear liquids (includes water, Jell-O, clear broth, apple juice or any non-carbonated beverage that you can see through) until 8:00 AM.     * Take epilepsy medications as instructed by your [facility] provider. You may take medications with a sip of water the morning of the procedure.     * You will be discharged the same day. You must have a  home and someone that can stay with you through the night.     PLEASE NOTE our COVID-19 visitors policy: For the protection of our patients and visitors, patients are allowed two consistent visitors, 5 years of age or older, per adult patient in the hospital. Visitors must wear a mask at all times while in the hospital.     All discharge instructions will be given to the  or volunteer. Documentation for the post-operative plan will be given to the patient and . Patients are required to have someone to stay with them for 24 hours after their procedure.    If you have questions regarding your procedure, please contact me at [phone], option 3.    Thank you,  Deisi Calloway, RN, CNRN, SCRN  Stroke & Neuroendovascular Care Coordinator

## 2022-09-26 DIAGNOSIS — D64.9 ANEMIA, UNSPECIFIED TYPE: ICD-10-CM

## 2022-09-28 NOTE — TELEPHONE ENCOUNTER
Faxed lab and covid orders to [facility] in [place], [place], fax# [phone]. Per J CARLOS GARCIA

## 2022-09-29 NOTE — TELEPHONE ENCOUNTER
ferrous sulfate 325 mg (65 mg iron) tablet    Last Written Prescription Date:  7/11/22  Last Fill Quantity: 30,   # refills: 1  Last Office Visit : 6/30/22  Future Office visit:  None    Routing refill request to provider for review/approval because:  Continue medication?     Latest Reference Range & Units 06/30/22 12:58   Hemoglobin 11.7 - 15.7 g/dL 11.8

## 2022-09-30 RX ORDER — PNV NO.95/FERROUS FUM/FOLIC AC 28MG-0.8MG
TABLET ORAL
Qty: 90 TABLET | Refills: 3 | Status: SHIPPED | OUTPATIENT
Start: 2022-09-30 | End: 2023-08-28

## 2022-10-03 ENCOUNTER — TELEPHONE (OUTPATIENT)
Dept: NEUROLOGY | Facility: CLINIC | Age: 42
End: 2022-10-03

## 2022-10-03 NOTE — TELEPHONE ENCOUNTER
[facility] Call Center    Phone Message    May a detailed message be left on voicemail: yes     Reason for Call: Other: Please resend lab orders and Covid order to this fax # [phone]. ATTN: CLEMENTINE POLLARD.      Action Taken: Message routed to:  [facility] ([facility] NEUROLOGY     Travel Screening: Not Applicable

## 2022-10-04 ENCOUNTER — TRANSFERRED RECORDS (OUTPATIENT)
Dept: HEALTH INFORMATION MANAGEMENT | Facility: CLINIC | Age: 42
End: 2022-10-04

## 2022-10-05 ENCOUNTER — ALLIED HEALTH/NURSE VISIT (OUTPATIENT)
Dept: NEUROLOGY | Facility: CLINIC | Age: 42
End: 2022-10-05
Payer: MEDICARE

## 2022-10-05 DIAGNOSIS — R56.9 SEIZURES (H): Primary | ICD-10-CM

## 2022-10-05 NOTE — PROGRESS NOTES
"Wada education completed.  Used the \"Understanding The Wada Test\" form.  All questions answered, patient understands.        Iraida Hooks Psychometrist  "

## 2022-10-06 ENCOUNTER — HOSPITAL ENCOUNTER (OUTPATIENT)
Facility: CLINIC | Age: 42
Discharge: HOME | End: 2022-10-06
Attending: NEUROLOGICAL SURGERY | Admitting: NEUROLOGICAL SURGERY
Payer: COMMERCIAL

## 2022-10-06 ENCOUNTER — APPOINTMENT (OUTPATIENT)
Dept: MEDSURG UNIT | Facility: CLINIC | Age: 42
End: 2022-10-06
Attending: NEUROLOGICAL SURGERY
Payer: COMMERCIAL

## 2022-10-06 ENCOUNTER — APPOINTMENT (OUTPATIENT)
Dept: INTERVENTIONAL RADIOLOGY/VASCULAR | Facility: CLINIC | Age: 42
End: 2022-10-06
Attending: NEUROLOGICAL SURGERY
Payer: COMMERCIAL

## 2022-10-06 ENCOUNTER — HOSPITAL ENCOUNTER (OUTPATIENT)
Dept: NEUROLOGY | Facility: CLINIC | Age: 42
Discharge: HOME | End: 2022-10-06
Attending: PSYCHIATRY & NEUROLOGY | Admitting: NEUROLOGICAL SURGERY
Payer: COMMERCIAL

## 2022-10-06 VITALS
TEMPERATURE: 98 F | DIASTOLIC BLOOD PRESSURE: 64 MMHG | HEART RATE: 79 BPM | WEIGHT: 114 LBS | BODY MASS INDEX: 19.46 KG/M2 | RESPIRATION RATE: 18 BRPM | OXYGEN SATURATION: 98 % | HEIGHT: 64 IN | SYSTOLIC BLOOD PRESSURE: 112 MMHG

## 2022-10-06 DIAGNOSIS — G40.219 LOCALIZATION-RELATED EPILEPSY WITH COMPLEX PARTIAL SEIZURES WITH INTRACTABLE EPILEPSY (H): ICD-10-CM

## 2022-10-06 DIAGNOSIS — R56.9 SEIZURES (H): ICD-10-CM

## 2022-10-06 LAB
ANION GAP SERPL CALCULATED.3IONS-SCNC: 8 MMOL/L (ref 7–15)
APTT PPP: 30 SECONDS (ref 22–38)
BUN SERPL-MCNC: 14.4 MG/DL (ref 6–20)
CALCIUM SERPL-MCNC: 8.5 MG/DL (ref 8.6–10)
CHLORIDE SERPL-SCNC: 103 MMOL/L (ref 98–107)
CREAT SERPL-MCNC: 0.8 MG/DL (ref 0.51–0.95)
DEPRECATED HCO3 PLAS-SCNC: 25 MMOL/L (ref 22–29)
ERYTHROCYTE [DISTWIDTH] IN BLOOD BY AUTOMATED COUNT: 11.9 % (ref 10–15)
GFR SERPL CREATININE-BSD FRML MDRD: >90 ML/MIN/1.73M2
GLUCOSE SERPL-MCNC: 90 MG/DL (ref 70–99)
HCT VFR BLD AUTO: 37.9 % (ref 35–47)
HGB BLD-MCNC: 12 G/DL (ref 11.7–15.7)
INR BLD: 1 (ref 2–3)
INR PPP: 0.95 (ref 0.85–1.15)
MCH RBC QN AUTO: 29.6 PG (ref 26.5–33)
MCHC RBC AUTO-ENTMCNC: 31.7 G/DL (ref 31.5–36.5)
MCV RBC AUTO: 94 FL (ref 78–100)
PLATELET # BLD AUTO: 235 10E3/UL (ref 150–450)
POTASSIUM SERPL-SCNC: 4.1 MMOL/L (ref 3.4–5.3)
RBC # BLD AUTO: 4.05 10E6/UL (ref 3.8–5.2)
SODIUM SERPL-SCNC: 136 MMOL/L (ref 136–145)
WBC # BLD AUTO: 3.6 10E3/UL (ref 4–11)

## 2022-10-06 PROCEDURE — 36224 PLACE CATH CAROTD ART: CPT | Mod: 50

## 2022-10-06 PROCEDURE — 250N000011 HC RX IP 250 OP 636: Performed by: STUDENT IN AN ORGANIZED HEALTH CARE EDUCATION/TRAINING PROGRAM

## 2022-10-06 PROCEDURE — 36224 PLACE CATH CAROTD ART: CPT | Mod: 50 | Performed by: NEUROLOGICAL SURGERY

## 2022-10-06 PROCEDURE — 95958 EEG MONITORING/FUNCTION TEST: CPT

## 2022-10-06 PROCEDURE — 96116 NUBHVL XM PHYS/QHP 1ST HR: CPT | Performed by: CLINICAL NEUROPSYCHOLOGIST

## 2022-10-06 PROCEDURE — 272N000192 HC ACCESSORY CR2

## 2022-10-06 PROCEDURE — C1887 CATHETER, GUIDING: HCPCS

## 2022-10-06 PROCEDURE — 96121 NUBHVL XM PHY/QHP EA ADDL HR: CPT | Performed by: CLINICAL NEUROPSYCHOLOGIST

## 2022-10-06 PROCEDURE — 272N000506 HC NEEDLE CR6

## 2022-10-06 PROCEDURE — 255N000002 HC RX 255 OP 636: Performed by: NEUROLOGICAL SURGERY

## 2022-10-06 PROCEDURE — 85730 THROMBOPLASTIN TIME PARTIAL: CPT | Performed by: NEUROLOGICAL SURGERY

## 2022-10-06 PROCEDURE — 80048 BASIC METABOLIC PNL TOTAL CA: CPT | Performed by: NEUROLOGICAL SURGERY

## 2022-10-06 PROCEDURE — 85610 PROTHROMBIN TIME: CPT

## 2022-10-06 PROCEDURE — 272N000566 HC SHEATH CR3

## 2022-10-06 PROCEDURE — 250N000009 HC RX 250: Performed by: STUDENT IN AN ORGANIZED HEALTH CARE EDUCATION/TRAINING PROGRAM

## 2022-10-06 PROCEDURE — 250N000013 HC RX MED GY IP 250 OP 250 PS 637: Performed by: STUDENT IN AN ORGANIZED HEALTH CARE EDUCATION/TRAINING PROGRAM

## 2022-10-06 PROCEDURE — 999N000134 HC STATISTIC PP CARE STAGE 3

## 2022-10-06 PROCEDURE — 95958 EEG MONITORING/FUNCTION TEST: CPT | Mod: 26 | Performed by: PSYCHIATRY & NEUROLOGY

## 2022-10-06 PROCEDURE — 85027 COMPLETE CBC AUTOMATED: CPT | Performed by: NEUROLOGICAL SURGERY

## 2022-10-06 PROCEDURE — C1769 GUIDE WIRE: HCPCS

## 2022-10-06 PROCEDURE — 999N000142 HC STATISTIC PROCEDURE PREP ONLY

## 2022-10-06 PROCEDURE — 36415 COLL VENOUS BLD VENIPUNCTURE: CPT | Performed by: NEUROLOGICAL SURGERY

## 2022-10-06 PROCEDURE — 85610 PROTHROMBIN TIME: CPT | Mod: 91 | Performed by: NEUROLOGICAL SURGERY

## 2022-10-06 RX ORDER — ONDANSETRON 2 MG/ML
4 INJECTION INTRAMUSCULAR; INTRAVENOUS EVERY 6 HOURS PRN
Status: DISCONTINUED | OUTPATIENT
Start: 2022-10-06 | End: 2022-10-06 | Stop reason: HOSPADM

## 2022-10-06 RX ORDER — ACETAMINOPHEN 325 MG/1
650 TABLET ORAL
Status: DISCONTINUED | OUTPATIENT
Start: 2022-10-06 | End: 2022-10-06 | Stop reason: HOSPADM

## 2022-10-06 RX ORDER — NALOXONE HYDROCHLORIDE 0.4 MG/ML
0.4 INJECTION, SOLUTION INTRAMUSCULAR; INTRAVENOUS; SUBCUTANEOUS
Status: DISCONTINUED | OUTPATIENT
Start: 2022-10-06 | End: 2022-10-06 | Stop reason: HOSPADM

## 2022-10-06 RX ORDER — FENTANYL CITRATE 50 UG/ML
25-50 INJECTION, SOLUTION INTRAMUSCULAR; INTRAVENOUS EVERY 5 MIN PRN
Status: DISCONTINUED | OUTPATIENT
Start: 2022-10-06 | End: 2022-10-06 | Stop reason: HOSPADM

## 2022-10-06 RX ORDER — SODIUM CHLORIDE 9 MG/ML
INJECTION, SOLUTION INTRAVENOUS CONTINUOUS
Status: DISCONTINUED | OUTPATIENT
Start: 2022-10-06 | End: 2022-10-06 | Stop reason: HOSPADM

## 2022-10-06 RX ORDER — HEPARIN SODIUM 200 [USP'U]/100ML
1 INJECTION, SOLUTION INTRAVENOUS CONTINUOUS PRN
Status: DISCONTINUED | OUTPATIENT
Start: 2022-10-06 | End: 2022-10-06 | Stop reason: HOSPADM

## 2022-10-06 RX ORDER — ONDANSETRON 4 MG/1
4 TABLET, ORALLY DISINTEGRATING ORAL EVERY 6 HOURS PRN
Status: DISCONTINUED | OUTPATIENT
Start: 2022-10-06 | End: 2022-10-06 | Stop reason: HOSPADM

## 2022-10-06 RX ORDER — FLUMAZENIL 0.1 MG/ML
0.2 INJECTION, SOLUTION INTRAVENOUS
Status: DISCONTINUED | OUTPATIENT
Start: 2022-10-06 | End: 2022-10-06 | Stop reason: HOSPADM

## 2022-10-06 RX ORDER — NALOXONE HYDROCHLORIDE 0.4 MG/ML
0.2 INJECTION, SOLUTION INTRAMUSCULAR; INTRAVENOUS; SUBCUTANEOUS
Status: DISCONTINUED | OUTPATIENT
Start: 2022-10-06 | End: 2022-10-06 | Stop reason: HOSPADM

## 2022-10-06 RX ORDER — IODIXANOL 320 MG/ML
100 INJECTION, SOLUTION INTRAVASCULAR ONCE
Status: COMPLETED | OUTPATIENT
Start: 2022-10-06 | End: 2022-10-06

## 2022-10-06 RX ORDER — LIDOCAINE 40 MG/G
CREAM TOPICAL
Status: DISCONTINUED | OUTPATIENT
Start: 2022-10-06 | End: 2022-10-06 | Stop reason: HOSPADM

## 2022-10-06 RX ORDER — PROCHLORPERAZINE 25 MG
25 SUPPOSITORY, RECTAL RECTAL EVERY 12 HOURS PRN
Status: DISCONTINUED | OUTPATIENT
Start: 2022-10-06 | End: 2022-10-06 | Stop reason: HOSPADM

## 2022-10-06 RX ORDER — PROCHLORPERAZINE MALEATE 10 MG
10 TABLET ORAL EVERY 6 HOURS PRN
Status: DISCONTINUED | OUTPATIENT
Start: 2022-10-06 | End: 2022-10-06 | Stop reason: HOSPADM

## 2022-10-06 RX ADMIN — IODIXANOL 15 ML: 320 INJECTION, SOLUTION INTRAVASCULAR at 12:00

## 2022-10-06 RX ADMIN — FENTANYL CITRATE 25 MCG: 0.05 INJECTION, SOLUTION INTRAMUSCULAR; INTRAVENOUS at 10:35

## 2022-10-06 RX ADMIN — FENTANYL CITRATE 25 MCG: 0.05 INJECTION, SOLUTION INTRAMUSCULAR; INTRAVENOUS at 11:44

## 2022-10-06 RX ADMIN — ACETAMINOPHEN 650 MG: 325 TABLET, FILM COATED ORAL at 16:40

## 2022-10-06 RX ADMIN — LIDOCAINE HYDROCHLORIDE 6 ML: 10 INJECTION, SOLUTION EPIDURAL; INFILTRATION; INTRACAUDAL; PERINEURAL at 10:38

## 2022-10-06 RX ADMIN — LIDOCAINE HYDROCHLORIDE 12 MG: 10 INJECTION, SOLUTION EPIDURAL; INFILTRATION; INTRACAUDAL; PERINEURAL at 11:45

## 2022-10-06 RX ADMIN — HEPARIN SODIUM 3 BAG: 200 INJECTION, SOLUTION INTRAVENOUS at 10:38

## 2022-10-06 ASSESSMENT — VISUAL ACUITY
OU: BASELINE

## 2022-10-06 ASSESSMENT — ACTIVITIES OF DAILY LIVING (ADL)
ADLS_ACUITY_SCORE: 35

## 2022-10-06 NOTE — PROGRESS NOTES
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 She has medically intractable epilepsy and is being considered for surgical candidacy. She began suffering from seizures at age 14.  She has 3 seizure types.  The first type are characterized by the sensation of a wave running through her head.  She becomes unable to talk and has difficulties with comprehension.  Type II are felt to be likely focal impaired seizures.  She has a staring spell and becomes unresponsive. Type III are generalized tonic-clonic seizures.  She underwent a work-up at [facility] in 2011.  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 Surgery was not pursued.  She is again considering a surgical treatment for her seizures. Video EEG monitoring from April 28 through May 8, 2022 was read as abnormal and documented independent bilateral temporal slowing, independent bilateral temporal epileptiform abnormalities, and 3 seizures.  One of these seizures was focal, with the other 2 being generalized tonic-clonic.  Onset of the seizures was felt to be unclear, but possibly right hemispheric.  An MRI of her brain on May 25, 2022 documented no intracranial pathology.  An FDG PET study on July 1, 2022 documented \"1. Asymmetric deficit in the right Heschl gyrus, and mild symmetric  deficits in the posteroinferior left temporal lobe and left parahippocampal region. The right Heschl deficit is below two standard deviations of normal. The left-sided deficits are quantified as within two standard deviations of normal, however deficits may be underestimated by quantification algorithm as control database is " for patients older than this patient. These deficits could be epileptogenic foci given history of bilateral temporal seizures. 2. Deficits in the thalami bilaterally, greater than the temporal deficits. Question underlying movement disorder. 3. Additional scattered deficits in the left frontal and parietal lobes are nonspecific, may be sequela of previous infection, ischemia, or trauma. Her other medical history includes depression, generalized anxiety disorder, history of , cervical HPV test positive, vitamin D deficiency, anorexia, and past tobacco use disorder. Per records, her current antiseizure medications are carbamazepine, lamotrigine, and levetiracetam.      Ms. Logan completed a neuropsychology exam with Dr. Laura Griffin on February 3, 2011.  This exam documented average range cognitive functioning, with suggestion of left basal temporal, mild right frontal parietal, and right mesial temporal region dysfunction.  The exam documented naming problems, as well as mild weaknesses in visual perception, organization, visual working memory, and in nonverbal encoding and recall.  Also noted in the exam were significant amounts of depression and anxiety.  I saw her for a neuropsychology exam on May 25, 2022.  Assuming conventionally arranged functional neuroanatomy, my evaluation documented weaknesses that were felt to be consistent with mild compromise of bilateral frontal and lateral temporal lobe networks.  I also felt that there was some suggestion of mild compromise of subcortical circuitry as well.  I felt that these results were generally in keeping with her history of suspected bilateral seizure onset, as well as possible contributions from medication toxicity.  There was also felt to be some suggestion of variable focus or engagement with the testing process.  In comparison to the results from her  exam, there was a mild decline in the function of the left mesial temporal region.  At face  "value, relative weaknesses were identified in auditory attention, verbal learning, verbal recall, verbal fluency, naming, cognitive speed, and bilateral psychomotor speed.    This current Wada study was requested by Dr. Ashli Wilkinson, in this context.    PROCEDURE:  On October 5, 2022, Ms. Logan was seen for orientation to the Wada procedure and to establish a baseline using an alternative version of the test materials. The standard procedure was completed, with intact language performances. Following a brief delay, she freely recalled 3 out of 4 words that were previously presented.  She recognized the other word amongst foils.  She freely recalled the nursery rhyme phrase.  She accurately identified each of the 4 geometric designs amongst foils.  She freely recalled 0 of the line drawing items, but recognized all 3 of the labels for these items amongst foils.  She accurately recognized the images for 3 of the 3 line drawing items amongst foils.  She accurately repeated 6 of 6 syntactically complex sentences.     LEFT HEMISPHERE INJECTION:  The left hemisphere received the initial injection. Baseline EEG was obtained, after which she was asked to name four common objects and recite the days of the week forward, all of which were done without error. Baseline  strength was obtained using a dynamometer. While she held both hands aloft and while counting backwards from 100, 4 mg of methohexital were injected into the left internal carotid artery.  She stopped counting.  She lost strength in her right arm.  She was unable to name 4 common objects.  She was unable to read or repeat 2 printed words.  She was told to remember these words.  She was presented with 2 geometric designs and told to remember the designs.  She was then presented with 3 line drawings of common objects, and told to remember these objects.  It was at this point,1'52\" post-injection that her right hand  strength was still undetectable.  She did " "not respond accurately to any of 5 verbal comprehension items.  She was then unable to repeat a nursery rhyme phrase.  She was instructed then to remember the phrase.  She was asked to state the days of the week.  She began counting.  She was given 1 additional milligram of methohexital.  She did then read and repeat the first of 2 additional printed words.  She accurately read the second word but was not able to repeat this word.  She was presented with 2 additional geometric designs and perseverated on one of the 2 words that she had been presented with immediately before this trial.  At this point, 3'37\" post-injection, her right hand  strength was estimated to be 10% of baseline.  She was following commands.  She then was asked to repeat 3 syntactically complex sentences but could not do so.  At the completion of the protocol, 4'22\" post-injection, her right hand  strength was measured to be 33% of baseline, per dynamometer. Her language functions continued to recover in the minutes thereafter.     An additional approximately 5' elapsed, during which the EEG normalized.  strength was measured to be greater than average baseline, per dynamometer. Motor drift was not detectable.  Her memory for the events post-injection was marginal.  She freely recalled 0 of the 4 words that were presented.  She accurately recognized the 3 of the 4 words when they were presented amongst foils.  She could not freely recall the nursery rhyme phrase, and her memory was not aided by a cue.  She did not recognize this phrase amongst foils.  She correctly recognized 4 out of 4 geometric designs when presented amongst foils.  She correctly recalled the verbal labels for 0 of 3 line drawing items.  She recognized the verbal labels for 3 of the 3 items amongst foils.  She accurately recognized the image associated with 1 of the 3 line drawings when they were presented amongst foils.     RIGHT HEMISPHERE " "INJECTION:  Approximately 25' after the initial injection, the same procedure was repeated for the right hemisphere using an alternative test version. Four new objects were correctly named and the days of the week were recited without error. While holding both hands aloft and while counting backwards from 100, 4 mg of methohexital were injected into the right internal carotid artery. Motor strength in the left arm was completely lost.  She was instructed to cease counting.  She was attentive to the examiner, although her gaze was deviated to the right.  She was able to name the four common objects.  She was presented with two written words for naming.  She read and repeated these words.  She was instructed to remember the words.  She was presented with 2 geometric designs and instructed to remember the designs.  She was then presented with 3 line drawing items and instructed to remember these items.  It was at this point, 1'23\" post-injection that her left hand  strength was estimated to be 10% of baseline.  She received 1 additional milligram of methohexital.  She developed greater left-sided inattention.  Consequently, she struggled to accurately complete the verbal comprehension items, as her gaze was deviated so far to the right that she struggled to see items held in front of her.  It appeared as if she had difficulty seeing even the items at the midline of the page.  She did accurately follow the last and most complicated verbal comprehension item.  She accurately repeated a nursery rhyme phrase.  She was instructed to remember this phrase.  She then stated the days of the week when prompted.  She accurately read and repeated to more printed words.  She was instructed to remember these words.  She was presented with 2 additional geometric designs, told her remember the designs.  At this point, 3'40\" post-injection, her left hand  strength was estimated to be 40% of baseline.  She accurately repeated 2 " "of 3 syntactically complex sentences, and had a word substitution error for the third sentence.  Upon completion of the protocol, 4'48\" had transpired since the injection. Left hand  strength was 59% of baseline, per dynamometer.     An additional approximately 4' transpired, during which the EEG normalized.  strength was measured to be greater than average baseline, per dynamometer. Motor drift was undetectable.  Her memory for the events that occurred post-injection was marginal.  She freely recalled 0 of the 4 words that she had read and been instructed to repeat.  She recognized all 4 of the 4 words amongst foils.  She did not freely recall the nursery rhyme phrase, and her memory was not aided by a cue. However, she recognized this phrase when it was presented amongst foils.  She correctly recognized 1 out of 4 geometric designs.  She freely recalled the verbal label for 0 of the line drawings that she had been presented.  She recognized 3 out of 3 verbal labels for these items when presented amongst foils.  She recognized 1 of the 3 visual representations of the items when they were presented amongst foils.     CONCLUSIONS:   Left hemisphere speech and language dominance.    The current results suggest overall equivalent memory, bilaterally.  However, when looking at material specific memory, the left hemisphere better supports verbal memory.  The right hemisphere better supports nonverbal memory.  Right hemispheric mediation of verbal memory is poor.  Left hemispheric mediation of nonverbal memory is poor.    The current results suggest that she could have increased language compromise if left temporal regions are to be resected or ablated.  She would also have a verbal memory deficit if left mesial temporal regions are to be resected or ablated.  If the right hemisphere temporal lobe is to be resected or ablated, I suspect that she would have increased compromise of nonverbal " memory.    RECOMMENDATIONS:  1. If she remains a resective surgical candidate, she should be counseled about the above findings.    2. I would be pleased to evaluate in the future, if clinically indicated.       Marko Snyder, Ph.D., L.P., ABPP-CN   / Licensed Psychologist [id number]    Time spent:  One unit (60 minutes) of CPT code 98213, neurobehavioral status exam by licensed and board certified neuropsychologist, including face-to-face time with the patient, time interpreting test results, and preparing the report, first hour. Two units (110 minutes) of CPT code 79626, neurobehavioral status exam by licensed and board-certified neuropsychologist, including. face-to-face time, interpreting test results, and preparing the report, subsequent hours. Diagnoses: R56.9, F06.8.

## 2022-10-06 NOTE — PROGRESS NOTES
Pt returned from IR at 1215 via liter accompanied by nurse.Right groin site is C/D/I no hematoma.Pt complains of pain in her head so upped her fluids.Pt tolerating food and fluids.

## 2022-10-06 NOTE — PROGRESS NOTES
"Arrived to Unit 2a for cerebral angiogram with WADA testing procedure with NeuroIR. AFVSS. States chronic mid/lower back pain \"3/10, constant throbbing, ok for now.\" Neurologically intact; no deficits noted. EEG set up complete. Pt states drank black coffee with creamer at 0530 today. Dr. Anguiano notified of this. Contrast reviewed. Bilat pp & pt pulses +2/marked. Pt states medical transport service, [employer], to drive pt to Aunt's home in [place] post procedure #[id number]. Pt stable, ready for consent.   "

## 2022-10-06 NOTE — DISCHARGE INSTRUCTIONS
[facility]         Interventional Radiology  Discharge Instructions Post Angiogram (NEURO) with WADA Testing    AFTER YOU GO HOME  If you were given sedation, for the first 24 hours: we recommend that an adult stay with you, DO NOT drive or operate machinery at home or at work, DO NOT smoke, DO NOT make any important or legal decisions.  DO NOT drink alcoholic beverages the day of your procedure  DO relax and take it easy for 24 hours  DO drink plenty of fluids  DO resume your regular diet, unless otherwise instructed by your Primary Physician  DO NOT take a shower for at least 24 hours following your procedure. No tub bath, hot tubs, or swimming for 5 days.   There should be minimal drainage at the procedure site. Remove dressing after 24 hours. You may shower at that time. Do NOT scrub the procedure site vigorously for 5 days. Re apply a band aid to the site for the next two days. Change daily and as needed.    Care of groin site  It is normal to have a small bruise or lump at the site.  For the first 2 days, when you cough, sneeze or move your bowels, hold your hand over the puncture site and press gently.  Do NOT lift more than 10 pounds or do any strenuous exercise for at least 3 to 5 days.  Do not use lotion or powder near the puncture site for 3 days.  If you start bleeding from the site in your groin: lie down flat and press firmly on the site. Call your doctor as soon as you can.   Call 911 right away if you have: Heavy bleeding, bleeding that does not stop.    CALL THE PHYSICIAN IF:  You start bleeding from the procedure site.  You have numbness, coolness or change in color of the arm or leg of the puncture site  You experience changes in your vision, hearing, balance, coordination, speech, thinking or memory  You experience weakness in one or more extremity  You experience pain or redness at the puncture site  You develop nausea or vomiting  You develop hives or a rash or unexplained  itching  You develop a temperature of 101 degrees F or greater      Stroke - Call 911    Remember: BE FAST       BALANCE--Sudden loss of balance or coordination. Example: trouble walking     EYES--Sudden problem seeing out of one or both eyes     FACE--Sudden numbness or change to one side of the face. Examples: facial droop, uneven smile     ARM--Sudden numbness or weakness in one arm or leg     SPEECH--Sudden changes in speech or talking that doesn t make sense     TIME--if the person is having any of the symptoms above, CALL 911 immediately.      Symptoms may go away, then come back.     Sudden, worst headache of your life      [facility] INTERVENTIONAL RADIOLOGY DEPARTMENT  Procedure Physician: Dr. Woodson, Dr. Bazzi, Dr. Anguiano  Date of procedure: October 6, 2022    Telephone Numbers: [phone]      Monday-Friday 7:30 am to 4:00 pm  [phone] After 4:00 pm Monday-Friday, Weekends & Holidays.   Ask the  to contact the Neuro-Interventional doctor on call.  Someone is on call 24 hrs/day  [facility] toll free number: [phone] Monday-Friday 8:00 am to 4:30 pm  [facility] Emergency Dept: [phone]

## 2022-10-06 NOTE — PROGRESS NOTES
Patient Name: Vernell Logan  Medical Record Number: [id number]  Today's Date: 10/6/2022    Procedure: Carotid cerebral angiogram, WADA testing  Proceduralist: Dr. Woodson, Dr. Bazzi. Dr. Roberts, Dr. Snyder   Pathology present: N/A    Procedure Start: 10:38  Procedure end: 12:01  Sedation: Local only    Report given to: Vania Allen RN   : N/A    Other Notes: Pt arrived to IR room 3 from . Consent reviewed. Pt denies any questions or concerns regarding procedure. Pt positioned supine and monitored per protocol. Right groin access.     Sheath removed at 11:45   Manual pressure held for 15 minutes;   Groin site appearance: clean, dry, flat, Tegaderm in place.   Pedal pulse assessment:  2+   Additional Puncture site(s): N/A   FLAT Bedrest for 5 hours per Dr. Bazzi.       Pt tolerated procedure without any noted complications. Pt transferred back to .

## 2022-10-06 NOTE — PROGRESS NOTES
[facility]     Endovascular Surgical Neuroradiology Pre-Procedure Note      HPI:  Vernell Logan is a 41 year old female with history of intractable epilepsy who presents for Wada testing for Epilepsy Surgery evaluation.    Medical History:  Reviewed    Surgical History:  Reviewed    Family History:  Reviewed    Social History:  Reviewed    Allergies:  Allergies   Allergen Reactions     Chantix [Varenicline]      Suicidal thoughts        Is there a contrast allergy?  No    Medications:  Medications Prior to Admission   Medication Sig Dispense Refill Last Dose     Acetaminophen (TYLENOL PO) Take by mouth as needed for mild pain or fever   10/5/2022 at Unknown time     albuterol (PROAIR HFA/PROVENTIL HFA/VENTOLIN HFA) 108 (90 Base) MCG/ACT inhaler INHALE 2 PUFFS EVERY 4 HOURS AS NEEDED FOR SHORTNESS OF BREATH OR WHEEZING 18 g 9 More than a month at Unknown time     busPIRone (BUSPAR) 10 MG tablet Take 10 mg by mouth 2 times daily  1 10/6/2022 at Unknown time     carBAMazepine (CARBATROL) 200 MG 12 hr capsule Take 200 mg at noon and night (night dose take along with 600 mg dose) 180 capsule 3 10/6/2022 at Unknown time     clonazePAM (KLONOPIN) 1 MG tablet Take 1 mg by mouth nightly as needed    More than a month at Unknown time     Ferrous Sulfate (IRON) 325 (65 Fe) MG tablet Take 1 tablet (325 mg) by mouth daily on an empty stomach. If stomach upset occurs, can take with meals. 90 tablet 3 10/6/2022 at Unknown time     lamoTRIgine (LAMICTAL) 200 MG tablet Take 1 tablet (200 mg) by mouth 3 times daily 270 tablet 3 10/6/2022 at Unknown time     levETIRAcetam (KEPPRA) 500 MG tablet Take 3 tablets (1,500 mg) by mouth 2 times daily 540 tablet 3 10/6/2022 at Unknown time     venlafaxine (EFFEXOR-XR) 150 MG 24 hr capsule daily   0 10/6/2022 at Unknown time     VITAMIN C 1000 MG OR TABS Take by mouth daily    Past Month at Unknown time     carBAMazepine (CARBATROL) 300 MG 12 hr  capsule 2 tablets in the morning and night (take along with 200 mg noon and night for total dose of 600 mg morning -200 mg noon -800 mg night) 360 capsule 3      ibuprofen (ADVIL/MOTRIN) 200 MG capsule TAKE 1 TABLET BY MOUTH EVERY 6 HOURS FOR 9 DOSES  0 10/4/2022   .    ROS:  The 10 point Review of Systems is negative other than noted in the HPI or here.     PHYSICAL EXAMINATION  Vital Signs:  B/P: 126/75,  T: 98,  P: 72,  R: 18    Cardio:  RRR  Pulmonary:  no respiratory distress  Abdomen:  soft, non-tender, non-distended    Neurologic  Mental Status:  fully alert, attentive and oriented, follows commands, speech clear and fluent  Cranial Nerves:  visual fields intact, PERRL, EOMI with normal smooth pursuit, facial sensation intact and symmetric, facial movements symmetric, hearing not formally tested but intact to conversation, palate elevation symmetric and uvula midline, no dysarthria, shoulder shrug strong bilaterally, tongue protrusion midline  Motor:  no abnormal movements, strength 5/5 throughout upper and lower extremities  Sensory:  intact/symmetric to light touch and pin prick throughout upper and lower extremities  Coordination:  FNF and HS intact without dysmetria    Pre-procedure National Institutes of Health Stroke Scale:   Not applicable    LABS  (most recent Cr, BUN, GFR, PLT, INR, PTT within the past 7 days):  No lab results found in last 7 days.     Platelet Function P2Y12 (PRU):  Not applicable    ASSESSMENT: 41 F hx intractable epilepsy undergoing epilepsy surgery evaluation    PLAN: Diagnostic cerebral angiogram, Wada testing        PRE-PROCEDURE SEDATION ASSESSMENT     Pre-Procedure Sedation Assessment done at 0930.    Expected Level:  Moderate Sedation    Indication:  Sedation is required to allow for neurointerventional procedure.    Consent obtained from patient after discussing the risks, benefits and alternatives.     PO Intake:  Appropriately NPO for procedure    ASA Class:  Class 2 -  MILD SYSTEMIC DISEASE, NO ACUTE PROBLEMS, NO FUNCTIONAL LIMITATIONS.    Mallampati: NA    Focused history and physical completed prior to procedure. I have reviewed the lab findings, diagnostic data, medications, and the plan for sedation. I have determined this patient to be an appropriate candidate for the planned sedation and procedure and have reassessed the patient IMMEDIATELY PRIOR to sedation and procedure.    Patient was discussed with the Attending, Dr. Woodson, who agrees with the plan.    Margo Roberts MD   Pager: [phone]

## 2022-10-06 NOTE — PROGRESS NOTES
[facility]     Endovascular Surgical Neuroradiology Post-Procedure Note    Pre-Procedure Diagnosis:  WADA testing   Post-Procedure Diagnosis:  Same completed successfully     Procedure(s):   WADA testing    Findings:  As above      Plan:    [] Keep right leg flat for 5 hours    Primary Surgeon:  Dr. Jhon Woodson  Secondary Surgeon:  Not applicable  Secondary Surgeon Review:  None  Fellow:  Silvio Bazzi   Additional Assistants:  None     Prior to the start of the procedure and with procedural staff participation, I verbally confirmed: the patient s identity using two indicators, relevant allergies, that the procedure was appropriate and matched the consent or emergent situation, and that the correct equipment/implants were available. Immediately prior to starting the procedure I conducted the Time Out with the procedural staff and re-confirmed the patient s name, procedure, and site/side. (The Joint Commission universal protocol was followed.)  Yes    PRU value: Not applicable    Anesthesia:  Conscious Sedation  Medications:  Fentanyl   Puncture site:  Right Femoral Artery    Fluoroscopy time (minutes):  16.9   Radiation dose (mGy):  235    Contrast amount (mL):  20     Estimated blood loss (mL):  05    Closure:  Manual    Disposition:  Home after recovery.        Sedation Post-Procedure Summary    Sedatives: Fentanyl and Midazolam (Versed)    Vital signs and pulse oximetry were monitored and remained stable throughout the procedure, and sedation was maintained until the procedure was complete.  The patient was monitored by staff until sedation discharge criteria were met.    Patient tolerance:  Patient tolerated the procedure well with no immediate complications.    Time of sedation in minutes:  30 minutes from beginning to end of physician one to one monitoring.    KVNG BAZZI MD  Pager:  [phone]

## 2022-10-06 NOTE — PROGRESS NOTES
Patient tolerated recovery stage well. VSS, right groin site clean/dry/intact, no hematoma, and denies pain. Patient tolerated PO food and fluids. Teaching was done and discharge instructions were given. Patient ambulated, voided, and PIV was removed. Patient discharged from the hospital via wheel chair to home with family.

## 2022-10-26 ENCOUNTER — TEAM CONFERENCE (OUTPATIENT)
Dept: NEUROLOGY | Facility: CLINIC | Age: 42
End: 2022-10-26
Payer: MEDICARE

## 2022-10-26 DIAGNOSIS — G40.219 LOCALIZATION-RELATED EPILEPSY WITH COMPLEX PARTIAL SEIZURES WITH INTRACTABLE EPILEPSY (H): Primary | ICD-10-CM

## 2022-10-26 NOTE — TELEPHONE ENCOUNTER
Bill for [facility] today, enter an order for [id number].     The visit note would need to include the time spent: 45 minutes and which specialties were present.     October 25, 2022     EPILEPSY CASE MANAGEMENT CONFERENCE: We had Epileptologist, Neurosurgeon, Neuropsychologist, Pharmacist, EEG technologist, Nurses, present for the discussion today.       The list of atendees:   David Padilla (Epilepsy Physician)   Freddy kelly (Epilepsy Physician)  Ashli Wilkinson (Epilepsy Physician)   Holley Dolan (Epilepsy Physician)   Jalyn Lamb (Epilepsy Physician)   Harjinder Miller (Epilepsy Physician)   Ab Skinner (Neurosurgery Physician)     Rory Huang (Neurosurgery Physician)    Nael Walls (Neurosurgery Physician)    Marko Snyder (Neuropsyhologist)   Margo Kat (Epilepsy Nurse)   Rachel Zhang (Pharmacist)   Shashank Huff (EEG Admin)           EPILEPSY HISTORY:  41 year old RH female with onset of seizures was 14 years of age.  The patient was initially started on carbamazepine monotherapy and it  was ineffective.  In 2001, her seizures became worse and she started having daily seizures, with essentially 3 different seizure semiologies. She was changed to Tegretol-XR and this improved seizure control and she only had a few  partial events per year on a combination of Tegretol-XR, levetiracetam and lamotrigine.  I think the combination of the 3 drugs (lamotrigine, Keppra and Tegretol) gave her the best seizure control.  In addition, when Dr. Sarmiento increased lamotrigine from 600 mg to 650 mg per day, it cut her seizures by 50%.  Lamotrigine has been very effective in controlling her seizures.    Seizure type 1: aura (wave running through her head, rarely gets aura) -> difficultly with communication with loss of awareness. Last 5 minutes.  frequency 5-10 per month.   Seizure type 2: Partial seizures with secondary generalization  -  with complex partial seizures evolving to generalized seizures. 8/7/2021 and 2014 -  generalized tonic-clonic ,convulsion     Epilepsy Risk Factors:  Patient has no history of encephalitis/meningitis, no history of stroke, no history of tumor, no history of traumatic brain injury.  The patient had a normal birth and delivery.  He was 6 pounds when he was born.  No developmental delays noted.  No febrile seizures.  No family members with epilepsy.     Current antiepileptic drug:   Carbamazepine 600-200-800  Levetiracetam 3350-3676 (not able to increase she has auditory hallucinations, level 22)   Lamotrigine 400-300-200 (not able to tolerate higher doses, level 5)     Past medical history:    Psycho-Social History:  She is not working. She is engaged.  She stopped smoking 2 weeks ago.  She makes several attempts to quit smoking, this is very hard for her.  She has 2 toddlers born in 2018 (walking, say a few words). Ex  Ricardo passed away 11/2015. Twins born 2018 (Aimee and Omar).  She does have a good social support system at this time.    Driving:  Currently patient is:  Not driving.    Previous Evaluations for Epilepsy:     Video EEG EMU 5/2022: Summary of 11 Days of VEEG Monitoring:  During the VEEG monitoring, EEG remained abnormal throughout the recording due to the presence of intermittent independent bilateral temporal slowing, and independent bilateral temporal epileptiform activities.  Total of 3 seizures were recorded.  One focal impaired seizure was recorded on day #6, Two GTCs were recorded on day #9.  One of the seizure one day #9 was not recorded due to technical issues.  The other GTC and one focal impaired seizure one day #3 showed head version to the left side at the beginning of the seizure. Among theses seizures,  lateralization of the seizure onset was not entirely clear, but possibly right hemisphere onset.  Patient will probably need invasive VEEG monitoring for further localization of the seizure onset zone.     2/2011: right temporal seizure EEG onset. Focal to  secondary generalization.     VEEG 09/2012, the patient had frequent nonconvulsive seizures arising from the right anterior portion and from the right and left temporal regions.  She also had bitemporal lobe interictal discharges.    PET 7/1/2022: 1. Asymmetric deficit in the right Heschl gyrus, and mild asymmetric deficits in the posteroinferior left temporal lobe and left  parahippocampal region. The right Heschl deficit is below two standard deviations of normal. The left-sided deficits are quantified as within two standard deviations of normal, however deficits may be underestimated by quantification algorithm as control database is for  patients older than this patient. These deficits could be epileptogenic foci given history of bilateral temporal seizures. 2. Deficits in the thalami bilaterally, greater than the temporal deficits. Question underlying movement disorder. (Dr. Miller read is that there is bilateral hippocampus hypometabolism, right greater then left)      MRI brain 5/2022: nonlesional (Dr. Miller read there is possibility of bilateral hippocampus increased T2 signal)     WADA 10/6/2022: Left hemisphere speech and language dominance.The current results suggest overall equivalent memory, bilaterally.  However, when looking at material specific memory, the left hemisphere better supports verbal memory.  The right hemisphere better supports nonverbal memory.  Right hemispheric mediation of verbal memory is poor.  Left hemispheric mediation of nonverbal memory is poor.The current results suggest that she could have increased language compromise if left temporal regions are to be resected or ablated.  She would also have a verbal memory deficit if left mesial temporal regions are to be resected or ablated.  If the right hemisphere temporal lobe is to be resected or ablated, I suspect that she would have increased compromise of nonverbal memory.    Neuropsychology 5/25/2022: Assuming conventionally  arranged functional neuroanatomy, Ms. Logan demonstrated weaknesses that are consistent with mild compromise of bilateral frontal and lateral temporal lobe networks.  There is also suggestion of mild compromise of subcortical circuitry as well.  These findings are generally in keeping with her history of suspected bilateral seizure onset, with possible contributions from medication toxicity. It is important to point out that there was also some suggestion of variable focus or engagement with testing.  In comparison to the results from her neuropsychology exam in February, 2011, there is mild decline in the functioning of the left mesial temporal region.  In this evaluation (at face value), relative weaknesses were identified in auditory attention, verbal learning, verbal recall, verbal fluency, naming, cognitive speed, and bilateral psychomotor speed.  In direct comparison to the results from 2011, slightly more prominent weaknesses were identified in verbal recall and aspects of cognitive/psychomotor speed.  Also noteworthy is that she is mildly depressed and anxious.  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  She also noted elevated pain. It may well be the case that functional factors introduced some degree of variability in her thinking.    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  However, it is not clear if she has bitemporal lobe epilepsy or if she has independent unifocal epilepsy with fast propagation to the contralateral temporal lobe.  VNS has been reviewed with the patient multiple times and she has refused it multiple times and has multiple fears associated with losing her voice.  Neuropsychology data shows left lateral temporal lobe dysfunction (difficulty with language) in addition to bilateral mesial temporal dysfunction.       Paul read of PET scan 7/2022 is bilateral hippocampus hypometabolism, right greater then left and MRI brain 5/2022: Dr. Miller read there is bilateral hippocampus increased T2 signal. 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     Invasive EEG evaluation with bilateral anterior and posterior hippocampus and amygdala, strips cortex evaluation in temporal lobe and frontal depth orbitofrontal and cingulate (going through superior frontal lobe). Two 1x4 strips lateral temporal lobe.     Subfrontal/subtemporal strips should be considered.     Explore limbic network.     Plan:   Repeat MRI brain 3T adrian when she comes for evaluation to [place]     I spent 60 minutes in total today to provide comprehensive history of patients Video EEG, MRI brain, epilepsy history, PET scan. Neuropsychology and WADA test was presented by Dr. Snyder.    I spent 5 minutes writing the note and placing orders.   I spent 5 minutes  reviewing the chart.         Ashli Wilkinson MD   Epilepsy Staff

## 2022-10-26 NOTE — TELEPHONE ENCOUNTER
Bill [place] [facility] [place] [id number].     The visit note would need to include the time spent: 45 minutes and which specialties were present.     October 25, 2022     EPILEPSY CASE MANAGEMENT CONFERENCE:     The list of atendees:   David Padilla (Epilepsy Physician)   Freddy kelly (Epilepsy Physician)  Ashli Wilkinson (Epilepsy Physician)   Holley Dolan (Epilepsy Physician)   Jalyn Lamb (Epilepsy Physician)   Harjinder Miller (Epilepsy Physician)   Ab Skinner (Neurosurgery Physician)     Rory Huang (Neurosurgery Physician)    Nael Walls (Neurosurgery Physician)    Marko Snyder (Neuropsyhologist)   Margo Kat (Epilepsy Nurse)   Rachel Zhang (Pharmacist)   Shashank Huff (EEG Admin)           EPILEPSY HISTORY:  Onset of seizures was 14 years of age.  The patient was initially started on carbamazepine monotherapy and it  was ineffective.  In 2001, her seizures became worse and she started having daily seizures, with essentially 3 different seizure semiologies. She was changed to Tegretol-XR and this improved seizure control and she only had a few  partial events per year on a combination of Tegretol-XR, levetiracetam and lamotrigine.  I think the combination of the 3 drugs (lamotrigine, Keppra and Tegretol) gave her the best seizure control.  In addition, when Dr. Sarmiento increased lamotrigine from 600 mg to 650 mg per day, it cut her seizures by 50%.  Lamotrigine has been very effective in controlling her seizures.    Seizure type 1: aura (wave running through her head, rarely gets aura) -> difficultly with communication with loss of awareness. Last 5 minutes.  frequency 5-10 per month.   Seizure type 2: Partial seizures with secondary generalization  -  with complex partial seizures evolving to generalized seizures. 8/7/2021 and 2014 - generalized tonic-clonic ,convulsion     Epilepsy Risk Factors:  Patient has no history of encephalitis/meningitis, no history of stroke, no history of tumor, no  history of traumatic brain injury.  The patient had a normal birth and delivery.  He was 6 pounds when he was born.  No developmental delays noted.  No febrile seizures.  No family members with epilepsy.     Current antiepileptic drug:   Carbamazepine 600-200-800  Levetiracetam 7153-1186 (not able to increase she has auditory hallucinations, level 22)   Lamotrigine 400-300-200 (not able to tolerate higher doses, level 5)     Past medical history:    Psycho-Social History:  She is not working. She is engaged.  She stopped smoking 2 weeks ago.  She makes several attempts to quit smoking, this is very hard for her.  She has 2 toddlers born in 2018 (walking, say a few words). Ex  Ricardo passed away 11/2015. Twins born 2018 (Aimee and Omar).  She does have a good social support system at this time.    Driving:  Currently patient is:  Not driving.    Previous Evaluations for Epilepsy:     Video EEG EMU 5/2022: Summary of 11 Days of VEEG Monitoring:  During the VEEG monitoring, EEG remained abnormal throughout the recording due to the presence of intermittent independent bilateral temporal slowing, and independent bilateral temporal epileptiform activities.  Total of 3 seizures were recorded.  One focal impaired seizure was recorded on day #6, Two GTCs were recorded on day #9.  One of the seizure one day #9 was not recorded due to technical issues.  The other GTC and one focal impaired seizure one day #3 showed head version to the left side at the beginning of the seizure. Among theses seizures,  lateralization of the seizure onset was not entirely clear, but possibly right hemisphere onset.  Patient will probably need invasive VEEG monitoring for further localization of the seizure onset zone.     2/2011: right temporal seizure EEG onset. Focal to secondary generalization.     VEEG 09/2012, the patient had frequent nonconvulsive seizures arising from the right anterior portion and from the right and left temporal  regions.  She also had bitemporal lobe interictal discharges.    PET 7/1/2022: 1. Asymmetric deficit in the right Heschl gyrus, and mild asymmetric  deficits in the posteroinferior left temporal lobe and left  parahippocampal region. The right Heschl deficit is below two standard  deviations of normal. The left-sided deficits are quantified as within  two standard deviations of normal, however deficits may be  underestimated by quantification algorithm as control database is for  patients older than this patient. These deficits could be  epileptogenic foci given history of bilateral temporal seizures.     2. Deficits in the thalami bilaterally, greater than the temporal  deficits. Question underlying movement disorder.     MRI brain 5/2022: nonlesional     WADA 10/6/2022: Left hemisphere speech and language dominance.     The current results suggest overall equivalent memory, bilaterally.  However, when looking at material specific memory, the left hemisphere better supports verbal memory.  The right hemisphere better supports nonverbal memory.  Right hemispheric mediation of verbal memory is poor.  Left hemispheric mediation of nonverbal memory is poor.     The current results suggest that she could have increased language compromise if left temporal regions are to be resected or ablated.  She would also have a verbal memory deficit if left mesial temporal regions are to be resected or ablated.  If the right hemisphere temporal lobe is to be resected or ablated, I suspect that she would have increased compromise of nonverbal memory.    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  However, it is not clear if she has bitemporal lobe epilepsy or if she has independent unifocal epilepsy with fast propagation to the contralateral temporal lobe.  VNS has been reviewed with the patient  multiple times and she has refused it multiple times and has multiple fears associated with losing her voice.  Neuropsychology data shows left lateral temporal lobe dysfunction (difficulty with language) in addition to bilateral mesial temporal dysfunction.      Dr. Miller read of PET scan 7/2022 is bilateral hippocampus hypometabolism, right greater then left and MRI brain 5/2022: Dr. Miller read there is bilateral hippocampus increased T2 signal. 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      Invasive EEG evaluation with bilateral anterior and posterior hippocampus and amygdala, strips cortex evaluation in temporal lobe and frontal depth orbitofrontal and cingulate (going through superior frontal lobe). Two 1x4 strips lateral temporal lobe.      Subfrontal/subtemporal strips should be considered.      Explore limbic network.      Plan:   Repeat MRI brain 3T adrian when she comes for evaluation to [place]      I spent 60 minutes in total today to provide comprehensive history of patients Video EEG, MRI brain, epilepsy history, PET scan. Neuropsychology and WADA test was presented by Dr. Snyder.    I spent 5 minutes writing the note and placing orders.   I spent 5 minutes  reviewing the chart.      Ashli Wilkinson MD   Epilepsy Staff

## 2022-11-21 ASSESSMENT — PATIENT HEALTH QUESTIONNAIRE - PHQ9
SUM OF ALL RESPONSES TO PHQ QUESTIONS 1-9: 10
SUM OF ALL RESPONSES TO PHQ QUESTIONS 1-9: 10
10. IF YOU CHECKED OFF ANY PROBLEMS, HOW DIFFICULT HAVE THESE PROBLEMS MADE IT FOR YOU TO DO YOUR WORK, TAKE CARE OF THINGS AT HOME, OR GET ALONG WITH OTHER PEOPLE: VERY DIFFICULT

## 2022-11-27 NOTE — PROGRESS NOTES
"Vernell Logan is a 39 year old female who is being evaluated via a billable video visit.      The patient has been notified of following:     \"This video visit will be conducted via a call between you and your physician/provider. We have found that certain health care needs can be provided without the need for an in-person physical exam.  This service lets us provide the care you need with a video conversation.  If a prescription is necessary we can send it directly to your pharmacy.  If lab work is needed we can place an order for that and you can then stop by our lab to have the test done at a later time.    Video visits are billed at different rates depending on your insurance coverage.  Please reach out to your insurance provider with any questions.    If during the course of the call the physician/provider feels a video visit is not appropriate, you will not be charged for this service.\"    Patient has given verbal consent for Video visit? Yes - amwell did not launch.     How would you like to obtain your AVS? [facility]    Patient would like the video invitation sent by: Text to cell phone: [phone]    Will anyone else be joining your video visit? No      Thank you  Rashad Granda LPN          HPI: Patient/caregiver has consented to this visit. In the last couple of month she had increased complex partial seizure seizures. Since April 8th she is no longer having 2 seizure per week. We started  Gabapentin. She is feeling more sleepy. But, in the last 2 days she is feeling little better. She would like to continue  Gabapentin. She is sleeping better now. Prior to starting  Gabapentin she had 2 seizure a week and she was not sleeping. .      Currently, on antiepileptic drugs there is + fatigue, no upset stomach , no dizziness, no double vision  or no mood changes (feelings of depression, irritablity, more argumentative). In the past if levetiracetam was increased she had auditory hallucinations, higher " lamotrigine caused double vision. Patient/caregiver was agreeable with plan of care.      Current Antiepileptic drug:   Levetiracetam 1500 mg twice a day   Lamotrigine 400-200-200  Carbamazepine 600 mg morning, 200 mg noon, 800 mg night   Gabapentin  300 mg pm      Review of System: No nausea, no vomiting, no diarrhea, no fevers, no cough.      Exam: weight. 110. Speech was fluent, patient comprehension was in tact, and he was able to recall pertinent medical history in regard to her care.      Impression:   Focal Epilepsy with loss of awareness   Depression   Anxiety   History of eating disorder         Discussion: Focal epilepsy with impairment in awareness.  Etiology of her seizures is unclear and her MRI of the brain is nonlesional.  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  No generalized tonic-clonic convulsion. Her last generalized tonic-clonic convulsion was 2014.Since Feb 2020 her seizure increased, we started  gabapentin  Low dose and seizure improved and she is sleeping better.  She does have fatigue but states she is doing better in the last 2 days and would like to continue gabapentin.      If needed, antiepileptic drug that may be considered in future: banzel, onfi, fycompa, felbamate. I would avoid additional Na+ blocking agents.      With COVID she has increased stress and anxiety. She is taking Effexor and seeing a mental health care provider once a week and psychiatrist. She is not suicidal not homicidal.  Overall she does lack support from her family but her friends have been helpful - Isadora Saleem, mother in law, Milagros.         Plan:   Follow-up virtual face to face visit in 2 weeks (patient needs to download Perfect ron)    Continue antiepileptic drug  Levetiracetam 1500 mg twice a day   Lamotrigine 400-200-200  Carbamazepine 600 mg morning, 200 mg noon, 800 mg night   Gabapentin  300 mg at night     Encouraged self care activity of stress management,  mental health care, sleep, and healthy diet.      I spent 12 minutes with the patient on the phone obtaining a medical history, determined medical diagnosis and treatment plan, then patient was counseled on this treatment plan and diagnosis. I answered all her questions and concerns, and arranged follow up care during this time.     Generalized abdominal pain now localized to RL abdomen. Labs, ct for appy.

## 2022-11-28 ENCOUNTER — VIRTUAL VISIT (OUTPATIENT)
Dept: NEUROLOGY | Facility: CLINIC | Age: 42
End: 2022-11-28
Payer: MEDICARE

## 2022-11-28 VITALS — WEIGHT: 116 LBS | HEIGHT: 64 IN | BODY MASS INDEX: 19.81 KG/M2

## 2022-11-28 DIAGNOSIS — G40.219 LOCALIZATION-RELATED EPILEPSY WITH COMPLEX PARTIAL SEIZURES WITH INTRACTABLE EPILEPSY (H): Primary | ICD-10-CM

## 2022-11-28 ASSESSMENT — PAIN SCALES - GENERAL: PAINLEVEL: SEVERE PAIN (7)

## 2022-11-28 NOTE — NURSING NOTE
Patient reviewed medications and allergies in Mychart during e-check in and said everything looked correct.    Patient scored 10 on PHQ-9.      Esthela Culp, VF

## 2022-11-28 NOTE — PROGRESS NOTES
Vernell is a 41 year old who is being evaluated via a billable video visit.      How would you like to obtain your AVS? MyChart  If the video visit is dropped, the invitation should be resent by: Text to cell phone: [phone]  Will anyone else be joining your video visit? No       YUKO Boo    Depression Response    Patient completed the PHQ-9 assessment for depression and scored >9? Yes  Question 9 on the PHQ-9 was positive for suicidality? Yes  Does patient have current mental health provider? Yes    Is this a virtual visit? Yes   Does patient have suicidal ideation (positive question 9)? No - offer to place Mental Health Referral.  Patient declined referral/not needed    I personally notified the following: visit provider               Video-Visit Details    Video Start Time: 8:24 AM    Type of service:  Video Visit    Video End Time:8:42 AM    Originating Location (pt. Location): Home        Distant Location (provider location):  Off-site  Platform used for Video Visit: [employer]           [facility]    Patient:  Vernell Logan  :  1980   Age:  41 year old   Today's Office Visit:  2022    Epilepsy Data:  Patient History  Primary Epileptologist/Provider: Ashli Wilkinson M.D.  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 Last 5 minutes.    # of Type 1  "Seizure since last visit: 40  Freq. Type 1 / Month: 8.06  Seizure Type 2: Partial seizures with secondary generalization  -  with complex partial seizures evolving to generalized seizures  Description of Sz Type 2: Stares off -> GTC  # of Type 2 Seizure since last visit: 0  Freq. Type 2 / Month: 0       EPILEPSY HISTORY: Onset of seizures was 14 years of age. Based on electrographic data 09/2012, the patient had frequent nonconvulsive seizures arising from the right and left temporal lobe, bitemporal lobe interictal discharges. Her MRI is normal. Her PET scan was remarkable for decreased metabolic activity in the left temporal lobe. Prior 2010 she rarely had seizure. From 5665-0424 she had several seizure per week. She had antiepileptic drug.     INTERVAL HISTORY:   Since last visit she averages 1-2 seizure per week, 8 focal impaired seizures per month.  On this visit we spent the entire time reviewing epilepsy surgery and necessary work-up.  She expressed understanding plan of care and next steps. She has high levels of stress, she moved and takes care of her kids. Her kids may autism, her kids are 3 years old (development delay not talking). She has lots of doctors visits and she is stressed. Patient denies dizziness, no double vision, no nausea, no vomiting, no abdominal pain, no mood changes, no ER visits, no hospitalizations, and had no significant fall with trauma.      Seizure frequency:   Seizure type 1: seizure are described as \"can not communicate, no loss of awareness, sometimes she may stare off 10-15 seconds\". Her partner has not noticed staring spells during the day.  11/2022 - 1-2 focal seizures with impairment in awareness  Per week or 8 per month   2021-June 2022. 5-10 focal seizures with impairment in awareness  Per month   8/7/2021 and 2014 - generalized tonic-clonic convulsion     Current antiepileptic drug  Carbamazepine 600-200-800  Levetiracetam 0776-4162 (not able to increase she has auditory " "hallucinations)   Lamotrigine 200-200-200 (not able to tolerate higher doses, she lowered it from 400-300-200)        Antiepileptic drug notes  Lamotrigine was lowered by Vernell from 400-300-200 to 200 mg three times a day, interestingly her seizure did not worsen.     SOCIAL HISTORY: She is not working. She is engaged.  She stopped smoking 2 weeks ago.  She makes several attempts to quit smoking, this is very hard for her.  She has 2 toddlers born in 2018 (walking, say a few words). Ex  Riacrdo passed away 11/2015. Twins born 2018 (Aimee and Omar).  She does have a good social support system at this time.    She is driving, she was made aware of risk and state laws. She was advised not drive.      PHYSICAL EXAMINATION:  Ht 5' 4\" (162.6 cm)   Wt 116 lb (52.6 kg)   BMI 19.91 kg/m    Alert, orientated, speech is fluent, face is symmetric, extra-ocular movement in tact, no focal deficits noted.G.    ASSESSMENT:   Focal Epilepsy with loss of awareness   Depression   Anxiety   History of eating disorder   Tubal Ligation       Discussion: Focal epilepsy with impairment in awareness.  Etiology of her seizures is unclear and her MRI of the brain is nonlesional.  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      We spoke about epilepsy surgery on this visit including scalp VEEG admission to localize seizures, need for MRI, PET scan, WADA, neuropsychology testing. After this data is collected, there will be a Case Management Conference to determine plan of care for epilepsy surgery, then a second hospitalization with invasive electrode monitoring to localize seizure onset will need to be completed (this will require a neurosurgical implantation of electrodes). After this a second Case Management Conference a plan of care for epilepsy surgery will be determined. Lastly, patient was educated they need medical and psychiatry clearance for epilepsy surgery. Patient would proceed, goal of " surgery is to reduce seizure frequency. She was told she will have to continue epilepsy medications.        Future consideration may be to further optimize carbamazepine (however lamotrigine level will go down). If needed, antiepileptic drug that may be considered in future: banzel, onfi, fycompa, felbamate. I would avoid additional Na+ blocking agents.     Mental health: restarted anxiety medications, she is working with [facility] in [place].     SURGERY EPILEPSY Workup:     Video EEG EMU 5/2022: Summary of 11 Days of VEEG Monitoring:  Three seizures. During the VEEG monitoring, EEG remained abnormal throughout the recording due tn to the left side at the beginning of the seizure. Among theses seizures,  lateralization of the seizure onset was not entirely clear, but possibly right hemisphere onset.    Total of 3 seizures were recorded.  One focal impaired seizure was recorded on day #6, Two GTCs were recorded on day #9.  One of the seizure one day #9 was not recorded due to technical issues.  The other GTC and one focal impaired seizure one day #3 showed head versioone.     2/2011: right temporal seizure EEG onset. Focal to secondary generalization.      VEEG 09/2012, the patient had frequent nonconvulsive seizures arising from the right anterior portion and from the right and left temporal regions.  She also had bitemporal lobe interictal discharges.     PET 7/1/2022: 1. Asymmetric deficit in the right Heschl gyrus, and mild asymmetric  deficits in the posteroinferior left temporal lobe and left  parahippocampal region. The right Heschl deficit is below two standard  deviations of normal. The left-sided deficits are quantified as within  two standard deviations of normal, however deficits may be  underestimated by quantification algorithm as control database is for  patients older than this patient. These deficits could be  epileptogenic foci given history of bilateral temporal seizures.       MRI brain 5/2022:  nonlesional      WADA 10/6/2022: Left hemisphere speech and language dominance.      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 Semiology, she has tingling in her tongue, she is not able to talk or get words out, and she has difficulty with comprehension. Invasive recommendation per Minh: left and right hippocampus, amygdala, dominant hemisphere Wernicke and broca area, cingulate.     Invasive EEG evaluation with bilateral anterior and posterior hippocampus and amygdala, strips cortex evaluation in temporal lobe and frontal depth orbitofrontal and cingulate (going through superior frontal lobe). Two 1x4 strips lateral temporal lobe. subfrontal/subtemporal strips should be considered. Explore limbic network.     PLAN:     Continue   Carbamazepine 600 mg morning-200 mg noon-800 mg evening  Levetiracetam 1500 mg twice a day   Lamotrigine 200 mg morning, 200 mg noon, and 200 mg evening      MRI brain Carmelita evaluation:   T1 without gadolinium, stereotactic protocol   T2 without gadolinium, stereotactic protocol   FLAIR without gadolinium, stereotactic protocol   T1 with gadolinium, stereotactic protocol   T2 and FLAIR coronally and 1 mm cuts or less      Follow up  Neurosurgery for invasive evaluation     Follow up 3 months        [facility]    Do not drive and seizure precautions     Focus on self care (walking, sleeping, eating, breathing exercise)     PEMA THORNE MD         I spent 22 minutes in total today to provide comprehensive  medical care.   I spent 2 minutes writing the note and placing orders.   I spent 1 minutes  reviewing the chart.     The rest of the time was spent with the patient in face to face interview. During this time key medical decisions were made with review of medical chart prior to visit, visit with patient, counseling/education,  and post visit work, including documentation of note on the day of visit. I addressed all questions the patient/caregiver raised in regards to epilepsy or related medical questions.         Ashli Wilkinson MD

## 2022-11-28 NOTE — PATIENT INSTRUCTIONS
Continue   Carbamazepine 600 mg morning-200 mg noon-800 mg evening  Levetiracetam 1500 mg twice a day   Lamotrigine 200 mg morning, 200 mg noon, and 200 mg evening      MRI brain Carmelita evaluation     Follow up  Neurosurgery for invasive evaluation     Follow up 3 months        [facility]rolled in [employer] [facility]    Do not drive and seizure precautions     Ashli Wilkinson MD

## 2022-11-28 NOTE — LETTER
2022     RE: Vernell Logan  : 1980   MRN: [id number]      Dear Colleague,    Thank you for referring your patient, Vernell Logan, to the [facility] EPILEPSY [facility] at [facility]. Please see a copy of my visit note below.    Vernell is a 41 year old who is being evaluated via a billable video visit.      How would you like to obtain your AVS? MyChart  If the video visit is dropped, the invitation should be resent by: Text to cell phone: [phone]  Will anyone else be joining your video visit? No       YUKO Boo    Depression Response    Patient completed the PHQ-9 assessment for depression and scored >9? Yes  Question 9 on the PHQ-9 was positive for suicidality? Yes  Does patient have current mental health provider? Yes    Is this a virtual visit? Yes   Does patient have suicidal ideation (positive question 9)? No - offer to place Mental Health Referral.  Patient declined referral/not needed    I personally notified the following: visit provider               Video-Visit Details    Video Start Time: 8:24 AM    Type of service:  Video Visit    Video End Time:8:42 AM    Originating Location (pt. Location): Home        Distant Location (provider location):  Off-site  Platform used for Video Visit: [employer]           [facility] Epilepsy Care Progress Note    Patient:  Vernell Logan  :  1980   Age:  41 year old   Today's Office Visit:  2022    Epilepsy Data:  Patient History  Primary Epileptologist/Provider: Ashli Wilkinson M.D.  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 Last 5 minutes.    # of Type 1 Seizure since last visit: 40  Freq. Type 1 / Month: 8.06  Seizure Type 2: Partial seizures with secondary generalization  -  with complex partial seizures evolving to generalized seizures  Description of Sz Type 2: Stares off -> GTC  # of Type 2 Seizure since last visit: 0  Freq. Type 2 / Month: 0       EPILEPSY HISTORY: Onset of seizures was 14 years of age. Based on electrographic data 09/2012, the patient had frequent nonconvulsive seizures arising from the right and left temporal lobe, bitemporal lobe interictal discharges. Her MRI is normal. Her PET scan was remarkable for decreased metabolic activity in the left temporal lobe. Prior 2010 she rarely had seizure. From 3639-7151 she had several seizure per week. She had antiepileptic drug.     INTERVAL HISTORY:   Since last visit she averages 1-2 seizure per week, 8 focal impaired seizures per month.  On this visit we spent the entire time reviewing epilepsy surgery and necessary work-up.  She expressed understanding plan of care and next steps. She has high levels of stress, she moved and takes care of her kids. Her kids may autism, her kids are 3 years old (development delay not talking). She has lots of doctors visits and she is stressed. Patient denies dizziness, no double vision, no nausea, no vomiting, no abdominal pain, no mood changes, no ER visits, no hospitalizations, and had no significant fall with trauma.      Seizure frequency:   Seizure type 1: seizure are described as \"can not communicate, no loss of awareness, sometimes she may stare off 10-15 seconds\". Her partner has not noticed staring spells during the day.  11/2022 - 1-2 focal seizures with impairment in awareness  Per " "week or 8 per month   2021-June 2022. 5-10 focal seizures with impairment in awareness  Per month   8/7/2021 and 2014 - generalized tonic-clonic convulsion     Current antiepileptic drug  Carbamazepine 600-200-800  Levetiracetam 6127-6941 (not able to increase she has auditory hallucinations)   Lamotrigine 200-200-200 (not able to tolerate higher doses, she lowered it from 400-300-200)        Antiepileptic drug notes  Lamotrigine was lowered by Vernell from 400-300-200 to 200 mg three times a day, interestingly her seizure did not worsen.     SOCIAL HISTORY: She is not working. She is engaged.  She stopped smoking 2 weeks ago.  She makes several attempts to quit smoking, this is very hard for her.  She has 2 toddlers born in 2018 (walking, say a few words). Ex  Ricardo passed away 11/2015. Twins born 2018 (Aimee and Omar).  She does have a good social support system at this time.    She is driving, she was made aware of risk and state laws. She was advised not drive.      PHYSICAL EXAMINATION:  Ht 5' 4\" (162.6 cm)   Wt 116 lb (52.6 kg)   BMI 19.91 kg/m    Alert, orientated, speech is fluent, face is symmetric, extra-ocular movement in tact, no focal deficits noted.G.    ASSESSMENT:   Focal Epilepsy with loss of awareness   Depression   Anxiety   History of eating disorder   Tubal Ligation       Discussion: Focal epilepsy with impairment in awareness.  Etiology of her seizures is unclear and her MRI of the brain is nonlesional.  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      We spoke about epilepsy surgery on this visit including scalp VEEG admission to localize seizures, need for MRI, PET scan, WADA, neuropsychology testing. After this data is collected, there will be a Case Management Conference to determine plan of care for epilepsy surgery, then a second hospitalization with invasive electrode monitoring to localize seizure onset will need to be completed (this will " require a neurosurgical implantation of electrodes). After this a second Case Management Conference a plan of care for epilepsy surgery will be determined. Lastly, patient was educated they need medical and psychiatry clearance for epilepsy surgery. Patient would proceed, goal of surgery is to reduce seizure frequency. She was told she will have to continue epilepsy medications.        Future consideration may be to further optimize carbamazepine (however lamotrigine level will go down). If needed, antiepileptic drug that may be considered in future: banzel, onfi, fycompa, felbamate. I would avoid additional Na+ blocking agents.     Mental health: restarted anxiety medications, she is working with [employer] in [employer].     SURGERY EPILEPSY Workup:     Video EEG EMU 5/2022: Summary of 11 Days of VEEG Monitoring:  Three seizures. During the VEEG monitoring, EEG remained abnormal throughout the recording due tn to the left side at the beginning of the seizure. Among theses seizures,  lateralization of the seizure onset was not entirely clear, but possibly right hemisphere onset.    Total of 3 seizures were recorded.  One focal impaired seizure was recorded on day #6, Two GTCs were recorded on day #9.  One of the seizure one day #9 was not recorded due to technical issues.  The other GTC and one focal impaired seizure one day #3 showed head versioone.     2/2011: right temporal seizure EEG onset. Focal to secondary generalization.      VEEG 09/2012, the patient had frequent nonconvulsive seizures arising from the right anterior portion and from the right and left temporal regions.  She also had bitemporal lobe interictal discharges.     PET 7/1/2022: 1. Asymmetric deficit in the right Heschl gyrus, and mild asymmetric  deficits in the posteroinferior left temporal lobe and left  parahippocampal region. The right Heschl deficit is below two standard  deviations of normal. The left-sided deficits are quantified as  within  two standard deviations of normal, however deficits may be  underestimated by quantification algorithm as control database is for  patients older than this patient. These deficits could be  epileptogenic foci given history of bilateral temporal seizures.       MRI brain 5/2022: nonlesional      WADA 10/6/2022: Left hemisphere speech and language dominance.      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 Semiology, she has tingling in her tongue, she is not able to talk or get words out, and she has difficulty with comprehension. Invasive recommendation per Minh: left and right hippocampus, amygdala, dominant hemisphere Wernicke and broca area, cingulate.     Invasive EEG evaluation with bilateral anterior and posterior hippocampus and amygdala, strips cortex evaluation in temporal lobe and frontal depth orbitofrontal and cingulate (going through superior frontal lobe). Two 1x4 strips lateral temporal lobe. subfrontal/subtemporal strips should be considered. Explore limbic network.     PLAN:     Continue   Carbamazepine 600 mg morning-200 mg noon-800 mg evening  Levetiracetam 1500 mg twice a day   Lamotrigine 200 mg morning, 200 mg noon, and 200 mg evening      MRI brain Carmelita evaluation:   T1 without gadolinium, stereotactic protocol   T2 without gadolinium, stereotactic protocol   FLAIR without gadolinium, stereotactic protocol   T1 with gadolinium, stereotactic protocol   T2 and FLAIR coronally and 1 mm cuts or less      Follow up  Neurosurgery for invasive evaluation     Follow up 3 months        [facility]    Do not drive and seizure precautions     Focus on self care (walking, sleeping, eating, breathing exercise)     PEMA THORNE MD         I spent 22 minutes in total today to provide comprehensive  medical care.   I spent 2 minutes writing  the note and placing orders.   I spent 1 minutes  reviewing the chart.     The rest of the time was spent with the patient in face to face interview. During this time key medical decisions were made with review of medical chart prior to visit, visit with patient, counseling/education, and post visit work, including documentation of note on the day of visit. I addressed all questions the patient/caregiver raised in regards to epilepsy or related medical questions.       Ashli Wilkinson MD

## 2023-01-04 DIAGNOSIS — G40.219 LOCALIZATION-RELATED EPILEPSY WITH COMPLEX PARTIAL SEIZURES WITH INTRACTABLE EPILEPSY (H): Primary | ICD-10-CM

## 2023-01-13 ENCOUNTER — TELEPHONE (OUTPATIENT)
Dept: NEUROLOGY | Facility: CLINIC | Age: 43
End: 2023-01-13

## 2023-01-13 NOTE — TELEPHONE ENCOUNTER
VM left for patient notifying her to schedule MRI and CT the for when she comes down to see Dr. Skinner.

## 2023-02-21 ENCOUNTER — TELEPHONE (OUTPATIENT)
Dept: NEUROSURGERY | Facility: CLINIC | Age: 43
End: 2023-02-21
Payer: MEDICARE

## 2023-02-23 ENCOUNTER — TELEPHONE (OUTPATIENT)
Dept: NEUROSURGERY | Facility: CLINIC | Age: 43
End: 2023-02-23
Payer: MEDICARE

## 2023-03-09 ENCOUNTER — TELEPHONE (OUTPATIENT)
Dept: NEUROLOGY | Facility: CLINIC | Age: 43
End: 2023-03-09

## 2023-03-09 NOTE — LETTER
3/31/2023       RE: Vernell Logan  [address]  [place] [address]              Dear Vernell Logan,     I am reaching out today by mail because I have been unable to reach you by telephone. We would like to follow up on the ordered tests and consult that Dr. Wilkinson had ordered and to schedule follow up with Dr. Wilkisnon.     Please give us a call if you'd like to arrange follow up. [phone].     Sincerely,     Yunier MODI, RN Care Coordinator  [facility] Epilepsy [facility]

## 2023-03-09 NOTE — TELEPHONE ENCOUNTER
Neurology Nurse Outreach Call    S: Epilepsy surgery candidate has not yet been scheduled for the appointments ordered.   B: 43 yo person with intractable focal epilepsy; previously completed phase 1 of epilepsy surgery testing. Next steps are for sEEG monitoring.   A: VM left for patient.

## 2023-03-31 ENCOUNTER — TELEPHONE (OUTPATIENT)
Dept: NEUROLOGY | Facility: CLINIC | Age: 43
End: 2023-03-31

## 2023-03-31 NOTE — TELEPHONE ENCOUNTER
What is the concern that needs to be addressed by a nurse? Patient called in stating that Medical form from [place] will be fax in and once form is filled out patient would like a copy put in chart.     May a detailed message be left on voicemail? Yes         Message routed to: jennifer galicia

## 2023-04-17 NOTE — TELEPHONE ENCOUNTER
The requested paperwork was prepared for the provider and is ready for final review with signature.

## 2023-07-10 DIAGNOSIS — G40.219 LOCALIZATION-RELATED EPILEPSY WITH COMPLEX PARTIAL SEIZURES WITH INTRACTABLE EPILEPSY (H): ICD-10-CM

## 2023-07-10 DIAGNOSIS — O30.001 TWIN GESTATION IN FIRST TRIMESTER, UNSPECIFIED MULTIPLE GESTATION TYPE: ICD-10-CM

## 2023-07-10 DIAGNOSIS — F33.0 MAJOR DEPRESSIVE DISORDER, RECURRENT EPISODE, MILD (H): ICD-10-CM

## 2023-07-10 DIAGNOSIS — O09.92 HIGH-RISK PREGNANCY IN SECOND TRIMESTER: ICD-10-CM

## 2023-07-10 DIAGNOSIS — G40.219 PARTIAL EPILEPSY WITH IMPAIRMENT OF CONSCIOUSNESS, INTRACTABLE (H): ICD-10-CM

## 2023-07-12 DIAGNOSIS — G40.219 LOCALIZATION-RELATED EPILEPSY WITH COMPLEX PARTIAL SEIZURES WITH INTRACTABLE EPILEPSY (H): ICD-10-CM

## 2023-07-13 RX ORDER — CARBAMAZEPINE 200 MG/1
CAPSULE, EXTENDED RELEASE ORAL
Qty: 180 CAPSULE | Refills: 0 | Status: SHIPPED | OUTPATIENT
Start: 2023-07-13 | End: 2023-07-19

## 2023-07-15 DIAGNOSIS — F33.0 MAJOR DEPRESSIVE DISORDER, RECURRENT EPISODE, MILD (H): ICD-10-CM

## 2023-07-15 DIAGNOSIS — G40.219 LOCALIZATION-RELATED EPILEPSY WITH COMPLEX PARTIAL SEIZURES WITH INTRACTABLE EPILEPSY (H): ICD-10-CM

## 2023-07-15 DIAGNOSIS — G40.219 PARTIAL EPILEPSY WITH IMPAIRMENT OF CONSCIOUSNESS, INTRACTABLE (H): ICD-10-CM

## 2023-07-15 DIAGNOSIS — O30.001 TWIN GESTATION IN FIRST TRIMESTER, UNSPECIFIED MULTIPLE GESTATION TYPE: ICD-10-CM

## 2023-07-15 DIAGNOSIS — O09.92 HIGH-RISK PREGNANCY IN SECOND TRIMESTER: ICD-10-CM

## 2023-07-16 RX ORDER — LEVETIRACETAM 500 MG/1
TABLET ORAL
Qty: 180 TABLET | Refills: 0 | Status: SHIPPED | OUTPATIENT
Start: 2023-07-16 | End: 2023-08-22

## 2023-07-17 NOTE — TELEPHONE ENCOUNTER
Last Clinic Visit:  11/28/22    RTC 3 MOS   Scheduling has been notified to contact the pt for appointment.

## 2023-07-19 RX ORDER — LAMOTRIGINE 200 MG/1
200 TABLET ORAL 3 TIMES DAILY
Qty: 90 TABLET | Refills: 0 | Status: SHIPPED | OUTPATIENT
Start: 2023-07-19 | End: 2023-08-18

## 2023-07-19 RX ORDER — CARBAMAZEPINE 200 MG/1
CAPSULE, EXTENDED RELEASE ORAL
Qty: 60 CAPSULE | Refills: 0 | Status: SHIPPED | OUTPATIENT
Start: 2023-07-19 | End: 2023-08-22

## 2023-07-19 RX ORDER — CARBAMAZEPINE 300 MG/1
CAPSULE, EXTENDED RELEASE ORAL
Qty: 120 CAPSULE | Refills: 0 | Status: SHIPPED | OUTPATIENT
Start: 2023-07-19 | End: 2023-08-18

## 2023-07-19 NOTE — TELEPHONE ENCOUNTER
carbamazepine  mg capsule,extended release [id number]  Last Written Prescription Date:    6/30/2022  Last Fill Quantity: 360,   # refills: 3  Last Office Visit :  11/28/2022  Future Office visit:  None  Routing refill request to provider for review/approval because:  30 days sent to pharm   Office visit due  Scheduling notified      lamotrigine 200 mg tablet  Last Written Prescription Date:    7/13/2023  Last Fill Quantity: 180,   # refills: 3  Last Office Visit :  11/28/2022  Future Office visit:  None  Routing refill request to provider for review/approval because:  30 days sent to pharm   Office visit due  Scheduling notified    carbamazepine  mg capsule,extended release [id number]  Last Written Prescription Date:    7/13/2022  Last Fill Quantity: 180,   # refills: 3  Last Office Visit :  11/28/2022  Future Office visit:  None  Routing refill request to provider for review/approval because:  30 days sent to pharm 120 Tabs  Office visit due  Scheduling notified    Juliana Hernadez RN  Central Triage Red Flags/Med Refills        Warnings Override History for carBAMazepine (CARBATROL) 200 MG 12 hr capsule [[id number]]    Overridden by Li Collier RN on Jul 13, 2023 5:25 PM   Drug-Drug   1. CARBAMAZEPINE / LAMOTRIGINE [Level: Major] [Reason: Tolerated medication/side effects in past]   Other Orders: lamoTRIgine (LAMICTAL) 200 MG tablet

## 2023-08-14 DIAGNOSIS — G40.219 PARTIAL EPILEPSY WITH IMPAIRMENT OF CONSCIOUSNESS, INTRACTABLE (H): ICD-10-CM

## 2023-08-14 DIAGNOSIS — O30.001 TWIN GESTATION IN FIRST TRIMESTER, UNSPECIFIED MULTIPLE GESTATION TYPE: ICD-10-CM

## 2023-08-14 DIAGNOSIS — G40.219 LOCALIZATION-RELATED EPILEPSY WITH COMPLEX PARTIAL SEIZURES WITH INTRACTABLE EPILEPSY (H): ICD-10-CM

## 2023-08-14 DIAGNOSIS — O09.92 HIGH-RISK PREGNANCY IN SECOND TRIMESTER: ICD-10-CM

## 2023-08-14 DIAGNOSIS — F33.0 MAJOR DEPRESSIVE DISORDER, RECURRENT EPISODE, MILD (H): ICD-10-CM

## 2023-08-18 RX ORDER — CARBAMAZEPINE 300 MG/1
CAPSULE, EXTENDED RELEASE ORAL
Qty: 120 CAPSULE | Refills: 0 | Status: SHIPPED | OUTPATIENT
Start: 2023-08-18 | End: 2023-08-22

## 2023-08-18 RX ORDER — LAMOTRIGINE 200 MG/1
200 TABLET ORAL 3 TIMES DAILY
Qty: 90 TABLET | Refills: 0 | Status: SHIPPED | OUTPATIENT
Start: 2023-08-18 | End: 2023-08-22

## 2023-08-18 NOTE — TELEPHONE ENCOUNTER
LCV:11/28/2022  [facility]  NCV: 8-22-23  carBAMazepine (CARBATROL) 300 MG 12 hr capsule   [facility]   AED: 6-30-22, CBC: 10-6-22, NA:10-6-22    lamoTRIgine (LAMICTAL) 200 MG tablet  Filling per [facility] medication refill protocols - seizure medications.  Not all labs required.

## 2023-08-22 ENCOUNTER — VIRTUAL VISIT (OUTPATIENT)
Dept: NEUROLOGY | Facility: CLINIC | Age: 43
End: 2023-08-22
Payer: MEDICARE

## 2023-08-22 VITALS — WEIGHT: 116 LBS | HEIGHT: 64 IN | BODY MASS INDEX: 19.81 KG/M2

## 2023-08-22 DIAGNOSIS — G40.219 LOCALIZATION-RELATED EPILEPSY WITH COMPLEX PARTIAL SEIZURES WITH INTRACTABLE EPILEPSY (H): ICD-10-CM

## 2023-08-22 DIAGNOSIS — O09.92 HIGH-RISK PREGNANCY IN SECOND TRIMESTER: ICD-10-CM

## 2023-08-22 DIAGNOSIS — F33.0 MAJOR DEPRESSIVE DISORDER, RECURRENT EPISODE, MILD (H): ICD-10-CM

## 2023-08-22 DIAGNOSIS — G40.219 PARTIAL EPILEPSY WITH IMPAIRMENT OF CONSCIOUSNESS, INTRACTABLE (H): ICD-10-CM

## 2023-08-22 DIAGNOSIS — O30.001 TWIN GESTATION IN FIRST TRIMESTER, UNSPECIFIED MULTIPLE GESTATION TYPE: ICD-10-CM

## 2023-08-22 RX ORDER — LAMOTRIGINE 200 MG/1
200 TABLET ORAL 3 TIMES DAILY
Qty: 270 TABLET | Refills: 3 | Status: SHIPPED | OUTPATIENT
Start: 2023-08-22 | End: 2024-05-15

## 2023-08-22 RX ORDER — CARBAMAZEPINE 200 MG/1
CAPSULE, EXTENDED RELEASE ORAL
Qty: 180 CAPSULE | Refills: 3 | Status: SHIPPED | OUTPATIENT
Start: 2023-08-22 | End: 2023-11-09

## 2023-08-22 RX ORDER — LEVETIRACETAM 500 MG/1
TABLET ORAL
Qty: 540 TABLET | Refills: 3 | Status: SHIPPED | OUTPATIENT
Start: 2023-08-22 | End: 2024-05-15

## 2023-08-22 RX ORDER — CARBAMAZEPINE 300 MG/1
CAPSULE, EXTENDED RELEASE ORAL
Qty: 360 CAPSULE | Refills: 3 | Status: ON HOLD | OUTPATIENT
Start: 2023-08-22 | End: 2024-04-24

## 2023-08-22 ASSESSMENT — PAIN SCALES - GENERAL: PAINLEVEL: NO PAIN (0)

## 2023-08-22 ASSESSMENT — PATIENT HEALTH QUESTIONNAIRE - PHQ9: SUM OF ALL RESPONSES TO PHQ QUESTIONS 1-9: 13

## 2023-08-22 NOTE — NURSING NOTE
Is the patient currently in the state of [place]? YES    Visit mode:VIDEO    If the visit is dropped, the patient can be reconnected by: VIDEO VISIT: Text to cell phone:   Telephone Information:   Mobile [phone]       Will anyone else be joining the visit? NO  (If patient encounters technical issues they should call [phone]    How would you like to obtain your AVS? MyChart    Are changes needed to the allergy or medication list? Pt stated no changes to allergies and Pt stated no med changes    Reason for visit: ARLEEN LARSON

## 2023-08-22 NOTE — PROGRESS NOTES
Virtual Visit Details    Type of service:  Video Visit   Video Start Time: 11:27 AM  Video End Time: 11:30 am (we had to stop video due to tech issues and used phone)    Originating Location (pt. Location): Home    Distant Location (provider location):  Off-site  Platform used for Video Visit: [facility]    Depression Response    Patient completed the PHQ-9 assessment for depression and scored >9? Yes  Question 9 on the PHQ-9 was positive for suicidality? No  Does patient have current mental health provider? Yes    Is this a virtual visit? Yes   Does patient have suicidal ideation (positive question 9)? No - offer to place Mental Health Referral.  Referral order pended    I personally notified the following: visit provider           [facility] Progress Note    Patient:  Vernell Logan  :  1980   Age:  42 year old   Today's Office Visit:  2023    EPILEPSY HISTORY copied forward: Onset of seizures was 14 years of age. Based on electrographic data 2012, the patient had frequent nonconvulsive seizures arising from the right and left temporal lobe, bitemporal lobe interictal discharges. Her MRI is normal. Her PET scan was remarkable for decreased metabolic activity in the left temporal lobe. Prior  she rarely had seizure. From 2967-2038 she had several seizure per week. She had antiepileptic drug.     INTERVAL HISTORY:   Since last visit she averages 2 seizure per month, her seizure is less and she is not sure why she has less. She has less stress now. She moved to a new house and feels situated and her kids have autism but have not been diagnosed. She worries about kids delays. She has been getting workup for breast cancer and gets mammogram. She has no cancer. She has therapist (every week) and psychiatrist (once a month)    Patient denies dizziness, no double vision, no nausea, no vomiting, no abdominal pain, no mood changes, no ER visits, no hospitalizations, and had no significant fall  "with trauma.      Seizure frequency:   Seizure type 1: seizure are described as \"can not communicate, no loss of awareness, sometimes she may stare off 10-15 seconds\". Sometimes she can not communicate for few minutes, she points to her mouth and  knows she can not take, when she has a seizure she ask people \"did I seem like I blacked out and they tell me I am staring off\". She is not driving.   Frequency:   Summer 2023 - 2 focal seizures with impairment in awareness  per month   11/2022 - 4-8 seizure per month focal seizures with impairment in awareness   2021-June 2022. 5-10 focal seizures with impairment in awareness  Per month     Seizure type 2: generalized tonic-clonic convulsion. Last seizure was 8/7/2021 and 2014 - generalized tonic-clonic convulsion     Current antiepileptic drug  Carbamazepine 600-200-800  Levetiracetam 6177-1690 (not able to increase she has auditory hallucinations)   Lamotrigine 200-200-200 (not able to tolerate higher doses, she lowered it from 400-300-200)        Antiepileptic drug notes  Lamotrigine was lowered by Vernell from 400-300-200 to 200 mg three times a day, interestingly her seizure did not worsen.     Women Issue: She had tubal ligation     SOCIAL HISTORY: She is not working. She is living with boyfriend.  She stopped smoking 2021. ral attempts to quit smoking, this is very hard for her.  She has 2 toddlers born in 2018 (walking, say a few words). Ex  Ricardo passed away 11/2015. Twins born 2018 (Aimee and Omar).  She does have a good social support system at this time.    She is driving, she was made aware of risk and state laws. She was advised not drive.      PHYSICAL EXAMINATION:  Ht 5' 4\" (162.6 cm)   Wt 116 lb (52.6 kg)   BMI 19.91 kg/m    Alert, orientated, speech is fluent    ASSESSMENT:   Focal Epilepsy with loss of awareness   Depression   Anxiety   History of eating disorder   Tubal Ligation       Discussion: Focal epilepsy with impairment in " awareness.  Etiology of her seizures is unclear and her MRI of the brain is nonlesional.  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  We will work her up for Deep Brain Stimulation device.       SURGERY EPILEPSY Workup:     Video EEG EMU 5/2022: Summary of 11 Days of VEEG Monitoring:  Three seizures. During the VEEG monitoring, EEG remained abnormal throughout the recording due tn to the left side at the beginning of the seizure. Among theses seizures,  lateralization of the seizure onset was not entirely clear, but possibly right hemisphere onset.    Total of 3 seizures were recorded.  One focal impaired seizure was recorded on day #6, Two GTCs were recorded on day #9.  One of the seizure one day #9 was not recorded due to technical issues.  The other GTC and one focal impaired seizure one day #3 showed head versioone.     2/2011: right temporal seizure EEG onset. Focal to secondary generalization.      VEEG 09/2012, the patient had frequent nonconvulsive seizures arising from the right anterior portion and from the right and left temporal regions.  She also had bitemporal lobe interictal discharges.     PET 7/1/2022: 1. Asymmetric deficit in the right Heschl gyrus, and mild asymmetric  deficits in the posteroinferior left temporal lobe and left  parahippocampal region. The right Heschl deficit is below two standard  deviations of normal. The left-sided deficits are quantified as within  two standard deviations of normal, however deficits may be  underestimated by quantification algorithm as control database is for  patients older than this patient. These deficits could be  epileptogenic foci given history of bilateral temporal seizures.    MRI brain 5/2022: nonlesional      WADA 10/6/2022: Left hemisphere speech and language dominance.      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 Semiology, staring she is not able to talk or get words out, and she has difficulty with comprehension, two seizure per month.      She has bitemporal lobe epilepsy and based on this would not be a focal resection candidate.  However, it is not clear if she has bitemporal lobe epilepsy or if she has independent unifocal epilepsy with fast propagation to the contralateral temporal lobe.  VNS has been reviewed with the patient multiple times and she has refused it multiple times and has multiple fears associated with losing her voice.  Neuropsychology data shows left lateral temporal lobe dysfunction (difficulty with language) in addition to bilateral mesial temporal dysfunction. She is a good candidate for Deep Brain Stimulation device  and we will re-present case at [facility] [place] [facility]. Lastly, patient was educated they need medical and psychiatry clearance for epilepsy surgery. Patient would proceed, goal of surgery is to reduce seizure frequency. She was told she will have to continue epilepsy medications.       PLAN:     Continue   Carbamazepine 600 mg morning-200 mg noon-800 mg evening  Levetiracetam 1500 mg twice a day   Lamotrigine 200 mg morning, 200 mg noon, and 200 mg evening        Follow up  Neurosurgery for Deep Brain Stimulation device      Follow up 3 months        [facility]    Do not drive and seizure precautions     Focus on self care (walking, sleeping, eating, breathing exercise)     PEMA THORNE MD         I spent 41 minutes in total today to provide comprehensive  medical care.   I spent 5 minutes writing the note and placing orders.   I spent 2 minutes  reviewing the chart.     The rest of the time was spent with the patient in face to face interview. During this time key medical decisions were made with review of medical chart prior to visit, visit with patient, counseling/education, and post visit work, including documentation of note on the  day of visit. I addressed all questions the patient/caregiver raised in regards to epilepsy or related medical questions.         Ashli Wilkinson MD

## 2023-08-22 NOTE — Clinical Note
Hi, Just an FYI: Vernell is back and would like to proceed with surgery.  I have to present her case again for case management conference I think deep brain stimulation would be the best for her.  She has a lot going on in her personal life and doing an invasive evaluation and then a possible Responsive Neurostimulation (Neuropace) for a patient who most likely has bitemporal lobe epilepsy does not seem ideal in her situation.  A deep brain stimulator in thalamus would be probably the easiest.  I have scheduled a neurosurgery consultation.  If neurosurgery would like an MRI of the brain prior to her visit please reach out to the patient.  Ideally neurosurgery can see her after case management conference.  Thank you so much for your help! Ashli

## 2023-08-22 NOTE — LETTER
2023       RE: Vernell Logan  : 1980   MRN: [id number]            Dear Colleague,    Thank you for referring your patient, Vernell Logan, [facility]. Please see a copy of my visit note below.    Depression Response    Patient completed the PHQ-9 assessment for depression and scored >9? Yes  Question 9 on the PHQ-9 was positive for suicidality? No  Does patient have current mental health provider? Yes    Is this a virtual visit? Yes   Does patient have suicidal ideation (positive question 9)? No - offer to place Mental Health Referral.  Referral order pended    I personally notified the following: visit provider           [facility]    Patient:  Vernell Logan  :  1980   Age:  42 year old   Today's Office Visit:  2023    EPILEPSY HISTORY copied forward: Onset of seizures was 14 years of age. Based on electrographic data 2012, the patient had frequent nonconvulsive seizures arising from the right and left temporal lobe, bitemporal lobe interictal discharges. Her MRI is normal. Her PET scan was remarkable for decreased metabolic activity in the left temporal lobe. Prior  she rarely had seizure. From 2316-1887 she had several seizure per week. She had antiepileptic drug.     INTERVAL HISTORY:   Since last visit she averages 2 seizure per month, her seizure is less and she is not sure why she has less. She has less stress now. She moved to a new house and feels situated and her kids have autism but have not been diagnosed. She worries about kids delays. She has been getting workup for breast cancer and gets mammogram. She has no cancer. She has therapist (every week) and psychiatrist (once a month)    Patient denies dizziness, no double vision, no nausea, no vomiting, no abdominal pain, no mood changes, no ER visits, no hospitalizations, and had no significant fall with  "trauma.      Seizure frequency:   Seizure type 1: seizure are described as \"can not communicate, no loss of awareness, sometimes she may stare off 10-15 seconds\". Sometimes she can not communicate for few minutes, she points to her mouth and  knows she can not take, when she has a seizure she ask people \"did I seem like I blacked out and they tell me I am staring off\". She is not driving.   Frequency:   Summer 2023 - 2 focal seizures with impairment in awareness  per month   11/2022 - 4-8 seizure per month focal seizures with impairment in awareness   2021-June 2022. 5-10 focal seizures with impairment in awareness  Per month     Seizure type 2: generalized tonic-clonic convulsion. Last seizure was 8/7/2021 and 2014 - generalized tonic-clonic convulsion     Current antiepileptic drug  Carbamazepine 600-200-800  Levetiracetam 6569-1176 (not able to increase she has auditory hallucinations)   Lamotrigine 200-200-200 (not able to tolerate higher doses, she lowered it from 400-300-200)        Antiepileptic drug notes  Lamotrigine was lowered by Vernell from 400-300-200 to 200 mg three times a day, interestingly her seizure did not worsen.     Women Issue: She had tubal ligation     SOCIAL HISTORY: She is not working. She is living with boyfriend.  She stopped smoking 2021. ral attempts to quit smoking, this is very hard for her.  She has 2 toddlers born in 2018 (walking, say a few words). Ex  Ricardo passed away 11/2015. Twins born 2018 (Aimee and Omar).  She does have a good social support system at this time.    She is driving, she was made aware of risk and state laws. She was advised not drive.      PHYSICAL EXAMINATION:  Ht 5' 4\" (162.6 cm)   Wt 116 lb (52.6 kg)   BMI 19.91 kg/m    Alert, orientated, speech is fluent    ASSESSMENT:   Focal Epilepsy with loss of awareness   Depression   Anxiety   History of eating disorder   Tubal Ligation       Discussion: Focal epilepsy with impairment in awareness. "  Etiology of her seizures is unclear and her MRI of the brain is nonlesional.  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  We will work her up for Deep Brain Stimulation device.       SURGERY EPILEPSY Workup:     Video EEG EMU 5/2022: Summary of 11 Days of VEEG Monitoring:  Three seizures. During the VEEG monitoring, EEG remained abnormal throughout the recording due tn to the left side at the beginning of the seizure. Among theses seizures,  lateralization of the seizure onset was not entirely clear, but possibly right hemisphere onset.    Total of 3 seizures were recorded.  One focal impaired seizure was recorded on day #6, Two GTCs were recorded on day #9.  One of the seizure one day #9 was not recorded due to technical issues.  The other GTC and one focal impaired seizure one day #3 showed head versioone.     2/2011: right temporal seizure EEG onset. Focal to secondary generalization.      VEEG 09/2012, the patient had frequent nonconvulsive seizures arising from the right anterior portion and from the right and left temporal regions.  She also had bitemporal lobe interictal discharges.     PET 7/1/2022: 1. Asymmetric deficit in the right Heschl gyrus, and mild asymmetric  deficits in the posteroinferior left temporal lobe and left  parahippocampal region. The right Heschl deficit is below two standard  deviations of normal. The left-sided deficits are quantified as within  two standard deviations of normal, however deficits may be  underestimated by quantification algorithm as control database is for  patients older than this patient. These deficits could be  epileptogenic foci given history of bilateral temporal seizures.    MRI brain 5/2022: nonlesional      WADA 10/6/2022: Left hemisphere speech and language dominance.      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 Semiology, staring she is not able to talk or get words out, and she has difficulty with comprehension, two seizure per month.      She has bitemporal lobe epilepsy and based on this would not be a focal resection candidate.  However, it is not clear if she has bitemporal lobe epilepsy or if she has independent unifocal epilepsy with fast propagation to the contralateral temporal lobe.  VNS has been reviewed with the patient multiple times and she has refused it multiple times and has multiple fears associated with losing her voice.  Neuropsychology data shows left lateral temporal lobe dysfunction (difficulty with language) in addition to bilateral mesial temporal dysfunction. She is a good candidate for Deep Brain Stimulation device  and we will re-present case at [facility]. Lastly, patient was educated they need medical and psychiatry clearance for epilepsy surgery. Patient would proceed, goal of surgery is to reduce seizure frequency. She was told she will have to continue epilepsy medications.       PLAN:     Continue   Carbamazepine 600 mg morning-200 mg noon-800 mg evening  Levetiracetam 1500 mg twice a day   Lamotrigine 200 mg morning, 200 mg noon, and 200 mg evening        Follow up  Neurosurgery for Deep Brain Stimulation device      Follow up 3 months        [facility]    Do not drive and seizure precautions     Focus on self care (walking, sleeping, eating, breathing exercise)        I spent 41 minutes in total today to provide comprehensive  medical care.   I spent 5 minutes writing the note and placing orders.   I spent 2 minutes  reviewing the chart.     The rest of the time was spent with the patient in face to face interview. During this time key medical decisions were made with review of medical chart prior to visit, visit with patient, counseling/education, and post visit work, including documentation of note on the day of visit. I addressed all  questions the patient/caregiver raised in regards to epilepsy or related medical questions.             Again, thank you for allowing me to participate in the care of your patient.      Sincerely,    Ashli Wilkinson MD

## 2023-08-23 DIAGNOSIS — D64.9 ANEMIA, UNSPECIFIED TYPE: ICD-10-CM

## 2023-08-28 RX ORDER — PNV NO.95/FERROUS FUM/FOLIC AC 28MG-0.8MG
TABLET ORAL
Qty: 90 TABLET | Refills: 1 | Status: SHIPPED | OUTPATIENT
Start: 2023-08-28 | End: 2024-03-14

## 2023-08-28 NOTE — TELEPHONE ENCOUNTER
LCV:8/22/2023  [facility]    outside lab: 3-29-23  HGB 11.2 - 15.5 g/dL 13.4

## 2023-11-07 DIAGNOSIS — G40.219 LOCALIZATION-RELATED EPILEPSY WITH COMPLEX PARTIAL SEIZURES WITH INTRACTABLE EPILEPSY (H): ICD-10-CM

## 2023-11-07 DIAGNOSIS — O09.92 HIGH-RISK PREGNANCY IN SECOND TRIMESTER: ICD-10-CM

## 2023-11-07 DIAGNOSIS — F33.0 MAJOR DEPRESSIVE DISORDER, RECURRENT EPISODE, MILD (H): ICD-10-CM

## 2023-11-07 DIAGNOSIS — O30.001 TWIN GESTATION IN FIRST TRIMESTER, UNSPECIFIED MULTIPLE GESTATION TYPE: ICD-10-CM

## 2023-11-07 DIAGNOSIS — G40.219 PARTIAL EPILEPSY WITH IMPAIRMENT OF CONSCIOUSNESS, INTRACTABLE (H): ICD-10-CM

## 2023-11-09 RX ORDER — CARBAMAZEPINE 200 MG/1
CAPSULE, EXTENDED RELEASE ORAL
Qty: 180 CAPSULE | Refills: 3 | Status: ON HOLD | OUTPATIENT
Start: 2023-11-09 | End: 2024-04-24

## 2023-11-09 NOTE — TELEPHONE ENCOUNTER
Last Clinic Visit:   8/22/23  NV:  12/27/23  AED     630/22   &  3/29/23  (outside labs)   CBC  10/6/22    carBAMazepine (CARBATROL) 200 MG   Last Written Prescription Date:  8/22/23  Last Fill Quantity: 180   # refills: 3  Last Office Visit : 8/22/23  Future Office visit:  12/27/23  Routing refill request to provider for review/approval because: MED OVER RIDE   AED     630/22   &  3/29/23  (outside labs)   CBC  10/6/22

## 2024-01-10 NOTE — TELEPHONE ENCOUNTER
Records Requested     January 10, 2024 9:35 AM   [id number]   Facility  [facility]   Outcome 9:38 am Sent request for imaging to be pushed to PACS. -BETTY Brush on 1/31/2024 at 9:40 AM Imaging resolved into PACS. -BETTY     Records Requested     January 10, 2024 9:35 AM   [id number]   Facility  [facility]   Outcome 9:39 am Sent request for imaging to be pushed to PACS. -BETTY Brush on 1/31/2024 at 9:40 AM Imaging resolved into PACS. -BETTY     RECORDS RECEIVED FROM: Care Everywhere   REASON FOR VISIT: Localization-related epilepsy with complex partial seizures, Epilepsy Surgery - intracranial EEG monitoring   Date of Appt: 3/5/24 @ 9:30 am    NOTES (FOR ALL VISITS) STATUS DETAILS   OFFICE NOTE from referring provider Internal 8/22/23, 11/28/22, 6/30/22 Ashli Wilkinson MD @[facility]     OFFICE NOTE from other specialist Care Everywhere 11/13/23 Esthela Mayo, APRN, CNP  @[facility]    5/3/23 Vern[facility] Bird [facility], CNP  [facility]    10/26/22 Team Conference @[facility]    9/14/22 Mia Crabtree, APRN, CNP  [facility]    5/25/22 Marko Snyder, PhD LP @[facility]    See Additional Encounters   DISCHARGE SUMMARY from hospital Internal 10/6/22 Jhon Woodson MD @[facility]    4/28/22-5/8/22 Holley Dolan MD @[facility]     EEG Internal 10/6/22 EEG WADA  7/1/22 EEG Ambulatory Unmonitored 12-26 Hrs  5/8/22   5/7/22   5/5/22   5/4/22   5/3/22  5/2/22  5/1/22  4/30/22  4/29/22  4/28/22     MEDICATION LIST Internal    IMAGING  (FOR ALL VISITS)     PET Internal [facility]  7/1/22 PET Brain     IR Internal [facility]  10/6/22 IR Carotid Cerebral Angio Bilat     MRI (HEAD, NECK, SPINE) PACS [facility]  5/25/22 MR Brain     CT (HEAD, NECK, SPINE) PACS [facility] [place] [place]  12/15/19 CT Head

## 2024-03-05 ENCOUNTER — OFFICE VISIT (OUTPATIENT)
Dept: NEUROSURGERY | Facility: CLINIC | Age: 44
End: 2024-03-05
Attending: NEUROLOGICAL SURGERY
Payer: MEDICARE

## 2024-03-05 ENCOUNTER — PRE VISIT (OUTPATIENT)
Dept: NEUROSURGERY | Facility: CLINIC | Age: 44
End: 2024-03-05

## 2024-03-05 VITALS
BODY MASS INDEX: 19.24 KG/M2 | HEIGHT: 64 IN | SYSTOLIC BLOOD PRESSURE: 115 MMHG | WEIGHT: 112.7 LBS | HEART RATE: 81 BPM | DIASTOLIC BLOOD PRESSURE: 75 MMHG | OXYGEN SATURATION: 97 % | RESPIRATION RATE: 16 BRPM

## 2024-03-05 DIAGNOSIS — G40.219 PARTIAL SYMPTOMATIC EPILEPSY WITH COMPLEX PARTIAL SEIZURES, INTRACTABLE, WITHOUT STATUS EPILEPTICUS (H): Primary | ICD-10-CM

## 2024-03-05 PROCEDURE — 99203 OFFICE O/P NEW LOW 30 MIN: CPT | Performed by: NEUROLOGICAL SURGERY

## 2024-03-05 ASSESSMENT — PAIN SCALES - GENERAL: PAINLEVEL: MILD PAIN (2)

## 2024-03-05 NOTE — LETTER
3/5/2024       RE: Vernell Logan  [address]  [facility] [address]     Dear Colleague,    Thank you for referring your patient, Vernell Logan, to the [facility] NEUROSURGERY [facility] [place] [facility]. Please see a copy of my visit note below.    Neurosurgery Attending Epilepsy Consult Note    HPI: Vernell Logan is a 44 y/o woman with medically refractory epilepsy who presents for neurosurgical consultation regarding deep brain stimulation. Seizures began about 15 years ago, no clear inciting event. She has tried many ASMs in the past and is currently seizing ~twice/month despite being on adequate dosages of 3 ASMs (carbamazepine, lamotrigine, levetiracetam). She has discussed VNS in the past, but is worried about changes to or losing her voice. She knows she will have to continue her ASMs after. She is interested in the U01 and 7T MRI studies. No antiplatelet or anticoagulation medications.    Seizure types:  Type 1: She describes these as being aware, but unable to talk or communicate. She can understand what people are saying, but she can't say anything back. Family will tell her sometimes that she does stare off for a few seconds, loses awareness.      Type 2: GTC    Seizure frequency  Type 1: 2/month  Type 2: rare, none since 2021    Medications:   Current Outpatient Medications:     Acetaminophen (TYLENOL PO), Take by mouth as needed for mild pain or fever, Disp: , Rfl:     albuterol (PROAIR HFA/PROVENTIL HFA/VENTOLIN HFA) 108 (90 Base) MCG/ACT inhaler, INHALE 2 PUFFS EVERY 4 HOURS AS NEEDED FOR SHORTNESS OF BREATH OR WHEEZING, Disp: 18 g, Rfl: 9    carBAMazepine (CARBATROL) 200 MG 12 hr capsule, take ONE CAPSULE (200 MG) BY MOUTH EVERY DAY AT NOON AND ONE CAPSULE AT night (night DOSE along with 600 MG CAPSULE), Disp: 180 capsule, Rfl: 3    carBAMazepine (CARBATROL) 300 MG 12 hr capsule, 2 tablet morning and 2 tablet night. (Take along with  carbamazepine 200 mg capsule for total dose on 600mg morning- 200mg noon- 800mg night), Disp: 360 capsule, Rfl: 3    clonazePAM (KLONOPIN) 1 MG tablet, Take 1 mg by mouth nightly as needed , Disp: , Rfl:     ibuprofen (ADVIL/MOTRIN) 200 MG capsule, TAKE 1 TABLET BY MOUTH EVERY 6 HOURS FOR 9 DOSES, Disp: , Rfl: 0    lamoTRIgine (LAMICTAL) 200 MG tablet, Take 1 tablet (200 mg) by mouth 3 times daily (Office visit due before next refill.  Please call [phone] to schedule.) Thank you, Disp: 270 tablet, Rfl: 3    levETIRAcetam (KEPPRA) 500 MG tablet, Take THREE tablets (1,500 MG) by MOUTH TWO times daily, Disp: 540 tablet, Rfl: 3    venlafaxine (EFFEXOR-XR) 150 MG 24 hr capsule, daily , Disp: , Rfl: 0    VITAMIN C 1000 MG OR TABS, Take by mouth daily , Disp: , Rfl:     busPIRone (BUSPAR) 10 MG tablet, Take 10 mg by mouth 2 times daily, Disp: , Rfl: 1    Ferrous Sulfate (IRON) 325 (65 Fe) MG tablet, Take 1 tablet by mouth daily before breakfast, Disp: 90 tablet, Rfl: 0    EEG: bitemporal interictal discharges, ictal onset difficult to lateralize, some with right hemispheric onset. Had 2012 EEG which had right temporal onset seizures.    MRI: Nonlesional    PET:  Bilateral temporal lobe deficits (Heschl gyrus on right, left PHG, posteroinferior left temporal lobe).     Language testing:left speech/language dominant. Left lateral temporal lobe dysfunction    Exam:  Awake, alert, oriented x 3  Attends  Follows  Fluent  PERRL  EOMI  FS  TML  BUE/BLE 5/5, no drift    A/P: 43 year old woman with medically refractory epilepsy. [facility] conference agreed that should would be a good DBS candidate. We discussed the risks, benefits and alternatives to DBS surgery and she wishes to proceed. Risks include but are not limited to hemorrhage, infection, need for revision, hardware malfunction/migration, seizure worsening. Benefits include seizure reduction which may improve with time. Alternatives include continuing medical management.  The research teams will get in touch with her to coordinate her involvement with 7T MRI and the U01 DBS optimization study.    Again, thank you for allowing me to participate in the care of your patient.      Sincerely,    Ab Skinner MD

## 2024-03-05 NOTE — NURSING NOTE
Chief Complaint   Patient presents with    New Patient     Here for consult, confirmed with patient         Devora Stanton

## 2024-03-07 DIAGNOSIS — D64.9 ANEMIA, UNSPECIFIED TYPE: Primary | ICD-10-CM

## 2024-03-14 RX ORDER — PNV NO.95/FERROUS FUM/FOLIC AC 28MG-0.8MG
1 TABLET ORAL
Qty: 90 TABLET | Refills: 0 | Status: SHIPPED | OUTPATIENT
Start: 2024-03-14 | End: 2024-08-27

## 2024-03-15 DIAGNOSIS — G40.219 PARTIAL SYMPTOMATIC EPILEPSY WITH COMPLEX PARTIAL SEIZURES, INTRACTABLE, WITHOUT STATUS EPILEPTICUS (H): Primary | ICD-10-CM

## 2024-03-15 DIAGNOSIS — G40.119 INTRACTABLE FOCAL EPILEPSY (H): ICD-10-CM

## 2024-03-19 ENCOUNTER — TELEPHONE (OUTPATIENT)
Dept: NEUROSURGERY | Facility: CLINIC | Age: 44
End: 2024-03-19
Payer: MEDICARE

## 2024-03-19 NOTE — TELEPHONE ENCOUNTER
Left patient a voicemail to schedule surgery for Intraoperative MRI-assisted bilateral deep brain stimulator placement, phase I, placement of bilateral deep brain stimulator electrode, target anterior nucleus of thalamus, WITHOUT microelectrode recording (Bilateral)  with Dr. Susanne Hatch on 3/19/2024 at 11:02 AM

## 2024-03-20 NOTE — PROGRESS NOTES
Neurosurgery Attending Epilepsy Consult Note    HPI: Vernell Logan is a 44 y/o woman with medically refractory epilepsy who presents for neurosurgical consultation regarding deep brain stimulation. Seizures began about 15 years ago, no clear inciting event. She has tried many ASMs in the past and is currently seizing ~twice/month despite being on adequate dosages of 3 ASMs (carbamazepine, lamotrigine, levetiracetam). She has discussed VNS in the past, but is worried about changes to or losing her voice. She knows she will have to continue her ASMs after. She is interested in the U01 and 7T MRI studies. No antiplatelet or anticoagulation medications.    Seizure types:  Type 1: She describes these as being aware, but unable to talk or communicate. She can understand what people are saying, but she can't say anything back. Family will tell her sometimes that she does stare off for a few seconds, loses awareness.      Type 2: GTC    Seizure frequency  Type 1: 2/month  Type 2: rare, none since 2021    Medications:   Current Outpatient Medications:     Acetaminophen (TYLENOL PO), Take by mouth as needed for mild pain or fever, Disp: , Rfl:     albuterol (PROAIR HFA/PROVENTIL HFA/VENTOLIN HFA) 108 (90 Base) MCG/ACT inhaler, INHALE 2 PUFFS EVERY 4 HOURS AS NEEDED FOR SHORTNESS OF BREATH OR WHEEZING, Disp: 18 g, Rfl: 9    carBAMazepine (CARBATROL) 200 MG 12 hr capsule, take ONE CAPSULE (200 MG) BY MOUTH EVERY DAY AT NOON AND ONE CAPSULE AT night (night DOSE along with 600 MG CAPSULE), Disp: 180 capsule, Rfl: 3    carBAMazepine (CARBATROL) 300 MG 12 hr capsule, 2 tablet morning and 2 tablet night. (Take along with carbamazepine 200 mg capsule for total dose on 600mg morning- 200mg noon- 800mg night), Disp: 360 capsule, Rfl: 3    clonazePAM (KLONOPIN) 1 MG tablet, Take 1 mg by mouth nightly as needed , Disp: , Rfl:     ibuprofen (ADVIL/MOTRIN) 200 MG capsule, TAKE 1 TABLET BY MOUTH EVERY 6 HOURS FOR 9 DOSES, Disp: , Rfl: 0     lamoTRIgine (LAMICTAL) 200 MG tablet, Take 1 tablet (200 mg) by mouth 3 times daily (Office visit due before next refill.  Please call [phone] to schedule.) Thank you, Disp: 270 tablet, Rfl: 3    levETIRAcetam (KEPPRA) 500 MG tablet, Take THREE tablets (1,500 MG) by MOUTH TWO times daily, Disp: 540 tablet, Rfl: 3    venlafaxine (EFFEXOR-XR) 150 MG 24 hr capsule, daily , Disp: , Rfl: 0    VITAMIN C 1000 MG OR TABS, Take by mouth daily , Disp: , Rfl:     busPIRone (BUSPAR) 10 MG tablet, Take 10 mg by mouth 2 times daily, Disp: , Rfl: 1    Ferrous Sulfate (IRON) 325 (65 Fe) MG tablet, Take 1 tablet by mouth daily before breakfast, Disp: 90 tablet, Rfl: 0    EEG: bitemporal interictal discharges, ictal onset difficult to lateralize, some with right hemispheric onset. Had 2012 EEG which had right temporal onset seizures.    MRI: Nonlesional    PET:  Bilateral temporal lobe deficits (Heschl gyrus on right, left PHG, posteroinferior left temporal lobe).     Language testing:left speech/language dominant. Left lateral temporal lobe dysfunction    Exam:  Awake, alert, oriented x 3  Attends  Follows  Fluent  PERRL  EOMI  FS  TML  BUE/BLE 5/5, no drift    A/P: 43 year old woman with medically refractory epilepsy. [facility] conference agreed that should would be a good DBS candidate. We discussed the risks, benefits and alternatives to DBS surgery and she wishes to proceed. Risks include but are not limited to hemorrhage, infection, need for revision, hardware malfunction/migration, seizure worsening. Benefits include seizure reduction which may improve with time. Alternatives include continuing medical management. The research teams will get in touch with her to coordinate her involvement with 7T MRI and the U01 DBS optimization study.

## 2024-03-21 ENCOUNTER — TELEPHONE (OUTPATIENT)
Dept: NEUROSURGERY | Facility: CLINIC | Age: 44
End: 2024-03-21
Payer: MEDICARE

## 2024-03-26 NOTE — TELEPHONE ENCOUNTER
FUTURE VISIT INFORMATION      SURGERY INFORMATION:  Date: 24  Location: [facility] or  Surgeon:  Ab Skinner MD   Anesthesia Type:  general  Procedure: Intraoperative Magnetic Resonance Imaging (MRI)/stealth  assisted bilateral deep brain stimulator placement, phase I, placement of bilateral deep brain stimulator electrode, target anterior nucleus of thalamus, WITHOUT microelectrode recording   Consult: ov 3/5/24    RECORDS REQUESTED FROM:       Primary Care Provider: Elayne    Most recent EKG+ Tracin22

## 2024-04-01 ENCOUNTER — TELEPHONE (OUTPATIENT)
Dept: NEUROSURGERY | Facility: CLINIC | Age: 44
End: 2024-04-01
Payer: MEDICARE

## 2024-04-01 NOTE — TELEPHONE ENCOUNTER
[facility] Call Center    Phone Message    May a detailed message be left on voicemail: yes     Reason for Call: Other: Pt is calling and wanting to know if she needs a updated MRI and would like to use [facility] in [facility]. Please call Pt back to discuss.     Action Taken: Message routed to:  [facility] ([facility]): Neurosurgery    Travel Screening: Not Applicable

## 2024-04-01 NOTE — TELEPHONE ENCOUNTER
Attn; Mckenzie Hobson, RNCC for Dr. Skinner   *Patient question on imaging     Sapphire Etienne LPN  Neurosurgery

## 2024-04-02 NOTE — TELEPHONE ENCOUNTER
Left VM for pt letting her know that the MRI that Dr. Skinner needs for her surgery will need to be done at the [facility] d/t the protocol he needs to use. Once the order is placed our  will call her to schedule. Left my direct phone number should pt have further questions.

## 2024-04-09 ENCOUNTER — VIRTUAL VISIT (OUTPATIENT)
Dept: SURGERY | Facility: CLINIC | Age: 44
End: 2024-04-09
Payer: MEDICARE

## 2024-04-09 ENCOUNTER — ANESTHESIA EVENT (OUTPATIENT)
Dept: SURGERY | Facility: CLINIC | Age: 44
DRG: 026 | End: 2024-04-09
Payer: MEDICARE

## 2024-04-09 ENCOUNTER — PRE VISIT (OUTPATIENT)
Dept: SURGERY | Facility: CLINIC | Age: 44
End: 2024-04-09

## 2024-04-09 VITALS — BODY MASS INDEX: 19.12 KG/M2 | HEIGHT: 64 IN | WEIGHT: 112 LBS

## 2024-04-09 DIAGNOSIS — Z01.818 PREOP EXAMINATION: Primary | ICD-10-CM

## 2024-04-09 DIAGNOSIS — G40.219 PARTIAL SYMPTOMATIC EPILEPSY WITH COMPLEX PARTIAL SEIZURES, INTRACTABLE, WITHOUT STATUS EPILEPTICUS (H): ICD-10-CM

## 2024-04-09 PROCEDURE — 99203 OFFICE O/P NEW LOW 30 MIN: CPT | Mod: 95 | Performed by: NURSE PRACTITIONER

## 2024-04-09 ASSESSMENT — ENCOUNTER SYMPTOMS
ORTHOPNEA: 0
SEIZURES: 1

## 2024-04-09 NOTE — H&P
Pre-Operative H & P     CC:  Preoperative exam to assess for increased cardiopulmonary risk while undergoing surgery and anesthesia.    Date of Encounter: 4/9/2024  Primary Care Physician:  Mia Crabtree     Reason for visit:   Encounter Diagnoses   Name Primary?    Preop examination Yes    Partial symptomatic epilepsy with complex partial seizures, intractable, without status epilepticus (H)        HPI  Vernell Logan is a 43 year old female who presents for pre-operative H & P in preparation for  Procedure Information       Case: [id number] Date/Time: 04/23/24 0800    Procedure: Intraoperative Magnetic Resonance Imaging (MRI)/stealth  assisted bilateral deep brain stimulator placement, phase I, placement of bilateral deep brain stimulator electrode, target anterior nucleus of thalamus, WITHOUT microelectrode recording (Bilateral: Head)    Anesthesia type: General    Diagnosis: Partial symptomatic epilepsy with complex partial seizures, intractable, without status epilepticus (H) [G40.219]    Pre-op diagnosis: Partial symptomatic epilepsy with complex partial seizures, intractable, without status epilepticus (H) [G40.219]    Location:  OR [address] [place] [address] OR    Providers: Ab Skinner MD            Vernell Logan is a 43 year old female with migraines, history of anorexia, depression and anxiety that has medically refractory epilepsy.  She has been having seizures for at least 15 years now and despite trials of numerous medications, her seizure disorder ahsn't been able to be well controlled.  She has consulted with Dr. Skinner and the above listed procedure has now been recommended for further management.     History is obtained from the patient and chart review    Hx of abnormal bleeding or anti-platelet use: none    Menstrual history: Patient's last menstrual period was 03/22/2024.:      Past Medical History  Past Medical History:   Diagnosis Date    Anorexia 3/5/2013    Anxiety     Depressive disorder  1999    Heart murmur     told benign    Localization-related epilepsy (H)     Major depression     Migraine     Seizure disorder (H)     working [place] [facility] [place] bad episodes     Seizures (H)     Status post left breast biopsy,sclerosing adenosis not concordant with imaging,12       Past Surgical History  Past Surgical History:   Procedure Laterality Date    C/SECTION, LOW TRANSVERSE  , 2018    x 2     SECTION, TUBAL LIGATION, COMBINED  2018    IR CAROTID CEREBRAL ANGIOGRAM BILATERAL  10/6/2022       Prior to Admission Medications  Current Outpatient Medications   Medication Sig Dispense Refill    Acetaminophen (TYLENOL PO) Take by mouth as needed for mild pain or fever      albuterol (PROAIR HFA/PROVENTIL HFA/VENTOLIN HFA) 108 (90 Base) MCG/ACT inhaler INHALE 2 PUFFS EVERY 4 HOURS AS NEEDED FOR SHORTNESS OF BREATH OR WHEEZING 18 g 9    BUSPIRONE HCL PO Take 15 mg by mouth 2 times daily  1    carBAMazepine (CARBATROL) 200 MG 12 hr capsule take ONE CAPSULE (200 MG) BY MOUTH EVERY DAY AT NOON AND ONE CAPSULE AT night (night DOSE along with 600 MG CAPSULE) (Patient taking differently: 200 mg 2 times daily take ONE CAPSULE (200 MG) BY MOUTH EVERY DAY AT NOON AND ONE CAPSULE AT night (night DOSE along with 600 MG CAPSULE)) 180 capsule 3    carBAMazepine (CARBATROL) 300 MG 12 hr capsule 2 tablet morning and 2 tablet night. (Take along with carbamazepine 200 mg capsule for total dose on 600mg morning- 200mg noon- 800mg night) (Patient taking differently: 300 mg 2 times daily 2 tablet morning and 2 tablet night. (Take along with carbamazepine 200 mg capsule for total dose on 600mg morning- 200mg noon- 800mg night)) 360 capsule 3    clonazePAM (KLONOPIN) 1 MG tablet Take 1 mg by mouth nightly as needed       Ferrous Sulfate (IRON) 325 (65 Fe) MG tablet Take 1 tablet by mouth daily before breakfast 90 tablet 0    ibuprofen (ADVIL/MOTRIN) 200 MG capsule 200 mg every 6 hours as needed   0    lamoTRIgine (LAMICTAL) 200 MG tablet Take 1 tablet (200 mg) by mouth 3 times daily (Office visit due before next refill.  Please call [phone] to schedule.) Thank you 270 tablet 3    levETIRAcetam (KEPPRA) 500 MG tablet Take THREE tablets (1,500 MG) by MOUTH TWO times daily (Patient taking differently: Take 1,500 mg by mouth 2 times daily Take THREE tablets (1,500 MG) by MOUTH TWO times daily) 540 tablet 3    VENLAFAXINE HCL ER  mg every morning  0    VITAMIN C 1000 MG OR TABS Take 1,000 mg by mouth as needed      Multiple Vitamins-Minerals (MULTIVITAMIN ADULTS) TABS Take 1 tablet by mouth every morning         Allergies  Allergies   Allergen Reactions    Chantix [Varenicline]      Suicidal thoughts        Social History  Social History     Socioeconomic History    Marital status:      Spouse name: Not on file    Number of children: 2    Years of education: Not on file    Highest education level: Not on file   Occupational History     Employer: NONE     Occupation: unemployed   Tobacco Use    Smoking status: Former     Packs/day: 1.00     Years: 26.00     Additional pack years: 0.00     Total pack years: 26.00     Types: Cigarettes     Quit date: 2021     Years since quittin.7     Passive exposure: Past    Smokeless tobacco: Former    Tobacco comments:     1 pack    Substance and Sexual Activity    Alcohol use: Not Currently     Comment: 0-2 per year     Drug use: No    Sexual activity: Yes     Partners: Male     Birth control/protection: Female Surgical     Comment: tubal ligation   Other Topics Concern    Parent/sibling w/ CABG, MI or angioplasty before 65F 55M? Yes   Social History Narrative    Not on file     Social Determinants of Health     Financial Resource Strain: Not on file   Food Insecurity: Not on file   Transportation Needs: Not on file   Physical Activity: Not on file   Stress: Not on file   Social Connections: Not on file   Interpersonal Safety: Not on file   Housing  "Stability: Not on file       Family History  Family History   Problem Relation Age of Onset    Schizophrenia Mother     Anxiety Disorder Mother     Depression Mother     Heart Disease Father         62 yo when he had arrhythmia,  of CHF age 65    Diabetes Father     Neurologic Disorder Father         epilepsy    Schizophrenia Father     Anxiety Disorder Father     Hypertension Father     Obesity Father     Cancer Maternal Grandfather     Cerebrovascular Disease Maternal Grandfather     Cerebrovascular Disease Paternal Grandmother     Heart Disease Paternal Grandfather     Psychotic Disorder Daughter     Neurologic Disorder Daughter         CP, she was adopted out in     Anesthesia Reaction No family hx of     Thrombosis No family hx of        Review of Systems  The complete review of systems is negative other than noted in the HPI or here.   Anesthesia Evaluation   Pt has had prior anesthetic.     No history of anesthetic complications       ROS/MED HX  ENT/Pulmonary:    (-) asthma and recent URI   Neurologic:     (+)      migraines, seizures, last seizure: 24, features: was \"totally aware\" with last seizure.  Last grand mal was about a month ago.,                       Cardiovascular:  - neg cardiovascular ROS   (+)  - -   -  - -                                 Previous cardiac testing   Echo: Date: Results:    Stress Test:  Date: Results:    ECG Reviewed:  Date:  Results:  NSR  Cath:  Date: Results:   (-) KEENAN and orthopnea/PND   METS/Exercise Tolerance: >4 METS Comment: Reports that she does a lot of walking for exercise.  Is very active with her 6 year old twins also.  Denies any exertional dyspnea or angina.     Hematologic:  - neg hematologic  ROS     Musculoskeletal: Comment: Chronic middle back pain from history of old vertebral fracture - manages with ibuprofen or tylenol      GI/Hepatic:  - neg GI/hepatic ROS     Renal/Genitourinary:  - neg Renal ROS     Endo:  - neg endo ROS   "   Psychiatric/Substance Use:     (+) psychiatric history anxiety and depression (hx of anorexia - stable)       Infectious Disease:  - neg infectious disease ROS     Malignancy:  - neg malignancy ROS     Other:  - neg other ROS          Virtual visit -  No vitals were obtained    Physical Exam  Constitutional: Awake, alert, cooperative, no apparent distress, and appears stated age.  Eyes: Pupils equal  HENT: Normocephalic  Respiratory: non labored breathing   Neurologic: Awake, alert, oriented to name, place and time.   Neuropsychiatric: Calm, cooperative. Normal affect.      Prior Labs/Diagnostic Studies   All labs and imaging personally reviewed     EKG/ stress test - if available please see in ROS above     Labs:    HEMOGRAM  Order: [id number]   suggestion  Information displayed in this report may not trend or trigger automated decision support.     Component  Ref Range & Units 2 d ago   WBC  3.2 - 11.0 10*9/L 5.7   RBC  3.77 - 5.24 10*12/L 4.42   HGB  11.2 - 15.5 g/dL 13.5   HCT  34.3 - 46.0 % 40.8   MCV  81.4 - 99.0 fL 92.3   MCH  26.7 - 33.1 pg 30.5   MCHC  31.6 - 35.5 g/dL 33.1   RDW  11.3 - 14.6 % 11.6   PLT  130 - 375 10*9/L 236     Specimen Collected: 04/19/24 10:07 AM    Performed by: [facility] Last Resulted: 04/19/24  4:38 PM   Received From: [facility]  Result Received: 04/21/24  1:04 PM    View Encounter        Received Information  BASIC METABOLIC PANEL  Order: [id number]   suggestion  Information displayed in this report may not trend or trigger automated decision support.     Component  Ref Range & Units 2 d ago   Sodium  134 - 143 mEq/L 138   Potassium  3.4 - 5.1 mEq/L 4.1   Chloride  99 - 110 mEq/L 105   Carbon Dioxide  19 - 29 mEq/L 29   Anion Gap  3.0 - 15.0 mEq/L 4.0   Blood Urea Nitrogen  5 - 24 mg/dL 20   Creatinine  0.40 - 1.00 mg/dL 0.84   Glomerular Filtration Rate  >60 mL/min/1.73 m*2 88   Comment: Risk of cardiovascular  disease increases when GFR is abnormal; persistently reduced GFR values are a specific indication of CKD. This calculation uses CKD-EPI 2021 equation without adjustment for race; it has not been validated in pregnant women.   Calcium  8.4 - 10.5 mg/dL 8.6   Glucose  70 - 99 mg/dL 82   Narrative    Current ADA criteria for Glucose:      Normal: 70-99 mg/dL      Impaired Fasting Glucose: 100-125 mg/dL      Diabetes Mellitus: at or above 126 mg/dL  The diagnosis of diabetes must be confirmed on a subsequent day by measuring Fasting Plasma Glucose, 2-hr PG or random plasma glucose (if symptoms are present).    Specimen Collected: 04/19/24 10:07 AM    Performed by: [facility]        The patient's records and results personally reviewed by this provider.     Outside records reviewed from: Care Everywhere      Assessment    Vernell Logan is a 43 year old female seen as a PAC referral for risk assessment and optimization for anesthesia.    Plan/Recommendations  Pt will be optimized for the proposed procedure.  See below for details on the assessment, risk, and preoperative recommendations    NEUROLOGY  - History of Seizure - procedure planned as above.       -Post Op delirium risk factors:  No risk identified    ENT  - No current airway concerns.  Will need to be reassessed day of surgery.  Mallampati: Unable to assess  TM: Unable to assess    CARDIAC  - No history of CAD, Hypertension, and Afib  - METS (Metabolic Equivalents)  Patient performs 4 or more METS exercise without symptoms            Total Score: 0      RCRI-Very low risk: Class 1 0.4% complication rate            Total Score: 0        PULMONARY    [place] Low Risk            Total Score: 0      - Denies asthma or COPD  - has a prn albuterol inhaler prescribed for rare SOB, but reports no diagnosis of asthma.  Denies any use of the inhaler in the last couple months.  - Tobacco History    History   Smoking Status    Former    Packs/day:  "1.00    Years: 26.00    Types: Cigarettes    Quit date: 7/23/2021   Smokeless Tobacco    Former       GI  - denies GERD  PONV High Risk  Total Score: 3           1 AN PONV: Pt is Female    1 AN PONV: Patient is not a current smoker    1 AN PONV: Intended Post Op Opioids            ENDOCRINE  {  - BMI: Estimated body mass index is 19.22 kg/m  as calculated from the following:    Height as of this encounter: 1.626 m (5' 4\").    Weight as of this encounter: 50.8 kg (112 lb).  Healthy Weight (BMI 18.5-24.9)  - No history of Diabetes Mellitus    HEME  VTE Low Risk 0.26%            Total Score: 0      - No history of abnormal bleeding or antiplatelet use.      MSK  -chronic middle back pain secondary to old fracture.  Consider cautious positioning.     PSYCH  - continue mental health meds without interruption.  - anorexia - stable currently    Different anesthesia methods/types have been discussed with the patient, but they are aware that the final plan will be decided by the assigned anesthesia provider on the date of service.      The patient is optimized for their procedure. AVS with information on surgery time/arrival time, meds and NPO status given by nursing staff. No further diagnostic testing indicated.    Please refer to the physical examination documented by the anesthesiologist in the anesthesia record on the day of surgery.    Video-Visit Details    Type of service:  Video Visit    Provider received verbal consent for a Video Visit from the patient? Yes   Video Start Time:  0654  Video End Time: 0708    Originating Location (pt. Location): Home    Distant Location (provider location):  Off-site  Mode of Communication:  Video Conference via [employer]    On the day of service:     Prep time: 5 minutes  Visit time: 14 minutes  Documentation time: 8 minutes  ------------------------------------------  Total time: 27 minutes      ALONDRA Tang CNP  Preoperative Assessment Center  [facility] [place]  [facility]  Phone: [phone]  Fax: [phone]

## 2024-04-09 NOTE — PATIENT INSTRUCTIONS
Preparing for Your Surgery      Name:  Vernell Logan   MRN:  [id number]   :  1980   Today's Date:  2024       Arriving for surgery:  Surgery date: 2024   Arrival time:  6:00 am    Please come to:     Please come to:      [facility] [address]  [address]  [address]      The [facility] Patient /Visitor Ramp is located at [address]. Patients and visitors who self-park will receive the reduced hospital parking rate. If the Patient /Visitor Ramp is full, please follow the signs to the  parking located at the main hospital entrance.     parking is available ( 24 hours/ 7 days a week)    Discounted parking pass options are available for patients and visitors. They can be purchased at the [place] desk at the [facility] hospital entrance.    -    Stop at the security desk and they will direct surgery patients to the 3rd floor Surgery Waiting Room. [phone]     -  If you are in need of directions, wheelchair or escort please stop at the Information/security desk in the lobby.       What can I eat or drink?  -  You may eat and drink normally up to 8 hours prior to arrival time. (Until Midnight)  -  You may have clear liquids until 2 hours prior to arrival time. (Until 4 am)    Examples of clear liquids:  Water  Clear broth  Juices (apple, white grape, white cranberry  and cider) without pulp  Noncarbonated, powder based beverages  (lemonade and Anuel-Aid)  Sodas (Sprite, 7-Up, ginger ale and seltzer)  Coffee or tea (without milk or cream)  Gatorade    -  No Alcohol or cannabis products for at least 24 hours before surgery.     Which medicines can I take?    Hold Aspirin for 7 days before surgery.   Hold Multivitamins for 7 days before surgery.  Hold Supplements for 7 days before surgery.  Hold Ibuprofen (Advil, Motrin) for 1 day(s) before surgery--unless otherwise directed by surgeon.  Hold Naproxen  (Aleve) for 4 days before surgery.    -  DO NOT take these medications the day of surgery:  Ferrous sulfate  Vitamin C      -  PLEASE TAKE these medications the day of surgery:  Carbamazepine  Lamotrigine  Levetiracetam  Inhaler per your routine and bring to hospital  Venlafaxine      How do I prepare myself?  - Please take 2 showers (one the night prior to surgery and one the morning of surgery) using Scrubcare or Hibiclens soap.    Use this soap only from the neck to your toes.     Leave the soap on your skin for one minute--then rinse thoroughly.      You may use your own shampoo and conditioner. No other hair products.   - Please remove all jewelry and body piercings.  - No lotions, deodorants or fragrance.  - No makeup or fingernail polish.   - Bring your ID and insurance card.    -If you use a CPAP machine, please bring the CPAP machine, tubing, and mask to hospital.    -If you have a Deep Brain Stimulator, Spinal Cord Stimulator, or any Neuro Stimulator device---you must bring the remote control to the hospital.      ALL PATIENTS GOING HOME THE SAME DAY OF SURGERY ARE REQUIRED TO HAVE A RESPONSIBLE ADULT TO DRIVE AND BE IN ATTENDANCE WITH THEM FOR 24 HOURS FOLLOWING SURGERY.    Covid testing policy as of 12/06/2022  Your surgeon will notify and schedule you for a COVID test if one is needed before surgery--please direct any questions or COVID symptoms to your surgeon      Questions or Concerns:    - For any questions regarding the day of surgery or your hospital stay, please contact the Pre Admission Nursing Office at [phone].       - If you have health changes between today and your surgery, please call your surgeon.       - For questions after surgery, please call your surgeons office.           Current Visitor Guidelines    You may have 2 visitors in the pre op area.    Visiting hours: 8 a.m. to 8:30 p.m.    Patients confirmed or suspected to have symptoms of COVID 19 or flu:     No visitors allowed  for adult patients.   Children (under age 18) can have 1 named visitor.     People who are sick or showing symptoms of COVID 19 or flu:    Are not allowed to visit patients--we can only make exceptions in special situations.       Please follow these guidelines for your visit:          Please maintain social distance          Masking is optional--however at times you may be asked to wear a mask for the safety of yourself and others     Clean your hands with alcohol hand . Do this when you arrive at and leave the building and patient room,    And again after you touch your mask or anything in the room.     Go directly to and from the room you are visiting.     Stay in the patient s room during your visit. Limit going to other places in the hospital as much as possible     Leave bags and jackets at home or in the car.     For everyone s health, please don t come and go during your visit. That includes for smoking   during your visit.

## 2024-04-09 NOTE — PROGRESS NOTES
Vernell is a 43 year old who is being evaluated via a billable video visit.    Antonio Gunn   Vernell is a 43 year old, presenting for the following health issues:  Pre-Op Exam          ELIZABETH Louie LPN

## 2024-04-10 DIAGNOSIS — G40.219 PARTIAL SYMPTOMATIC EPILEPSY WITH COMPLEX PARTIAL SEIZURES, INTRACTABLE, WITHOUT STATUS EPILEPTICUS (H): Primary | ICD-10-CM

## 2024-04-22 ENCOUNTER — HOSPITAL ENCOUNTER (OUTPATIENT)
Dept: MRI IMAGING | Facility: CLINIC | Age: 44
Discharge: HOME | DRG: 026 | End: 2024-04-22
Attending: NEUROLOGICAL SURGERY | Admitting: NEUROLOGICAL SURGERY
Payer: MEDICARE

## 2024-04-22 DIAGNOSIS — G40.219 PARTIAL SYMPTOMATIC EPILEPSY WITH COMPLEX PARTIAL SEIZURES, INTRACTABLE, WITHOUT STATUS EPILEPTICUS (H): ICD-10-CM

## 2024-04-22 DIAGNOSIS — G40.119 INTRACTABLE FOCAL EPILEPSY (H): ICD-10-CM

## 2024-04-22 PROCEDURE — G1010 CDSM STANSON: HCPCS | Performed by: RADIOLOGY

## 2024-04-22 PROCEDURE — 70553 MRI BRAIN STEM W/O & W/DYE: CPT | Mod: MG

## 2024-04-22 PROCEDURE — 255N000002 HC RX 255 OP 636: Performed by: NEUROLOGICAL SURGERY

## 2024-04-22 PROCEDURE — 70553 MRI BRAIN STEM W/O & W/DYE: CPT | Mod: 26 | Performed by: RADIOLOGY

## 2024-04-22 PROCEDURE — A9585 GADOBUTROL INJECTION: HCPCS | Performed by: NEUROLOGICAL SURGERY

## 2024-04-22 RX ORDER — GADOBUTROL 604.72 MG/ML
7.5 INJECTION INTRAVENOUS ONCE
Status: COMPLETED | OUTPATIENT
Start: 2024-04-22 | End: 2024-04-22

## 2024-04-22 RX ADMIN — GADOBUTROL 5 ML: 604.72 INJECTION INTRAVENOUS at 11:26

## 2024-04-22 ASSESSMENT — ENCOUNTER SYMPTOMS
ORTHOPNEA: 0
SEIZURES: 1

## 2024-04-22 NOTE — ANESTHESIA PREPROCEDURE EVALUATION
Anesthesia Pre-Procedure Evaluation    Patient: Vernell Logan   MRN: [id number] : 1980        Procedure : Procedure(s):  Intraoperative Magnetic Resonance Imaging (MRI)/stealth  assisted bilateral deep brain stimulator placement, phase I, placement of bilateral deep brain stimulator electrode, target anterior nucleus of thalamus, WITHOUT microelectrode recording          Vernell Logna is a 43 year old female with PMHx including migraines, h/o anorexia, MDD, HOOD, and medically refractory epilepsy.  She has been having seizures for at least 15 years now and despite trials of numerous medications, her seizure disorder hasn't been able to be well controlled. Now she is undergoing above procedure.     Past Medical History:   Diagnosis Date    Anorexia 3/5/2013    Anxiety     Depressive disorder 1999    Heart murmur     told benign    Localization-related epilepsy (H)     Major depression     Migraine     Seizure disorder (H)     working with [employer], really bad episodes     Seizures (H)     Status post left breast biopsy,sclerosing adenosis not concordant with imaging,12      Past Surgical History:   Procedure Laterality Date    C/SECTION, LOW TRANSVERSE  , 2018    x 2     SECTION, TUBAL LIGATION, COMBINED  2018    IR CAROTID CEREBRAL ANGIOGRAM BILATERAL  10/6/2022      Allergies   Allergen Reactions    Chantix [Varenicline]      Suicidal thoughts       Social History     Tobacco Use    Smoking status: Former     Current packs/day: 0.00     Average packs/day: 1 pack/day for 26.0 years (26.0 ttl pk-yrs)     Types: Cigarettes     Start date: 1995     Quit date: 2021     Years since quittin.7     Passive exposure: Past    Smokeless tobacco: Former    Tobacco comments:     1 pack    Substance Use Topics    Alcohol use: Not Currently     Comment: 0-2 per year       Wt Readings from Last 1 Encounters:   24 50.8 kg (112 lb)        Anesthesia Evaluation   Pt has  "had prior anesthetic.     No history of anesthetic complications       ROS/MED HX  ENT/Pulmonary: Comment:   - Note: has a prn albuterol inhaler prescribed for rare SOB, but reports no diagnosis of asthma   (-) asthma and recent URI   Neurologic:     (+)      migraines, seizures, last seizure: 4/6/24, features: was \"totally aware\" with last seizure.  Last grand mal was about a month ago.,                       Cardiovascular:  - neg cardiovascular ROS   (+)  - -   -  - -                                 Previous cardiac testing   Echo: Date: Results:    Stress Test:  Date: Results:    ECG Reviewed:  Date: 4/28/2022 Results:  NSR 74 BMP  Cath:  Date: Results:   (-) hypertension, CAD, KEENAN and orthopnea/PND   METS/Exercise Tolerance: >4 METS Comment: Reports that she does a lot of walking for exercise.  Is very active with her 6 year old twins also.  Denies any exertional dyspnea or angina.     Hematologic:  - neg hematologic  ROS     Musculoskeletal: Comment: Chronic middle back pain from history of old vertebral fracture - manages with ibuprofen or tylenol      GI/Hepatic:  - neg GI/hepatic ROS     Renal/Genitourinary:  - neg Renal ROS     Endo:  - neg endo ROS     Psychiatric/Substance Use:     (+) psychiatric history anxiety and depression (hx of anorexia - stable)       Infectious Disease:  - neg infectious disease ROS     Malignancy:  - neg malignancy ROS     Other:  - neg other ROS          Physical Exam    Airway        Mallampati: II   TM distance: > 3 FB   Neck ROM: full   Mouth opening: > 3 cm    Respiratory Devices and Support         Dental     Comment: dentures    (+) Removable bridges or other hardware      Cardiovascular          Rhythm and rate: regular     Pulmonary   pulmonary exam normal                OUTSIDE LABS: From [facility] on 4/19/2024:   Sodium  134 - 143 mEq/L 138   Potassium  3.4 - 5.1 mEq/L 4.1   Chloride  99 - 110 mEq/L 105   Carbon Dioxide  19 - 29 mEq/L " 29   Anion Gap  3.0 - 15.0 mEq/L 4.0   Blood Urea Nitrogen  5 - 24 mg/dL 20   Creatinine  0.40 - 1.00 mg/dL 0.84   Glomerular Filtration Rate  >60 mL/min/1.73 m*2 88   Comment: Risk of cardiovascular disease increases when GFR is abnormal; persistently reduced GFR values are a specific indication of CKD. This calculation uses CKD-EPI 2021 equation without adjustment for race; it has not been validated in pregnant women.   Calcium  8.4 - 10.5 mg/dL 8.6   Glucose  70 - 99 mg/dL 82     WBC  3.2 - 11.0 10*9/L 5.7   RBC  3.77 - 5.24 10*12/L 4.42   HGB  11.2 - 15.5 g/dL 13.5   HCT  34.3 - 46.0 % 40.8   MCV  81.4 - 99.0 fL 92.3   MCH  26.7 - 33.1 pg 30.5   MCHC  31.6 - 35.5 g/dL 33.1   RDW  11.3 - 14.6 % 11.6   PLT  130 - 375 10*9/L 236       CBC:   Lab Results   Component Value Date    WBC 3.6 (L) 10/06/2022    WBC 3.8 (L) 06/30/2022    HGB 12.0 10/06/2022    HGB 11.8 06/30/2022    HCT 37.9 10/06/2022    HCT 40.3 06/30/2022     10/06/2022     06/30/2022     BMP:   Lab Results   Component Value Date     10/06/2022     06/30/2022    POTASSIUM 4.1 10/06/2022    POTASSIUM 4.4 04/28/2022    CHLORIDE 103 10/06/2022    CHLORIDE 107 04/28/2022    CO2 25 10/06/2022    CO2 28 04/28/2022    BUN 14.4 10/06/2022    BUN 11 04/28/2022    CR 0.80 10/06/2022    CR 0.89 06/30/2022    GLC 90 10/06/2022    GLC 90 07/01/2022     COAGS:   Lab Results   Component Value Date    PTT 30 10/06/2022    INR 1.0 (L) 10/06/2022     POC:   Lab Results   Component Value Date    HCGS Positive (A) 10/30/2017     HEPATIC:   Lab Results   Component Value Date    ALBUMIN 3.6 04/28/2022    PROTTOTAL 6.8 04/28/2022    ALT 33 06/30/2022    AST 27 06/30/2022    ALKPHOS 116 04/28/2022    BILITOTAL 0.2 04/28/2022     OTHER:   Lab Results   Component Value Date    A1C 4.9 07/25/2019    EULALIA 8.5 (L) 10/06/2022    PHOS 2.7 03/14/2008    TSH 0.57 04/28/2022    T4 1.05 10/30/2017       Anesthesia Plan    ASA Status:  2    NPO Status:  Will be  NPO Appropriate at ...    Anesthesia Type: General.     - Airway: ETT   Induction: Intravenous.   Maintenance: Balanced.   Techniques and Equipment:     - Lines/Monitors: 2nd IV, Arterial Line     - Drips/Meds: Phenylephrine, Remifentanil     Consents    Anesthesia Plan(s) and associated risks, benefits, and realistic alternatives discussed. Questions answered and patient/representative(s) expressed understanding.     - Discussed:     - Discussed with:  Patient      - Extended Intubation/Ventilatory Support Discussed: No.      - Patient is DNR/DNI Status: No     Use of blood products discussed: Yes.     Postoperative Care    Pain management: IV analgesics, Oral pain medications, Multi-modal analgesia.   PONV prophylaxis: Ondansetron (or other 5HT-3), Dexamethasone or Solumedrol     Comments:               Rosio Bergman MD    I have reviewed the pertinent notes and labs in the chart from the past 30 days and (re)examined the patient.  Any updates or changes from those notes are reflected in this note.

## 2024-04-23 ENCOUNTER — APPOINTMENT (OUTPATIENT)
Dept: GENERAL RADIOLOGY | Facility: CLINIC | Age: 44
DRG: 026 | End: 2024-04-23
Payer: MEDICARE

## 2024-04-23 ENCOUNTER — ANESTHESIA (OUTPATIENT)
Dept: SURGERY | Facility: CLINIC | Age: 44
DRG: 026 | End: 2024-04-23
Payer: MEDICARE

## 2024-04-23 ENCOUNTER — HOSPITAL ENCOUNTER (OUTPATIENT)
Dept: MRI IMAGING | Facility: CLINIC | Age: 44
Discharge: HOME | DRG: 026 | End: 2024-04-23
Attending: NEUROLOGICAL SURGERY | Admitting: NEUROLOGICAL SURGERY
Payer: MEDICARE

## 2024-04-23 ENCOUNTER — HOSPITAL ENCOUNTER (INPATIENT)
Facility: CLINIC | Age: 44
LOS: 1 days | Discharge: HOME | DRG: 026 | End: 2024-04-24
Attending: NEUROLOGICAL SURGERY | Admitting: NEUROLOGICAL SURGERY
Payer: MEDICARE

## 2024-04-23 ENCOUNTER — APPOINTMENT (OUTPATIENT)
Dept: MRI IMAGING | Facility: CLINIC | Age: 44
DRG: 026 | End: 2024-04-23
Attending: NEUROLOGICAL SURGERY
Payer: MEDICARE

## 2024-04-23 ENCOUNTER — APPOINTMENT (OUTPATIENT)
Dept: CT IMAGING | Facility: CLINIC | Age: 44
DRG: 026 | End: 2024-04-23
Payer: MEDICARE

## 2024-04-23 DIAGNOSIS — G40.119 INTRACTABLE FOCAL EPILEPSY (H): ICD-10-CM

## 2024-04-23 DIAGNOSIS — G40.219 PARTIAL SYMPTOMATIC EPILEPSY WITH COMPLEX PARTIAL SEIZURES, INTRACTABLE, WITHOUT STATUS EPILEPTICUS (H): ICD-10-CM

## 2024-04-23 DIAGNOSIS — G40.219 PARTIAL SYMPTOMATIC EPILEPSY WITH COMPLEX PARTIAL SEIZURES, INTRACTABLE, WITHOUT STATUS EPILEPTICUS (H): Primary | ICD-10-CM

## 2024-04-23 LAB
ANION GAP SERPL CALCULATED.3IONS-SCNC: 9 MMOL/L (ref 7–15)
APTT PPP: 31 SECONDS (ref 22–38)
BUN SERPL-MCNC: 11.8 MG/DL (ref 6–20)
CALCIUM SERPL-MCNC: 8.8 MG/DL (ref 8.6–10)
CHLORIDE SERPL-SCNC: 101 MMOL/L (ref 98–107)
CREAT SERPL-MCNC: 0.78 MG/DL (ref 0.51–0.95)
DEPRECATED HCO3 PLAS-SCNC: 26 MMOL/L (ref 22–29)
EGFRCR SERPLBLD CKD-EPI 2021: >90 ML/MIN/1.73M2
ERYTHROCYTE [DISTWIDTH] IN BLOOD BY AUTOMATED COUNT: 11.7 % (ref 10–15)
GLUCOSE BLDC GLUCOMTR-MCNC: 98 MG/DL (ref 70–99)
GLUCOSE SERPL-MCNC: 100 MG/DL (ref 70–99)
HCT VFR BLD AUTO: 37.3 % (ref 35–47)
HGB BLD-MCNC: 12.2 G/DL (ref 11.7–15.7)
INR PPP: 0.99 (ref 0.85–1.15)
MCH RBC QN AUTO: 30.7 PG (ref 26.5–33)
MCHC RBC AUTO-ENTMCNC: 32.7 G/DL (ref 31.5–36.5)
MCV RBC AUTO: 94 FL (ref 78–100)
PLATELET # BLD AUTO: 249 10E3/UL (ref 150–450)
POTASSIUM SERPL-SCNC: 4 MMOL/L (ref 3.4–5.3)
RBC # BLD AUTO: 3.98 10E6/UL (ref 3.8–5.2)
SODIUM SERPL-SCNC: 136 MMOL/L (ref 135–145)
WBC # BLD AUTO: 4.6 10E3/UL (ref 4–11)

## 2024-04-23 PROCEDURE — 85027 COMPLETE CBC AUTOMATED: CPT | Performed by: STUDENT IN AN ORGANIZED HEALTH CARE EDUCATION/TRAINING PROGRAM

## 2024-04-23 PROCEDURE — 80048 BASIC METABOLIC PNL TOTAL CA: CPT | Performed by: STUDENT IN AN ORGANIZED HEALTH CARE EDUCATION/TRAINING PROGRAM

## 2024-04-23 PROCEDURE — 70450 CT HEAD/BRAIN W/O DYE: CPT | Mod: MG

## 2024-04-23 PROCEDURE — 250N000011 HC RX IP 250 OP 636: Performed by: STUDENT IN AN ORGANIZED HEALTH CARE EDUCATION/TRAINING PROGRAM

## 2024-04-23 PROCEDURE — 70250 X-RAY EXAM OF SKULL: CPT | Mod: 26 | Performed by: RADIOLOGY

## 2024-04-23 PROCEDURE — 36415 COLL VENOUS BLD VENIPUNCTURE: CPT | Performed by: STUDENT IN AN ORGANIZED HEALTH CARE EDUCATION/TRAINING PROGRAM

## 2024-04-23 PROCEDURE — 710N000010 HC RECOVERY PHASE 1, LEVEL 2, PER MIN: Performed by: NEUROLOGICAL SURGERY

## 2024-04-23 PROCEDURE — 61863 IMPLANT NEUROELECTRODE: CPT | Performed by: ANESTHESIOLOGY

## 2024-04-23 PROCEDURE — G1010 CDSM STANSON: HCPCS | Performed by: RADIOLOGY

## 2024-04-23 PROCEDURE — 272N000004 HC RX 272: Performed by: NEUROLOGICAL SURGERY

## 2024-04-23 PROCEDURE — C1787 PATIENT PROGR, NEUROSTIM: HCPCS | Performed by: NEUROLOGICAL SURGERY

## 2024-04-23 PROCEDURE — 999N000065 XR SKULL PORT 1/3 VIEWS

## 2024-04-23 PROCEDURE — 120N000002 HC R&B MED SURG/OB UMMC

## 2024-04-23 PROCEDURE — 370N000017 HC ANESTHESIA TECHNICAL FEE, PER MIN: Performed by: NEUROLOGICAL SURGERY

## 2024-04-23 PROCEDURE — 61863 IMPLANT NEUROELECTRODE: CPT | Mod: GC | Performed by: NEUROLOGICAL SURGERY

## 2024-04-23 PROCEDURE — 272N000002 HC OR SUPPLY OTHER OPNP: Performed by: NEUROLOGICAL SURGERY

## 2024-04-23 PROCEDURE — 250N000011 HC RX IP 250 OP 636: Performed by: NEUROLOGICAL SURGERY

## 2024-04-23 PROCEDURE — 70450 CT HEAD/BRAIN W/O DYE: CPT | Mod: 26 | Performed by: RADIOLOGY

## 2024-04-23 PROCEDURE — A9585 GADOBUTROL INJECTION: HCPCS | Performed by: NEUROLOGICAL SURGERY

## 2024-04-23 PROCEDURE — 61863 IMPLANT NEUROELECTRODE: CPT | Performed by: NURSE ANESTHETIST, CERTIFIED REGISTERED

## 2024-04-23 PROCEDURE — 250N000009 HC RX 250

## 2024-04-23 PROCEDURE — 85610 PROTHROMBIN TIME: CPT | Performed by: STUDENT IN AN ORGANIZED HEALTH CARE EDUCATION/TRAINING PROGRAM

## 2024-04-23 PROCEDURE — 250N000011 HC RX IP 250 OP 636

## 2024-04-23 PROCEDURE — 258N000003 HC RX IP 258 OP 636

## 2024-04-23 PROCEDURE — 250N000013 HC RX MED GY IP 250 OP 250 PS 637: Performed by: NEUROLOGICAL SURGERY

## 2024-04-23 PROCEDURE — 85730 THROMBOPLASTIN TIME PARTIAL: CPT | Performed by: STUDENT IN AN ORGANIZED HEALTH CARE EDUCATION/TRAINING PROGRAM

## 2024-04-23 PROCEDURE — 250N000009 HC RX 250: Performed by: NEUROLOGICAL SURGERY

## 2024-04-23 PROCEDURE — 250N000025 HC SEVOFLURANE, PER MIN: Performed by: NEUROLOGICAL SURGERY

## 2024-04-23 PROCEDURE — 272N000001 HC OR GENERAL SUPPLY STERILE: Performed by: NEUROLOGICAL SURGERY

## 2024-04-23 PROCEDURE — 61864 IMPLANT NEUROELECTRDE ADDL: CPT | Mod: GC | Performed by: NEUROLOGICAL SURGERY

## 2024-04-23 PROCEDURE — 70552 MRI BRAIN STEM W/DYE: CPT | Mod: 26 | Performed by: RADIOLOGY

## 2024-04-23 PROCEDURE — 00H00MZ INSERTION OF NEUROSTIMULATOR LEAD INTO BRAIN, OPEN APPROACH: ICD-10-PCS | Performed by: NEUROLOGICAL SURGERY

## 2024-04-23 PROCEDURE — C1713 ANCHOR/SCREW BN/BN,TIS/BN: HCPCS | Performed by: NEUROLOGICAL SURGERY

## 2024-04-23 PROCEDURE — 255N000002 HC RX 255 OP 636: Performed by: NEUROLOGICAL SURGERY

## 2024-04-23 PROCEDURE — 360N000080 HC SURGERY LEVEL 7, PER MIN: Performed by: NEUROLOGICAL SURGERY

## 2024-04-23 PROCEDURE — 8E09XBZ COMPUTER ASSISTED PROCEDURE OF HEAD AND NECK REGION: ICD-10-PCS | Performed by: NEUROLOGICAL SURGERY

## 2024-04-23 PROCEDURE — 999N000141 HC STATISTIC PRE-PROCEDURE NURSING ASSESSMENT: Performed by: NEUROLOGICAL SURGERY

## 2024-04-23 PROCEDURE — 250N000013 HC RX MED GY IP 250 OP 250 PS 637

## 2024-04-23 PROCEDURE — C1883 ADAPT/EXT, PACING/NEURO LEAD: HCPCS | Performed by: NEUROLOGICAL SURGERY

## 2024-04-23 PROCEDURE — 70552 MRI BRAIN STEM W/DYE: CPT

## 2024-04-23 DEVICE — IMPLANTABLE DEVICE: Type: IMPLANTABLE DEVICE | Site: CRANIAL | Status: FUNCTIONAL

## 2024-04-23 DEVICE — IMP SCR SYN MATRIX LOW PRO 1.5X04MM SELF DRILL 04.503.104.01: Type: IMPLANTABLE DEVICE | Site: CRANIAL | Status: FUNCTIONAL

## 2024-04-23 RX ORDER — GADOBUTROL 604.72 MG/ML
7.5 INJECTION INTRAVENOUS ONCE
Status: COMPLETED | OUTPATIENT
Start: 2024-04-23 | End: 2024-04-23

## 2024-04-23 RX ORDER — FENTANYL CITRATE 50 UG/ML
INJECTION, SOLUTION INTRAMUSCULAR; INTRAVENOUS PRN
Status: DISCONTINUED | OUTPATIENT
Start: 2024-04-23 | End: 2024-04-23

## 2024-04-23 RX ORDER — ONDANSETRON 2 MG/ML
INJECTION INTRAMUSCULAR; INTRAVENOUS PRN
Status: DISCONTINUED | OUTPATIENT
Start: 2024-04-23 | End: 2024-04-23

## 2024-04-23 RX ORDER — SODIUM CHLORIDE, SODIUM LACTATE, POTASSIUM CHLORIDE, CALCIUM CHLORIDE 600; 310; 30; 20 MG/100ML; MG/100ML; MG/100ML; MG/100ML
INJECTION, SOLUTION INTRAVENOUS CONTINUOUS
Status: DISCONTINUED | OUTPATIENT
Start: 2024-04-23 | End: 2024-04-23 | Stop reason: HOSPADM

## 2024-04-23 RX ORDER — ONDANSETRON 4 MG/1
4 TABLET, ORALLY DISINTEGRATING ORAL EVERY 30 MIN PRN
Status: DISCONTINUED | OUTPATIENT
Start: 2024-04-23 | End: 2024-04-23 | Stop reason: HOSPADM

## 2024-04-23 RX ORDER — NALOXONE HYDROCHLORIDE 0.4 MG/ML
0.2 INJECTION, SOLUTION INTRAMUSCULAR; INTRAVENOUS; SUBCUTANEOUS
Status: DISCONTINUED | OUTPATIENT
Start: 2024-04-23 | End: 2024-04-23 | Stop reason: HOSPADM

## 2024-04-23 RX ORDER — BUSPIRONE HYDROCHLORIDE 15 MG/1
15 TABLET ORAL 2 TIMES DAILY
Status: DISCONTINUED | OUTPATIENT
Start: 2024-04-23 | End: 2024-04-24 | Stop reason: HOSPADM

## 2024-04-23 RX ORDER — CEFAZOLIN SODIUM/WATER 2 G/20 ML
2 SYRINGE (ML) INTRAVENOUS
Status: COMPLETED | OUTPATIENT
Start: 2024-04-23 | End: 2024-04-23

## 2024-04-23 RX ORDER — CARBAMAZEPINE 300 MG/1
600 CAPSULE, EXTENDED RELEASE ORAL DAILY
Status: DISCONTINUED | OUTPATIENT
Start: 2024-04-24 | End: 2024-04-24 | Stop reason: HOSPADM

## 2024-04-23 RX ORDER — SODIUM CHLORIDE, SODIUM LACTATE, POTASSIUM CHLORIDE, CALCIUM CHLORIDE 600; 310; 30; 20 MG/100ML; MG/100ML; MG/100ML; MG/100ML
INJECTION, SOLUTION INTRAVENOUS CONTINUOUS PRN
Status: DISCONTINUED | OUTPATIENT
Start: 2024-04-23 | End: 2024-04-23

## 2024-04-23 RX ORDER — CEFAZOLIN SODIUM 2 G/100ML
2 INJECTION, SOLUTION INTRAVENOUS EVERY 8 HOURS
Qty: 60 ML | Refills: 0 | Status: DISCONTINUED | OUTPATIENT
Start: 2024-04-23 | End: 2024-04-24

## 2024-04-23 RX ORDER — CEFAZOLIN SODIUM 1 G/3ML
1 INJECTION, POWDER, FOR SOLUTION INTRAMUSCULAR; INTRAVENOUS EVERY 8 HOURS
Status: DISCONTINUED | OUTPATIENT
Start: 2024-04-23 | End: 2024-04-23

## 2024-04-23 RX ORDER — CARBAMAZEPINE 200 MG/1
200 CAPSULE, EXTENDED RELEASE ORAL DAILY
Status: DISCONTINUED | OUTPATIENT
Start: 2024-04-24 | End: 2024-04-24 | Stop reason: HOSPADM

## 2024-04-23 RX ORDER — NALOXONE HYDROCHLORIDE 0.4 MG/ML
0.4 INJECTION, SOLUTION INTRAMUSCULAR; INTRAVENOUS; SUBCUTANEOUS
Status: DISCONTINUED | OUTPATIENT
Start: 2024-04-23 | End: 2024-04-24 | Stop reason: HOSPADM

## 2024-04-23 RX ORDER — NALOXONE HYDROCHLORIDE 0.4 MG/ML
0.4 INJECTION, SOLUTION INTRAMUSCULAR; INTRAVENOUS; SUBCUTANEOUS
Status: DISCONTINUED | OUTPATIENT
Start: 2024-04-23 | End: 2024-04-23 | Stop reason: HOSPADM

## 2024-04-23 RX ORDER — CARBAMAZEPINE 300 MG/1
600 CAPSULE, EXTENDED RELEASE ORAL 2 TIMES DAILY
Status: DISCONTINUED | OUTPATIENT
Start: 2024-04-23 | End: 2024-04-23

## 2024-04-23 RX ORDER — HYDROMORPHONE HCL IN WATER/PF 6 MG/30 ML
0.2 PATIENT CONTROLLED ANALGESIA SYRINGE INTRAVENOUS
Status: DISCONTINUED | OUTPATIENT
Start: 2024-04-23 | End: 2024-04-24

## 2024-04-23 RX ORDER — VENLAFAXINE HYDROCHLORIDE 225 MG/1
225 TABLET, EXTENDED RELEASE ORAL EVERY MORNING
Status: DISCONTINUED | OUTPATIENT
Start: 2024-04-24 | End: 2024-04-24 | Stop reason: HOSPADM

## 2024-04-23 RX ORDER — LIDOCAINE 40 MG/G
CREAM TOPICAL
Status: DISCONTINUED | OUTPATIENT
Start: 2024-04-23 | End: 2024-04-23 | Stop reason: HOSPADM

## 2024-04-23 RX ORDER — METHOCARBAMOL 750 MG/1
750 TABLET, FILM COATED ORAL EVERY 6 HOURS PRN
Status: DISCONTINUED | OUTPATIENT
Start: 2024-04-23 | End: 2024-04-24 | Stop reason: HOSPADM

## 2024-04-23 RX ORDER — POLYETHYLENE GLYCOL 3350 17 G/17G
17 POWDER, FOR SOLUTION ORAL DAILY
Status: DISCONTINUED | OUTPATIENT
Start: 2024-04-24 | End: 2024-04-24 | Stop reason: HOSPADM

## 2024-04-23 RX ORDER — FENTANYL CITRATE 50 UG/ML
25 INJECTION, SOLUTION INTRAMUSCULAR; INTRAVENOUS EVERY 5 MIN PRN
Status: DISCONTINUED | OUTPATIENT
Start: 2024-04-23 | End: 2024-04-23 | Stop reason: HOSPADM

## 2024-04-23 RX ORDER — BISACODYL 10 MG
10 SUPPOSITORY, RECTAL RECTAL DAILY PRN
Status: DISCONTINUED | OUTPATIENT
Start: 2024-04-26 | End: 2024-04-24 | Stop reason: HOSPADM

## 2024-04-23 RX ORDER — ACETAMINOPHEN 325 MG/1
975 TABLET ORAL EVERY 8 HOURS
Status: DISCONTINUED | OUTPATIENT
Start: 2024-04-23 | End: 2024-04-24 | Stop reason: HOSPADM

## 2024-04-23 RX ORDER — CARBAMAZEPINE 200 MG/1
200 CAPSULE, EXTENDED RELEASE ORAL 2 TIMES DAILY
Status: DISCONTINUED | OUTPATIENT
Start: 2024-04-23 | End: 2024-04-23

## 2024-04-23 RX ORDER — HYDRALAZINE HYDROCHLORIDE 20 MG/ML
10-20 INJECTION INTRAMUSCULAR; INTRAVENOUS EVERY 30 MIN PRN
Status: DISCONTINUED | OUTPATIENT
Start: 2024-04-23 | End: 2024-04-24 | Stop reason: HOSPADM

## 2024-04-23 RX ORDER — PROCHLORPERAZINE MALEATE 10 MG
10 TABLET ORAL EVERY 6 HOURS PRN
Status: DISCONTINUED | OUTPATIENT
Start: 2024-04-23 | End: 2024-04-24 | Stop reason: HOSPADM

## 2024-04-23 RX ORDER — LABETALOL HYDROCHLORIDE 5 MG/ML
10-40 INJECTION, SOLUTION INTRAVENOUS EVERY 10 MIN PRN
Status: DISCONTINUED | OUTPATIENT
Start: 2024-04-23 | End: 2024-04-24 | Stop reason: HOSPADM

## 2024-04-23 RX ORDER — ACETAMINOPHEN 325 MG/1
650 TABLET ORAL EVERY 4 HOURS PRN
Status: DISCONTINUED | OUTPATIENT
Start: 2024-04-26 | End: 2024-04-24 | Stop reason: HOSPADM

## 2024-04-23 RX ORDER — LAMOTRIGINE 200 MG/1
200 TABLET ORAL 3 TIMES DAILY
Status: DISCONTINUED | OUTPATIENT
Start: 2024-04-23 | End: 2024-04-24 | Stop reason: HOSPADM

## 2024-04-23 RX ORDER — OXYCODONE HYDROCHLORIDE 10 MG/1
10 TABLET ORAL EVERY 4 HOURS PRN
Status: DISCONTINUED | OUTPATIENT
Start: 2024-04-23 | End: 2024-04-24 | Stop reason: HOSPADM

## 2024-04-23 RX ORDER — ONDANSETRON 2 MG/ML
4 INJECTION INTRAMUSCULAR; INTRAVENOUS EVERY 6 HOURS PRN
Status: DISCONTINUED | OUTPATIENT
Start: 2024-04-23 | End: 2024-04-24 | Stop reason: HOSPADM

## 2024-04-23 RX ORDER — ONDANSETRON 2 MG/ML
4 INJECTION INTRAMUSCULAR; INTRAVENOUS EVERY 30 MIN PRN
Status: DISCONTINUED | OUTPATIENT
Start: 2024-04-23 | End: 2024-04-23 | Stop reason: HOSPADM

## 2024-04-23 RX ORDER — LIDOCAINE HYDROCHLORIDE 20 MG/ML
INJECTION, SOLUTION INFILTRATION; PERINEURAL PRN
Status: DISCONTINUED | OUTPATIENT
Start: 2024-04-23 | End: 2024-04-23

## 2024-04-23 RX ORDER — NALOXONE HYDROCHLORIDE 0.4 MG/ML
0.1 INJECTION, SOLUTION INTRAMUSCULAR; INTRAVENOUS; SUBCUTANEOUS
Status: DISCONTINUED | OUTPATIENT
Start: 2024-04-23 | End: 2024-04-23 | Stop reason: HOSPADM

## 2024-04-23 RX ORDER — AMOXICILLIN 250 MG
1 CAPSULE ORAL 2 TIMES DAILY
Status: DISCONTINUED | OUTPATIENT
Start: 2024-04-23 | End: 2024-04-24 | Stop reason: HOSPADM

## 2024-04-23 RX ORDER — ONDANSETRON 4 MG/1
4 TABLET, ORALLY DISINTEGRATING ORAL EVERY 6 HOURS PRN
Status: DISCONTINUED | OUTPATIENT
Start: 2024-04-23 | End: 2024-04-24 | Stop reason: HOSPADM

## 2024-04-23 RX ORDER — NALOXONE HYDROCHLORIDE 0.4 MG/ML
0.2 INJECTION, SOLUTION INTRAMUSCULAR; INTRAVENOUS; SUBCUTANEOUS
Status: DISCONTINUED | OUTPATIENT
Start: 2024-04-23 | End: 2024-04-24 | Stop reason: HOSPADM

## 2024-04-23 RX ORDER — PROPOFOL 10 MG/ML
INJECTION, EMULSION INTRAVENOUS PRN
Status: DISCONTINUED | OUTPATIENT
Start: 2024-04-23 | End: 2024-04-23

## 2024-04-23 RX ORDER — FENTANYL CITRATE 50 UG/ML
50 INJECTION, SOLUTION INTRAMUSCULAR; INTRAVENOUS EVERY 5 MIN PRN
Status: DISCONTINUED | OUTPATIENT
Start: 2024-04-23 | End: 2024-04-23 | Stop reason: HOSPADM

## 2024-04-23 RX ORDER — OXYCODONE HYDROCHLORIDE 5 MG/1
5 TABLET ORAL EVERY 4 HOURS PRN
Status: DISCONTINUED | OUTPATIENT
Start: 2024-04-23 | End: 2024-04-24 | Stop reason: HOSPADM

## 2024-04-23 RX ORDER — HYDROMORPHONE HCL IN WATER/PF 6 MG/30 ML
0.2 PATIENT CONTROLLED ANALGESIA SYRINGE INTRAVENOUS EVERY 5 MIN PRN
Status: DISCONTINUED | OUTPATIENT
Start: 2024-04-23 | End: 2024-04-23 | Stop reason: HOSPADM

## 2024-04-23 RX ORDER — ALBUTEROL SULFATE 90 UG/1
2 AEROSOL, METERED RESPIRATORY (INHALATION) EVERY 4 HOURS PRN
Status: DISCONTINUED | OUTPATIENT
Start: 2024-04-23 | End: 2024-04-24 | Stop reason: HOSPADM

## 2024-04-23 RX ORDER — DEXAMETHASONE SODIUM PHOSPHATE 4 MG/ML
INJECTION, SOLUTION INTRA-ARTICULAR; INTRALESIONAL; INTRAMUSCULAR; INTRAVENOUS; SOFT TISSUE PRN
Status: DISCONTINUED | OUTPATIENT
Start: 2024-04-23 | End: 2024-04-23

## 2024-04-23 RX ORDER — CEFAZOLIN SODIUM/WATER 2 G/20 ML
2 SYRINGE (ML) INTRAVENOUS SEE ADMIN INSTRUCTIONS
Status: DISCONTINUED | OUTPATIENT
Start: 2024-04-23 | End: 2024-04-23 | Stop reason: HOSPADM

## 2024-04-23 RX ORDER — LIDOCAINE 40 MG/G
CREAM TOPICAL
Status: DISCONTINUED | OUTPATIENT
Start: 2024-04-23 | End: 2024-04-24 | Stop reason: HOSPADM

## 2024-04-23 RX ORDER — LEVETIRACETAM 750 MG/1
1500 TABLET ORAL 2 TIMES DAILY
Status: DISCONTINUED | OUTPATIENT
Start: 2024-04-23 | End: 2024-04-24 | Stop reason: HOSPADM

## 2024-04-23 RX ORDER — HYDROMORPHONE HCL IN WATER/PF 6 MG/30 ML
0.4 PATIENT CONTROLLED ANALGESIA SYRINGE INTRAVENOUS
Status: DISCONTINUED | OUTPATIENT
Start: 2024-04-23 | End: 2024-04-24 | Stop reason: HOSPADM

## 2024-04-23 RX ORDER — HYDROMORPHONE HCL IN WATER/PF 6 MG/30 ML
0.4 PATIENT CONTROLLED ANALGESIA SYRINGE INTRAVENOUS EVERY 5 MIN PRN
Status: DISCONTINUED | OUTPATIENT
Start: 2024-04-23 | End: 2024-04-23 | Stop reason: HOSPADM

## 2024-04-23 RX ORDER — SODIUM CHLORIDE 9 MG/ML
INJECTION, SOLUTION INTRAVENOUS CONTINUOUS
Status: ACTIVE | OUTPATIENT
Start: 2024-04-23 | End: 2024-04-24

## 2024-04-23 RX ADMIN — FENTANYL CITRATE 50 MCG: 50 INJECTION INTRAMUSCULAR; INTRAVENOUS at 08:49

## 2024-04-23 RX ADMIN — PHENYLEPHRINE HYDROCHLORIDE 100 MCG: 10 INJECTION INTRAVENOUS at 14:08

## 2024-04-23 RX ADMIN — Medication 2 G: at 12:50

## 2024-04-23 RX ADMIN — HYDROMORPHONE HYDROCHLORIDE 0.25 MG: 1 INJECTION, SOLUTION INTRAMUSCULAR; INTRAVENOUS; SUBCUTANEOUS at 14:32

## 2024-04-23 RX ADMIN — Medication 30 MG: at 10:44

## 2024-04-23 RX ADMIN — Medication 30 MG: at 13:18

## 2024-04-23 RX ADMIN — ONDANSETRON 4 MG: 2 INJECTION INTRAMUSCULAR; INTRAVENOUS at 20:31

## 2024-04-23 RX ADMIN — SUGAMMADEX 200 MG: 100 INJECTION, SOLUTION INTRAVENOUS at 14:36

## 2024-04-23 RX ADMIN — Medication 30 MG: at 08:44

## 2024-04-23 RX ADMIN — PHENYLEPHRINE HYDROCHLORIDE 150 MCG: 10 INJECTION INTRAVENOUS at 11:38

## 2024-04-23 RX ADMIN — SODIUM CHLORIDE, POTASSIUM CHLORIDE, SODIUM LACTATE AND CALCIUM CHLORIDE: 600; 310; 30; 20 INJECTION, SOLUTION INTRAVENOUS at 12:28

## 2024-04-23 RX ADMIN — ACETAMINOPHEN 975 MG: 325 TABLET, FILM COATED ORAL at 16:29

## 2024-04-23 RX ADMIN — PHENYLEPHRINE HYDROCHLORIDE 200 MCG: 10 INJECTION INTRAVENOUS at 08:46

## 2024-04-23 RX ADMIN — SODIUM CHLORIDE, POTASSIUM CHLORIDE, SODIUM LACTATE AND CALCIUM CHLORIDE: 600; 310; 30; 20 INJECTION, SOLUTION INTRAVENOUS at 08:00

## 2024-04-23 RX ADMIN — FENTANYL CITRATE 25 MCG: 50 INJECTION, SOLUTION INTRAMUSCULAR; INTRAVENOUS at 15:24

## 2024-04-23 RX ADMIN — LEVETIRACETAM 1500 MG: 750 TABLET, FILM COATED ORAL at 20:41

## 2024-04-23 RX ADMIN — DOCUSATE SODIUM 50 MG AND SENNOSIDES 8.6 MG 1 TABLET: 8.6; 5 TABLET, FILM COATED ORAL at 20:31

## 2024-04-23 RX ADMIN — SODIUM CHLORIDE: 9 INJECTION, SOLUTION INTRAVENOUS at 20:16

## 2024-04-23 RX ADMIN — Medication 20 MG: at 12:32

## 2024-04-23 RX ADMIN — Medication 30 MG: at 12:10

## 2024-04-23 RX ADMIN — PHENYLEPHRINE HYDROCHLORIDE 100 MCG: 10 INJECTION INTRAVENOUS at 09:15

## 2024-04-23 RX ADMIN — HYDROMORPHONE HYDROCHLORIDE 0.4 MG: 0.2 INJECTION, SOLUTION INTRAMUSCULAR; INTRAVENOUS; SUBCUTANEOUS at 16:54

## 2024-04-23 RX ADMIN — HYDROMORPHONE HYDROCHLORIDE 0.25 MG: 1 INJECTION, SOLUTION INTRAMUSCULAR; INTRAVENOUS; SUBCUTANEOUS at 13:58

## 2024-04-23 RX ADMIN — FENTANYL CITRATE 50 MCG: 50 INJECTION, SOLUTION INTRAMUSCULAR; INTRAVENOUS at 15:04

## 2024-04-23 RX ADMIN — CARBAMAZEPINE 800 MG: 300 CAPSULE, EXTENDED RELEASE ORAL at 20:32

## 2024-04-23 RX ADMIN — PHENYLEPHRINE HYDROCHLORIDE 150 MCG: 10 INJECTION INTRAVENOUS at 11:47

## 2024-04-23 RX ADMIN — FENTANYL CITRATE 100 MCG: 50 INJECTION INTRAMUSCULAR; INTRAVENOUS at 08:10

## 2024-04-23 RX ADMIN — DEXMEDETOMIDINE HYDROCHLORIDE 8 MCG: 100 INJECTION, SOLUTION INTRAVENOUS at 13:56

## 2024-04-23 RX ADMIN — Medication 2 G: at 08:50

## 2024-04-23 RX ADMIN — Medication 20 MG: at 10:03

## 2024-04-23 RX ADMIN — PHENYLEPHRINE HYDROCHLORIDE 100 MCG: 10 INJECTION INTRAVENOUS at 12:44

## 2024-04-23 RX ADMIN — HYDROMORPHONE HYDROCHLORIDE 0.2 MG: 0.2 INJECTION, SOLUTION INTRAMUSCULAR; INTRAVENOUS; SUBCUTANEOUS at 18:35

## 2024-04-23 RX ADMIN — FENTANYL CITRATE 25 MCG: 50 INJECTION, SOLUTION INTRAMUSCULAR; INTRAVENOUS at 15:13

## 2024-04-23 RX ADMIN — FENTANYL CITRATE 25 MCG: 50 INJECTION INTRAMUSCULAR; INTRAVENOUS at 10:26

## 2024-04-23 RX ADMIN — LIDOCAINE HYDROCHLORIDE 100 MG: 20 INJECTION, SOLUTION INFILTRATION; PERINEURAL at 08:10

## 2024-04-23 RX ADMIN — DEXMEDETOMIDINE HYDROCHLORIDE 4 MCG: 100 INJECTION, SOLUTION INTRAVENOUS at 10:38

## 2024-04-23 RX ADMIN — ONDANSETRON 4 MG: 2 INJECTION INTRAMUSCULAR; INTRAVENOUS at 14:12

## 2024-04-23 RX ADMIN — DEXAMETHASONE SODIUM PHOSPHATE 8 MG: 4 INJECTION, SOLUTION INTRA-ARTICULAR; INTRALESIONAL; INTRAMUSCULAR; INTRAVENOUS; SOFT TISSUE at 08:45

## 2024-04-23 RX ADMIN — OXYCODONE HYDROCHLORIDE 5 MG: 5 TABLET ORAL at 20:31

## 2024-04-23 RX ADMIN — GADOBUTROL 5.5 ML: 604.72 INJECTION INTRAVENOUS at 14:19

## 2024-04-23 RX ADMIN — FENTANYL CITRATE 25 MCG: 50 INJECTION INTRAMUSCULAR; INTRAVENOUS at 13:21

## 2024-04-23 RX ADMIN — HYDROMORPHONE HYDROCHLORIDE 0.2 MG: 0.2 INJECTION, SOLUTION INTRAMUSCULAR; INTRAVENOUS; SUBCUTANEOUS at 16:12

## 2024-04-23 RX ADMIN — FENTANYL CITRATE 25 MCG: 50 INJECTION INTRAMUSCULAR; INTRAVENOUS at 12:24

## 2024-04-23 RX ADMIN — MIDAZOLAM 2 MG: 1 INJECTION INTRAMUSCULAR; INTRAVENOUS at 08:45

## 2024-04-23 RX ADMIN — LAMOTRIGINE 200 MG: 200 TABLET ORAL at 20:41

## 2024-04-23 RX ADMIN — BUSPIRONE HYDROCHLORIDE 15 MG: 15 TABLET ORAL at 20:31

## 2024-04-23 RX ADMIN — HYDROMORPHONE HYDROCHLORIDE 0.2 MG: 0.2 INJECTION, SOLUTION INTRAMUSCULAR; INTRAVENOUS; SUBCUTANEOUS at 23:28

## 2024-04-23 RX ADMIN — PROPOFOL 120 MG: 10 INJECTION, EMULSION INTRAVENOUS at 08:10

## 2024-04-23 RX ADMIN — DEXMEDETOMIDINE HYDROCHLORIDE 4 MCG: 100 INJECTION, SOLUTION INTRAVENOUS at 10:33

## 2024-04-23 RX ADMIN — Medication 50 MG: at 08:15

## 2024-04-23 RX ADMIN — HYDROMORPHONE HYDROCHLORIDE 0.2 MG: 0.2 INJECTION, SOLUTION INTRAMUSCULAR; INTRAVENOUS; SUBCUTANEOUS at 15:37

## 2024-04-23 RX ADMIN — CEFAZOLIN SODIUM 2 G: 2 INJECTION, SOLUTION INTRAVENOUS at 20:40

## 2024-04-23 RX ADMIN — PHENYLEPHRINE HYDROCHLORIDE 100 MCG: 10 INJECTION INTRAVENOUS at 09:09

## 2024-04-23 RX ADMIN — FENTANYL CITRATE 25 MCG: 50 INJECTION INTRAMUSCULAR; INTRAVENOUS at 09:50

## 2024-04-23 RX ADMIN — Medication 20 MG: at 13:50

## 2024-04-23 ASSESSMENT — ACTIVITIES OF DAILY LIVING (ADL)
ADLS_ACUITY_SCORE: 32

## 2024-04-23 NOTE — ANESTHESIA CARE TRANSFER NOTE
Patient: Vernell Logan    Procedure: Procedure(s):  Intraoperative Magnetic Resonance Imaging (MRI)/stealth  assisted bilateral deep brain stimulator placement, phase I, placement of bilateral deep brain stimulator electrode, target anterior nucleus of thalamus, WITHOUT microelectrode recording       Diagnosis: Partial symptomatic epilepsy with complex partial seizures, intractable, without status epilepticus (H) [G40.219]  Diagnosis Additional Information: No value filed.    Anesthesia Type:   General     Note:    Oropharynx: oropharynx clear of all foreign objects  Level of Consciousness: awake and drowsy  Oxygen Supplementation: face mask  Level of Supplemental Oxygen (L/min / FiO2): 8  Independent Airway: airway patency satisfactory and stable  Dentition: dentition unchanged  Vital Signs Stable: post-procedure vital signs reviewed and stable  Report to RN Given: handoff report given  Patient transferred to: PACU    Handoff Report: Identifed the Patient, Identified the Reponsible Provider, Reviewed the pertinent medical history, Discussed the surgical course, Reviewed Intra-OP anesthesia mangement and issues during anesthesia, Set expectations for post-procedure period and Allowed opportunity for questions and acknowledgement of understanding      Vitals:  Vitals Value Taken Time   /88 04/23/24 1502   Temp     Pulse 92 04/23/24 1504   Resp 12 04/23/24 1504   SpO2 100 % 04/23/24 1504   Vitals shown include unfiled device data.    Electronically Signed By: Rosio Bergman MD  April 23, 2024  3:04 PM

## 2024-04-23 NOTE — ANESTHESIA POSTPROCEDURE EVALUATION
Patient: Vernell Logan    Procedure: Procedure(s):  Intraoperative Magnetic Resonance Imaging (MRI)/stealth  assisted bilateral deep brain stimulator placement, phase I, placement of bilateral deep brain stimulator electrode, target anterior nucleus of thalamus, WITHOUT microelectrode recording       Anesthesia Type:  General    Note:  Disposition: Inpatient   Postop Pain Control: Uneventful            Sign Out: Well controlled pain   PONV: No   Neuro/Psych: Uneventful            Sign Out: Acceptable/Baseline neuro status   Airway/Respiratory: Uneventful            Sign Out: Acceptable/Baseline resp. status   CV/Hemodynamics: Uneventful            Sign Out: Acceptable CV status; No obvious hypovolemia; No obvious fluid overload   Other NRE: NONE   DID A NON-ROUTINE EVENT OCCUR? No       Last vitals:  Vitals Value Taken Time   /68 04/23/24 1530   Temp 36.9  C (98.4  F) 04/23/24 1500   Pulse 77 04/23/24 1538   Resp 7 04/23/24 1538   SpO2 99 % 04/23/24 1515   Vitals shown include unfiled device data.    Electronically Signed By: Guillermina Mckeon MD  April 23, 2024  3:39 PM

## 2024-04-23 NOTE — OP NOTE
PATIENT NAME: Vernell Logan  PATIENT MRN: [id number]   PATIENT ACCOUNT: [id number]   PATIENT YOB: 1980     NEUROSURGERY OPERATIVE REPORT     DATE OF SURGERY: 04/23/24     PREOPERATIVE DIAGNOSIS:  1. Medically refractory epilepsy     POSTOPERATIVE DIAGNOSIS:  1. Medically refractory epilepsy     PROCEDURES PERFORMED:  1. Phase 1 trans-ventricular placement of bilateral deep brain stimulator electrodes in the anterior nucleus of the thalamus   2. Intra-op use of clearpoint frameless stereotaxic   3. Interrogation of deep brain stimulator electrodes  4. Intraoperative use of MRI     STAFF SURGEON: Ab Thomason MD     RESIDENT SURGEONS: Moises Gallardo MD     ANESTHESIA: General endotracheal anesthesia     ESTIMATED BLOOD LOSS: 10 mL     IMPLANTS:   1. Medtronic Model # [id number] SN [id number]  2. Medtronic Model # [id number] SN [id number]     EXPLANTS: none     DRAINS: none     FINDINGS:  Bilateral leads placed with normal impedances bilaterally .     COMPLICATIONS: none immediate     INDICATIONS: Vernell Logan is a 43 year old female who has medically refractory epilepsy. After discussion at the epilepsy conference, she was found to be a candidate for DBS for treatment of epilepsy. After a discussion of the risks, alternatives, and benefits of the operation, she provided written and verbal consent to proceed.      DESCRIPTION OF PROCEDURE:  ClearPoint placement and stereotactic neuronavigation planning.       Informed consent was obtained.  The patient was brought to the operating room.  She was then transferred to the MRI compatible [facility] operative bed and was positioned supine.  With the placement of endotracheal tube, general anesthesia was obtained.  The bed was then rotated 180 degrees in preparation for the intraoperative MRI guided procedure.  Using a surgical clipper, we shaved the region of her presumed incisions and yuri hole placement in the frontal region of his scalp around the coronal suture.  Her head was then positioned and fixed to the ClearPoint Multi-Positional Head Fixation Frame (MPHFF) with 4 pins for fixation.  Her head was positioned in a slight flexed position and with room to tunnel the ends of the leads to the right.  Appropriate lines were placed by our anesthesia team.  Jaimes catheter was also placed.  All pressure points were well padded.  We also confirmed that she would clear the MRI bore.  At this time, MRI and the coil was prepped and draped in sterile fashion.      At this time, attention was turned to the neuronavigation.  Preoperatively, the Stealth neuronavigation device was loaded with the preoperative MRI, stereotactic protocol which was obtained well ahead of the surgery.  We used the Cyveraalth MRI to assist with the localization and targeting of the left and right ANT.  Neuronavigation was used to stereotactically target the left and right ANT.  The technique used was AC-PC line localization technique as well as direct identification of ANT.  MRI FGATIR sequence was also used to identify the ideal target.  Transventricular approach was used for both left and right side.  The plan was to place the electrode so that the contacts 2 and 3 would be in the ANT.  The entry into the skull, as well as the trajectory of the electrode were checked with the probe's eye view to avoid any sulci, ventricle or vascular structures, especially the ventricular venous structures.     We then used the CloudHelix neuronavigation.  The same preoperative MRI, stereotactic protocol, was loaded to the program for localization and targeting of the left and right ANT.  Neuronavigation was used to stereotactically target the left and right ANT.  The technique used was AC-PC line localization technique as well as direct identification of ANT.  MRI FGATIR sequence was also used to identify the ideal target.  Transventricular approach was used for both left and right side.  The plan was to place the electrode  so that the contact #3 would be in the ANT.  The entry into the skull, as well as the trajectory of the electrode were checked with the probe's eye view to avoid any sulci, ventricle or vascular structures, especially the ventricular venous structures.  The two plans were compared to be similar.  However, the trajectory and plan on the ClearPoint neuronavigation was used and these trajectories were further refined.     At this time, attention was turned back to the patient.  The surgical area, most of the head, was prepped and draped in routine surgical fashion.  Sterile field was toweled off and Ioban applied.  Then, ClearPoint tubular drape designed for MRI was applied.  Timeout was then performed confirming the patient's identity, procedure to be performed, side and site of surgery identified, imaging corresponding with the patient and administration of preoperative prophylactic antibiotic.     Around the estimated bilateral frontal entry points, ClearPoint fiducial grids, 6 rows by 6 rows, were placed to localize the entry point on the scalp.  At this time, IMRIS MRI was brought in for scanning.  We obtained T1 volume scan with the grid.  We also obtained FGATIR sequence to better identify the target.  We confirmed the grid location on the MRI and appropriate segmentation for localization.  The IMRIS MRI was taken out of the room.     Bilateral yuri hole placement     The MRI obtained with the fiducial grid was merged with the planning and the trajectory as well as the entry point was confirmed.  Attention was turned to the fiducial grid.  The top layer of the grid was peeled off.  For the left and right side, the entry point on the scalp localized on the grid was marked.  Using an awl, the center of the yuri hole or the trajectory was marked.  The awl was pushed through the scalp and screwed into the skull thus marking the skull.  With the entry point marked, the two grids were then completely taken off.  The  marked entry point was clearly visible.     Two C-shaped incisions were planned with the marked entry points being the center or the middle of each of the incisions.  Attention was first turned to the right side.  The area of the intended incision and the area posterior designated for the pocket were injected with 50:50 mixture of 1% Lidocaine with epinephrine and 0.25% Marcaine plain.  A #10 blade was used to incise the incision down to the pericranium.  Hemostasis was obtained using the monopolar and bipolar cautery.  A thin layer of pericranium tissue was left behind on the skull.  The incision was retracted open using a self retaining retractor.  Using blunt dissection, a pocket was created posteriorly and towards the right parietal area for the tail of the electrode/connector placement later.  Attention was then turned to the left side.  The area of the intended incision and the area posterior designated for the pocket were injected with 50:50 mixture of 1% Lidocaine with epinephrine and 0.25% Marcaine plain.  A #10 blade was used to incise the incision down to the pericranium.  Hemostasis was obtained using the monopolar and bipolar cautery.  A thin layer of pericranium tissue was left behind on the skull.  The incision was retracted open using a self retaining retractor.  Using blunt dissection, a pocket was created posteriorly and towards the right parietal area for the tail of the electrode/connector placement later.  Care was taken to not overlap the pocket with the right side one and the pocket for the left side tail of the electrode/connector was located posterior to that of the right side one.     Attention was then turned to the right side again.  A drill with a 5 mm round cutting bit was used to make a yuri hole at the entry point.  The area was vigorously irrigated and bone wax was used to control the bleeding.  A small curette followed by a Kerrison punch was used to clean up the remaining bone  around the inner table of the yuri hole.  The underlying dura was coagulated with the bipolar cautery.  The electrode fixation cover was affixed to the skull using two new screws.  Care was taken to overlap the pericranium over the edge of the cover to provide a smooth tissue transition.  Attention was then turned to the left side.  A drill with a 5 mm round cutting bit was used to make a yuri hole at the entry point.  The area was vigorously irrigated and bone wax was used to control the bleeding.  A small curette followed by a Kerrison punch was used to clean up the remaining bone around the inner table of the yuri hole.  The underlying dura was coagulated with the bipolar cautery.  The electrode fixation cover was affixed to the skull using two new screws.  Care was taken to overlap the pericranium over the edge of the cover to provide a smooth tissue transition.  To keep both of the incisions open without the retractors, 2-0 Vicryls were stitched to the underside galea of each side of the incisions and pull the incision apart.       Mounting the Viddyad towers     At this time, the Viddyad tower was brought onto the field.  On the right side, the base of the tower was screwed into the scalp and into the skull using the seven scalp screws.  Care was taken to place the base so that the yuri hole was in the middle of the base and that the trajectory was on target with the yuri hole.  The screws demonstrated good and strong purchase of the bone.  Then, using the four offset screws, the base was secured to the scalp/skull.  Care was taken so that the screws were not over tightened.  Then, the tower was engaged to the base.  It was locked in and the orange knob was used to fine tune the alignment of the trajectory to the yuri hole.  The roll lock was then tightened and then loosened by one turn.  The robotic control for the knobs was connected to the tower.  Then, on the left side, the base of the tower was  screwed into the scalp and into the skull using the seven scalp screws.  Care was taken to place the base so that the yuri hole was in the middle of the base and that the trajectory was on target with the yuri hole.  The screws demonstrated good and strong purchase of the bone.  Then, using the four offset screws, the base was secured to the scalp/skull.  Care was taken so that the screws were not over tightened.  Then, the tower was engaged to the base.  It was locked in and the orange knob was used to fine tune the alignment of the trajectory to the yuri hole.  The roll lock was then tightened and then loosened by one turn.  The robotic control for the knobs was connected to the tower.     At this time, IMRIS MRI was brought back into the room for more scans.  Care was taken to place the robotic control apparatus to come out of the other side of the MRI bore so that the tower and the trajectory could be adjusted.  Using the newly obtained scans, we adjusted the trajectory using the control knob to realign the tower to the planned trajectory so that the target would align with the plan.  Fine adjustments were made to the tower based on the series of imaging.  With each adjustment, new scan was obtained to confirm proper movement of the tower.  Ultimately, the trajectory and the target was satisfactory bilaterally such that alignment error was less than 0.5 mm on both sides.     The distance to the target was obtained from the navigation and the recent image.  This was marked on the ceramic stylet and the depth stop was carefully placed.  Care was taken to dayron the ceramic stylet at both on top and bottom of the depth stop.  At this time, the peel away sheath and the stylet was prepared and secured to the lock and dock apparatus.  The ceramic stylet was placed inside the peel away sheath and both were placed into the lock and dock apparatus.  Then, the peel away sheath was opened and peeled while pulling within the  lock and dock until the tip of the ceramic stylet was visible at the tip of the sheath.  The peel away sheath and the stylet was then finally secured.  This was done for both left and right side trajectory.     Insertion of the stylet and peel away sheath bilaterally     Attention was first turned to the right side.  The trajectory guide was in place.  Using a #11 blade and bipolar cautery, an opening was made in the dura as well as a small corticectomy.  No active bleeding was noted at this point.  Then, with the sheath and the stylet in the trajectory guide, it was slowly inserted until the lock and dock was at its final position, indicating that the target has been reached.  No abnormal resistance was noted.  Gelfoam with thrombin was placed around the stylet and DuraSeal was used to seal the entry point to the dura to minimize the cerebrospinal fluid loss and air entry.     Attention was then turned to the left side.  The trajectory guide was in place.  Using a #11 blade and bipolar cautery, an opening was made in the dura as well as a small corticectomy.  No active bleeding was noted at this point.  Then, with the sheath and the stylet in the trajectory guide, it was slowly inserted until the lock and dock was at its final position, indicating that the target has been reached.  No abnormal resistance was noted.  Gelfoam with thrombin was placed around the stylet and DuraSeal was used to seal the entry point to the dura to minimize the cerebrospinal fluid loss and air entry.     Both robotic knob control apparatus was disengaged.  The IMRIS MRI was again brought into the room for confirmation of the trajectory and location of the sheath and the stylet.  The images confirmed that both trajectories were satisfactory and that the sheath and stylet were on target. The IMRIS MRI was again taken out of the room.     Insertion of the DBS electrodes into the bilateral ANT and securing of the DBS electrodes bilaterally      Medtronic SenSight electrode was brought in for the right side.  It was the marked electrode.  The electrode was tested in the normal saline and the impedance values were within normal.  The electrode was marked out to the appropriate depth.  The ceramic stylet was then pulled out.  No active bleeding was noted.  Then, the SenSight DBS electrode was inserted into the sheath to the appropriate depth.  Then, another Medtronic SenSight electrode was brought in for the left side.  It was the unmarked electrode.  The electrode was tested in the normal saline and the impedance values were within normal.  The electrode was marked out to the appropriate depth.  The ceramic stylet was then pulled out.  No active bleeding was noted.  Then, the SenSight DBS electrode was inserted into the sheath to the appropriate depth.  The IMRIS MRI was then brought in again and scan was obtained to confirm proper placement of the bilateral electrodes.  Final radial placement error on the left side was 0.4 mm and on the right side was 0.7 mm. While the right side was >0.5 mm, the error was in the medial direction such that it was more in the thalamus and away from mamillothalamic tract and thus this was felt to be favorable     The IMRIS MRI was then taken out of the room and the electrodes were secured.  Attention was first turned to the right side.  The peel away sheath was pulled while pulling.  Care was taken to make sure that the DBS electrode is not pulled out.  We made sure that the white knob was secure.  With the sheath removed, the trajectory guide was pulled up.  This provided some room to access the entry point of the electrode at the dura.  The DuraSeal and Gelfoam was removed carefully and the area was generously irrigated.  No active bleeding was noted.  Then, new Gelfoam was placed followed by DuraSeal.  Then, the electrode clamp was brought in and placed and activated to secure the electrode.  The white knob was then  loosened to release the electrode and the inner stylet of the DBS electrode was pulled out.  Again, care was taken to not disturb or pull out the electrode.  Now with the electrode secure, the tower was disengaged and pulled away.  The electrode was further secured by the placement of the yuri hole cap.     Attention was then turned to the left side.  The peel away sheath was pulled while pulling.  Care was taken to make sure that the DBS electrode is not pulled out.  We made sure that the white knob was secure.  With the sheath removed, the trajectory guide was pulled up.  This provided some room to access the entry point of the electrode at the dura.  The DuraSeal and Gelfoam was removed carefully and the area was generously irrigated.  No active bleeding was noted.  Then, new Gelfoam was placed followed by DuraSeal.  Then, the electrode clamp was brought in and placed and activated to secure the electrode.  The white know was then loosened to release the electrode and the inner stylet of the DBS electrode was pulled out.  Again, care was taken to not disturb or pull out the electrode.  Now with the electrode secure, the tower was disengaged and pulled away.  The electrode was further secured by the placement of the yuri hole cap.     Removal of the tower bases     The attention was turned to the removal of the tower base.  These were removed by loosening the six scalp screws.  The offset screws were also loosened slightly.  The bases were removed without any difficulty.       The electrodes were tested and the impedance values were within normal.  The connecting portion of the electrodes were covered with a protective coverings and a dead-end connectors.  First the right side electrode was buried.  The connector portion of the electrode was inserted into the subgaleal space/pocket towards the right parietal area that was created at the beginning of the case.  The excess wire was also buried posteriorly in the  previously created pocket.  Then, the left side electrode was buried.  The connector portion of the electrode was inserted into the subgaleal space/pocket towards the right parietal area that was created at the beginning of the case.  It was also relatively posterior to the right side burial site.  Therefore, the right side electrode connector was located anterior to the left side electrode connector.  The excess wire was also buried posteriorly in the previous pocket.  The wounds were then generously irrigated.       Wound closure and end of procedure     We began closing the two bilateral frontal incisions.  They were both irrigated generously and meticulous hemostasis was obtained.  Both incisions were closed in similar fashion.  The galeal layer was reapproximated using 3-0 Vicryl sutures in an inverted interrupted fashion and the skin was reapproximated using 4-0 Monocryl suture in a running fashion.  Both incisions were cleaned and dried and prepped with ChoraPrep.  Then, a primipore sterile dressing was applied.     Then, the sterile drapes were removed.  Subsequently, the patient was taken out of the [facility] head fixation frame, [facility].  The four pin sites were clean and dry and no active bleeding was noted.  Antibiotic ointment and dry sterile dressing were applied to the pin sites.     Patient was further awakened, extubated and taken to the recovery room in a stable condition.  At the end of the case, all counts including needle, sponge and instrument counts were correct x 2.  There were no complications.     Ab PEÑALOZA MD Neurosurgery Attending, was present and scrubbed for the entire case and performed the key and critical portions of the case.

## 2024-04-23 NOTE — BRIEF OP NOTE
[facility]    Brief Operative Note    Pre-operative diagnosis: Partial symptomatic epilepsy with complex partial seizures, intractable, without status epilepticus (H) [G40.219]  Post-operative diagnosis Same as pre-operative diagnosis    Procedure: Intraoperative Magnetic Resonance Imaging (MRI)/stealth  assisted bilateral deep brain stimulator placement, phase I, placement of bilateral deep brain stimulator electrode, target anterior nucleus of thalamus, WITHOUT microelectrode recording, Bilateral - Head    Surgeon: Surgeons and Role:     * Ab Skinner MD - Primary     * Moises Gallardo MD - Resident - Assisting  Anesthesia: General   Estimated Blood Loss: 25 mL from 4/23/2024  8:00 AM to 4/23/2024  2:53 PM      Drains: None  Specimens: * No specimens in log *  Findings:   Good impedence   Complications: None.  Implants:   Implant Name Type Inv. Item Serial No.  Lot No. LRB No. Used Action   SenSight [place] Hole Device Kit    MEDTRONIC [id number] Left 1 Implanted   SenSight [place] Hole Device    MEDTRONIC [id number] Right 1 Implanted   IMP SCR SYN MATRIX LOW PRO 1.5X04MM SELF DRILL [id number] - [employer] IMP SCR SYN MATRIX LOW PRO 1.5X04MM SELF DRILL [id number] NA SYNTHES-STRATEC  Right 2 Implanted   SenSight Directional Lead Kit   [id number] MEDTRONIC  Right 1 Implanted   SenSight Directional Lead Kit   [id number] MEDTRONIC  Left 1 Implanted

## 2024-04-23 NOTE — ANESTHESIA PROCEDURE NOTES
Airway       Patient location during procedure: OR       Procedure Start/Stop Times: 4/23/2024 8:18 AM  Staff -        Anesthesiologist:  Naya Camarena MD       Resident/Fellow: Rosio Bergman MD       Performed By: resident  Consent for Airway        Urgency: elective  Indications and Patient Condition       Indications for airway management: brandon-procedural       Induction type:intravenous       Mask difficulty assessment: 1 - vent by mask    Final Airway Details       Final airway type: endotracheal airway       Successful airway: ETT - single and Oral  Endotracheal Airway Details        ETT size (mm): 7.0       Cuffed: yes       Successful intubation technique: direct laryngoscopy       DL Blade Type: MAC 3       Grade View of Cords: 1       Adjucts: stylet       Position: Right       Measured from: lips       Secured at (cm): 22       Bite block used: None    Post intubation assessment        Placement verified by: capnometry and equal breath sounds        Number of attempts at approach: 1       Number of other approaches attempted: 0       Secured with: silk tape       Ease of procedure: easy       Dentition: Intact and Unchanged    Medication(s) Administered   Medication Administration Time: 4/23/2024 8:18 AM

## 2024-04-24 VITALS
TEMPERATURE: 98.6 F | HEIGHT: 64 IN | WEIGHT: 120.15 LBS | BODY MASS INDEX: 20.51 KG/M2 | HEART RATE: 87 BPM | DIASTOLIC BLOOD PRESSURE: 74 MMHG | RESPIRATION RATE: 16 BRPM | OXYGEN SATURATION: 99 % | SYSTOLIC BLOOD PRESSURE: 107 MMHG

## 2024-04-24 LAB
ANION GAP SERPL CALCULATED.3IONS-SCNC: 8 MMOL/L (ref 7–15)
BASOPHILS # BLD AUTO: 0 10E3/UL (ref 0–0.2)
BASOPHILS NFR BLD AUTO: 0 %
BUN SERPL-MCNC: 9.9 MG/DL (ref 6–20)
CALCIUM SERPL-MCNC: 8.3 MG/DL (ref 8.6–10)
CHLORIDE SERPL-SCNC: 106 MMOL/L (ref 98–107)
CREAT SERPL-MCNC: 0.78 MG/DL (ref 0.51–0.95)
DEPRECATED HCO3 PLAS-SCNC: 28 MMOL/L (ref 22–29)
EGFRCR SERPLBLD CKD-EPI 2021: >90 ML/MIN/1.73M2
EOSINOPHIL # BLD AUTO: 0 10E3/UL (ref 0–0.7)
EOSINOPHIL NFR BLD AUTO: 1 %
ERYTHROCYTE [DISTWIDTH] IN BLOOD BY AUTOMATED COUNT: 11.9 % (ref 10–15)
GLUCOSE SERPL-MCNC: 118 MG/DL (ref 70–99)
HCT VFR BLD AUTO: 36.2 % (ref 35–47)
HGB BLD-MCNC: 11.6 G/DL (ref 11.7–15.7)
IMM GRANULOCYTES # BLD: 0 10E3/UL
IMM GRANULOCYTES NFR BLD: 0 %
LYMPHOCYTES # BLD AUTO: 0.8 10E3/UL (ref 0.8–5.3)
LYMPHOCYTES NFR BLD AUTO: 15 %
MCH RBC QN AUTO: 31 PG (ref 26.5–33)
MCHC RBC AUTO-ENTMCNC: 32 G/DL (ref 31.5–36.5)
MCV RBC AUTO: 97 FL (ref 78–100)
MONOCYTES # BLD AUTO: 0.4 10E3/UL (ref 0–1.3)
MONOCYTES NFR BLD AUTO: 8 %
NEUTROPHILS # BLD AUTO: 3.9 10E3/UL (ref 1.6–8.3)
NEUTROPHILS NFR BLD AUTO: 76 %
NRBC # BLD AUTO: 0 10E3/UL
NRBC BLD AUTO-RTO: 0 /100
PLATELET # BLD AUTO: 219 10E3/UL (ref 150–450)
POTASSIUM SERPL-SCNC: 3.5 MMOL/L (ref 3.4–5.3)
RBC # BLD AUTO: 3.74 10E6/UL (ref 3.8–5.2)
SODIUM SERPL-SCNC: 142 MMOL/L (ref 135–145)
WBC # BLD AUTO: 5.2 10E3/UL (ref 4–11)

## 2024-04-24 PROCEDURE — 250N000011 HC RX IP 250 OP 636

## 2024-04-24 PROCEDURE — 36415 COLL VENOUS BLD VENIPUNCTURE: CPT

## 2024-04-24 PROCEDURE — 80048 BASIC METABOLIC PNL TOTAL CA: CPT

## 2024-04-24 PROCEDURE — 250N000011 HC RX IP 250 OP 636: Performed by: NURSE PRACTITIONER

## 2024-04-24 PROCEDURE — 85048 AUTOMATED LEUKOCYTE COUNT: CPT

## 2024-04-24 PROCEDURE — 250N000013 HC RX MED GY IP 250 OP 250 PS 637: Performed by: NEUROLOGICAL SURGERY

## 2024-04-24 PROCEDURE — 250N000013 HC RX MED GY IP 250 OP 250 PS 637

## 2024-04-24 RX ORDER — IBUPROFEN 200 MG
600 TABLET ORAL DAILY PRN
Status: ON HOLD | COMMUNITY
End: 2024-04-24

## 2024-04-24 RX ORDER — OXYCODONE HYDROCHLORIDE 5 MG/1
5 TABLET ORAL EVERY 6 HOURS PRN
Qty: 15 TABLET | Refills: 0 | Status: ON HOLD | OUTPATIENT
Start: 2024-04-24 | End: 2024-04-29

## 2024-04-24 RX ORDER — CEFAZOLIN SODIUM 2 G/100ML
2 INJECTION, SOLUTION INTRAVENOUS EVERY 8 HOURS
Status: COMPLETED | OUTPATIENT
Start: 2024-04-24 | End: 2024-04-24

## 2024-04-24 RX ORDER — AMOXICILLIN 250 MG
1 CAPSULE ORAL 2 TIMES DAILY
Qty: 20 TABLET | Refills: 0 | Status: SHIPPED | OUTPATIENT
Start: 2024-04-24

## 2024-04-24 RX ORDER — ACETAMINOPHEN 500 MG
1000 TABLET ORAL EVERY 6 HOURS PRN
Status: ON HOLD | COMMUNITY
End: 2024-04-29

## 2024-04-24 RX ORDER — CARBAMAZEPINE 200 MG/1
200 CAPSULE, EXTENDED RELEASE ORAL EVERY EVENING
COMMUNITY
End: 2024-05-15

## 2024-04-24 RX ORDER — CARBAMAZEPINE 300 MG/1
600 CAPSULE, EXTENDED RELEASE ORAL 2 TIMES DAILY
COMMUNITY
End: 2024-05-15

## 2024-04-24 RX ADMIN — BUSPIRONE HYDROCHLORIDE 15 MG: 15 TABLET ORAL at 07:48

## 2024-04-24 RX ADMIN — ACETAMINOPHEN 975 MG: 325 TABLET, FILM COATED ORAL at 07:47

## 2024-04-24 RX ADMIN — CEFAZOLIN SODIUM 2 G: 2 INJECTION, SOLUTION INTRAVENOUS at 04:20

## 2024-04-24 RX ADMIN — OXYCODONE HYDROCHLORIDE 5 MG: 5 TABLET ORAL at 01:18

## 2024-04-24 RX ADMIN — LEVETIRACETAM 1500 MG: 750 TABLET, FILM COATED ORAL at 07:48

## 2024-04-24 RX ADMIN — DOCUSATE SODIUM 50 MG AND SENNOSIDES 8.6 MG 1 TABLET: 8.6; 5 TABLET, FILM COATED ORAL at 07:47

## 2024-04-24 RX ADMIN — CARBAMAZEPINE 600 MG: 300 CAPSULE, EXTENDED RELEASE ORAL at 07:47

## 2024-04-24 RX ADMIN — CEFAZOLIN SODIUM 2 G: 2 INJECTION, SOLUTION INTRAVENOUS at 09:52

## 2024-04-24 RX ADMIN — OXYCODONE HYDROCHLORIDE 5 MG: 5 TABLET ORAL at 11:00

## 2024-04-24 RX ADMIN — LAMOTRIGINE 200 MG: 200 TABLET ORAL at 07:47

## 2024-04-24 RX ADMIN — OXYCODONE HYDROCHLORIDE 5 MG: 5 TABLET ORAL at 07:00

## 2024-04-24 RX ADMIN — ACETAMINOPHEN 975 MG: 325 TABLET, FILM COATED ORAL at 01:18

## 2024-04-24 RX ADMIN — VENLAFAXINE HYDROCHLORIDE 225 MG: 225 TABLET, EXTENDED RELEASE ORAL at 07:46

## 2024-04-24 ASSESSMENT — ACTIVITIES OF DAILY LIVING (ADL)
ADLS_ACUITY_SCORE: 33
ADLS_ACUITY_SCORE: 25
ADLS_ACUITY_SCORE: 33
ADLS_ACUITY_SCORE: 25
ADLS_ACUITY_SCORE: 33
ADLS_ACUITY_SCORE: 25

## 2024-04-24 NOTE — PLAN OF CARE
Goal Outcome Evaluation:      Plan of Care Reviewed With: patient    Overall Patient Progress: improvingOverall Patient Progress: improving     Discharge to: Hotel w friend   Transportation: Friend, Berta, contact info in chart  Time: 1305  Prescriptions: None brought from home, new sent to discharge pharmacy  Belongings: All packed and brought w pt  Access: 1 R PIV 1 L PIV removed  Care plan and education discontinued: yes  Paperwork: AVS printed and discussed w pt and friend. Both asked questions, confirmed understanding, and took AVS w them upon leaving.     While w this RN from [id number]: A&Ox4, VSS on RA. Pain managed w tylenol and oxy. Bilateral hand tremors noted, pt states is baseline. Capno in place until discharge stats WDL. No BM, voids spont, menses. 2 incisions covered w primapore, 2 pin sites covered w primapore, all uncovered and open to air per team before pt left, incision and pin sites WDL. Indp at time of discharge. Abx course completed.

## 2024-04-24 NOTE — DISCHARGE SUMMARY
[facility] Discharge Summary and Instructions    Vernell Logan MRN# [id number]   Age: 43 year old YOB: 1980     Date of Admission:  4/23/2024  Date of Discharge::  4/24/2024  Admitting Physician:  Ab Skinner MD  Discharge Physician:  Ab Skinner MD          Admission Diagnoses:   Partial symptomatic epilepsy with complex partial seizures, intractable, without status epilepticus (H) [G40.219]          Discharge Diagnosis:     Partial symptomatic epilepsy with complex partial seizures, intractable, without status epilepticus (H) [G40.219]            Procedures:   Phase 1 trans-ventricular placement of bilateral deep brain stimulator electrodes in the anterior nucleus of the thalamus            Brief History of Illness:   Vernell Logan is a 43 year old female who has medically refractory epilepsy. After discussion at the epilepsy conference, she was found to be a candidate for DBS for treatment of epilepsy.  Patient has elected to undergo above-mentioned procedure.           Hospital Course:   Patient underwent above-mentioned procedure on 4/23/2024.  Following the procedure the patient was transferred to surgical floor. On post operative day 1, she was ambulating, voiding without a bah, eating a regular diet, pain was well controlled and therefore she was discharged to home. Patient will follow-up with Neurosurgery ANGELA in 2 weeks           Discharge Medications:     Current Discharge Medication List        START taking these medications    Details   oxyCODONE (ROXICODONE) 5 MG tablet Take 1 tablet (5 mg) by mouth every 6 hours as needed for moderate pain  Qty: 15 tablet, Refills: 0    Associated Diagnoses: Intractable focal epilepsy (H)      senna-docusate (SENOKOT-S/PERICOLACE) 8.6-50 MG tablet Take 1 tablet by mouth 2 times daily  Qty: 20 tablet, Refills: 0    Associated Diagnoses: Intractable focal epilepsy (H)           CONTINUE these medications which have NOT CHANGED     Details   Acetaminophen (TYLENOL PO) Take by mouth as needed for mild pain or fever    Associated Diagnoses: Localization-related (focal) (partial) epilepsy and epileptic syndromes with complex partial seizures, with intractable epilepsy      albuterol (PROAIR HFA/PROVENTIL HFA/VENTOLIN HFA) 108 (90 Base) MCG/ACT inhaler INHALE 2 PUFFS EVERY 4 HOURS AS NEEDED FOR SHORTNESS OF BREATH OR WHEEZING  Qty: 18 g, Refills: 9    Associated Diagnoses: Chronic cough      BUSPIRONE HCL PO Take 15 mg by mouth 2 times daily  Refills: 1      !! carBAMazepine (CARBATROL) 200 MG 12 hr capsule take ONE CAPSULE (200 MG) BY MOUTH EVERY DAY AT NOON AND ONE CAPSULE AT night (night DOSE along with 600 MG CAPSULE)  Qty: 180 capsule, Refills: 3    Associated Diagnoses: Major depressive disorder, recurrent episode, mild (H24); Partial epilepsy with impairment of consciousness, intractable (H); 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); High-risk pregnancy in second trimester; Twin gestation in first trimester, unspecified multiple gestation type      !! carBAMazepine (CARBATROL) 300 MG 12 hr capsule 2 tablet morning and 2 tablet night. (Take along with carbamazepine 200 mg capsule for total dose on 600mg morning- 200mg noon- 800mg night)  Qty: 360 capsule, Refills: 3    Associated Diagnoses: Major depressive disorder, recurrent episode, mild (H24); Partial epilepsy with impairment of consciousness, intractable (H); 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); High-risk pregnancy in second trimester; Twin gestation in first trimester, unspecified multiple gestation type      clonazePAM (KLONOPIN) 1 MG tablet Take 1 mg by mouth nightly as needed       Ferrous Sulfate (IRON) 325 (65 Fe) MG tablet Take 1 tablet by mouth daily before breakfast  Qty: 90 tablet, Refills: 0    Comments: This prescription was filled on 2/16/2024. Any refills authorized will  "be placed on file.  Associated Diagnoses: Anemia, unspecified type      lamoTRIgine (LAMICTAL) 200 MG tablet Take 1 tablet (200 mg) by mouth 3 times daily (Office visit due before next refill.  Please call [phone] to schedule.) Thank you  Qty: 270 tablet, Refills: 3    Associated Diagnoses: Major depressive disorder, recurrent episode, mild (H24); Partial epilepsy with impairment of consciousness, intractable (H); 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); High-risk pregnancy in second trimester; Twin gestation in first trimester, unspecified multiple gestation type      levETIRAcetam (KEPPRA) 500 MG tablet Take THREE tablets (1,500 MG) by MOUTH TWO times daily  Qty: 540 tablet, Refills: 3    Associated Diagnoses: 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)      Multiple Vitamins-Minerals (MULTIVITAMIN ADULTS) TABS Take 1 tablet by mouth every morning      VENLAFAXINE HCL ER  mg every morning  Refills: 0      VITAMIN C 1000 MG OR TABS Take 1,000 mg by mouth as needed       !! - Potential duplicate medications found. Please discuss with provider.        STOP taking these medications       ibuprofen (ADVIL/MOTRIN) 200 MG capsule Comments:   Reason for Stopping:                      Exam:   Physical Exam  /62 (BP Location: Left arm)   Pulse 72   Temp 98.6  F (37  C) (Oral)   Resp 14   Ht 1.626 m (5' 4\")   Wt 54.5 kg (120 lb 2.4 oz)   LMP 04/19/2024 (Exact Date)   SpO2 100%   BMI 20.62 kg/m    General: Appears comfortable, NAD  Wound: Incision, clean, dry, intact without strikethrough  Neurologic Exam:  - AOx3.  - Follows commands.  - Speech fluent, spontaneous. No aphasia or dysarthria.  - No gaze preference. No apparent hemineglect.  - PERRL, EOMI.  - Face symmetric with sensation intact to light touch.  - Palate elevates symmetrically, uvula midline, tongue protrudes midline.  - Trapezii and " sternocleidomastoid muscles 5/5 bilaterally.  - No pronator drift.  Motor: Normal bulk/tone; no rigidity  Sensation intact in bilateral L4-S1 dermatones              Discharge Instructions and Follow-Up:     Discharge diet: Regular   Discharge activity: Do not do any bending, twisting, strenuous exercise, or heavy lifting (greater than 10 pounds) for 4-6 weeks. Be careful and ask for assistance when walking or going up and down stairs. Avoid any activities that could result in trauma to the surgical wound. Do not drive within 3 months of having your last seizure or while using narcotics or other sedating medications, such as sleep aids, muscle relaxants, etc.     Discharge follow-up: Follow-up with Neurosurgery [facility]GELA in 2 weeks for wound check/post-hospital evaluation, all additional follow-up visits to be determined by Dr. Ab Skinner MD       Wound care:   - Your sutures are absorbable.     Wound care  - You are ok to shower, but do not soak your incisions. Pat them dry if they get damp.   - Avoid coloring your hair or permanent styling until cleared by your surgeon  - No baths, hot tubs or pools for 4-6 weeks after surgery.       Call if you have any of the followin. Temperature greater than 101.5 F.   2. Any redness, swelling or discharge from the wound.   3. Any new weakness, numbness or altered mental status.  4. Worsening pain that is not improving with the pain medications you were prescribed.     Call [phone] or after 5:00 pm or on weekends call [phone] and ask for the neurosurgery resident on call. Thank You.                  Discharge Disposition:     Discharged to home        ALONDRA Fuentes, CNP  Neurosurgery  Pager [phone]

## 2024-04-24 NOTE — PHARMACY-ADMISSION MEDICATION HISTORY
Pharmacist Admission Medication History    Admission medication history is complete. The information provided in this note is only as accurate as the sources available at the time of the update.    Information Source(s): Patient and CareEverywhere/SureScripts via in-person    Pertinent Information:   Patient was a good historian of their medications and denied the use of any other prescription or over the counter medications.     Changes made to PTA medication list:  Added: None  Deleted: None  Changed:   Carbamazepine 200 mg 12 hr capsule - take ONE CAPSULE (200 MG) BY MOUTH EVERY DAY AT NOON AND ONE CAPSULE AT night (night DOSE along with 600 MG CAPSULE) --> take 200 mg by mouth every evening take along with 600 mg in the evening for total daily dose of 600 mg in AM and 800 mg in PM (per patient, does not take a 200 mg capsule at noon)  Carbamazepine 300 mg 12 hr capsule - 2 tablet morning and 2 tablet night. (Take along with carbamazepine 200 mg capsule for total dose on 600mg morning- 200mg noon- 800mg night) --> Take 600 mg by mouth 2 times daily for evening dose take an additional 200 mg capsule for total daily dose of 600 mg in AM and 800 mg in PM (per patient, does not take a 200 mg capsule at noon)    Allergies reviewed with patient and updates made in EHR: yes    Medication History Completed By: Juventino Condon RPH 4/24/2024 10:07 AM    PTA Med List   Medication Sig Last Dose    acetaminophen (TYLENOL) 500 MG tablet Take 1,000 mg by mouth every 6 hours as needed for mild pain 4/23/2024 at 0400    albuterol (PROAIR HFA/PROVENTIL HFA/VENTOLIN HFA) 108 (90 Base) MCG/ACT inhaler INHALE 2 PUFFS EVERY 4 HOURS AS NEEDED FOR SHORTNESS OF BREATH OR WHEEZING More than a month    busPIRone (BUSPAR) 15 mg tablet Take 15 mg by mouth 2 times daily 4/23/2024 at 0530    carBAMazepine (CARBATROL) 200 MG 12 hr capsule Take 200 mg by mouth every evening take along with 600 mg in the evening for total daily dose of 600 mg in  AM and 800 mg in PM 4/22/2024 at 2100    carBAMazepine (CARBATROL) 300 MG 12 hr capsule Take 600 mg by mouth 2 times daily for evening dose take an additional 200 mg capsule for total daily dose of 600 mg in AM and 800 mg in PM 4/23/2024 at 0530    clonazePAM (KLONOPIN) 1 MG tablet Take 1 mg by mouth nightly as needed  More than a month    Ferrous Sulfate (IRON) 325 (65 Fe) MG tablet Take 1 tablet by mouth daily before breakfast Past Month    ibuprofen (ADVIL/MOTRIN) 200 MG tablet Take 600 mg by mouth daily as needed for pain 4/22/2024 at 0800    lamoTRIgine (LAMICTAL) 200 MG tablet Take 1 tablet (200 mg) by mouth 3 times daily (Office visit due before next refill.  Please call [phone] to schedule.) Thank you 4/23/2024 at 0530    levETIRAcetam (KEPPRA) 500 MG tablet Take THREE tablets (1,500 MG) by MOUTH TWO times daily 4/23/2024 at 0530    Multiple Vitamins-Minerals (MULTIVITAMIN ADULTS) TABS Take 1 tablet by mouth every morning Past Month    VENLAFAXINE HCL ER PO Take 225 mg by mouth every morning 4/23/2024 at 0530    VITAMIN C 1000 MG OR TABS Take 1,000 mg by mouth as needed 4/19/2024

## 2024-04-24 NOTE — PLAN OF CARE
Status: DBS Phase 1 ; Partial symptomatic epilepsy with complex partial seizures, intractable, without status epilepticus   Vitals: VSS on RA   Neuros: A&Ox4 strength 4/5 Denies N/T Bilateral hand tremors.  IV: PIV infusing NS @50mL/hr in between abx   Resp/trach: CAPNO overnight pulse ox on RA  Diet: Regular diet, nauseous/emesis yesterday evening IV zofran x1. Denies nausea Overnight   Bowel status: PTA BS+ BM given  : Voiding via BR, Menses  Skin: 4 pin sites covered w/ prima pore, R ear birthmark  Pain: Mild HA/ Incisional pain managed w/ scheduled tylenol, PRN oxy 5mg, Dilaudid x1. Rates pain 6-7/10  Activity: SBA GB  Plan: Plan to Discharge today after last antibiotic.     Arrived from: PACU   Belongings/meds: With pts no meds  2 RN Skin Assessment Completed by: Isaiah Montgomery     Non-intact findings documented (yes/no/NA): 4 pin sites covered w/ primapore. R ear birthmark

## 2024-04-25 ENCOUNTER — PATIENT OUTREACH (OUTPATIENT)
Dept: NEUROSURGERY | Facility: CLINIC | Age: 44
End: 2024-04-25
Payer: MEDICARE

## 2024-04-25 NOTE — PROGRESS NOTES
[facility]: Post-Discharge Note  SITUATION                                                      Admission:    Admission Date: 04/23/24   Reason for Admission: Intraoperative Magnetic Resonance Imaging (MRI)/stealth  assisted bilateral deep brain stimulator placement, phase I, placement of bilateral deep brain stimulator electrode, target anterior nucleus of thalamus, WITHOUT microelectrode recording, Bilateral  Discharge:   Discharge Date: 04/24/24  Discharge Diagnosis: Partial symptomatic epilepsy with complex partial seizures, intractable, without status epilepticus    BACKGROUND                                                      Per hospital discharge summary and inpatient provider notes:  Neurosurgery Discharge Coordination Note     Attending physician: Dr. Skinner  Discharge to: Home     Current status: Patient states her bilateral incision pain is only helped minimally with 5 mg oxycodone q6 and Tylenol (taking them together). Pt said dilauded worked better for her while inpatient and she is requesting to switch to dilauded. Will follow-up with Dr. Skinner. In the meantime, pt could try staggering the oxy and Tylenol so she has pain medication on board before it's time for the next dose.     Denies redness, swelling, increased tenderness, drainage, incisions opening or elevated temp. Denies current bowel or bladder issues.    Discharge instructions and medications reviewed with patient. Also reviewed instructions for pt's 4/29 surgery.   Follow up appointments/imaging/tests needed: DBS battery placement 4/29 at 3:25 p.m. Arrive on unit 3C at 1:25 p.m.    RN triage/on call number given: [phone]/ [phone]      ASSESSMENT           Discharge Assessment  How are your symptoms? (Red Flag symptoms escalate to triage hotline per guidelines): Improved  Do you feel your condition is stable enough to be safe at home until your provider visit?: Yes  Does the patient have their discharge instructions?  : Yes  Does the patient have questions regarding their discharge instructions? : No  Were you started on any new medications or were there changes to any of your previous medications? : Yes  Does the patient have all of their medications?: Yes  Do you have questions regarding any of your medications? :  (Requesting dilauded in place of oxycodone)  Discharge follow-up appointment scheduled within 14 calendar days? : Yes (DBS battery placement 4/29/24)         Post-op (Clinicians Only)  Did the patient have surgery or a procedure: Yes  Incision: closed;healing  Drainage: No  Bleeding: none  Fever: No  Chills: No  Redness: No  Warmth: No  Swelling: No  Incision site pain: Yes  Closure: suture;dissolving  Eating & Drinking: eating and drinking without complaints/concerns  PO Intake: regular diet  Bowel Function: normal  Urinary Status: voiding without complaint/concerns        PLAN                                                      Outpatient Plan:  Routine postop follow-up      Future Appointments   Date Time Provider Department Center   5/9/2024 10:30 AM Ginger Medina APRN [facility]         For any urgent concerns, please contact our 24 hour nurse triage line: [phone] ([phone])         PAT LEROY RN

## 2024-04-26 ENCOUNTER — TELEPHONE (OUTPATIENT)
Dept: NEUROSURGERY | Facility: CLINIC | Age: 44
End: 2024-04-26
Payer: MEDICARE

## 2024-04-26 DIAGNOSIS — Z96.89 S/P DEEP BRAIN STIMULATOR PLACEMENT: Primary | ICD-10-CM

## 2024-04-26 RX ORDER — HYDROMORPHONE HYDROCHLORIDE 2 MG/1
1 TABLET ORAL EVERY 6 HOURS PRN
Qty: 14 TABLET | Refills: 0 | Status: SHIPPED | OUTPATIENT
Start: 2024-04-26 | End: 2024-05-03

## 2024-04-26 NOTE — TELEPHONE ENCOUNTER
Called pt to let her know that the dilauded prescription was sent to her pharmacy. Pt should discontinue oxycodone while taking dilauded. Half tab every 6 hours as needed. Pt expressed understanding and said her pain seems to be improving. Nothing further at this time.

## 2024-04-28 ENCOUNTER — ANESTHESIA EVENT (OUTPATIENT)
Dept: SURGERY | Facility: CLINIC | Age: 44
End: 2024-04-28
Payer: MEDICARE

## 2024-04-28 ASSESSMENT — ENCOUNTER SYMPTOMS
SEIZURES: 1
ORTHOPNEA: 0

## 2024-04-28 NOTE — ANESTHESIA PREPROCEDURE EVALUATION
Anesthesia Pre-Procedure Evaluation    Patient: Vernell Logan   MRN: [id number] : 1980        Procedure : Procedure(s):  Deep brain stimulator placement, phase II, placement of deep brain stimulator generator/battery over the right chest wall          Past Medical History:   Diagnosis Date    Anorexia 3/5/2013    Anxiety     Depressive disorder 1999    Heart murmur     told benign    Localization-related epilepsy (H)     Major depression     Migraine     Seizure disorder (H)     working with [employer], really bad episodes     Seizures (H)     Status post left breast biopsy,sclerosing adenosis not concordant with imaging,12      Past Surgical History:   Procedure Laterality Date    C/SECTION, LOW TRANSVERSE  , 2018    x 2     SECTION, TUBAL LIGATION, COMBINED      INSERTION, ELECTRODE LEADS, DEEP BRAIN STIMULATOR, BILATERAL, USING OPTICAL TRACKING SYSTEM, WITH MRI GUIDANCE Bilateral 2024    Procedure: Intraoperative Magnetic Resonance Imaging (MRI)/stealth  assisted bilateral deep brain stimulator placement, phase I, placement of bilateral deep brain stimulator electrode, target anterior nucleus of thalamus, WITHOUT microelectrode recording;  Surgeon: Ab Skinner MD;  Location: [facility] OR    IR CAROTID CEREBRAL ANGIOGRAM BILATERAL  10/6/2022      Allergies   Allergen Reactions    Chantix [Varenicline]      Suicidal thoughts       Social History     Tobacco Use    Smoking status: Former     Current packs/day: 0.00     Average packs/day: 1 pack/day for 26.0 years (26.0 ttl pk-yrs)     Types: Cigarettes     Start date: 1995     Quit date: 2021     Years since quittin.7     Passive exposure: Past    Smokeless tobacco: Former    Tobacco comments:     1 pack    Substance Use Topics    Alcohol use: Not Currently     Comment: 0-2 per year       Wt Readings from Last 1 Encounters:   24 54.5 kg (120 lb 2.4 oz)        Anesthesia Evaluation   Pt has had  "prior anesthetic.     No history of anesthetic complications       ROS/MED HX  ENT/Pulmonary:    (-) asthma and recent URI   Neurologic:     (+)      migraines, seizures, last seizure: 4/6/24, features: was \"totally aware\" with last seizure.  Last grand mal was about a month ago.,                       Cardiovascular:  - neg cardiovascular ROS   (+)  - -   -  - -                                 Previous cardiac testing   Echo: Date: Results:    Stress Test:  Date: Results:    ECG Reviewed:  Date: 2022 Results:  NSR  Cath:  Date: Results:   (-) KEENAN and orthopnea/PND   METS/Exercise Tolerance: >4 METS Comment: Reports that she does a lot of walking for exercise.  Is very active with her 6 year old twins also.  Denies any exertional dyspnea or angina.     Hematologic:  - neg hematologic  ROS     Musculoskeletal: Comment: Chronic middle back pain from history of old vertebral fracture - manages with ibuprofen or tylenol      GI/Hepatic:  - neg GI/hepatic ROS     Renal/Genitourinary:  - neg Renal ROS     Endo:  - neg endo ROS     Psychiatric/Substance Use:     (+) psychiatric history anxiety and depression (hx of anorexia - stable)       Infectious Disease:  - neg infectious disease ROS     Malignancy:  - neg malignancy ROS     Other:  - neg other ROS          Physical Exam    Airway        Mallampati: II   TM distance: < 3 FB   Neck ROM: full   Mouth opening: > 3 cm    Respiratory Devices and Support         Dental       (+) Minor Abnormalities - some fillings, tiny chips      Cardiovascular   cardiovascular exam normal          Pulmonary   pulmonary exam normal                OUTSIDE LABS:  CBC:   Lab Results   Component Value Date    WBC 5.2 04/24/2024    WBC 4.6 04/23/2024    HGB 11.6 (L) 04/24/2024    HGB 12.2 04/23/2024    HCT 36.2 04/24/2024    HCT 37.3 04/23/2024     04/24/2024     04/23/2024     BMP:   Lab Results   Component Value Date     04/24/2024     04/23/2024    POTASSIUM 3.5 " 04/24/2024    POTASSIUM 4.0 04/23/2024    CHLORIDE 106 04/24/2024    CHLORIDE 101 04/23/2024    CO2 28 04/24/2024    CO2 26 04/23/2024    BUN 9.9 04/24/2024    BUN 11.8 04/23/2024    CR 0.78 04/24/2024    CR 0.78 04/23/2024     (H) 04/24/2024    GLC 98 04/23/2024     COAGS:   Lab Results   Component Value Date    PTT 31 04/23/2024    INR 0.99 04/23/2024     POC:   Lab Results   Component Value Date    HCGS Positive (A) 10/30/2017     HEPATIC:   Lab Results   Component Value Date    ALBUMIN 3.6 04/28/2022    PROTTOTAL 6.8 04/28/2022    ALT 33 06/30/2022    AST 27 06/30/2022    ALKPHOS 116 04/28/2022    BILITOTAL 0.2 04/28/2022     OTHER:   Lab Results   Component Value Date    A1C 4.9 07/25/2019    EULALIA 8.3 (L) 04/24/2024    PHOS 2.7 03/14/2008    TSH 0.57 04/28/2022    T4 1.05 10/30/2017       Anesthesia Plan    ASA Status:  3       Anesthesia Type: General.     - Airway: ETT   Induction: Intravenous.   Maintenance: Inhalation.   Techniques and Equipment:     - Airway: Video-Laryngoscope     - Lines/Monitors: 2nd IV     Consents    Anesthesia Plan(s) and associated risks, benefits, and realistic alternatives discussed. Questions answered and patient/representative(s) expressed understanding.     - Discussed: Risks, Benefits and Alternatives for BOTH SEDATION and the PROCEDURE were discussed     - Discussed with:  Patient      - Extended Intubation/Ventilatory Support Discussed: No.      - Patient is DNR/DNI Status: No     Use of blood products discussed: No .     Postoperative Care    Pain management: IV analgesics.   PONV prophylaxis: Ondansetron (or other 5HT-3), Dexamethasone or Solumedrol     Comments:               Klaus Duenas MD    I have reviewed the pertinent notes and labs in the chart from the past 30 days and (re)examined the patient.  Any updates or changes from those notes are reflected in this note.

## 2024-04-29 ENCOUNTER — HOSPITAL ENCOUNTER (OUTPATIENT)
Facility: CLINIC | Age: 44
Discharge: HOME | End: 2024-04-29
Attending: NEUROLOGICAL SURGERY | Admitting: NEUROLOGICAL SURGERY
Payer: MEDICARE

## 2024-04-29 ENCOUNTER — ANESTHESIA (OUTPATIENT)
Dept: SURGERY | Facility: CLINIC | Age: 44
End: 2024-04-29
Payer: MEDICARE

## 2024-04-29 VITALS
TEMPERATURE: 99 F | RESPIRATION RATE: 17 BRPM | DIASTOLIC BLOOD PRESSURE: 80 MMHG | HEIGHT: 64 IN | WEIGHT: 121.25 LBS | BODY MASS INDEX: 20.7 KG/M2 | HEART RATE: 75 BPM | OXYGEN SATURATION: 99 % | SYSTOLIC BLOOD PRESSURE: 132 MMHG

## 2024-04-29 DIAGNOSIS — G40.219 PARTIAL SYMPTOMATIC EPILEPSY WITH COMPLEX PARTIAL SEIZURES, INTRACTABLE, WITHOUT STATUS EPILEPTICUS (H): Primary | ICD-10-CM

## 2024-04-29 PROCEDURE — C1787 PATIENT PROGR, NEUROSTIM: HCPCS | Performed by: NEUROLOGICAL SURGERY

## 2024-04-29 PROCEDURE — 250N000011 HC RX IP 250 OP 636: Performed by: NURSE ANESTHETIST, CERTIFIED REGISTERED

## 2024-04-29 PROCEDURE — 272N000001 HC OR GENERAL SUPPLY STERILE: Performed by: NEUROLOGICAL SURGERY

## 2024-04-29 PROCEDURE — 61886 IMPLANT NEUROSTIM ARRAYS: CPT | Mod: 58 | Performed by: NEUROLOGICAL SURGERY

## 2024-04-29 PROCEDURE — 370N000017 HC ANESTHESIA TECHNICAL FEE, PER MIN: Performed by: NEUROLOGICAL SURGERY

## 2024-04-29 PROCEDURE — 61886 IMPLANT NEUROSTIM ARRAYS: CPT | Performed by: ANESTHESIOLOGY

## 2024-04-29 PROCEDURE — 278N000051 HC OR IMPLANT GENERAL: Performed by: NEUROLOGICAL SURGERY

## 2024-04-29 PROCEDURE — 250N000011 HC RX IP 250 OP 636

## 2024-04-29 PROCEDURE — 710N000010 HC RECOVERY PHASE 1, LEVEL 2, PER MIN: Performed by: NEUROLOGICAL SURGERY

## 2024-04-29 PROCEDURE — 250N000009 HC RX 250: Performed by: NURSE ANESTHETIST, CERTIFIED REGISTERED

## 2024-04-29 PROCEDURE — 250N000025 HC SEVOFLURANE, PER MIN: Performed by: NEUROLOGICAL SURGERY

## 2024-04-29 PROCEDURE — C1820 GENERATOR NEURO RECHG BAT SY: HCPCS | Performed by: NEUROLOGICAL SURGERY

## 2024-04-29 PROCEDURE — 258N000003 HC RX IP 258 OP 636: Performed by: STUDENT IN AN ORGANIZED HEALTH CARE EDUCATION/TRAINING PROGRAM

## 2024-04-29 PROCEDURE — 250N000009 HC RX 250: Performed by: NEUROLOGICAL SURGERY

## 2024-04-29 PROCEDURE — 360N000077 HC SURGERY LEVEL 4, PER MIN: Performed by: NEUROLOGICAL SURGERY

## 2024-04-29 PROCEDURE — 999N000141 HC STATISTIC PRE-PROCEDURE NURSING ASSESSMENT: Performed by: NEUROLOGICAL SURGERY

## 2024-04-29 PROCEDURE — C1883 ADAPT/EXT, PACING/NEURO LEAD: HCPCS | Performed by: NEUROLOGICAL SURGERY

## 2024-04-29 PROCEDURE — 61886 IMPLANT NEUROSTIM ARRAYS: CPT | Performed by: NURSE ANESTHETIST, CERTIFIED REGISTERED

## 2024-04-29 PROCEDURE — C1713 ANCHOR/SCREW BN/BN,TIS/BN: HCPCS | Performed by: NEUROLOGICAL SURGERY

## 2024-04-29 PROCEDURE — 250N000011 HC RX IP 250 OP 636: Performed by: STUDENT IN AN ORGANIZED HEALTH CARE EDUCATION/TRAINING PROGRAM

## 2024-04-29 PROCEDURE — 710N000012 HC RECOVERY PHASE 2, PER MINUTE: Performed by: NEUROLOGICAL SURGERY

## 2024-04-29 PROCEDURE — 250N000011 HC RX IP 250 OP 636: Performed by: NEUROLOGICAL SURGERY

## 2024-04-29 PROCEDURE — 250N000013 HC RX MED GY IP 250 OP 250 PS 637

## 2024-04-29 DEVICE — IMP SCR SYN MATRIX LOW PRO 1.5X04MM SELF DRILL 04.503.104.01: Type: IMPLANTABLE DEVICE | Site: CRANIAL | Status: FUNCTIONAL

## 2024-04-29 DEVICE — NEUROSTIMULATOR IMPLANTABLE 2CHANNEL PERCEPT 68X51MM B35200: Type: IMPLANTABLE DEVICE | Site: CHEST  WALL | Status: FUNCTIONAL

## 2024-04-29 DEVICE — IMPLANTABLE DEVICE: Type: IMPLANTABLE DEVICE | Site: CRANIAL | Status: FUNCTIONAL

## 2024-04-29 DEVICE — IMP PLATE SYN BURR HOLE STRAIGHT 12MM 4H 04.503.063: Type: IMPLANTABLE DEVICE | Site: CRANIAL | Status: FUNCTIONAL

## 2024-04-29 RX ORDER — SODIUM CHLORIDE, SODIUM LACTATE, POTASSIUM CHLORIDE, CALCIUM CHLORIDE 600; 310; 30; 20 MG/100ML; MG/100ML; MG/100ML; MG/100ML
INJECTION, SOLUTION INTRAVENOUS CONTINUOUS
Status: DISCONTINUED | OUTPATIENT
Start: 2024-04-29 | End: 2024-04-29 | Stop reason: HOSPADM

## 2024-04-29 RX ORDER — DEXAMETHASONE SODIUM PHOSPHATE 4 MG/ML
INJECTION, SOLUTION INTRA-ARTICULAR; INTRALESIONAL; INTRAMUSCULAR; INTRAVENOUS; SOFT TISSUE PRN
Status: DISCONTINUED | OUTPATIENT
Start: 2024-04-29 | End: 2024-04-29

## 2024-04-29 RX ORDER — CEPHALEXIN 500 MG/1
500 CAPSULE ORAL 4 TIMES DAILY
Qty: 28 CAPSULE | Refills: 0 | Status: SHIPPED | OUTPATIENT
Start: 2024-04-29 | End: 2024-05-06

## 2024-04-29 RX ORDER — ACETAMINOPHEN 500 MG
500-1000 TABLET ORAL EVERY 6 HOURS PRN
Qty: 30 TABLET | Refills: 0 | Status: SHIPPED | OUTPATIENT
Start: 2024-04-29

## 2024-04-29 RX ORDER — ONDANSETRON 2 MG/ML
4 INJECTION INTRAMUSCULAR; INTRAVENOUS EVERY 30 MIN PRN
Status: DISCONTINUED | OUTPATIENT
Start: 2024-04-29 | End: 2024-05-02 | Stop reason: HOSPADM

## 2024-04-29 RX ORDER — FENTANYL CITRATE 50 UG/ML
25 INJECTION, SOLUTION INTRAMUSCULAR; INTRAVENOUS EVERY 5 MIN PRN
Status: DISCONTINUED | OUTPATIENT
Start: 2024-04-29 | End: 2024-04-29 | Stop reason: HOSPADM

## 2024-04-29 RX ORDER — NALOXONE HYDROCHLORIDE 0.4 MG/ML
0.1 INJECTION, SOLUTION INTRAMUSCULAR; INTRAVENOUS; SUBCUTANEOUS
Status: DISCONTINUED | OUTPATIENT
Start: 2024-04-29 | End: 2024-04-29 | Stop reason: HOSPADM

## 2024-04-29 RX ORDER — ONDANSETRON 4 MG/1
4 TABLET, ORALLY DISINTEGRATING ORAL EVERY 30 MIN PRN
Status: DISCONTINUED | OUTPATIENT
Start: 2024-04-29 | End: 2024-04-29 | Stop reason: HOSPADM

## 2024-04-29 RX ORDER — OXYCODONE HYDROCHLORIDE 10 MG/1
10 TABLET ORAL
Status: DISCONTINUED | OUTPATIENT
Start: 2024-04-29 | End: 2024-05-02 | Stop reason: HOSPADM

## 2024-04-29 RX ORDER — HYDROMORPHONE HCL IN WATER/PF 6 MG/30 ML
0.4 PATIENT CONTROLLED ANALGESIA SYRINGE INTRAVENOUS EVERY 5 MIN PRN
Status: DISCONTINUED | OUTPATIENT
Start: 2024-04-29 | End: 2024-04-29 | Stop reason: HOSPADM

## 2024-04-29 RX ORDER — FENTANYL CITRATE 50 UG/ML
INJECTION, SOLUTION INTRAMUSCULAR; INTRAVENOUS PRN
Status: DISCONTINUED | OUTPATIENT
Start: 2024-04-29 | End: 2024-04-29

## 2024-04-29 RX ORDER — OXYCODONE HYDROCHLORIDE 5 MG/1
5 TABLET ORAL EVERY 6 HOURS PRN
Qty: 12 TABLET | Refills: 0 | Status: SHIPPED | OUTPATIENT
Start: 2024-04-29 | End: 2024-05-02

## 2024-04-29 RX ORDER — HYDROMORPHONE HCL IN WATER/PF 6 MG/30 ML
0.2 PATIENT CONTROLLED ANALGESIA SYRINGE INTRAVENOUS EVERY 5 MIN PRN
Status: DISCONTINUED | OUTPATIENT
Start: 2024-04-29 | End: 2024-04-29 | Stop reason: HOSPADM

## 2024-04-29 RX ORDER — CEFAZOLIN SODIUM/WATER 2 G/20 ML
2 SYRINGE (ML) INTRAVENOUS SEE ADMIN INSTRUCTIONS
Status: DISCONTINUED | OUTPATIENT
Start: 2024-04-29 | End: 2024-04-29 | Stop reason: HOSPADM

## 2024-04-29 RX ORDER — FENTANYL CITRATE 50 UG/ML
50 INJECTION, SOLUTION INTRAMUSCULAR; INTRAVENOUS EVERY 5 MIN PRN
Status: DISCONTINUED | OUTPATIENT
Start: 2024-04-29 | End: 2024-04-29 | Stop reason: HOSPADM

## 2024-04-29 RX ORDER — ONDANSETRON 2 MG/ML
INJECTION INTRAMUSCULAR; INTRAVENOUS PRN
Status: DISCONTINUED | OUTPATIENT
Start: 2024-04-29 | End: 2024-04-29

## 2024-04-29 RX ORDER — CEFAZOLIN SODIUM/WATER 2 G/20 ML
2 SYRINGE (ML) INTRAVENOUS
Status: COMPLETED | OUTPATIENT
Start: 2024-04-29 | End: 2024-04-29

## 2024-04-29 RX ORDER — ACETAMINOPHEN 325 MG/1
975 TABLET ORAL ONCE
Status: COMPLETED | OUTPATIENT
Start: 2024-04-29 | End: 2024-04-29

## 2024-04-29 RX ORDER — NALOXONE HYDROCHLORIDE 0.4 MG/ML
0.1 INJECTION, SOLUTION INTRAMUSCULAR; INTRAVENOUS; SUBCUTANEOUS
Status: DISCONTINUED | OUTPATIENT
Start: 2024-04-29 | End: 2024-05-02 | Stop reason: HOSPADM

## 2024-04-29 RX ORDER — ONDANSETRON 4 MG/1
4 TABLET, ORALLY DISINTEGRATING ORAL EVERY 30 MIN PRN
Status: DISCONTINUED | OUTPATIENT
Start: 2024-04-29 | End: 2024-05-02 | Stop reason: HOSPADM

## 2024-04-29 RX ORDER — OXYCODONE HYDROCHLORIDE 5 MG/1
5 TABLET ORAL
Status: DISCONTINUED | OUTPATIENT
Start: 2024-04-29 | End: 2024-05-02 | Stop reason: HOSPADM

## 2024-04-29 RX ORDER — ONDANSETRON 2 MG/ML
4 INJECTION INTRAMUSCULAR; INTRAVENOUS EVERY 30 MIN PRN
Status: DISCONTINUED | OUTPATIENT
Start: 2024-04-29 | End: 2024-04-29 | Stop reason: HOSPADM

## 2024-04-29 RX ORDER — LIDOCAINE HYDROCHLORIDE 20 MG/ML
INJECTION, SOLUTION INFILTRATION; PERINEURAL PRN
Status: DISCONTINUED | OUTPATIENT
Start: 2024-04-29 | End: 2024-04-29

## 2024-04-29 RX ORDER — PROPOFOL 10 MG/ML
INJECTION, EMULSION INTRAVENOUS PRN
Status: DISCONTINUED | OUTPATIENT
Start: 2024-04-29 | End: 2024-04-29

## 2024-04-29 RX ORDER — LIDOCAINE 40 MG/G
CREAM TOPICAL
Status: DISCONTINUED | OUTPATIENT
Start: 2024-04-29 | End: 2024-04-29 | Stop reason: HOSPADM

## 2024-04-29 RX ADMIN — FENTANYL CITRATE 25 MCG: 50 INJECTION, SOLUTION INTRAMUSCULAR; INTRAVENOUS at 17:18

## 2024-04-29 RX ADMIN — FENTANYL CITRATE 50 MCG: 50 INJECTION, SOLUTION INTRAMUSCULAR; INTRAVENOUS at 17:29

## 2024-04-29 RX ADMIN — ACETAMINOPHEN 975 MG: 325 TABLET, FILM COATED ORAL at 13:11

## 2024-04-29 RX ADMIN — PROPOFOL 100 MG: 10 INJECTION, EMULSION INTRAVENOUS at 15:07

## 2024-04-29 RX ADMIN — FENTANYL CITRATE 25 MCG: 50 INJECTION, SOLUTION INTRAMUSCULAR; INTRAVENOUS at 17:10

## 2024-04-29 RX ADMIN — MIDAZOLAM 2 MG: 1 INJECTION INTRAMUSCULAR; INTRAVENOUS at 15:00

## 2024-04-29 RX ADMIN — PROPOFOL 50 MG: 10 INJECTION, EMULSION INTRAVENOUS at 16:03

## 2024-04-29 RX ADMIN — Medication 50 MG: at 15:08

## 2024-04-29 RX ADMIN — HYDROMORPHONE HYDROCHLORIDE 0.2 MG: 0.2 INJECTION, SOLUTION INTRAMUSCULAR; INTRAVENOUS; SUBCUTANEOUS at 17:58

## 2024-04-29 RX ADMIN — FENTANYL CITRATE 100 MCG: 50 INJECTION INTRAMUSCULAR; INTRAVENOUS at 15:04

## 2024-04-29 RX ADMIN — Medication 2 G: at 15:18

## 2024-04-29 RX ADMIN — ONDANSETRON 4 MG: 2 INJECTION INTRAMUSCULAR; INTRAVENOUS at 17:32

## 2024-04-29 RX ADMIN — DEXAMETHASONE SODIUM PHOSPHATE 10 MG: 4 INJECTION, SOLUTION INTRA-ARTICULAR; INTRALESIONAL; INTRAMUSCULAR; INTRAVENOUS; SOFT TISSUE at 15:08

## 2024-04-29 RX ADMIN — SODIUM CHLORIDE, POTASSIUM CHLORIDE, SODIUM LACTATE AND CALCIUM CHLORIDE: 600; 310; 30; 20 INJECTION, SOLUTION INTRAVENOUS at 15:00

## 2024-04-29 RX ADMIN — LIDOCAINE HYDROCHLORIDE 60 MG: 20 INJECTION, SOLUTION INFILTRATION; PERINEURAL at 15:07

## 2024-04-29 RX ADMIN — ONDANSETRON 4 MG: 2 INJECTION INTRAMUSCULAR; INTRAVENOUS at 16:22

## 2024-04-29 ASSESSMENT — ACTIVITIES OF DAILY LIVING (ADL)
ADLS_ACUITY_SCORE: 32
ADLS_ACUITY_SCORE: 31
ADLS_ACUITY_SCORE: 32
ADLS_ACUITY_SCORE: 31

## 2024-04-29 NOTE — ANESTHESIA CARE TRANSFER NOTE
Patient: Vernell Logan    Procedure: Procedure(s):  Deep brain stimulator placement, phase II, placement of deep brain stimulator generator/battery over the right chest wall       Diagnosis: Partial symptomatic epilepsy with complex partial seizures, intractable, without status epilepticus (H) [G40.219]  Diagnosis Additional Information: No value filed.    Anesthesia Type:   General     Note:    Oropharynx: oropharynx clear of all foreign objects  Level of Consciousness: awake  Oxygen Supplementation: room air    Independent Airway: airway patency satisfactory and stable  Dentition: dentition unchanged  Vital Signs Stable: post-procedure vital signs reviewed and stable  Report to RN Given: handoff report given  Patient transferred to: PACU    Handoff Report: Identifed the Patient, Identified the Reponsible Provider, Reviewed the pertinent medical history, Discussed the surgical course, Reviewed Intra-OP anesthesia mangement and issues during anesthesia, Set expectations for post-procedure period and Allowed opportunity for questions and acknowledgement of understanding      Vitals:  Vitals Value Taken Time   BP     Temp     Pulse 98 04/29/24 1706   Resp 12 04/29/24 1706   SpO2 96 % 04/29/24 1706   Vitals shown include unfiled device data.    Electronically Signed By: ALONDRA Muñiz CRNA  April 29, 2024  5:07 PM

## 2024-04-29 NOTE — OP NOTE
PATIENT NAME: Vernell Logan  PATIENT MRN: [id number]  PATIENT ACCOUNT NUMBER: [id number]  PATIENT YOB: 1980    DEPARTMENT OF NEUROSURGERY  OPERATIVE REPORT    DATE OF PROCEDURE:  4/29/2024     PREOPERATIVE DIAGNOSIS:   1. Medically refractory epilepsy     POSTOPERATIVE DIAGNOSIS:   1. Medically refractory epilepsy     PROCEDURES PERFORMED:  1.  Deep brain stimulator placement, phase II, placement of new deep brain stimulator generator/battery, over right chest wall    2.  Placement of two extension wires and connection of the right side deep brain stimulator electrode, one electrode array, to the new generator/battery  3.  Electrical interrogation and analysis of the deep brain stimulator system.     ATTENDING SURGEON:  Susanne Berger MD     RESIDENT SURGEON:  Yusef Mcdonough MD     ANESTHESIA:  General endotracheal anesthesia     ESTIMATED BLOOD LOSS:  5 cc     COMPLICATIONS:  None     FINDINGS:  Impedance values within normal limits for the right sided intracranial lead and extension wire.     IMPLANTS:   Implant Name Type Inv. Item Serial No.  Lot No. LRB No. Used Action   SenSight Extension Kit     [id number] [employer]   Left 1 Implanted   SenSight Extrension Kit     [id number] [employer]   Right 1 Implanted   NEUROSTIMULATOR IMPLANTABLE 2CHANNEL PERCEPT [id number]  [id number] - [id number] Neurology device NEUROSTIMULATOR IMPLANTABLE 2CHANNEL PERCEPT [id number]  [id number] [employer]   Right 1 Implanted   IMP SCR SYN MATRIX LOW PRO 1.5X04MM SELF DRILL [id number] - [employer]/[place] IMP SCR SYN MATRIX LOW PRO 1.5X04MM SELF DRILL [id number] NA [email or web address]   Right 4 Implanted   IMP PLATE SYN ROMINA HOLE STRAIGHT 12MM 4H [id number] - [employer]/[place] IMP PLATE SYN ROMINA HOLE STRAIGHT 12MM 4H [phone] NA [email or web address][email or web address]   Right 1 Implanted        INDICATIONS FOR PROCEDURE:  Vernell Logan is a 43 year old female who has medically refractory epilepsy. After  discussion at the epilepsy conference, she was found to be a candidate for DBS for treatment of epilepsy. She underwent Phase 1 trans-ventricular placement of bilateral deep brain stimulator electrodes in the anterior nucleus of the thalamus on 4/23/2024. She slowly recovered and presents for phase II surgery with two extensions. The risks, benefits, alternative therapies were discussed with the patient, including but not limited to infection and bleeding (intracranial), injury to the brain, stroke, death, hardware failure and possible need for more surgeries.  Surgical procedure was discussed in detail.  All questions were answered, and the patient expressed understanding.      DESCRIPTION OF PROCEDURE:  The patient was taken to the operating room and positioned supine on the operating room table.  All pressure points were appropriately padded.  With placement of the endotracheal tube, general endotracheal anesthesia was induced.  His head was turned to the left side, thus exposing the right parietal area of the head.  The distal end of the previously implanted stimulating electrode could be palpated on the right of the head.  A small area of hair was removed over this palpable connector using a surgical hair clipper.  Then the left side of the head, neck and chest area were cleaned, prepared, and draped in sterile fashion.  Local anesthetic was injected in the areas of intended incision at the left scalp and left chest wall as well as along the path of the intended tunneling. A total of 30 mL of 1% lidocaine with epinephrine mixed with 0.25% Marcaine plain in a 50:50 combination was used.  A timeout was performed confirming the patient's identity, procedure to be performed, site and side of surgery and administration of preoperative prophylactic antibiotics.       Attention was turned to the head.  A #10 blade scalpel was used to make a 3 cm skin incision at the distal end of the connector that was palpated in his  scalp.  Hemostasis was achieved with bipolar cautery.  The incision was carried down to the pericranium.  The mosquito was used to hold the protective cover, and we gently pulled out the connector.  This was found to be fully intact.  At that point, a pocket was made using a Jenny clamp down toward behind the ear into the nuchal line.  This was in preparation for tunneling of the extension wires. A trough was drilled in the bone for the placement of the electrode connectors.     Attention was turned to the right chest wall area.  A #10 blade scalpel was used to make a 5 cm incision on the left chest wall.  The #10 blade was used to finish dissection down to the proper plane.  We found a nice dissecting plane superficial to the pectoralis muscle.  A combination of blunt and sharp dissection technique was used to establish a subcutaneous pocket about 3 fingerbreadths wide and deep enough to encompass the generator/battery sizer.  Hemostasis was achieved with bipolar cautery.  The pocket was copiously irrigated and one sponge was placed in the pocket.      At this point, a tunneler was brought into the field.  We tunneled a passage from the head incision to the chest wall incision.  Care was taken to stay superficial and the path of the tunneler was confirmed to be over the clavicle.  The tunneler was removed, leaving behind a plastic sheath.  Two extension wires were passed from the head incision to the chest incision.  Subsequently, the plastic sheath was pulled out from the chest incision, leaving behind the tunneled extension wires.     The protective covering was removed from the connector portion of the intracranial electrodes.  The contacts were inspected to be clean and without damage.  We then proceeded to make the connection between the intracranial leads and the extensions wires. We then connected the left extension wire to the superior port of the battery and the right extension wire to the inferior port of  the battery. All impedances were normal.      The generator/battery was again taken out of the pocket.  Hemostasis of the pocket was performed with bipolar cautery.  The area was dry.  Then, the new generator/battery with the excess wires being placed behind and at the periphery of the generator/battery were placed into the left chest wall pocket. The entire apparatus fit into the pocket comfortably.  The generator/battery was anchored to the pocket using two 2-0 Ethibond sutures.      The system was tested wirelessly again.  All the impedance values were within normal limits.  No problems were found with the system.     With the satisfactory placement of the system, we began closing the wound.  The left chest incision was closed in the following manner.  The deep pocket was reapproximated using 3-0 Vicryl sutures in an inverted interrupted fashion.  The subcutaneous fat layer was reapproximated using 3-0 Vicryl sutures in an inverted interrupted fashion.  The dermal layer was reapproximated using 3-0 Vicryl sutures in an inverted interrupted fashion.  The skin was reapproximated using 4-0 Monocryl suture in a subcuticular fashion.  We placed steri strips over the chest wound    The left parietal head incision was irrigated and dried. We placed the connector into the trough in a horizontal fashion.  The connector fitted very well into the drill trough.  2 dog bones or screws using 4 mm mini screws across the trough in order to secure the connector.   Meticulous hemostasis was obtained.  It was then closed in the following manner.  The galea was reapproximated over the implanted hardware using 3-0 Vicryl sutures in an inverted interrupted fashion.  This provided a protective layer.  The skin was then reapproximated using 4-0 Monocryl suture in a running fashion. Both wounds were cleaned and dried.  ChoraPrep was used to clean the incisions again. We applied bacitracin over the wound and covered with Primapore.     At  the end of the case, all counts including needle, sponge and instrument counts were correct x 2. There were no complications observed.    Dr Skinner was present for all critical portions of the operation and immediately available at all times.    Yusef Mcdonough MD  Neurosurgery Resident  Pager: [phone]    Physician Attestation   I was present for the key portions of the procedure and I was immediately available for the entire procedure between opening and closing.    Ab Skinner MD  Date of Service (when I saw the patient): 4/29/24

## 2024-04-29 NOTE — ANESTHESIA PROCEDURE NOTES
Airway       Patient location during procedure: OR       Procedure Start/Stop Times: 4/29/2024 3:07 PM and 4/29/2024 3:10 PM  Staff -        CRNA: Ventura Jimenez APRN CRNA       Performed By: CRNA  Consent for Airway        Urgency: elective  Indications and Patient Condition       Indications for airway management: brandon-procedural       Induction type:intravenous       Mask difficulty assessment: 1 - vent by mask    Final Airway Details       Final airway type: endotracheal airway       Successful airway: ETT - single  Endotracheal Airway Details        ETT size (mm): 7.0       Cuffed: yes       Successful intubation technique: direct laryngoscopy       DL Blade Type: Morgan 2       Grade View of Cords: 1       Adjucts: stylet       Position: Left       Measured from: gums/teeth       Secured at (cm): 21       Bite block used: None    Post intubation assessment        Placement verified by: capnometry, equal breath sounds and chest rise        Number of attempts at approach: 1       Number of other approaches attempted: 0       Secured with: pink tape       Ease of procedure: easy       Dentition: Intact    Medication(s) Administered   Medication Administration Time: 4/29/2024 3:07 PM

## 2024-04-29 NOTE — BRIEF OP NOTE
[facility]    Brief Operative Note    Pre-operative diagnosis: Partial symptomatic epilepsy with complex partial seizures, intractable, without status epilepticus (H) [G40.219]  Post-operative diagnosis Same as pre-operative diagnosis    Procedure: Deep brain stimulator placement, phase II, placement of deep brain stimulator generator/battery over the right chest wall, Right - Chest    Surgeon: Surgeons and Role:     * Ab Skinner MD - Primary     * Yusef Mcdonough MD - Assisting  Anesthesia: General   Estimated Blood Loss: Less than 10 ml    Drains: None  Specimens: * No specimens in log *  Findings:   Phase 2 DBS, Battery over right chest wall .  Complications: None.  Implants:   Implant Name Type Inv. Item Serial No.  Lot No. LRB No. Used Action   SenSight Extension Kit   [id number] MEDTRONIC  Left 1 Implanted   SenSight Extrension Kit   [id number] [employer]  Right 1 Implanted   NEUROSTIMULATOR IMPLANTABLE 2CHANNEL PERCEPT [id number]  [id number] - [id number] Neurology device NEUROSTIMULATOR IMPLANTABLE 2CHANNEL PERCEPT [id number]  [id number] [employer]  Right 1 Implanted   IMP SCR SYN MATRIX LOW PRO 1.5X04MM SELF DRILL [id number] - S[employer]ware/[place] IMP SCR SYN MATRIX LOW PRO 1.5X04MM SELF DRILL [id number] NA SYNTHES-STRATEC  Right 4 Implanted   IMP PLATE SYN ROMINA HOLE STRAIGHT 12MM 4H 04.503.063 - S[employer]/[place] IMP PLATE SYN ROMINA HOLE STRAIGHT 12MM 4H 04.503.063 NA SYNTHES-STRATEC  Right 1 Implanted     Yusef Mcdonough MD  Neurosurgery Resident  Pager: [phone]

## 2024-04-29 NOTE — ANESTHESIA POSTPROCEDURE EVALUATION
Patient: Vernell Logan    Procedure: Procedure(s):  Deep brain stimulator placement, phase II, placement of deep brain stimulator generator/battery over the right chest wall       Anesthesia Type:  General    Note:  Disposition: Outpatient   Postop Pain Control: Uneventful            Sign Out: Well controlled pain   PONV: No   Neuro/Psych: Uneventful            Sign Out: Acceptable/Baseline neuro status   Airway/Respiratory: Uneventful            Sign Out: Acceptable/Baseline resp. status   CV/Hemodynamics: Uneventful            Sign Out: Acceptable CV status; No obvious hypovolemia; No obvious fluid overload   Other NRE: NONE   DID A NON-ROUTINE EVENT OCCUR? No           Last vitals:  Vitals Value Taken Time   /80 04/29/24 1821   Temp 36.8  C (98.2  F) 04/29/24 1800   Pulse 71 04/29/24 1825   Resp 13 04/29/24 1825   SpO2 100 % 04/29/24 1825   Vitals shown include unfiled device data.    Electronically Signed By: Sushant Harris MD  April 29, 2024  6:25 PM

## 2024-04-29 NOTE — DISCHARGE INSTRUCTIONS
Contacting your Doctor -   To contact a doctor, call Dr. Skinner [phone] @ [facility]  or:  [phone] and ask for the resident on call for Neurosurgery (answered 24 hours a day)   Emergency Department:  [facility]: [phone] 911 if you are in need of immediate or emergent help

## 2024-04-30 ENCOUNTER — PATIENT OUTREACH (OUTPATIENT)
Dept: NEUROSURGERY | Facility: CLINIC | Age: 44
End: 2024-04-30
Payer: MEDICARE

## 2024-04-30 ASSESSMENT — ACTIVITIES OF DAILY LIVING (ADL)
ADLS_ACUITY_SCORE: 32

## 2024-04-30 NOTE — OR NURSING
Pt. Took 5 mg of oxycodone and 500 mg of Acetamonophin, both of her discharge meds at 19;00 for pain.

## 2024-04-30 NOTE — PROGRESS NOTES
[facility]: Post-Discharge Note  SITUATION                                                      Admission:    Admission Date: 04/29/24   Reason for Admission: Deep brain stimulator placement, phase II, placement of deep brain stimulator generator/battery over the right chest wall  Discharge:   Discharge Date: 04/29/24  Discharge Diagnosis: Epilepsy    BACKGROUND                                                      Per hospital discharge summary and inpatient provider notes:  Neurosurgery Discharge Coordination Note     Attending physician: Dr. Skinner  Discharge to: Home     Current status: Patient states she is doing well today. She had a difficult night getting pain under control but it is now well controlled with oxycodone and Tylenol as needed. No nausea. Incisions are covered and dressings can be removed tomorrow. No shadow drainage noted. She understands to call if she notes redness, swelling, increased tenderness, drainage, incisions opening or elevated temp. She understands to leave steri-strips in place. Denies current bowel or bladder issues. Taking abx as directed.     Discharge instructions and medications reviewed with patient.  Follow up appointments/imaging/tests needed: 2 week post op with XIANG Medina on 5/9/24.     RN triage/on call number given: [phone]/ [phone]      ASSESSMENT           Discharge Assessment  How are your symptoms? (Red Flag symptoms escalate to triage hotline per guidelines): Improved  Does the patient have their discharge instructions? : Yes  Does the patient have questions regarding their discharge instructions? : No  Were you started on any new medications or were there changes to any of your previous medications? : Yes  Does the patient have all of their medications?: Yes  Do you have questions regarding any of your medications? : No  Discharge follow-up appointment scheduled within 14 calendar days? : Yes  Discharge Follow Up Appointment Date:  05/09/24  Discharge Follow Up Appointment Scheduled with?: Specialty Care Provider         Post-op (Clinicians Only)  Did the patient have surgery or a procedure: Yes  Incision: closed;healing  Drainage: No  Bleeding: none  Fever: No  Chills: No  Redness: No  Warmth: No  Swelling: No  Incision site pain: Yes  Closure: suture;dissolving  Eating & Drinking: eating and drinking without complaints/concerns  PO Intake: regular diet  Bowel Function: normal  Urinary Status: voiding without complaint/concerns        PLAN                                                      Outpatient Plan:  Routine postop follow-up      Future Appointments   Date Time Provider Department Center   5/9/2024 10:30 AM Ginger Medina APRN [facility]         For any urgent concerns, please contact our 24 hour nurse triage line: [phone] ([phone])         PAT LEROY RN

## 2024-05-01 ASSESSMENT — ACTIVITIES OF DAILY LIVING (ADL)
ADLS_ACUITY_SCORE: 32

## 2024-05-02 ASSESSMENT — ACTIVITIES OF DAILY LIVING (ADL)
ADLS_ACUITY_SCORE: 32

## 2024-05-06 ENCOUNTER — PRE VISIT (OUTPATIENT)
Dept: SURGERY | Facility: CLINIC | Age: 44
End: 2024-05-06

## 2024-05-09 ENCOUNTER — OFFICE VISIT (OUTPATIENT)
Dept: NEUROSURGERY | Facility: CLINIC | Age: 44
End: 2024-05-09
Payer: MEDICARE

## 2024-05-09 ENCOUNTER — TELEPHONE (OUTPATIENT)
Dept: NEUROSURGERY | Facility: CLINIC | Age: 44
End: 2024-05-09

## 2024-05-09 VITALS
DIASTOLIC BLOOD PRESSURE: 85 MMHG | HEART RATE: 70 BPM | HEIGHT: 64 IN | SYSTOLIC BLOOD PRESSURE: 133 MMHG | OXYGEN SATURATION: 100 % | BODY MASS INDEX: 20.49 KG/M2 | WEIGHT: 120 LBS | RESPIRATION RATE: 16 BRPM

## 2024-05-09 DIAGNOSIS — Z96.89 S/P DEEP BRAIN STIMULATOR PLACEMENT: Primary | ICD-10-CM

## 2024-05-09 PROCEDURE — 99024 POSTOP FOLLOW-UP VISIT: CPT | Performed by: NURSE PRACTITIONER

## 2024-05-09 RX ORDER — MULTIVITAMIN
TABLET ORAL
COMMUNITY

## 2024-05-09 RX ORDER — CYCLOBENZAPRINE HCL 10 MG
5-10 TABLET ORAL
COMMUNITY
Start: 2023-11-13

## 2024-05-09 ASSESSMENT — PAIN SCALES - GENERAL: PAINLEVEL: MODERATE PAIN (4)

## 2024-05-09 NOTE — TELEPHONE ENCOUNTER
Left pt a message with appt date and time with Dr Thomason. Also sent message to Mckenzie Hobson to see if Dr Thomason want to see her before July 9th. Nothing available in 6 weeks.
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
## 2024-05-14 ASSESSMENT — PATIENT HEALTH QUESTIONNAIRE - PHQ9
SUM OF ALL RESPONSES TO PHQ QUESTIONS 1-9: 13
SUM OF ALL RESPONSES TO PHQ QUESTIONS 1-9: 13
10. IF YOU CHECKED OFF ANY PROBLEMS, HOW DIFFICULT HAVE THESE PROBLEMS MADE IT FOR YOU TO DO YOUR WORK, TAKE CARE OF THINGS AT HOME, OR GET ALONG WITH OTHER PEOPLE: VERY DIFFICULT

## 2024-05-15 ENCOUNTER — OFFICE VISIT (OUTPATIENT)
Dept: NEUROLOGY | Facility: CLINIC | Age: 44
End: 2024-05-15
Payer: MEDICARE

## 2024-05-15 VITALS
DIASTOLIC BLOOD PRESSURE: 74 MMHG | TEMPERATURE: 97.7 F | BODY MASS INDEX: 20.01 KG/M2 | HEIGHT: 64 IN | HEART RATE: 80 BPM | WEIGHT: 117.2 LBS | SYSTOLIC BLOOD PRESSURE: 120 MMHG

## 2024-05-15 DIAGNOSIS — G40.219 LOCALIZATION-RELATED EPILEPSY WITH COMPLEX PARTIAL SEIZURES WITH INTRACTABLE EPILEPSY (H): ICD-10-CM

## 2024-05-15 DIAGNOSIS — F33.0 MAJOR DEPRESSIVE DISORDER, RECURRENT EPISODE, MILD (H): ICD-10-CM

## 2024-05-15 DIAGNOSIS — G40.219 PARTIAL EPILEPSY WITH IMPAIRMENT OF CONSCIOUSNESS, INTRACTABLE (H): ICD-10-CM

## 2024-05-15 DIAGNOSIS — O30.001 TWIN GESTATION IN FIRST TRIMESTER, UNSPECIFIED MULTIPLE GESTATION TYPE: ICD-10-CM

## 2024-05-15 DIAGNOSIS — O09.92 HIGH-RISK PREGNANCY IN SECOND TRIMESTER: ICD-10-CM

## 2024-05-15 RX ORDER — CARBAMAZEPINE 300 MG/1
600 CAPSULE, EXTENDED RELEASE ORAL 2 TIMES DAILY
Qty: 360 CAPSULE | Refills: 3 | Status: SHIPPED | OUTPATIENT
Start: 2024-05-15

## 2024-05-15 RX ORDER — LAMOTRIGINE 200 MG/1
200 TABLET ORAL 3 TIMES DAILY
Qty: 270 TABLET | Refills: 3 | Status: SHIPPED | OUTPATIENT
Start: 2024-05-15

## 2024-05-15 RX ORDER — LEVETIRACETAM 500 MG/1
TABLET ORAL
Qty: 540 TABLET | Refills: 3 | Status: SHIPPED | OUTPATIENT
Start: 2024-05-15

## 2024-05-15 RX ORDER — CARBAMAZEPINE 200 MG/1
200 CAPSULE, EXTENDED RELEASE ORAL EVERY EVENING
Qty: 90 CAPSULE | Refills: 3 | Status: SHIPPED | OUTPATIENT
Start: 2024-05-15

## 2024-05-15 NOTE — PROGRESS NOTES
"P/[facility] Epilepsy Care Progress Note    Patient:  Vernell Logan  :  1980   Age:  43 year old   Today's Office Visit:  5/15/2024    INTERVAL HISTORY:   Vernell Logan is a pleasant 43 year old female with medically refractory epilepsy  who is s/p Intraoperative Magnetic Resonance Imaging (MRI)/stealth  assisted bilateral deep brain stimulator placement, phase I, placement of bilateral deep brain stimulator electrode, target anterior nucleus of thalamus with Dr. Ab Skinner on 2024 and phase II placement of battery on 2024.     She states she has no complication after surgery (no weakness, no sensory changes, no vision change, no infection, no issues). She has headache, she has chronic headaches. Her last seizure was 2024 was generalized tonic-clonic convulsion.     Today she had some stress about her  issues and felt overwhelmed. She had workup for breast cancer which was negative. She has no cancer. She has therapist (every week) and psychiatrist (once a month)    Since last visit she averages 2 seizure per month, her seizure is less and she is not sure why she has less. She has less stress now. She moved to a apartment, she left her partner due to control issues, her kids have autism. She worries about kids delays. Patient has fatigue, denies dizziness, no double vision, no nausea, no vomiting, no abdominal pain, no mood changes, no ER visits, she had hospitalizations, and had no significant fall with trauma.      Seizure frequency:   Seizure type 1: seizure are described as \"can not communicate, no loss of awareness, sometimes she may stare off 10-15 seconds\". Sometimes she can not communicate for few minutes, she points to her mouth and  knows she can not take, when she has a seizure she ask people \"did I seem like I blacked out and they tell me I am staring off\". She is not driving.   Frequency:   2023-Spring 2024 - 2 focal seizures with impairment in awareness  " per month. She had generalized tonic-clonic convulsion 1/2024 and 3/2024.   Summer 2023 - 2 focal seizures with impairment in awareness  per month   11/2022 - 4-8 seizure per month focal seizures with impairment in awareness   2021-June 2022. 5-10 focal seizures with impairment in awareness  Per month     Seizure type 2: generalized tonic-clonic convulsion. Last seizure was 3/2024, 8/7/2021 and 2014 - generalized tonic-clonic convulsion     Current antiepileptic drug  5/15/2024      Times of Days 8 am noon 8Pm        Medication Tablet Size Number of Tablets/Capsules Total Daily Dosage    carbamazepine   200 mg       1          carbamazepine   300 mg   2    2          levetiracetam   500 mg   3    3          Lamotrigine   200 mg   1  1  1                Antiepileptic drug notes  Lamotrigine was lowered by Vernell from 400-300-200 to 200 mg three times a day, interestingly her seizure did not worsen.         DEEP BRAIN STIMULATION DEVICE    Model: OMEGA MORGAN PC [id number]  Series: [id number]  Implant Date: May 15, 2024  Battery level: 99%  Estimated Battery Life: 14 years and 11 months (based on last 7 days)  Cycling: enabled On: 1 minute Off: 5 minutes              LEFT  RIGHT CHANGES MADE           Current 2 mA 2 mA 0 mA to 2mA   Frequency   145 hz 145 hz 0 hz to 145 hz    Burst Duration    90  s  90  s 0  s to 90  s   Impedance is OK  Monopolar  Monopolar              s - microsecond is one millionth of a second   Ms- millisecond is a thousandth of a second      Note: If MRI is needed, will have to turn off therapy. This may be completed via handset ([employer]). Its OK to get CT of Head if needed in urgent cases.   When flying show [employer] patient ID card (device implantation information). This will allow TSA to have you avoid scanner, as device will activate scanner.   Patient was given handset ([employer]) and communicator. Do not set password on handset. Both were activated and we double checked they are working in  "this visit.     For Deep Brain Stimulation device  - remember to charge your communicator and phone once a month for 2 hours. Support number [phone]. For Providers and Doctors number for help is [phone]      Women Issue: She had tubal ligation     SOCIAL HISTORY: She is not working. She is living with boyfriend.  She stopped smoking 2021. ral attempts to quit smoking, this is very hard for her.  She has 2 toddlers born in 2018 (walking, say a few words). Ex  Ricardo passed away 11/2015. Twins born 2018 (Aimee and Omar).  She does have a good social support system at this time.    She is driving, she was made aware of risk and state laws. She was advised not drive.      PHYSICAL EXAMINATION:  /74   Pulse 80   Temp 97.7  F (36.5  C) (Temporal)   Ht 5' 4\" (162.6 cm)   Wt 117 lb 3.2 oz (53.2 kg)   LMP 04/19/2024 (Exact Date)   BMI 20.12 kg/m    Alert, orientated, speech is fluent    ASSESSMENT:   Focal Epilepsy with loss of awareness   S/P bilateral deep brain stimulator placement, target anterior nucleus of thalamus 4/23/2024 and phase II placement of battery on 4/29/2024.   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  She has bilateral independent temporal lobe epilepsy (VIDEO EEG shows bilateral temporal epileptiform discharges and right temporal seizure recorded 2011). Semiology, staring she is not able to talk or get words out, and she has difficulty with comprehension, two seizure per month.  Etiology of her seizures is unclear and her MRI of the brain is nonlesional.  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  S/P bilateral deep brain stimulator placement, target anterior nucleus of thalamus 4/23/2024 and phase II placement of battery on 4/29/2024. She had no complications post epilepsy surgery.      On today's visit we turn Deep Brain Stimulation device on " and set current to setting noted above, patient had no side effects. Specially, no anxiety and no dizziness. Antiepileptic drug will be continued the same.     PLAN:     Continue antiepileptic drug     Times of Days 8 am noon 8Pm        Medication Tablet Size Number of Tablets/Capsules Total Daily Dosage    carbamazepine   200 mg       1          carbamazepine   300 mg   2    2          levetiracetam   500 mg   3    3          Lamotrigine   200 mg   1  1  1              Follow up 2-3 months        Do not drive and seizure precautions     Focus on self care (walking, sleeping, eating, breathing exercise)     ASHLI THORNE MD       Patient care time was a total of 60 minutes. 40  time was spent counseling and coordination of care on antiepileptic drug, seizure, and Deep Brain Stimulation device. 20 minutes was spent on Deep Brain Stimulation device interrogation and programing more than 5 parameters on the Deep Brain Stimulation device, please note chart above. During this time we interrogated the device, made lead changes as needed, communicated changes to patient, and answered questions patient had regarding device.     Ashli Thorne MD       Answers submitted by the patient for this visit:  Patient Health Questionnaire (Submitted on 5/14/2024)  If you checked off any problems, how difficult have these problems made it for you to do your work, take care of things at home, or get along with other people?: Very difficult  PHQ9 TOTAL SCORE: 13

## 2024-05-15 NOTE — PATIENT INSTRUCTIONS
Continue antiepileptic drug     Times of Days 8 am noon 8Pm        Medication Tablet Size Number of Tablets/Capsules Total Daily Dosage    carbamazepine   200 mg       1          carbamazepine   300 mg   2    2          levetiracetam   500 mg   3    3          Lamotrigine   200 mg   1  1  1              Follow up 2-3 months        Do not drive and seizure precautions     Focus on self care (walking, sleeping, eating, breathing exercise)
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
## 2024-05-28 DIAGNOSIS — D64.9 ANEMIA, UNSPECIFIED TYPE: ICD-10-CM

## 2024-06-02 ENCOUNTER — HEALTH MAINTENANCE LETTER (OUTPATIENT)
Age: 44
End: 2024-06-02

## 2024-06-02 NOTE — TELEPHONE ENCOUNTER
Refill Request:    Ferrous Sulfate (IRON) 325 (65 Fe) MG tablet 90 tablet 0 3/14/2024 -- No   Sig - Route: Take 1 tablet by mouth daily before breakfast - Oral     Last office visit: Neurosurgery visit found with prescribing provider:  Christopher Barros APRN CNP   Future Office Visit:  07/23/24 with Ab Womack MD     Requested Prescriptions   Pending Prescriptions Disp Refills    Ferrous Sulfate (IRON) 325 (65 Fe) MG tablet [Pharmacy Med Name: ferrous sulfate 325 mg (65 mg iron) tablet] 90 tablet 0     Sig: Take 1 tablet by mouth daily before breakfast       Iron Supplements Passed - 5/28/2024  8:41 AM        Passed - Patient is 12 years of age or older        Passed - Hgb OR Hct on record within the past 12 mos.     Patient need only have had a HGB or HCT on file in the past 12 mos. That result does not need to be normal.    Recent Labs   Lab Test 04/24/24  0639 04/23/24  0649 10/06/22  1008   HGB 11.6* 12.2 12.0       Recent Labs   Lab Test 04/24/24  0639 04/23/24  0649 10/06/22  1008   HCT 36.2 37.3 37.9       Please verify a HGB or HCT has been checked SINCE THE LAST DOSE CHANGE.            Passed - Medication is active on med list        Passed - Medication indicated for associated diagnosis     The medication is prescribed for one or more of the following conditions:    Iron deficiency,   Restless leg syndrome   Gastric bypass   Malabsorption            Passed - Recent (12 mo) or future (90 days) visit within the authorizing provider's specialty     The patient must have completed an in-person or virtual visit within the past 12 months or has a future visit scheduled within the next 90 days with the authorizing provider s specialty.  Urgent care and e-visits do not quality as an office visit for this protocol.               Will send refill request to Ab Womack MD to approve.    Martha Fernández RN on 6/1/2024 at 8:31 PM

## 2024-07-23 ENCOUNTER — OFFICE VISIT (OUTPATIENT)
Dept: NEUROSURGERY | Facility: CLINIC | Age: 44
End: 2024-07-23
Payer: MEDICARE

## 2024-07-23 VITALS
HEIGHT: 64 IN | OXYGEN SATURATION: 100 % | DIASTOLIC BLOOD PRESSURE: 77 MMHG | HEART RATE: 74 BPM | RESPIRATION RATE: 16 BRPM | BODY MASS INDEX: 19.31 KG/M2 | WEIGHT: 113.1 LBS | SYSTOLIC BLOOD PRESSURE: 136 MMHG

## 2024-07-23 DIAGNOSIS — G40.219 PARTIAL SYMPTOMATIC EPILEPSY WITH COMPLEX PARTIAL SEIZURES, INTRACTABLE, WITHOUT STATUS EPILEPTICUS (H): Primary | ICD-10-CM

## 2024-07-23 PROCEDURE — 99024 POSTOP FOLLOW-UP VISIT: CPT | Performed by: NEUROLOGICAL SURGERY

## 2024-07-23 NOTE — NURSING NOTE
Chief Complaint   Patient presents with    RECHECK     Here for a follow up, confirmed with patient     Devora Stanton

## 2024-07-23 NOTE — LETTER
7/23/2024       RE: Vernell Logan  [address]  [place] [address]     Dear Colleague,    Thank you for referring your patient, Vernell Logan, to the [facility] NEUROSURGERY CLINIC [place] at [facility]. Please see a copy of my visit note below.    Neurosurgery Attending Epilepsy Follow-Up Note    HPI: Vernell Logan is a 43 year old woman with medically refractory epilepsy who is now ~2 months s/p b/l ANT-DBS ([employer] Sensight/[employer]). Doing well so far. No issues with incisions or wound healing. She is in the U01 DBS RCT. Her IPG was turned on at her initial postop visit with Dr. Wilkinson in May and is at 2.0/145/90 bilaterally. It is unclear who she is going to follow with moving forward as Dr. Wilkinson transitions to [facility].     Exam:  Awake, alert, oriented x 3  Attends, follows, fluent  PERRL  EOMI  FS  TML  BUE/BLE 5/5, no drift  Wounds c/d/I, no erythema, swelling or pain    Pathology: n/a    Singh outcome: TBD    A/P: 42 y/o woman with medically refractory epilepsy. She is at relatively low current settings currently. We would ideally like to get her ramped up to a reasonable output before entering into the randomization phase in a couple of months. Will try to coordinate this with the [facility] team.    - Consider increasing current prior to 4 month visit  - Continue in U01 RCT with plan for optimization session at next visit  - No further neurosurgical follow-up necessary      Again, thank you for allowing me to participate in the care of your patient.      Sincerely,    Ab Skinner MD

## 2024-08-06 NOTE — PROGRESS NOTES
Neurosurgery Attending Epilepsy Follow-Up Note    HPI: Vernell Logan is a 43 year old woman with medically refractory epilepsy who is now ~2 months s/p b/l ANT-DBS ([employer] Sensight/[employer]). Doing well so far. No issues with incisions or wound healing. She is in the U01 DBS RCT. Her IPG was turned on at her initial postop visit with Dr. Wilkinson in May and is at 2.0/145/90 bilaterally. It is unclear who she is going to follow with moving forward as Dr. Wilkinson transitions to [facility].     Exam:  Awake, alert, oriented x 3  Attends, follows, fluent  PERRL  EOMI  FS  TML  BUE/BLE 5/5, no drift  Wounds c/d/I, no erythema, swelling or pain    Pathology: n/a    Singh outcome: TBD    A/P: 44 y/o woman with medically refractory epilepsy. She is at relatively low current settings currently. We would ideally like to get her ramped up to a reasonable output before entering into the randomization phase in a couple of months. Will try to coordinate this with the [facility] team.    - Consider increasing current prior to 4 month visit  - Continue in U01 RCT with plan for optimization session at next visit  - No further neurosurgical follow-up necessary

## 2024-08-26 RX ORDER — PNV NO.95/FERROUS FUM/FOLIC AC 28MG-0.8MG
1 TABLET ORAL
Qty: 90 TABLET | Refills: 0 | OUTPATIENT
Start: 2024-08-26

## 2024-08-26 ASSESSMENT — PATIENT HEALTH QUESTIONNAIRE - PHQ9
SUM OF ALL RESPONSES TO PHQ QUESTIONS 1-9: 14
SUM OF ALL RESPONSES TO PHQ QUESTIONS 1-9: 14
10. IF YOU CHECKED OFF ANY PROBLEMS, HOW DIFFICULT HAVE THESE PROBLEMS MADE IT FOR YOU TO DO YOUR WORK, TAKE CARE OF THINGS AT HOME, OR GET ALONG WITH OTHER PEOPLE: VERY DIFFICULT

## 2024-08-27 ENCOUNTER — OFFICE VISIT (OUTPATIENT)
Dept: NEUROLOGY | Facility: CLINIC | Age: 44
End: 2024-08-27
Payer: MEDICARE

## 2024-08-27 VITALS
HEART RATE: 68 BPM | DIASTOLIC BLOOD PRESSURE: 70 MMHG | OXYGEN SATURATION: 100 % | SYSTOLIC BLOOD PRESSURE: 117 MMHG | TEMPERATURE: 98.2 F

## 2024-08-27 DIAGNOSIS — G40.219 PARTIAL EPILEPSY WITH IMPAIRMENT OF CONSCIOUSNESS, INTRACTABLE (H): ICD-10-CM

## 2024-08-27 LAB
BASOPHILS # BLD AUTO: 0 10E3/UL (ref 0–0.2)
BASOPHILS NFR BLD AUTO: 1 %
EOSINOPHIL # BLD AUTO: 0 10E3/UL (ref 0–0.7)
EOSINOPHIL NFR BLD AUTO: 1 %
ERYTHROCYTE [DISTWIDTH] IN BLOOD BY AUTOMATED COUNT: 11.9 % (ref 10–15)
HCT VFR BLD AUTO: 40.8 % (ref 35–47)
HGB BLD-MCNC: 12.7 G/DL (ref 11.7–15.7)
IMM GRANULOCYTES # BLD: 0 10E3/UL
IMM GRANULOCYTES NFR BLD: 1 %
LYMPHOCYTES # BLD AUTO: 1.1 10E3/UL (ref 0.8–5.3)
LYMPHOCYTES NFR BLD AUTO: 25 %
MCH RBC QN AUTO: 30.1 PG (ref 26.5–33)
MCHC RBC AUTO-ENTMCNC: 31.1 G/DL (ref 31.5–36.5)
MCV RBC AUTO: 97 FL (ref 78–100)
MONOCYTES # BLD AUTO: 0.3 10E3/UL (ref 0–1.3)
MONOCYTES NFR BLD AUTO: 7 %
NEUTROPHILS # BLD AUTO: 2.8 10E3/UL (ref 1.6–8.3)
NEUTROPHILS NFR BLD AUTO: 65 %
NRBC # BLD AUTO: 0 10E3/UL
NRBC BLD AUTO-RTO: 0 /100
PLATELET # BLD AUTO: 386 10E3/UL (ref 150–450)
RBC # BLD AUTO: 4.22 10E6/UL (ref 3.8–5.2)
WBC # BLD AUTO: 4.3 10E3/UL (ref 4–11)

## 2024-08-27 PROCEDURE — 84295 ASSAY OF SERUM SODIUM: CPT | Mod: ORL | Performed by: PSYCHIATRY & NEUROLOGY

## 2024-08-27 PROCEDURE — 84460 ALANINE AMINO (ALT) (SGPT): CPT | Mod: ORL | Performed by: PSYCHIATRY & NEUROLOGY

## 2024-08-27 PROCEDURE — 80156 ASSAY CARBAMAZEPINE TOTAL: CPT | Mod: ORL | Performed by: PSYCHIATRY & NEUROLOGY

## 2024-08-27 PROCEDURE — 80177 DRUG SCRN QUAN LEVETIRACETAM: CPT | Mod: ORL | Performed by: PSYCHIATRY & NEUROLOGY

## 2024-08-27 PROCEDURE — 80175 DRUG SCREEN QUAN LAMOTRIGINE: CPT | Mod: ORL | Performed by: PSYCHIATRY & NEUROLOGY

## 2024-08-27 PROCEDURE — 85025 COMPLETE CBC W/AUTO DIFF WBC: CPT | Mod: ORL | Performed by: PSYCHIATRY & NEUROLOGY

## 2024-08-27 PROCEDURE — 84450 TRANSFERASE (AST) (SGOT): CPT | Mod: ORL | Performed by: PSYCHIATRY & NEUROLOGY

## 2024-08-27 ASSESSMENT — PAIN SCALES - GENERAL: PAINLEVEL: MODERATE PAIN (4)

## 2024-08-27 NOTE — LETTER
"2024       RE: Vernell Logan  : 1980   MRN: [id number]      Dear Colleague,    Thank you for referring your patient, Vernell Logan, to the [facility] EPILEPSY CARE at [facility]. Please see a copy of my visit note below.      [facility] Epilepsy Clinic:  RETURN VISIT         Service Date: 2024     HISTORY:  Ms. Vernell Logan is a 43-year-old, right-handed woman who was with refractory focal epilepsy and thalamic DBS, who previously was followed by Dr. Ashli Wilkinson.  She came alone to the visit today.  Dr. Ab Skinner placed the thalamic electrodes on 2024, and placed the generator on 2024.  She volunteered for the the IRB-approved  Epilepsy DBS Optimization  protocol ([id number], P.I.: Ab Skinner).      She states she has no complication after surgery (no weakness, no sensory changes, no vision change, no infection, no issues). She has headache, she has chronic headaches. Her last seizure was 2024 was generalized tonic-clonic convulsion.      Today she had some stress about her  issues and felt overwhelmed. She had workup for breast cancer which was negative. She has no cancer. She has therapist (every week) and psychiatrist (once a month)     Since last visit she averages 2 seizure per month, her seizure is less and she is not sure why she has less. She has less stress now. She moved to a apartment, she left her partner due to control issues, her kids have autism. She worries about kids delays. Patient has fatigue, denies dizziness, no double vision, no nausea, no vomiting, no abdominal pain, no mood changes, no ER visits, she had hospitalizations, and had no significant fall with trauma.       Ictal semiology-history:   Seizure type 1: seizure are described as \" cannot communicate, no loss of awareness, sometimes she may stare off 10-15 seconds\". Sometimes she cannot communicate for few minutes, she points " "to her mouth and  knows she cannot take, when she has a seizure she ask people \"did I seem like I blacked out and they tell me I am staring off\". She is not driving.   Frequency:   2023-Spring 2024 - 2 focal seizures with impairment in awareness  per month. She had generalized tonic-clonic convulsion 2024 and 3/2024.   Summer 2023 - 2 focal seizures with impairment in awareness  per month   2022 - 4-8 seizure per month focal seizures with impairment in awareness   -2022. 5-10 focal seizures with impairment in awareness  Per month      Seizure type 2: generalized tonic-clonic convulsion. Last seizure was 3/2024, 2021 and  - generalized tonic-clonic convulsion     Epilepsy-seizure predispositions:   The patient has no family history of epilepsy or seizures.  She has no history of gestational or  injury, febrile convulsions, developmental delay, stroke, meningitis, encephalitis, significant head injury, or other epileptic predispositions.  She denied a history of physical or sexual abuse by an adult during her childhood or adulthood.      MEDICATIONS: Carbamazepine extended release 600/800 mg daily, lamotrigine 200 mg t.i.d., levetiracetam 1500 mg b.i.d., and other medications as per the electronic medical record.      SOCIAL HISTORY: She is not working. She is living with a roommate.  She stopped smoking . ral attempts to quit smoking, this is very hard for her.  She has 2 toddlers born in 2018 (walking, say a few words). Ex  Ricardo passed away 2015. Twins born 2018 (Aimee and Omar).  She does have a good social support system at this time.    PHYSICAL EXAMINATION:    On physical examination the patient appeared well nourished and in no acute distress.   Vital signees were as per the electronic medical record.  Skull was normocephalic and atraumatic.  Neck was supple, without signs of meningeal irritation.      On neurological examination, the patient appeared " alert and was fully oriented to person, place, time, and reason for visit.  Speech showed normal articulation, fluency, repetitions, naming, syntax and comprehension.    Cranial nerves III through XII were normal.  Muscle masses, tones and strengths were normal throughout.  There was no pronator drift.  Sequential fine finger movements were performed normally with each hand.  No spontaneous tremors, myoclonus, or other abnormal movements were observed.  Sensations of light touch were reportedly normal throughout.  The finger-nose-finger and heel-shin maneuvers were performed normally bilaterally.  Romberg maneuver was negative.  Regular, heel, toe, tandem and reverse tandem walking were normal.  Deep tendon reflexes were normal and symmetric throughout.  Toes were downgoing bilaterally.          [employer] PERCEPT PROGRAMMING AT INITIAL INTERROGATION:       Initial Stimulation Programming:  Left ANT  Right ANT    Pulse width  90  sec  90  sec    Frequency  145 Hz  145 Hz    Current  2.0 mA  2.0 mA   Cycle  On 1 min. / Off 5 mins.  On 1 min. / Off 5 mins.          SUBSEQUENT MED[employer]IC PERCEPT PROGRAMMING:       Stimulation Set A:  Left ANT  Right ANT    Pulse width  90  sec  90  sec    Frequency  145 Hz  145 Hz    Current  3.0 mA 3.0 mA   Cycle  On 1 min. / Off 5 mins.  On 1 min. / Off 5 mins.       Stimulation Set B:  Left ANT  Right ANT    Pulse width  90  sec  90  sec    Frequency  125 Hz  125 Hz    Current  3.0 mA 3.0 mA   Cycle  On 1 min. / Off 5 mins.  On 1 min. / Off 5 mins.          IMPRESSION:    Focal epilepsy with impairment in awareness.  She has bilateral independent temporal lobe epilepsy (VIDEO EEG shows bilateral temporal epileptiform discharges and right temporal seizure recorded 2011). Semiology, staring she is not able to talk or get words out, and she has difficulty with comprehension, two seizure per month.  Etiology of her seizures is unclear and her MRI of the brain is nonlesional.  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  S/P bilateral deep brain stimulator placement, target anterior nucleus of thalamus 4/23/2024 and phase II placement of battery on 4/29/2024. She had no complications post epilepsy surgery.       On today's visit we turn Deep Brain Stimulation device on and set current to setting noted above, patient had no side effects. Specially, no anxiety and no dizziness. Antiepileptic drug will be continued the same.      PLAN:   1.  Continue levetiracetam, carbamazepine and lamotrigine at the current doses, and check anti-seizure medication levels today.   2.  I completed bilateral anterior thalamic DBS interrogation, and then programmed a set  B  of stimulation parameters that was based on observations of stimulation responses in the  Epilepsy DBS Optimization  protocol, as noted above; stimulation used the set  B  parameters as the patient exited the clinic today..   3.  On 06/12/2023, the patient entered the IRB-approved  Epilepsy DBS Optimization  protocol ([id number], P.I.: Ab Skinner) today; study procedures were performed at the [facility] today, and were conducted separately from the clinical visit reported in this note.    4.  Return visit in about 4 months.      I spent 69 minutes in this clinical patient care, with 44 minutes in direct patient contact, and 25 minutes in chart review and document preparation on the day of the visit.   I spent an additional 30 minutes in anterior thalamic DBS programming, in addition to other patient evaluation and management activities at this clinic visit.         Harjinder Miller M.D.   Professor of Neurology      Again, thank you for allowing me to participate in the care of your patient.      Sincerely,    Harjinder Miller MD

## 2024-08-27 NOTE — PROGRESS NOTES
"  [facility] Epilepsy Clinic:  RETURN VISIT         Service Date: 08/21/2024     HISTORY:  Ms. Vernell Logan is a 43-year-old, right-handed woman who was with refractory focal epilepsy and thalamic DBS, who previously was followed by Dr. Ashli Wilkinson.  She came alone to the visit today.  Dr. Ab Skinner placed the thalamic electrodes on 4/23/2024, and placed the generator on 4/29/2024.  She volunteered for the the IRB-approved  Epilepsy DBS Optimization  protocol ([id number], P.I.: Ab Skinner).      She states she has no complication after surgery (no weakness, no sensory changes, no vision change, no infection, no issues). She has headache, she has chronic headaches. Her last seizure was march 2024 was generalized tonic-clonic convulsion.      Today she had some stress about her  issues and felt overwhelmed. She had workup for breast cancer which was negative. She has no cancer. She has therapist (every week) and psychiatrist (once a month)     Since last visit she averages 2 seizure per month, her seizure is less and she is not sure why she has less. She has less stress now. She moved to a apartment, she left her partner due to control issues, her kids have autism. She worries about kids delays. Patient has fatigue, denies dizziness, no double vision, no nausea, no vomiting, no abdominal pain, no mood changes, no ER visits, she had hospitalizations, and had no significant fall with trauma.       Ictal semiology-history:   Seizure type 1: seizure are described as \" cannot communicate, no loss of awareness, sometimes she may stare off 10-15 seconds\". Sometimes she cannot communicate for few minutes, she points to her mouth and  knows she cannot take, when she has a seizure she ask people \"did I seem like I blacked out and they tell me I am staring off\". She is not driving.   Frequency:   Fall 2023-Spring 2024 - 2 focal seizures with impairment in awareness  per month. She had generalized " tonic-clonic convulsion 2024 and 3/2024.   Summer  - 2 focal seizures with impairment in awareness  per month   2022 - 4-8 seizure per month focal seizures with impairment in awareness   -2022. 5-10 focal seizures with impairment in awareness  Per month      Seizure type 2: generalized tonic-clonic convulsion. Last seizure was 3/2024, 2021 and  - generalized tonic-clonic convulsion     Epilepsy-seizure predispositions:   The patient has no family history of epilepsy or seizures.  She has no history of gestational or  injury, febrile convulsions, developmental delay, stroke, meningitis, encephalitis, significant head injury, or other epileptic predispositions.  She denied a history of physical or sexual abuse by an adult during her childhood or adulthood.      MEDICATIONS: Carbamazepine extended release 600/800 mg daily, lamotrigine 200 mg t.i.d., levetiracetam 1500 mg b.i.d., and other medications as per the electronic medical record.      SOCIAL HISTORY: She is not working. She is living with a roommate.  She stopped smoking . ral attempts to quit smoking, this is very hard for her.  She has 2 toddlers born in 2018 (walking, say a few words). Ex  Ricardo passed away 2015. Twins born  (Aimee and Omar).  She does have a good social support system at this time.    PHYSICAL EXAMINATION:    On physical examination the patient appeared well nourished and in no acute distress.   Vital signees were as per the electronic medical record.  Skull was normocephalic and atraumatic.  Neck was supple, without signs of meningeal irritation.      On neurological examination, the patient appeared alert and was fully oriented to person, place, time, and reason for visit.  Speech showed normal articulation, fluency, repetitions, naming, syntax and comprehension.    Cranial nerves III through XII were normal.  Muscle masses, tones and strengths were normal throughout.  There was no  pronator drift.  Sequential fine finger movements were performed normally with each hand.  No spontaneous tremors, myoclonus, or other abnormal movements were observed.  Sensations of light touch were reportedly normal throughout.  The finger-nose-finger and heel-shin maneuvers were performed normally bilaterally.  Romberg maneuver was negative.  Regular, heel, toe, tandem and reverse tandem walking were normal.  Deep tendon reflexes were normal and symmetric throughout.  Toes were downgoing bilaterally.          [employer] PERCEPT PROGRAMMING AT INITIAL INTERROGATION:       Initial Stimulation Programming:  Left ANT  Right ANT    Pulse width  90 ?sec  90 ?sec    Frequency  145 Hz  145 Hz    Current  2.0 mA  2.0 mA   Cycle  On 1 min. / Off 5 mins.  On 1 min. / Off 5 mins.          SUBSEQUENT [employer] PERCEPT PROGRAMMING:       Stimulation Set A:  Left ANT  Right ANT    Pulse width  90 ?sec  90 ?sec    Frequency  145 Hz  145 Hz    Current  3.0 mA 3.0 mA   Cycle  On 1 min. / Off 5 mins.  On 1 min. / Off 5 mins.       Stimulation Set B:  Left ANT  Right ANT    Pulse width  90 ?sec  90 ?sec    Frequency  125 Hz  125 Hz    Current  3.0 mA 3.0 mA   Cycle  On 1 min. / Off 5 mins.  On 1 min. / Off 5 mins.          IMPRESSION:    Focal epilepsy with impairment in awareness.  She has bilateral independent temporal lobe epilepsy (VIDEO EEG shows bilateral temporal epileptiform discharges and right temporal seizure recorded 2011). Semiology, staring she is not able to talk or get words out, and she has difficulty with comprehension, two seizure per month.  Etiology of her seizures is unclear and her MRI of the brain is nonlesional.  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  S/P bilateral deep brain stimulator placement, target anterior nucleus of thalamus 4/23/2024 and phase II placement of battery on 4/29/2024. She had no complications post epilepsy surgery.       On today's visit we turn  Deep Brain Stimulation device on and set current to setting noted above, patient had no side effects. Specially, no anxiety and no dizziness. Antiepileptic drug will be continued the same.      PLAN:   1.  Continue levetiracetam, carbamazepine and lamotrigine at the current doses, and check anti-seizure medication levels today.   2.  I completed bilateral anterior thalamic DBS interrogation, and then programmed a set  B  of stimulation parameters that was based on observations of stimulation responses in the  Epilepsy DBS Optimization  protocol, as noted above; stimulation used the set  B  parameters as the patient exited the clinic today..   3.  On 06/12/2023, the patient entered the IRB-approved  Epilepsy DBS Optimization  protocol ([id number], P.I.: Ab Skinner) today; study procedures were performed at the [facility] today, and were conducted separately from the clinical visit reported in this note.    4.  Return visit in about 4 months.      I spent 69 minutes in this clinical patient care, with 44 minutes in direct patient contact, and 25 minutes in chart review and document preparation on the day of the visit.   I spent an additional 30 minutes in anterior thalamic DBS programming, in addition to other patient evaluation and management activities at this clinic visit.         Harjinder Miller M.D.   Professor of Neurology

## 2024-08-28 LAB
ALT SERPL W P-5'-P-CCNC: 17 U/L (ref 0–50)
AST SERPL W P-5'-P-CCNC: 21 U/L (ref 0–45)
LEVETIRACETAM SERPL-MCNC: 24.9 ΜG/ML (ref 10–40)
SODIUM SERPL-SCNC: 142 MMOL/L (ref 135–145)

## 2024-08-29 LAB — LAMOTRIGINE SERPL-MCNC: 5 UG/ML

## 2024-09-03 LAB
CARBAMAZEPINE EP SERPL-MCNC: 3.5 UG/ML
CARBAMAZEPINE SERPL-MCNC: 9.6 UG/ML

## 2024-10-06 NOTE — MR AVS SNAPSHOT
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  IF YOU  HAVE ANY SIDE EFFECTS OR CONCERNS ABOUT YOUR ANTIEPILEPTIC DRUG CALL [facility] OFFICE [phone]. PLEASE FOLLOW MEDICATION CHANGES AS ADVISED.     ASHLI THORNE MD               Follow-ups after your visit        Follow-up notes from your care team     Return in about 1 year (around 10/12/2018).      Who to contact     Please call your clinic at [phone] to:    Ask questions about your health    Make or cancel appointments    Discuss your medicines    Learn about your test results    Speak to your doctor   If you have compliments or concerns about an experience at your clinic, or if you wish to file a complaint, please contact [facility] Patient Relations at [phone] or email us at [email or web address]         Additional Information About Your Visit        MyChart Information     [employer] gives you secure access to your electronic health record. If you see a primary care provider, you can also send messages to your care team and make appointments. If you have questions, please call your  "primary care clinic.  If you do not have a primary care provider, please call [phone] and they will assist you.      [employer] is an electronic gateway that provides easy, online access to your medical records. With [employer], you can request a clinic appointment, read your test results, renew a prescription or communicate with your care team.     To access your existing account, please contact your [facility] or call [phone] for assistance.        Care EveryWhere ID     This is your Care EveryWhere ID. This could be used by other organizations to access your [facility] medical records  [id number]        Your Vitals Were     Pulse Height Last Period Breastfeeding? BMI (Body Mass Index)       96 5' 5\" (165.1 cm) 10/04/2017 (Approximate) No 18.27 kg/m2        Blood Pressure from Last 3 Encounters:   10/12/17 127/62   04/26/17 124/43   08/17/16 121/60    Weight from Last 3 Encounters:   10/12/17 109 lb 12.8 oz (49.8 kg)   04/26/17 123 lb (55.8 kg)   08/17/16 121 lb 3.2 oz (55 kg)              Today, you had the following     No orders found for display         Today's Medication Changes          These changes are accurate as of: 10/12/17 12:16 PM.  If you have any questions, ask your nurse or doctor.               These medicines have changed or have updated prescriptions.        Dose/Directions    carBAMazepine 300 MG 12 hr capsule   Commonly known as:  CARBATROL   This may have changed:  See the new instructions.   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)   Changed by:  Ashli Wilkinson " No MD JH        200 mg three times a day   Quantity:  540 tablet   Refills:  3            Where to get your medicines      These medications were sent to [facility] [id number] - [place], [place] - [address]  [address], [facility] [address]     Phone:  [phone]     carBAMazepine 300 MG 12 hr capsule    lamoTRIgine 100 MG tablet    levETIRAcetam 500 MG tablet                Primary Care Provider Office Phone # Fax #    Juliana Harris [phone] [address]  [facility] [address]        Equal Access to Services     [facility]: Hadii aad ku hadasho Soomaali, waaxda luqadaha, qaybta kaalmada alexisyafab, bubba candelario . So [facility] [phone].    ATENCIÓN: Si habla español, tiene a larkin disposición servicios gratuitos de asistencia lingüística. [facility] [phone].    We comply with applicable federal civil rights laws and [place] laws. We do not discriminate on the basis of race, color, national origin, age, disability, sex, sexual orientation, or gender identity.            Thank you!     Thank you for choosing [facility]  for your care. Our goal is always to provide you with excellent care. Hearing back from our patients is one way we can continue to improve our services. Please take a few minutes to complete the written survey that you may receive in the mail after your visit with us. Thank you!             Your Updated Medication List - Protect others around you: Learn how to safely use, store and throw away your medicines at www.disposemymeds.org.          This list is accurate as of: 10/12/17 12:16 PM.  Always use your most recent med list.                   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

## 2025-01-08 ENCOUNTER — OFFICE VISIT (OUTPATIENT)
Dept: NEUROLOGY | Facility: CLINIC | Age: 45
End: 2025-01-08
Payer: MEDICARE

## 2025-01-08 VITALS
WEIGHT: 121.2 LBS | SYSTOLIC BLOOD PRESSURE: 104 MMHG | OXYGEN SATURATION: 100 % | BODY MASS INDEX: 20.69 KG/M2 | DIASTOLIC BLOOD PRESSURE: 69 MMHG | TEMPERATURE: 98.6 F | HEART RATE: 83 BPM | HEIGHT: 64 IN

## 2025-01-08 DIAGNOSIS — F33.0 MAJOR DEPRESSIVE DISORDER, RECURRENT EPISODE, MILD: ICD-10-CM

## 2025-01-08 DIAGNOSIS — G40.219 LOCALIZATION-RELATED EPILEPSY WITH COMPLEX PARTIAL SEIZURES WITH INTRACTABLE EPILEPSY (H): ICD-10-CM

## 2025-01-08 DIAGNOSIS — R51.9 HEADACHE, CHRONIC DAILY: ICD-10-CM

## 2025-01-08 DIAGNOSIS — O09.92 HIGH-RISK PREGNANCY IN SECOND TRIMESTER: ICD-10-CM

## 2025-01-08 DIAGNOSIS — G40.219 PARTIAL EPILEPSY WITH IMPAIRMENT OF CONSCIOUSNESS, INTRACTABLE (H): ICD-10-CM

## 2025-01-08 DIAGNOSIS — O30.001 TWIN GESTATION IN FIRST TRIMESTER, UNSPECIFIED MULTIPLE GESTATION TYPE: ICD-10-CM

## 2025-01-08 DIAGNOSIS — G40.219 PARTIAL SYMPTOMATIC EPILEPSY WITH COMPLEX PARTIAL SEIZURES, INTRACTABLE, WITHOUT STATUS EPILEPTICUS (H): Primary | ICD-10-CM

## 2025-01-08 RX ORDER — CARBAMAZEPINE 200 MG/1
200 CAPSULE, EXTENDED RELEASE ORAL EVERY EVENING
Qty: 90 CAPSULE | Refills: 3 | Status: SHIPPED | OUTPATIENT
Start: 2025-01-08

## 2025-01-08 RX ORDER — LEVETIRACETAM 500 MG/1
TABLET ORAL
Qty: 540 TABLET | Refills: 3 | Status: SHIPPED | OUTPATIENT
Start: 2025-01-08

## 2025-01-08 RX ORDER — LAMOTRIGINE 200 MG/1
200 TABLET ORAL 3 TIMES DAILY
Qty: 270 TABLET | Refills: 3 | Status: SHIPPED | OUTPATIENT
Start: 2025-01-08

## 2025-01-08 RX ORDER — CARBAMAZEPINE 300 MG/1
600 CAPSULE, EXTENDED RELEASE ORAL 2 TIMES DAILY
Qty: 360 CAPSULE | Refills: 3 | Status: SHIPPED | OUTPATIENT
Start: 2025-01-08

## 2025-01-08 ASSESSMENT — PATIENT HEALTH QUESTIONNAIRE - PHQ9: SUM OF ALL RESPONSES TO PHQ QUESTIONS 1-9: 11

## 2025-01-08 NOTE — LETTER
2025       RE: Vernell Logan  : 1980   MRN: [id number]      Dear Colleague,    Thank you for referring your patient, Vernell Logan, to the [facility]PILEPS[facility]ARE at [facility]. Please see a copy of my visit note below.      [facility]:  RETURN VISIT         Service Date: 2025     HISTORY:  Ms. Vernell Logan is a 44-year-old, right-handed woman who was with refractory focal epilepsy and thalamic DBS, who previously was followed by Dr. Ashli Wilkinson.  She came alone to the visit today.  Dr. Ab Skinner placed the thalamic electrodes on 2024, and placed the generator on 2024.  She volunteered for the the IRB-approved  Epilepsy DBS Optimization  protocol ([id number], P.I.: Ab Skinner).      She reports 1-2 foal seizures per month that has improved since last visit. The seizures are described as expressive aphasia without LOC, and staring lasting < 30 seconds. During seizures she reports she is aware but unable to communicate. Post ictal fatigue, headache, and cognitive slowing. Last GTC seizure 2024.      She has headache, she has chronic headaches. Starting in her 20s. She endorses migraines with visual aura, photophobia, phonophobia that mostly resolved since her 30s. She endorses chronic daily headaches that over both sides at top of head that is described as pressure like. Unchanged severity and quality of pain throughout the day or positional ongoing for years. Only mild alleviation with tylenol, ibuprofen, and water. Endorses taking ibuprofen and tylenol daily for years. Denies taking prevention headache medications. Denies neck pain or tenderness. Endorses adequate and quality of sleep. Reports good hydration.     She reports ongoing intermittent difficulty with reading unable to comprehend words that started 2-3 weeks ago that last < 1 to 2 minutes. Denies changes in concentration, attention,  "IADLs, or memory loss. Denies recent falls or head trauma. Ongoing intermittent double vision that has been chronic after taking medications that improves throughout the day.     Ictal semiology-history:   Seizure type 1: seizure are described as \" cannot communicate, no loss of awareness, sometimes she may stare off 10-15 seconds\". Sometimes she cannot communicate for few minutes, she points to her mouth and  knows she cannot take, when she has a seizure she ask people \"did I seem like I blacked out and they tell me I am staring off\". She is not driving.   Frequency:   Fall -2024 - 2 focal seizures with impairment in awareness  per month. She had generalized tonic-clonic convulsion 2024 and 3/2024.   Summer 2023 - 2 focal seizures with impairment in awareness  per month   2022 - 4-8 seizure per month focal seizures with impairment in awareness   -2022. 5-10 focal seizures with impairment in awareness  Per month      Seizure type 2: generalized tonic-clonic convulsion. Last seizure was 3/2024, 2021 and  - generalized tonic-clonic convulsion     Epilepsy-seizure predispositions:   The patient has no family history of epilepsy or seizures.  She has no history of gestational or  injury, febrile convulsions, developmental delay, stroke, meningitis, encephalitis, significant head injury, or other epileptic predispositions.  She denied a history of physical or sexual abuse by an adult during her childhood or adulthood.      Laboratory studies:     MRI brain 24  IMPRESSION:Limited imaging primarily for the purposes of stereotactic  localization. Bifrontal high frontal approach deep brain stimulator  leads with tips terminating within the anterior thalami.     MRI brain 2024  1. No definite temporal lobe abnormality, mass, or congenital lesion  identified as a cause of the patient's seizures.  2. No evidence of abnormal enhancement intracranially.  3. Bandlike T2 " hyperintensity in the right cerebellar hemisphere, may  represent chronic insult versus deepening of the sulci.    EEG 10/6/2022   SUMMARY OF VIDEO EEG MONITORING DURING INTRACAROTID Brevital  TESTING: The baseline EEG recording during waking was abnormal.  There were focal independent delta slowing and epileptiform discharges over temporal regions bilaterally, consistent with bitemporal irritable structural abnormalities.  No electrographic seizure activity and no paroxysmal behavioral events were recorded during the period of monitoring. There was expected Brevital effect seen after initial left carotid injection and subsequently following right carotid injection with minimal crossover to the contralateral hemisphere bilaterally.     EEG 7/1/2022  IMPRESSION: This ambulatory electroencephalogram is abnormal:   1) Independent left and right frontotemporal  epileptiform discharges  2) Independent left and right frontotemporal delta-theta slowing (less than 7 seconds) without significant change in frequency or morphology. Not suggestive of seizure activity. These findings indicated independent left and right frontotemporal dysfunction and cortical irritability.     AED levels: 8/27/24  Carbamazepine 9.6  Keppra 24.9  Lamotrigine 5     8/27/2024  Sodium 142  LFT WNL   CBC WNL     MEDICATIONS: Carbamazepine extended release 600/800 mg daily, lamotrigine 200 mg t.i.d., levetiracetam 1500 mg b.i.d., and other medications as per the electronic medical record.      SOCIAL HISTORY: She is not working. She is living with a roommate.  She stopped smoking 2021. ral attempts to quit smoking, this is very hard for her.  She has 2 toddlers born in 2018 (walking, say a few words). Ex  Ricardo passed away 11/2015. Twins born 2018 (Aimee and Omar).  She does have a good social support system at this time.    PHYSICAL EXAMINATION:    On physical examination the patient appeared well nourished and in no acute distress.   Vital  signees were as per the electronic medical record.  Skull was normocephalic and atraumatic.  Neck was supple, without signs of meningeal irritation.      On neurological examination, the patient appeared alert and was fully oriented to person, place, time, and reason for visit.  Speech showed normal articulation, fluency, repetitions, naming, syntax and comprehension.    Cranial nerves III through XII were normal.  Muscle masses, tones and strengths were normal throughout.  There was no pronator drift.  Sequential fine finger movements were performed normally with each hand.  No spontaneous tremors, myoclonus, or other abnormal movements were observed.  Sensations of light touch were reportedly normal throughout.  The finger-nose-finger and heel-shin maneuvers were performed normally bilaterally.           Biodesy PROGRAMMING AT INTERROGATION:       Stimulation Set A:  Left ANT  Right ANT    Pulse width  90  sec  90  sec    Frequency  145 Hz  145 Hz    Current  3.0 mA 3.0 mA   Cycle  On 1 min. / Off 5 mins.  On 1 min. / Off 5 mins.       Stimulation Set B:  Left ANT  Right ANT    Pulse width  90  sec  90  sec    Frequency  125 Hz  125 Hz    Current  3.0 mA 3.0 mA   Cycle  On 1 min. / Off 5 mins.  On 1 min. / Off 5 mins.          IMPRESSION:    Focal epilepsy with impairment in awareness.  She has bilateral independent temporal lobe epilepsy (VIDEO EEG shows bilateral temporal epileptiform discharges and right temporal seizure recorded 2011). Semiology, staring she is not able to talk or get words out, and she has difficulty with comprehension, two seizure per month.  Etiology of her seizures is unclear and her MRI of the brain is nonlesional.  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  S/P bilateral deep brain stimulator placement, target anterior nucleus of thalamus 4/23/2024 and phase II placement of battery on 4/29/2024. She had no complications post epilepsy  surgery.       Deep Brain Stimulation device on and set current to setting noted above, patient had no side effects. Specially, no anxiety and no dizziness. Antiepileptic drug will be continued the same. Last AED levels 8/27/2024 within therapeutic range. Given new onset intermittent alexia over the last 2-3 weeks lasting < 1-2 minutes is concerning for possible new focal seizures. Plan to obtain routine 3 hour EEG to evaluate for focal seizures. If worsening epileptiform discharges and cortical irritability would consider increasing AED at next visit.     Chronic daily headaches ongoing for years that have worsened over the past year could be 2/2 to bilateral DBS placement and rebound headaches from daily tylenol and ibuprofen use ongoing for years. Discussed the option of starting a prophylactic headache medication and patient agreeable. Plan to place referral to headache clinic at the [facility] for further evaluation and management. Discussed reducing NSAIDs and tylenol to no more than 14 days per month to reduce medication overuse headaches. Encouraged adequate hydration and diet in addition to good sleep hygiene.      PLAN:   1. Continue levetiracetam, carbamazepine, and lamotrigine at the current doses  2. Out-patient porlonged VEEG   3. Referral to the Headache Clinic at [facility] for further evaluation and management   4.  Completed bilateral anterior thalamic DBS interrogation, and then programmed a set  B  of stimulation parameters that was based on observations of stimulation responses in the  Epilepsy DBS Optimization  protocol, as noted above; stimulation used the set  B  parameters   5.  On 06/12/2023, the patient entered the IRB-approved  Epilepsy DBS Optimization  protocol ([id number], P.I.: Ab Skinner); study procedures were performed at the [facility] today, and were conducted separately from the clinical visit reported in this note.    6.  Return visit in about 4months.      Patient was seen and  discussed with staff epileptologist, Dr. Miller.     Toyin Howell MD  PGY3 Neurology     Report Prepared By: Toyin Howell MD, Neurology Resident   I agree with the findings and plan of care as documented.  I personally examined the patient, and discussed our epilepsy diagnostic impressions and therapeutic recommendations with the patient.  The patient was agreeable to this plan.    I spent 46 minutes in this patient care, with 36 minutes in direct patient contact, and 10 minutes in chart review.   Harjinder Miller M.D., Professor of Neurology       Again, thank you for allowing me to participate in the care of your patient.      Sincerely,    Harjinder Miller MD

## 2025-01-08 NOTE — PROGRESS NOTES
"  [facility]ilepsy [facility]:  RETURN VISIT         Service Date: 01/08/2025     HISTORY:  Ms. Vernell Logan is a 44-year-old, right-handed woman who was with refractory focal epilepsy and thalamic DBS, who previously was followed by Dr. Ashli Wilkinson.  She came alone to the visit today.  Dr. Ab Skinner placed the thalamic electrodes on 4/23/2024, and placed the generator on 4/29/2024.  She volunteered for the the IRB-approved  Epilepsy DBS Optimization  protocol ([id number], P.I.: Ab Skinner).      She reports 1-2 foal seizures per month that has improved since last visit. The seizures are described as expressive aphasia without LOC, and staring lasting < 30 seconds. During seizures she reports she is aware but unable to communicate. Post ictal fatigue, headache, and cognitive slowing. Last GTC seizure March of 2024.      She has headache, she has chronic headaches. Starting in her 20s. She endorses migraines with visual aura, photophobia, phonophobia that mostly resolved since her 30s. She endorses chronic daily headaches that over both sides at top of head that is described as pressure like. Unchanged severity and quality of pain throughout the day or positional ongoing for years. Only mild alleviation with tylenol, ibuprofen, and water. Endorses taking ibuprofen and tylenol daily for years. Denies taking prevention headache medications. Denies neck pain or tenderness. Endorses adequate and quality of sleep. Reports good hydration.     She reports ongoing intermittent difficulty with reading unable to comprehend words that started 2-3 weeks ago that last < 1 to 2 minutes. Denies changes in concentration, attention, IADLs, or memory loss. Denies recent falls or head trauma. Ongoing intermittent double vision that has been chronic after taking medications that improves throughout the day.     Ictal semiology-history:   Seizure type 1: seizure are described as \" cannot communicate, no loss of awareness, " "sometimes she may stare off 10-15 seconds\". Sometimes she cannot communicate for few minutes, she points to her mouth and  knows she cannot take, when she has a seizure she ask people \"did I seem like I blacked out and they tell me I am staring off\". She is not driving.   Frequency:   2023-2024 - 2 focal seizures with impairment in awareness  per month. She had generalized tonic-clonic convulsion 2024 and 3/2024.   Summer 2023 - 2 focal seizures with impairment in awareness  per month   2022 - 4-8 seizure per month focal seizures with impairment in awareness   -2022. 5-10 focal seizures with impairment in awareness  Per month      Seizure type 2: generalized tonic-clonic convulsion. Last seizure was 3/2024, 2021 and  - generalized tonic-clonic convulsion     Epilepsy-seizure predispositions:   The patient has no family history of epilepsy or seizures.  She has no history of gestational or  injury, febrile convulsions, developmental delay, stroke, meningitis, encephalitis, significant head injury, or other epileptic predispositions.  She denied a history of physical or sexual abuse by an adult during her childhood or adulthood.      Laboratory studies:     MRI brain 24  IMPRESSION:Limited imaging primarily for the purposes of stereotactic  localization. Bifrontal high frontal approach deep brain stimulator  leads with tips terminating within the anterior thalami.     MRI brain 2024  1. No definite temporal lobe abnormality, mass, or congenital lesion  identified as a cause of the patient's seizures.  2. No evidence of abnormal enhancement intracranially.  3. Bandlike T2 hyperintensity in the right cerebellar hemisphere, may  represent chronic insult versus deepening of the sulci.    EEG 10/6/2022   SUMMARY OF VIDEO EEG MONITORING DURING INTRACAROTID Brevital  TESTING: The baseline EEG recording during waking was abnormal.  There were focal independent " delta slowing and epileptiform discharges over temporal regions bilaterally, consistent with bitemporal irritable structural abnormalities.  No electrographic seizure activity and no paroxysmal behavioral events were recorded during the period of monitoring. There was expected Brevital effect seen after initial left carotid injection and subsequently following right carotid injection with minimal crossover to the contralateral hemisphere bilaterally.     EEG 7/1/2022  IMPRESSION: This ambulatory electroencephalogram is abnormal:   1) Independent left and right frontotemporal  epileptiform discharges  2) Independent left and right frontotemporal delta-theta slowing (less than 7 seconds) without significant change in frequency or morphology. Not suggestive of seizure activity. These findings indicated independent left and right frontotemporal dysfunction and cortical irritability.     AED levels: 8/27/24  Carbamazepine 9.6  Keppra 24.9  Lamotrigine 5     8/27/2024  Sodium 142  LFT WNL   CBC WNL     MEDICATIONS: Carbamazepine extended release 600/800 mg daily, lamotrigine 200 mg t.i.d., levetiracetam 1500 mg b.i.d., and other medications as per the electronic medical record.      SOCIAL HISTORY: She is not working. She is living with a roommate.  She stopped smoking 2021. ral attempts to quit smoking, this is very hard for her.  She has 2 toddlers born in 2018 (walking, say a few words). Ex  Ricardo passed away 11/2015. Twins born 2018 (Aimee and Omar).  She does have a good social support system at this time.    PHYSICAL EXAMINATION:    On physical examination the patient appeared well nourished and in no acute distress.   Vital signees were as per the electronic medical record.  Skull was normocephalic and atraumatic.  Neck was supple, without signs of meningeal irritation.      On neurological examination, the patient appeared alert and was fully oriented to person, place, time, and reason for visit.  Speech  showed normal articulation, fluency, repetitions, naming, syntax and comprehension.    Cranial nerves III through XII were normal.  Muscle masses, tones and strengths were normal throughout.  There was no pronator drift.  Sequential fine finger movements were performed normally with each hand.  No spontaneous tremors, myoclonus, or other abnormal movements were observed.  Sensations of light touch were reportedly normal throughout.  The finger-nose-finger and heel-shin maneuvers were performed normally bilaterally.           [employer] PROGRAMMING AT INTERROGATION:       Stimulation Set A:  Left ANT  Right ANT    Pulse width  90 ?sec  90 ?sec    Frequency  145 Hz  145 Hz    Current  3.0 mA 3.0 mA   Cycle  On 1 min. / Off 5 mins.  On 1 min. / Off 5 mins.       Stimulation Set B:  Left ANT  Right ANT    Pulse width  90 ?sec  90 ?sec    Frequency  125 Hz  125 Hz    Current  3.0 mA 3.0 mA   Cycle  On 1 min. / Off 5 mins.  On 1 min. / Off 5 mins.          IMPRESSION:    Focal epilepsy with impairment in awareness.  She has bilateral independent temporal lobe epilepsy (VIDEO EEG shows bilateral temporal epileptiform discharges and right temporal seizure recorded 2011). Semiology, staring she is not able to talk or get words out, and she has difficulty with comprehension, two seizure per month.  Etiology of her seizures is unclear and her MRI of the brain is nonlesional.  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  S/P bilateral deep brain stimulator placement, target anterior nucleus of thalamus 4/23/2024 and phase II placement of battery on 4/29/2024. She had no complications post epilepsy surgery.       Deep Brain Stimulation device on and set current to setting noted above, patient had no side effects. Specially, no anxiety and no dizziness. Antiepileptic drug will be continued the same. Last AED levels 8/27/2024 within therapeutic range. Given new onset intermittent alexia  over the last 2-3 weeks lasting < 1-2 minutes is concerning for possible new focal seizures. Plan to obtain routine 3 hour EEG to evaluate for focal seizures. If worsening epileptiform discharges and cortical irritability would consider increasing AED at next visit.     Chronic daily headaches ongoing for years that have worsened over the past year could be 2/2 to bilateral DBS placement and rebound headaches from daily tylenol and ibuprofen use ongoing for years. Discussed the option of starting a prophylactic headache medication and patient agreeable. Plan to place referral to headache clinic at the [facility] for further evaluation and management. Discussed reducing NSAIDs and tylenol to no more than 14 days per month to reduce medication overuse headaches. Encouraged adequate hydration and diet in addition to good sleep hygiene.      PLAN:   1. Continue levetiracetam, carbamazepine, and lamotrigine at the current doses  2. Out-patient porlonged VEEG   3. Referral to the Headache Clinic at [facility] for further evaluation and management   4.  Completed bilateral anterior thalamic DBS interrogation, and then programmed a set  B  of stimulation parameters that was based on observations of stimulation responses in the  Epilepsy DBS Optimization  protocol, as noted above; stimulation used the set  B  parameters   5.  On 06/12/2023, the patient entered the IRB-approved  Epilepsy DBS Optimization  protocol ([id number], P.I.: Ab Skinner); study procedures were performed at the [facility] today, and were conducted separately from the clinical visit reported in this note.    6.  Return visit in about 4months.      Patient was seen and discussed with staff epileptologist, Dr. Miller.     Toyin Howell MD  PGY3 Neurology     Report Prepared By: Toyin Howell MD, Neurology Resident   I agree with the findings and plan of care as documented.  I personally examined the patient, and discussed our epilepsy diagnostic  impressions and therapeutic recommendations with the patient.  The patient was agreeable to this plan.    I spent 46 minutes in this patient care, with 36 minutes in direct patient contact, and 10 minutes in chart review.   Harjinder Miller M.D., Professor of Neurology

## 2025-05-03 ENCOUNTER — HEALTH MAINTENANCE LETTER (OUTPATIENT)
Age: 45
End: 2025-05-03

## 2025-05-06 ASSESSMENT — SLEEP AND FATIGUE QUESTIONNAIRES
HOW LIKELY ARE YOU TO NOD OFF OR FALL ASLEEP IN A CAR, WHILE STOPPED FOR A FEW MINUTES IN TRAFFIC: WOULD NEVER DOZE
HOW LIKELY ARE YOU TO NOD OFF OR FALL ASLEEP WHILE WATCHING TV: SLIGHT CHANCE OF DOZING
HOW LIKELY ARE YOU TO NOD OFF OR FALL ASLEEP WHILE SITTING AND TALKING TO SOMEONE: WOULD NEVER DOZE
HOW LIKELY ARE YOU TO NOD OFF OR FALL ASLEEP WHILE SITTING QUIETLY AFTER LUNCH WITHOUT ALCOHOL: SLIGHT CHANCE OF DOZING
HOW LIKELY ARE YOU TO NOD OFF OR FALL ASLEEP WHILE LYING DOWN TO REST IN THE AFTERNOON WHEN CIRCUMSTANCES PERMIT: HIGH CHANCE OF DOZING
HOW LIKELY ARE YOU TO NOD OFF OR FALL ASLEEP WHEN YOU ARE A PASSENGER IN A CAR FOR AN HOUR WITHOUT A BREAK: SLIGHT CHANCE OF DOZING
HOW LIKELY ARE YOU TO NOD OFF OR FALL ASLEEP WHILE SITTING INACTIVE IN A PUBLIC PLACE: WOULD NEVER DOZE
HOW LIKELY ARE YOU TO NOD OFF OR FALL ASLEEP WHILE SITTING AND READING: SLIGHT CHANCE OF DOZING

## 2025-05-06 ASSESSMENT — PATIENT HEALTH QUESTIONNAIRE - PHQ9
SUM OF ALL RESPONSES TO PHQ QUESTIONS 1-9: 13
10. IF YOU CHECKED OFF ANY PROBLEMS, HOW DIFFICULT HAVE THESE PROBLEMS MADE IT FOR YOU TO DO YOUR WORK, TAKE CARE OF THINGS AT HOME, OR GET ALONG WITH OTHER PEOPLE: SOMEWHAT DIFFICULT
SUM OF ALL RESPONSES TO PHQ QUESTIONS 1-9: 13

## 2025-05-07 ENCOUNTER — OFFICE VISIT (OUTPATIENT)
Dept: NEUROLOGY | Facility: CLINIC | Age: 45
End: 2025-05-07
Payer: MEDICARE

## 2025-05-07 VITALS
TEMPERATURE: 97.3 F | DIASTOLIC BLOOD PRESSURE: 70 MMHG | HEART RATE: 74 BPM | OXYGEN SATURATION: 98 % | SYSTOLIC BLOOD PRESSURE: 118 MMHG

## 2025-05-07 DIAGNOSIS — G40.219 PARTIAL SYMPTOMATIC EPILEPSY WITH COMPLEX PARTIAL SEIZURES, INTRACTABLE, WITHOUT STATUS EPILEPTICUS (H): Primary | ICD-10-CM

## 2025-05-07 NOTE — PROGRESS NOTES
"  [facility] Epilepsy Clinic:  RETURN VISIT         Service Date: 05/07/2025    HISTORY:  Ms. Vernell Logan is a 44-year-old, right-handed woman with refractory focal epilepsy and thalamic DBS, who previously was followed by Dr. Ashli Wilkinson.  She came alone to the visit today.  Dr. Ab Skinner placed the thalamic electrodes on 4/23/2024, and placed the generator on 4/29/2024.  She volunteered for the the IRB-approved  Epilepsy DBS Optimization  protocol ([id number], P.I.: Ab Skinner).       Since the last visit on 01/08/2025, she has averaged 1-2 focal seizure per month, her seizure is less intense. She has less stress now. She moved to a apartment, she left her partner due to control issues, her kids have autism. She worries about kids delays. Patient has fatigue, denies dizziness, no double vision, no nausea, no vomiting, no abdominal pain, no mood changes, no ER visits, she had hospitalizations, and had no significant fall with trauma.    Her last generalized tonic-clonic seizure was in March 2024.     She reported improved depression and anxiety, with ongoing stress about  issues and felt overwhelmed. She has therapist (every week) and psychiatrist (at [facility], about once a month).  She denied suicidal ideation.      Ictal semiology-history:   Seizure type 1: seizure are described as \" cannot communicate, no loss of awareness, sometimes she may stare off 10-15 seconds\". Sometimes she cannot communicate for few minutes, she pointed to her mouth and  would know that she cannot talk.  When she has a seizure she often would ask people \"did I seem like I blacked out and they tell me I am staring off\".. Frequency:   Fall 2023-Spring 2024 - 2 focal seizures with impairment in awareness  per month. She had generalized tonic-clonic convulsion 1/2024 and 3/2024.   Summer 2023 - 2 focal seizures with impairment in awareness  per month   11/2022 - 4-8 seizure per month focal seizures with " impairment in awareness   -2022. 5-10 focal seizures with impairment in awareness  Per month      Seizure type 2: generalized tonic-clonic convulsion. Last seizure was 3/2024, 2021 and  - generalized tonic-clonic convulsion      Epilepsy-seizure predispositions:   The patient has no family history of epilepsy or seizures.  She has no history of gestational or  injury, febrile convulsions, developmental delay, stroke, meningitis, encephalitis, significant head injury, or other epileptic predispositions.  She denied a history of physical or sexual abuse by an adult during her childhood or adulthood.       MEDICATIONS: Carbamazepine extended release 600/800 mg daily, lamotrigine 200 mg t.i.d., levetiracetam 1500 mg b.i.d., and other medications as per the electronic medical record.       SOCIAL HISTORY: She is not working. She is living with her ex-boyfriend (father of there children), his mother and their two children.  She stopped smoking . Several attempts to quit smoking, this is very hard for her.  She has 2 toddlers born in  (walking, say a few words). Ex  Ricardo passed away in . Twins born  (Aimee and Omar).  She does have a good social support system at this time.     PHYSICAL EXAMINATION:    On physical examination the patient appeared well nourished and in no acute distress.   Vital signees were as per the electronic medical record.  Skull was normocephalic and atraumatic.  Neck was supple, without signs of meningeal irritation.       On neurological examination, the patient appeared alert and was fully oriented to person, place, time, and reason for visit.  Speech showed normal articulation, fluency, repetitions, naming, syntax and comprehension.    Cranial nerves III through XII were normal.  Muscle masses, tones and strengths were normal throughout.  There was no pronator drift.  Sequential fine finger movements were performed normally with each hand.  No  "spontaneous tremors, myoclonus, or other abnormal movements were observed.  Sensations of light touch were reportedly normal throughout.  The finger-nose-finger and heel-shin maneuvers were performed normally bilaterally.  Romberg maneuver was negative.  Regular, heel, toe, tandem and reverse tandem walking were normal.  Deep tendon reflexes were normal and symmetric throughout.  Toes were downgoing bilaterally.          [employer] PROGRAMMING AT INITIAL INTERROGATION:       Initial Stimulation Programming:  Left ANT  Right ANT    Pulse width  90 ?sec  90 ?sec    Frequency  145 Hz  145 Hz    Current  2.0 mA  2.0 mA   Cycle  On 1 min. / Off 5 mins.  On 1 min. / Off 5 mins.          SUBSEQUENT [employer] PERCEPT PROGRAMMING:       Stimulation Set A: \"Clinical\" Left ANT  Right ANT    Pulse width  90 ?sec  90 ?sec    Frequency  145 Hz  145 Hz    Current  3.0 mA 3.0 mA   Cycle  On 1 min. / Off 5 mins.  On 1 min. / Off 5 mins.       Stimulation Set B: \"Study\" Left ANT  Right ANT    Pulse width  60 ?sec  60 ?sec    Frequency  125 Hz  125 Hz    Current  4.0 mA 4.0 mA   Cycle  On 1 min. / Off 5 mins.  On 1 min. / Off 5 mins.          IMPRESSION:    Focal epilepsy with impairment in awareness.  She has bilateral independent temporal lobe epilepsy (VIDEO EEG shows bilateral temporal epileptiform discharges and right temporal seizure recorded 2011). Semiology, staring she is not able to talk or get words out, and she has difficulty with comprehension, two seizure per month.  Etiology of her seizures is unclear and her MRI of the brain is nonlesional.  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  S/P bilateral deep brain stimulator placement, target anterior nucleus of thalamus 4/23/2024 and phase II placement of battery on 4/29/2024. She had no complications post epilepsy surgery.       On today's visit we interrogated the Deep Brain Stimulation device.  Following review with the study " team, we re-programmed the study settings of the generator as listed above.  The patient had no side effects. Specially, no anxiety and no dizziness. Antiepileptic drug doses will be continued unchanged.     She is not driving.     PLAN:   1.  Continue levetiracetam, carbamazepine and lamotrigine at the current doses, and check anti-seizure medication levels today.   2.  We completed bilateral anterior thalamic DBS interrogation, and then programmed a set  B  of stimulation parameters that was based on observations of stimulation responses in the  Epilepsy DBS Optimization  protocol, as noted above; stimulation used the set  B  parameters as the patient exited the clinic today..   3.  On 06/12/2024, the patient entered the IRB-approved  Epilepsy DBS Optimization  protocol ([id number], P.I.: Ab Skinner) today; study procedures were performed at the [facility] clinic today, and were conducted separately from the clinical visit reported in this note.    4.  Return visit in about 4 months.      I spent 45 minutes in this clinical patient care, with 33 minutes in direct patient contact, and 12 minutes in chart review and document preparation on the day of the visit.   I spent an additional 30 minutes in anterior thalamic DBS programming, in addition to other patient evaluation and management activities at this clinic visit.         Harjinder Miller M.D.   Professor of Neurology

## 2025-05-07 NOTE — LETTER
"2025       RE: Vernell Logan  : 1980   MRN: [id number]      Dear Colleague,    Thank you for referring your patient, Vernell Logan, to the [facility] EPILEPSY [facility] at [facility]. Please see a copy of my visit note below.      [facility]:  RETURN VISIT         Service Date: 2025    HISTORY:  Ms. Vernell Logan is a 44-year-old, right-handed woman with refractory focal epilepsy and thalamic DBS, who previously was followed by Dr. Ashli Wilkinson.  She came alone to the visit today.  Dr. Ab Skinner placed the thalamic electrodes on 2024, and placed the generator on 2024.  She volunteered for the the IRB-approved  Epilepsy DBS Optimization  protocol ([id number], P.I.: Ab Skinner).       Since the last visit on 2025, she has averaged 1-2 focal seizure per month, her seizure is less intense. She has less stress now. She moved to a apartment, she left her partner due to control issues, her kids have autism. She worries about kids delays. Patient has fatigue, denies dizziness, no double vision, no nausea, no vomiting, no abdominal pain, no mood changes, no ER visits, she had hospitalizations, and had no significant fall with trauma.    Her last generalized tonic-clonic seizure was in 2024.     She reported improved depression and anxiety, with ongoing stress about  issues and felt overwhelmed. She has therapist (every week) and psychiatrist (at [facility], about once a month).  She denied suicidal ideation.      Ictal semiology-history:   Seizure type 1: seizure are described as \" cannot communicate, no loss of awareness, sometimes she may stare off 10-15 seconds\". Sometimes she cannot communicate for few minutes, she pointed to her mouth and  would know that she cannot talk.  When she has a seizure she often would ask people \"did I seem like I blacked out and they tell me I am staring off\".. " Frequency:   Fall -Spring 2024 - 2 focal seizures with impairment in awareness  per month. She had generalized tonic-clonic convulsion 2024 and 3/2024.   Summer 2023 - 2 focal seizures with impairment in awareness  per month   2022 - 4-8 seizure per month focal seizures with impairment in awareness   -2022. 5-10 focal seizures with impairment in awareness  Per month      Seizure type 2: generalized tonic-clonic convulsion. Last seizure was 3/2024, 2021 and  - generalized tonic-clonic convulsion      Epilepsy-seizure predispositions:   The patient has no family history of epilepsy or seizures.  She has no history of gestational or  injury, febrile convulsions, developmental delay, stroke, meningitis, encephalitis, significant head injury, or other epileptic predispositions.  She denied a history of physical or sexual abuse by an adult during her childhood or adulthood.       MEDICATIONS: Carbamazepine extended release 600/800 mg daily, lamotrigine 200 mg t.i.d., levetiracetam 1500 mg b.i.d., and other medications as per the electronic medical record.       SOCIAL HISTORY: She is not working. She is living with her ex-boyfriend (father of there children), his mother and their two children.  She stopped smoking . Several attempts to quit smoking, this is very hard for her.  She has 2 toddlers born in 2018 (walking, say a few words). Ex  Ricardo passed away in . Twins born  (Aimee and Omar).  She does have a good social support system at this time.     PHYSICAL EXAMINATION:    On physical examination the patient appeared well nourished and in no acute distress.   Vital signees were as per the electronic medical record.  Skull was normocephalic and atraumatic.  Neck was supple, without signs of meningeal irritation.       On neurological examination, the patient appeared alert and was fully oriented to person, place, time, and reason for visit.  Speech showed normal  "articulation, fluency, repetitions, naming, syntax and comprehension.    Cranial nerves III through XII were normal.  Muscle masses, tones and strengths were normal throughout.  There was no pronator drift.  Sequential fine finger movements were performed normally with each hand.  No spontaneous tremors, myoclonus, or other abnormal movements were observed.  Sensations of light touch were reportedly normal throughout.  The finger-nose-finger and heel-shin maneuvers were performed normally bilaterally.  Romberg maneuver was negative.  Regular, heel, toe, tandem and reverse tandem walking were normal.  Deep tendon reflexes were normal and symmetric throughout.  Toes were downgoing bilaterally.          [employer] PERCEPT PROGRAMMING AT INITIAL INTERROGATION:       Initial Stimulation Programming:  Left ANT  Right ANT    Pulse width  90  sec  90  sec    Frequency  145 Hz  145 Hz    Current  2.0 mA  2.0 mA   Cycle  On 1 min. / Off 5 mins.  On 1 min. / Off 5 mins.          SUBSEQUENT [employer] PERCEPT PROGRAMMING:       Stimulation Set A: \"Clinical\" Left ANT  Right ANT    Pulse width  90  sec  90  sec    Frequency  145 Hz  145 Hz    Current  3.0 mA 3.0 mA   Cycle  On 1 min. / Off 5 mins.  On 1 min. / Off 5 mins.       Stimulation Set B: \"Study\" Left ANT  Right ANT    Pulse width  60  sec  60  sec    Frequency  125 Hz  125 Hz    Current  4.0 mA 4.0 mA   Cycle  On 1 min. / Off 5 mins.  On 1 min. / Off 5 mins.          IMPRESSION:    Focal epilepsy with impairment in awareness.  She has bilateral independent temporal lobe epilepsy (VIDEO EEG shows bilateral temporal epileptiform discharges and right temporal seizure recorded 2011). Semiology, staring she is not able to talk or get words out, and she has difficulty with comprehension, two seizure per month.  Etiology of her seizures is unclear and her MRI of the brain is nonlesional.  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  S/P " bilateral deep brain stimulator placement, target anterior nucleus of thalamus 4/23/2024 and phase II placement of battery on 4/29/2024. She had no complications post epilepsy surgery.       On today's visit we interrogated the Deep Brain Stimulation device.  Following review with the study team, we re-programmed the study settings of the generator as listed above.  The patient had no side effects. Specially, no anxiety and no dizziness. Antiepileptic drug doses will be continued unchanged.     She is not driving.     PLAN:   1.  Continue levetiracetam, carbamazepine and lamotrigine at the current doses, and check anti-seizure medication levels today.   2.  We completed bilateral anterior thalamic DBS interrogation, and then programmed a set  B  of stimulation parameters that was based on observations of stimulation responses in the  Epilepsy DBS Optimization  protocol, as noted above; stimulation used the set  B  parameters as the patient exited the clinic today..   3.  On 06/12/2024, the patient entered the IRB-approved  Epilepsy DBS Optimization  protocol ([id number], P.I.: Ab Skinner) today; study procedures were performed at the [facility] clinic today, and were conducted separately from the clinical visit reported in this note.    4.  Return visit in about 4 months.      I spent 45 minutes in this clinical patient care, with 33 minutes in direct patient contact, and 12 minutes in chart review and document preparation on the day of the visit.   I spent an additional 30 minutes in anterior thalamic DBS programming, in addition to other patient evaluation and management activities at this clinic visit.         Harjinder Miller M.D.   Professor of Neurology      Again, thank you for allowing me to participate in the care of your patient.      Sincerely,    Harjinder Miller MD

## 2025-06-14 ENCOUNTER — HEALTH MAINTENANCE LETTER (OUTPATIENT)
Age: 45
End: 2025-06-14

## (undated) DEVICE — DRSG KERLIX 4 1/2"X4YDS ROLL 6715

## (undated) DEVICE — RX SURGIFLO HEMOSTATIC MATRIX W/THROMBIN 8ML 2994

## (undated) DEVICE — PREP SKIN SCRUB TRAY 4461A

## (undated) DEVICE — PREP CHLORAPREP 26ML TINTED HI-LITE ORANGE 930815

## (undated) DEVICE — DRSG TEGADERM 4X4 3/4" 1626W

## (undated) DEVICE — JELLY LUBRICATING SURGILUBE 2OZ TUBE

## (undated) DEVICE — DRAPE IOBAN INCISE 13X13" 6640EZ

## (undated) DEVICE — DRSG TEGADERM 2 3/8X2 3/4" 1624W

## (undated) DEVICE — GLOVE BIOGEL PI MICRO SZ 7.5 48575

## (undated) DEVICE — PREP POVIDONE-IODINE 10% SOLUTION 4OZ BOTTLE MDS093944

## (undated) DEVICE — PREP POVIDONE-IODINE 7.5% SCRUB 4OZ BOTTLE MDS093945

## (undated) DEVICE — SPONGE COTTONOID 1/2X1/2" 20-04S

## (undated) DEVICE — SOL WATER IRRIG 1000ML BOTTLE 2F7114

## (undated) DEVICE — PREP CHLORAPREP CLEAR 3ML 930400

## (undated) DEVICE — DRAPE IOBAN INCISE 23X17" 6650EZ

## (undated) DEVICE — Device

## (undated) DEVICE — DRAPE SHEET REV FOLD 3/4 9349

## (undated) DEVICE — SPONGE SURGIFOAM 100 1974

## (undated) DEVICE — LIGHT HANDLE X1 31140133

## (undated) DEVICE — PACK NEURO MINOR UMMC SNE32MNMU4

## (undated) DEVICE — LINEN TOWEL PACK X5 5464

## (undated) DEVICE — LINEN TOWEL PACK X6 WHITE 5487

## (undated) DEVICE — ADH LIQUID MASTISOL TOPICAL VIAL 2-3ML 0523-48

## (undated) DEVICE — DRSG TELFA 3X8" 1238

## (undated) DEVICE — DRSG STERI STRIP 1/2X4" R1547

## (undated) DEVICE — NDL BLUNT 18GA 1" W/O FILTER 305181

## (undated) DEVICE — SUCTION MANIFOLD NEPTUNE 2 SYS 4 PORT 0702-020-000

## (undated) DEVICE — SHUNT TUNNELER SHEATH 46CM 901118

## (undated) DEVICE — BLADE KNIFE SURG 11 371111

## (undated) DEVICE — SU MONOCRYL 3-0 PS-1 27" Y936H

## (undated) DEVICE — BLADE CLIPPER SGL USE 9680

## (undated) DEVICE — ESU GROUND PAD ADULT REM W/15' CORD E7507DB

## (undated) DEVICE — SPONGE RAY-TEC 4X8" 7318

## (undated) DEVICE — BUR STRK CARBIDE ROUND 5.0MM 5820-110-050C

## (undated) DEVICE — SYR 03ML LL W/O NDL 309657

## (undated) DEVICE — LABEL MEDICATION SYSTEM 3303-P

## (undated) DEVICE — PAD CHUX UNDERPAD 23X24" 7136

## (undated) DEVICE — STPL SKIN 35W ROTATING HEAD PRW35

## (undated) DEVICE — SU VICRYL 3-0 SH 8X18" UND J864D

## (undated) DEVICE — SU MONOCRYL 4-0 PS-2 27" UND Y426H

## (undated) DEVICE — GLOVE BIOGEL PI MICRO INDICATOR UNDERGLOVE SZ 7.5 48975

## (undated) DEVICE — DECANTER VIAL 2006S

## (undated) DEVICE — SU VICRYL 0 CT-1 27" UND J260H

## (undated) DEVICE — PROTECTOR ARM ONE-STEP TRENDELENBURG 40418

## (undated) DEVICE — PACK GOWN 3/PK DISP XL SBA32GPFCB

## (undated) DEVICE — WIPES FOLEY CARE SURESTEP PROVON DFC100

## (undated) DEVICE — SOL NACL 0.9% IRRIG 1000ML BOTTLE 2F7124

## (undated) DEVICE — DRSG PRIMAPORE 03 1/8X6" 66000318

## (undated) DEVICE — DRAPE SCANNER BORE EXTENSION MRI NGS-PD-04

## (undated) DEVICE — SUTURE BOOTS 051003PBX

## (undated) DEVICE — DRAPE C-ARM W/STRAPS 42X72" 07-CA104

## (undated) DEVICE — DRSG PRIMAPORE 02X3" 7133

## (undated) DEVICE — OINTMENT ANTIBIOTIC BACITRACIN ZINC .9 G 1171

## (undated) DEVICE — DRAPE PATIENT NEURO MRI NGS-PD-05

## (undated) DEVICE — LINEN TOWEL PACK X30 5481

## (undated) DEVICE — CATH TRAY FOLEY 16FR W/URINE METER STATLOCK 303316A

## (undated) DEVICE — ESU GROUND PAD ADULT W/CORD E7507

## (undated) DEVICE — SENSIGHT LEAD TEST CABLE KIT

## (undated) RX ORDER — FENTANYL CITRATE 50 UG/ML
INJECTION, SOLUTION INTRAMUSCULAR; INTRAVENOUS
Status: DISPENSED
Start: 2024-04-23

## (undated) RX ORDER — CEFAZOLIN SODIUM/WATER 2 G/20 ML
SYRINGE (ML) INTRAVENOUS
Status: DISPENSED
Start: 2024-04-29

## (undated) RX ORDER — DEXAMETHASONE SODIUM PHOSPHATE 4 MG/ML
INJECTION, SOLUTION INTRA-ARTICULAR; INTRALESIONAL; INTRAMUSCULAR; INTRAVENOUS; SOFT TISSUE
Status: DISPENSED
Start: 2024-04-23

## (undated) RX ORDER — GENTAMICIN 40 MG/ML
INJECTION, SOLUTION INTRAMUSCULAR; INTRAVENOUS
Status: DISPENSED
Start: 2024-04-23

## (undated) RX ORDER — FENTANYL CITRATE 50 UG/ML
INJECTION, SOLUTION INTRAMUSCULAR; INTRAVENOUS
Status: DISPENSED
Start: 2024-04-29

## (undated) RX ORDER — ACETAMINOPHEN 325 MG/1
TABLET ORAL
Status: DISPENSED
Start: 2022-10-06

## (undated) RX ORDER — PROPOFOL 10 MG/ML
INJECTION, EMULSION INTRAVENOUS
Status: DISPENSED
Start: 2024-04-23

## (undated) RX ORDER — ACETAMINOPHEN 325 MG/1
TABLET ORAL
Status: DISPENSED
Start: 2024-04-23

## (undated) RX ORDER — ONDANSETRON 2 MG/ML
INJECTION INTRAMUSCULAR; INTRAVENOUS
Status: DISPENSED
Start: 2024-04-29

## (undated) RX ORDER — BUPIVACAINE HYDROCHLORIDE 2.5 MG/ML
INJECTION, SOLUTION EPIDURAL; INFILTRATION; INTRACAUDAL
Status: DISPENSED
Start: 2024-04-23

## (undated) RX ORDER — HYDROMORPHONE HCL IN WATER/PF 6 MG/30 ML
PATIENT CONTROLLED ANALGESIA SYRINGE INTRAVENOUS
Status: DISPENSED
Start: 2024-04-29

## (undated) RX ORDER — CEFAZOLIN SODIUM 1 G/3ML
INJECTION, POWDER, FOR SOLUTION INTRAMUSCULAR; INTRAVENOUS
Status: DISPENSED
Start: 2024-04-23

## (undated) RX ORDER — CEFAZOLIN SODIUM/WATER 2 G/20 ML
SYRINGE (ML) INTRAVENOUS
Status: DISPENSED
Start: 2024-04-23

## (undated) RX ORDER — SODIUM CHLORIDE 9 MG/ML
INJECTION, SOLUTION INTRAVENOUS
Status: DISPENSED
Start: 2022-10-06

## (undated) RX ORDER — HYDROMORPHONE HCL IN WATER/PF 6 MG/30 ML
PATIENT CONTROLLED ANALGESIA SYRINGE INTRAVENOUS
Status: DISPENSED
Start: 2024-04-23

## (undated) RX ORDER — HYDROMORPHONE HYDROCHLORIDE 1 MG/ML
INJECTION, SOLUTION INTRAMUSCULAR; INTRAVENOUS; SUBCUTANEOUS
Status: DISPENSED
Start: 2024-04-23

## (undated) RX ORDER — HEPARIN SODIUM 200 [USP'U]/100ML
INJECTION, SOLUTION INTRAVENOUS
Status: DISPENSED
Start: 2022-10-06

## (undated) RX ORDER — LIDOCAINE HYDROCHLORIDE 10 MG/ML
INJECTION, SOLUTION EPIDURAL; INFILTRATION; INTRACAUDAL; PERINEURAL
Status: DISPENSED
Start: 2022-10-06

## (undated) RX ORDER — GLYCOPYRROLATE 0.2 MG/ML
INJECTION, SOLUTION INTRAMUSCULAR; INTRAVENOUS
Status: DISPENSED
Start: 2024-04-23

## (undated) RX ORDER — LIDOCAINE HYDROCHLORIDE AND EPINEPHRINE 10; 10 MG/ML; UG/ML
INJECTION, SOLUTION INFILTRATION; PERINEURAL
Status: DISPENSED
Start: 2024-04-29

## (undated) RX ORDER — ACETAMINOPHEN 325 MG/1
TABLET ORAL
Status: DISPENSED
Start: 2024-04-29

## (undated) RX ORDER — LIDOCAINE HYDROCHLORIDE AND EPINEPHRINE 10; 10 MG/ML; UG/ML
INJECTION, SOLUTION INFILTRATION; PERINEURAL
Status: DISPENSED
Start: 2024-04-23

## (undated) RX ORDER — ONDANSETRON 2 MG/ML
INJECTION INTRAMUSCULAR; INTRAVENOUS
Status: DISPENSED
Start: 2024-04-23

## (undated) RX ORDER — FENTANYL CITRATE 50 UG/ML
INJECTION, SOLUTION INTRAMUSCULAR; INTRAVENOUS
Status: DISPENSED
Start: 2022-10-06

## (undated) RX ORDER — BUPIVACAINE HYDROCHLORIDE 2.5 MG/ML
INJECTION, SOLUTION EPIDURAL; INFILTRATION; INTRACAUDAL
Status: DISPENSED
Start: 2024-04-29

## (undated) RX ORDER — FENTANYL CITRATE-0.9 % NACL/PF 10 MCG/ML
PLASTIC BAG, INJECTION (ML) INTRAVENOUS
Status: DISPENSED
Start: 2024-04-23